# Patient Record
Sex: FEMALE | Race: WHITE | NOT HISPANIC OR LATINO | Employment: OTHER | URBAN - METROPOLITAN AREA
[De-identification: names, ages, dates, MRNs, and addresses within clinical notes are randomized per-mention and may not be internally consistent; named-entity substitution may affect disease eponyms.]

---

## 2022-04-29 ENCOUNTER — HOSPITAL ENCOUNTER (EMERGENCY)
Facility: HOSPITAL | Age: 77
Discharge: ANOTHER HOSPITAL | End: 2022-04-30
Attending: SURGERY
Payer: MEDICARE

## 2022-04-29 DIAGNOSIS — I48.91 ATRIAL FIBRILLATION WITH RVR: Primary | ICD-10-CM

## 2022-04-29 DIAGNOSIS — R42 DIZZINESS: ICD-10-CM

## 2022-04-29 LAB
ALBUMIN SERPL BCP-MCNC: 4.1 G/DL (ref 3.5–5.2)
ALP SERPL-CCNC: 57 U/L (ref 55–135)
ALT SERPL W/O P-5'-P-CCNC: 29 U/L (ref 10–44)
AMORPH CRY URNS QL MICRO: ABNORMAL
AMPHET+METHAMPHET UR QL: NEGATIVE
ANION GAP SERPL CALC-SCNC: 13 MMOL/L (ref 8–16)
AST SERPL-CCNC: 49 U/L (ref 10–40)
BACTERIA #/AREA URNS HPF: ABNORMAL /HPF
BARBITURATES UR QL SCN>200 NG/ML: NEGATIVE
BASOPHILS # BLD AUTO: 0.05 K/UL (ref 0–0.2)
BASOPHILS NFR BLD: 0.6 % (ref 0–1.9)
BENZODIAZ UR QL SCN>200 NG/ML: NEGATIVE
BILIRUB SERPL-MCNC: 0.5 MG/DL (ref 0.1–1)
BILIRUB UR QL STRIP: ABNORMAL
BNP SERPL-MCNC: 278 PG/ML (ref 0–99)
BUN SERPL-MCNC: 28 MG/DL (ref 8–23)
BZE UR QL SCN: NEGATIVE
CALCIUM SERPL-MCNC: 9.9 MG/DL (ref 8.7–10.5)
CANNABINOIDS UR QL SCN: NEGATIVE
CHLORIDE SERPL-SCNC: 92 MMOL/L (ref 95–110)
CK MB SERPL-MCNC: 11.5 NG/ML (ref 0.1–6.5)
CK MB SERPL-RTO: 2.5 % (ref 0–5)
CK SERPL-CCNC: 466 U/L (ref 20–180)
CK SERPL-CCNC: 466 U/L (ref 20–180)
CLARITY UR: CLEAR
CO2 SERPL-SCNC: 25 MMOL/L (ref 23–29)
COLOR UR: YELLOW
CREAT SERPL-MCNC: 1.5 MG/DL (ref 0.5–1.4)
CREAT UR-MCNC: 158.9 MG/DL (ref 15–325)
DIFFERENTIAL METHOD: ABNORMAL
EOSINOPHIL # BLD AUTO: 0 K/UL (ref 0–0.5)
EOSINOPHIL NFR BLD: 0.1 % (ref 0–8)
ERYTHROCYTE [DISTWIDTH] IN BLOOD BY AUTOMATED COUNT: 13.7 % (ref 11.5–14.5)
EST. GFR  (AFRICAN AMERICAN): 38 ML/MIN/1.73 M^2
EST. GFR  (NON AFRICAN AMERICAN): 33 ML/MIN/1.73 M^2
GLUCOSE SERPL-MCNC: 131 MG/DL (ref 70–110)
GLUCOSE UR QL STRIP: NEGATIVE
HCT VFR BLD AUTO: 35.7 % (ref 37–48.5)
HGB BLD-MCNC: 12 G/DL (ref 12–16)
HGB UR QL STRIP: NEGATIVE
HYALINE CASTS #/AREA URNS LPF: 30 /LPF
IMM GRANULOCYTES # BLD AUTO: 0.01 K/UL (ref 0–0.04)
IMM GRANULOCYTES NFR BLD AUTO: 0.1 % (ref 0–0.5)
KETONES UR QL STRIP: ABNORMAL
LEUKOCYTE ESTERASE UR QL STRIP: ABNORMAL
LIPASE SERPL-CCNC: 27 U/L (ref 4–60)
LYMPHOCYTES # BLD AUTO: 2.5 K/UL (ref 1–4.8)
LYMPHOCYTES NFR BLD: 30.7 % (ref 18–48)
MAGNESIUM SERPL-MCNC: 2 MG/DL (ref 1.6–2.6)
MCH RBC QN AUTO: 32.3 PG (ref 27–31)
MCHC RBC AUTO-ENTMCNC: 33.6 G/DL (ref 32–36)
MCV RBC AUTO: 96 FL (ref 82–98)
METHADONE UR QL SCN>300 NG/ML: NEGATIVE
MICROSCOPIC COMMENT: ABNORMAL
MONOCYTES # BLD AUTO: 1.2 K/UL (ref 0.3–1)
MONOCYTES NFR BLD: 14.8 % (ref 4–15)
NEUTROPHILS # BLD AUTO: 4.4 K/UL (ref 1.8–7.7)
NEUTROPHILS NFR BLD: 53.7 % (ref 38–73)
NITRITE UR QL STRIP: NEGATIVE
NRBC BLD-RTO: 0 /100 WBC
OPIATES UR QL SCN: NEGATIVE
PCP UR QL SCN>25 NG/ML: NEGATIVE
PH UR STRIP: 6 [PH] (ref 5–8)
PHOSPHATE SERPL-MCNC: 2.7 MG/DL (ref 2.7–4.5)
PLATELET # BLD AUTO: 256 K/UL (ref 150–450)
PMV BLD AUTO: 10 FL (ref 9.2–12.9)
POTASSIUM SERPL-SCNC: 3.7 MMOL/L (ref 3.5–5.1)
PROT SERPL-MCNC: 7.3 G/DL (ref 6–8.4)
PROT UR QL STRIP: NEGATIVE
RBC # BLD AUTO: 3.71 M/UL (ref 4–5.4)
RBC #/AREA URNS HPF: 1 /HPF (ref 0–4)
SODIUM SERPL-SCNC: 130 MMOL/L (ref 136–145)
SP GR UR STRIP: 1.02 (ref 1–1.03)
SQUAMOUS #/AREA URNS HPF: 1 /HPF
TOXICOLOGY INFORMATION: NORMAL
TROPONIN I SERPL DL<=0.01 NG/ML-MCNC: 0.03 NG/ML (ref 0–0.03)
TSH SERPL DL<=0.005 MIU/L-ACNC: 2.16 UIU/ML (ref 0.4–4)
URN SPEC COLLECT METH UR: ABNORMAL
UROBILINOGEN UR STRIP-ACNC: NEGATIVE EU/DL
WBC # BLD AUTO: 8.12 K/UL (ref 3.9–12.7)
WBC #/AREA URNS HPF: 7 /HPF (ref 0–5)

## 2022-04-29 PROCEDURE — 83880 ASSAY OF NATRIURETIC PEPTIDE: CPT | Performed by: SURGERY

## 2022-04-29 PROCEDURE — 80053 COMPREHEN METABOLIC PANEL: CPT | Performed by: SURGERY

## 2022-04-29 PROCEDURE — 93010 EKG 12-LEAD: ICD-10-PCS | Mod: ,,, | Performed by: INTERNAL MEDICINE

## 2022-04-29 PROCEDURE — 82553 CREATINE MB FRACTION: CPT | Performed by: SURGERY

## 2022-04-29 PROCEDURE — 25000003 PHARM REV CODE 250: Performed by: SURGERY

## 2022-04-29 PROCEDURE — 85025 COMPLETE CBC W/AUTO DIFF WBC: CPT | Performed by: SURGERY

## 2022-04-29 PROCEDURE — 80307 DRUG TEST PRSMV CHEM ANLYZR: CPT | Performed by: SURGERY

## 2022-04-29 PROCEDURE — 84484 ASSAY OF TROPONIN QUANT: CPT | Performed by: SURGERY

## 2022-04-29 PROCEDURE — 96361 HYDRATE IV INFUSION ADD-ON: CPT

## 2022-04-29 PROCEDURE — 96374 THER/PROPH/DIAG INJ IV PUSH: CPT | Mod: 59

## 2022-04-29 PROCEDURE — 83735 ASSAY OF MAGNESIUM: CPT | Performed by: SURGERY

## 2022-04-29 PROCEDURE — 93005 ELECTROCARDIOGRAM TRACING: CPT

## 2022-04-29 PROCEDURE — 63600175 PHARM REV CODE 636 W HCPCS: Performed by: SURGERY

## 2022-04-29 PROCEDURE — 99291 CRITICAL CARE FIRST HOUR: CPT | Mod: 25

## 2022-04-29 PROCEDURE — 96372 THER/PROPH/DIAG INJ SC/IM: CPT | Mod: 59 | Performed by: SURGERY

## 2022-04-29 PROCEDURE — 36415 COLL VENOUS BLD VENIPUNCTURE: CPT | Performed by: SURGERY

## 2022-04-29 PROCEDURE — 83690 ASSAY OF LIPASE: CPT | Performed by: SURGERY

## 2022-04-29 PROCEDURE — 93010 ELECTROCARDIOGRAM REPORT: CPT | Mod: ,,, | Performed by: INTERNAL MEDICINE

## 2022-04-29 PROCEDURE — 81000 URINALYSIS NONAUTO W/SCOPE: CPT | Performed by: SURGERY

## 2022-04-29 PROCEDURE — 84100 ASSAY OF PHOSPHORUS: CPT | Performed by: SURGERY

## 2022-04-29 PROCEDURE — 84443 ASSAY THYROID STIM HORMONE: CPT | Performed by: SURGERY

## 2022-04-29 RX ORDER — DILTIAZEM HYDROCHLORIDE 5 MG/ML
10 INJECTION INTRAVENOUS
Status: COMPLETED | OUTPATIENT
Start: 2022-04-29 | End: 2022-04-29

## 2022-04-29 RX ORDER — ENOXAPARIN SODIUM 100 MG/ML
1 INJECTION SUBCUTANEOUS
Status: COMPLETED | OUTPATIENT
Start: 2022-04-29 | End: 2022-04-29

## 2022-04-29 RX ORDER — SODIUM CHLORIDE 9 MG/ML
INJECTION, SOLUTION INTRAVENOUS
Status: COMPLETED | OUTPATIENT
Start: 2022-04-29 | End: 2022-04-29

## 2022-04-29 RX ORDER — DILTIAZEM HCL 1 MG/ML
5 INJECTION, SOLUTION INTRAVENOUS
Status: COMPLETED | OUTPATIENT
Start: 2022-04-29 | End: 2022-04-29

## 2022-04-29 RX ADMIN — SODIUM CHLORIDE: 0.9 INJECTION, SOLUTION INTRAVENOUS at 09:04

## 2022-04-29 RX ADMIN — DILTIAZEM HYDROCHLORIDE 10 MG: 5 INJECTION INTRAVENOUS at 10:04

## 2022-04-29 RX ADMIN — ENOXAPARIN SODIUM 50 MG: 60 INJECTION SUBCUTANEOUS at 10:04

## 2022-04-29 RX ADMIN — DILTIAZEM HYDROCHLORIDE 5 MG/HR: 5 INJECTION INTRAVENOUS at 11:04

## 2022-04-30 VITALS
DIASTOLIC BLOOD PRESSURE: 63 MMHG | SYSTOLIC BLOOD PRESSURE: 130 MMHG | WEIGHT: 114 LBS | TEMPERATURE: 99 F | BODY MASS INDEX: 21.52 KG/M2 | HEIGHT: 61 IN | RESPIRATION RATE: 23 BRPM | HEART RATE: 95 BPM | OXYGEN SATURATION: 100 %

## 2022-04-30 NOTE — ED PROVIDER NOTES
Encounter Date: 4/29/2022       History     Chief Complaint   Patient presents with    Loss of Consciousness     Patient to ED via EMS after being found outside confused, Patient is unsure how long she was outside     Teresa Leos is a 77 y.o. female presents with confusion this evening at home  Patient was found outside, not acting like herself no obvious stroke-like symptoms  Family states that the patient has a lot of medications, gets confused taking them  Patient has got confused before after mixing up her medications at home per family  Additionally, the patient started to show signs and symptoms of dementia per family  Patient has no focal deficits, no obvious weakness, not acting like herself per family        Review of patient's allergies indicates:  No Known Allergies  Past Medical History:   Diagnosis Date    Hyperlipidemia     Hypertension     Osteoporosis      Past Surgical History:   Procedure Laterality Date    HYSTERECTOMY      LEG SURGERY      SKIN CANCER EXCISION      TONSILLECTOMY, ADENOIDECTOMY       No family history on file.  Social History     Tobacco Use    Smoking status: Current Every Day Smoker     Packs/day: 0.50    Smokeless tobacco: Never Used   Substance Use Topics    Alcohol use: No    Drug use: No     Review of Systems   Constitutional: Negative.    HENT: Negative.    Eyes: Negative.    Respiratory: Negative.    Cardiovascular: Negative.    Gastrointestinal: Negative.    Genitourinary: Negative.    Musculoskeletal: Negative.    Skin: Negative.    Neurological: Positive for dizziness.   Psychiatric/Behavioral: Positive for confusion.       Physical Exam     Initial Vitals   BP Pulse Resp Temp SpO2   04/29/22 2110 04/29/22 2132 04/29/22 2207 04/29/22 2206 04/29/22 2132   135/77 (!) 165 (!) 24 98.9 °F (37.2 °C) (!) 93 %      MAP       --                Physical Exam    Nursing note and vitals reviewed.  Constitutional: She appears well-developed and well-nourished.    HENT:   Head: Normocephalic and atraumatic.   Right Ear: External ear normal.   Left Ear: External ear normal.   Nose: Nose normal.   Mouth/Throat: Oropharynx is clear and moist.   Eyes: Conjunctivae and EOM are normal. Pupils are equal, round, and reactive to light.   Neck:   Normal range of motion.  Cardiovascular:   Irregular tachycardia consistent with atrial fibrillation   Pulmonary/Chest: Breath sounds normal.   Abdominal: Abdomen is soft. Bowel sounds are normal.   Musculoskeletal:         General: Normal range of motion.      Cervical back: Normal range of motion.     Neurological: She is alert and oriented to person, place, and time.   Skin: Skin is warm. Capillary refill takes less than 2 seconds.         ED Course   Procedures  Labs Reviewed   COMPREHENSIVE METABOLIC PANEL - Abnormal; Notable for the following components:       Result Value    Sodium 130 (*)     Chloride 92 (*)     Glucose 131 (*)     BUN 28 (*)     Creatinine 1.5 (*)     AST 49 (*)     eGFR if  38 (*)     eGFR if non  33 (*)     All other components within normal limits   TROPONIN I - Abnormal; Notable for the following components:    Troponin I 0.027 (*)     All other components within normal limits   CK-MB - Abnormal; Notable for the following components:     (*)     CPK MB 11.5 (*)     All other components within normal limits   CK - Abnormal; Notable for the following components:     (*)     All other components within normal limits   CBC W/ AUTO DIFFERENTIAL - Abnormal; Notable for the following components:    RBC 3.71 (*)     Hematocrit 35.7 (*)     MCH 32.3 (*)     Mono # 1.2 (*)     All other components within normal limits   B-TYPE NATRIURETIC PEPTIDE - Abnormal; Notable for the following components:     (*)     All other components within normal limits   URINALYSIS, REFLEX TO URINE CULTURE - Abnormal; Notable for the following components:    Ketones, UA 1+ (*)     Bilirubin  (UA) 1+ (*)     Leukocytes, UA Trace (*)     All other components within normal limits    Narrative:     Specimen Source->Urine   URINALYSIS MICROSCOPIC - Abnormal; Notable for the following components:    WBC, UA 7 (*)     Bacteria Few (*)     Hyaline Casts, UA 30 (*)     All other components within normal limits    Narrative:     Specimen Source->Urine   DRUG SCREEN PANEL, URINE EMERGENCY    Narrative:     Specimen Source->Urine   PHOSPHORUS   MAGNESIUM   TSH   LIPASE          Imaging Results          CT Head Without Contrast (In process)  Result time 04/29/22 22:29:02               X-Ray Chest 1 View (Final result)  Result time 04/29/22 22:13:33    Final result by Darren Urbina MD (04/29/22 22:13:33)                 Impression:      No acute process.      Electronically signed by: Darren Urbina MD  Date:    04/29/2022  Time:    22:13             Narrative:    EXAMINATION:  XR CHEST 1 VIEW    CLINICAL HISTORY:  Chest pain.    TECHNIQUE:  Single frontal view of the chest was performed.    COMPARISON:  06/16/2016.    FINDINGS:  Monitoring EKG leads are present.  The trachea is unremarkable.  There are calcifications of the aortic knob.  The cardiomediastinal silhouette is within normal limits.  The hemidiaphragms are unremarkable.  There are no pleural effusions.  There is no evidence of a pneumothorax.  There is no evidence of pneumomediastinum.  There multiple stable calcified pulmonary nodules.  No focal consolidations identified.  There are degenerative changes in the osseous structures.                                 Medications   diltiaZEM injection 10 mg (has no administration in time range)   diltiaZEM 125 mg in D5W 125 mL infusion (has no administration in time range)   enoxaparin injection 50 mg (has no administration in time range)   0.9%  NaCl infusion ( Intravenous New Bag 4/29/22 0885)     Critical Care ED Physician Time (minutes):  -- Performed by: Boris Schwab M.D.  -- Date/Time: 10:32 PM 4/29/2022    -- Direct Patient Care (Face Time): 5  -- Additional History from Records or Taking Additional History: 5  -- Ordering, Reviewing, and Interpreting Diagnostic Studies: 5  -- Total Time in Documentation: 11  -- Consultation with Other Physicians: 5  -- Consultation with Family Related to Condition: 0  -- Total Critical Care Time: 31  -- Critical care was necessary to treat atrial fibrillation RVR  -- Critical care was time spent personally by me on the following activities:   -- blood draw for specimens discussions with consultants,   -- development of treatment plan with patient or surrogate,   -- examination of patient, ordering and performing treatments   -- review of radiographic studies, re-evaluation of pt's condition  -- review of labs and evaluation of response to treatment      Medical Decision Making:   Initial Assessment:   Confusion outside tonight, patient not acting like herself  Family is worried she is not taking her medications appropriately  Patient is showing increasing signs of dementia per her family  Patient has a heart rate in the 160s, no previous history of arrhythmia    Differential Diagnosis:   Arrhythmia, confusion, stroke, metabolic encephalopathy, UTI    Clinical Tests:   Lab Tests: Ordered and Reviewed  Radiological Study: Ordered and Reviewed  Medical Tests: Ordered and Reviewed    ED Management:  Patient with dizziness confusion and tachycardia on arrival tonight  Patient is in atrial fibrillation RVR, no previous history of atrial fibrillation  Patient has no UTI, has no other obvious metabolic issues on evaluation  Lovenox administered, IV Cardizem started, will seek transfer for evaluation  Patient needs higher level care and cardiology evaluation going forward                      Clinical Impression:   Final diagnoses:  [R42] Dizziness  [I48.91] Atrial fibrillation with RVR (Primary)          ED Disposition Condition    Transfer to Another Facility [facility]  MD Josselin  04/29/22 6253

## 2022-04-30 NOTE — ED NOTES
Patient able to state name and that she is in the hospital.  Patient unable to answer medical history, prescriber, allergy questions appropriately.  Family denies history of afib

## 2022-04-30 NOTE — ED NOTES
aasi here for transport to [facility] Emergency Room, IV Diltiazem, no acute distress noted. Breaths even and unlabored.

## 2022-04-30 NOTE — ED NOTES
Bed: ED 05  Expected date: 4/29/22  Expected time: 9:00 PM  Means of arrival: Ambulance Service  Comments:

## 2022-05-04 ENCOUNTER — HOSPITAL ENCOUNTER (EMERGENCY)
Facility: HOSPITAL | Age: 77
Discharge: ANOTHER HOSPITAL | End: 2022-05-04
Attending: SURGERY
Payer: MEDICARE

## 2022-05-04 ENCOUNTER — HOSPITAL ENCOUNTER (INPATIENT)
Facility: HOSPITAL | Age: 77
LOS: 20 days | Discharge: NURSING, REHAB OR LONG-TERM CARE | DRG: 100 | End: 2022-05-25
Attending: EMERGENCY MEDICINE | Admitting: PSYCHIATRY & NEUROLOGY
Payer: MEDICARE

## 2022-05-04 VITALS
SYSTOLIC BLOOD PRESSURE: 100 MMHG | TEMPERATURE: 96 F | HEIGHT: 61 IN | DIASTOLIC BLOOD PRESSURE: 58 MMHG | RESPIRATION RATE: 13 BRPM | HEART RATE: 119 BPM | WEIGHT: 113.56 LBS | BODY MASS INDEX: 21.44 KG/M2 | OXYGEN SATURATION: 100 %

## 2022-05-04 DIAGNOSIS — R56.9 SEIZURE: ICD-10-CM

## 2022-05-04 DIAGNOSIS — R56.9 FOCAL SEIZURES: ICD-10-CM

## 2022-05-04 DIAGNOSIS — R56.9 SEIZURE-LIKE ACTIVITY: ICD-10-CM

## 2022-05-04 DIAGNOSIS — R56.9 SEIZURE-LIKE ACTIVITY: Primary | ICD-10-CM

## 2022-05-04 DIAGNOSIS — I95.9 HYPOTENSION, UNSPECIFIED HYPOTENSION TYPE: ICD-10-CM

## 2022-05-04 DIAGNOSIS — K21.9 GASTROESOPHAGEAL REFLUX DISEASE WITHOUT ESOPHAGITIS: ICD-10-CM

## 2022-05-04 DIAGNOSIS — I48.91 A-FIB: ICD-10-CM

## 2022-05-04 DIAGNOSIS — J96.01 ACUTE HYPOXEMIC RESPIRATORY FAILURE: ICD-10-CM

## 2022-05-04 DIAGNOSIS — I48.91 ATRIAL FIBRILLATION WITH RVR: ICD-10-CM

## 2022-05-04 DIAGNOSIS — R56.9 FOCAL SEIZURE: ICD-10-CM

## 2022-05-04 DIAGNOSIS — N30.00 ACUTE CYSTITIS WITHOUT HEMATURIA: ICD-10-CM

## 2022-05-04 DIAGNOSIS — G40.901 STATUS EPILEPTICUS: ICD-10-CM

## 2022-05-04 DIAGNOSIS — R41.82 ALTERED MENTAL STATUS, UNSPECIFIED ALTERED MENTAL STATUS TYPE: Primary | ICD-10-CM

## 2022-05-04 DIAGNOSIS — R07.9 CHEST PAIN: ICD-10-CM

## 2022-05-04 DIAGNOSIS — F03.90 DEMENTIA WITHOUT BEHAVIORAL DISTURBANCE, UNSPECIFIED DEMENTIA TYPE: ICD-10-CM

## 2022-05-04 DIAGNOSIS — E87.70 VOLUME OVERLOAD: ICD-10-CM

## 2022-05-04 PROBLEM — E78.5 HYPERLIPIDEMIA: Status: ACTIVE | Noted: 2022-05-04

## 2022-05-04 PROBLEM — M25.559 GREATER TROCHANTERIC PAIN SYNDROME: Status: ACTIVE | Noted: 2022-05-04

## 2022-05-04 PROBLEM — M16.9 OSTEOARTHRITIS OF HIP: Status: ACTIVE | Noted: 2022-05-04

## 2022-05-04 PROBLEM — S82.209A CLOSED FRACTURE OF TIBIA: Status: ACTIVE | Noted: 2022-05-04

## 2022-05-04 PROBLEM — M86.9 OSTEOMYELITIS OF LOWER LEG: Status: ACTIVE | Noted: 2022-05-04

## 2022-05-04 PROBLEM — E78.49 OTHER HYPERLIPIDEMIA: Status: ACTIVE | Noted: 2022-05-04

## 2022-05-04 PROBLEM — A04.8 HELICOBACTER PYLORI GASTROINTESTINAL TRACT INFECTION: Status: ACTIVE | Noted: 2022-05-04

## 2022-05-04 PROBLEM — I83.90 VARICOSE VEINS OF LOWER EXTREMITY: Status: ACTIVE | Noted: 2022-05-04

## 2022-05-04 PROBLEM — M71.9 DISORDER OF BURSAE OF SHOULDER REGION: Status: ACTIVE | Noted: 2022-05-04

## 2022-05-04 PROBLEM — B35.3: Status: ACTIVE | Noted: 2022-05-04

## 2022-05-04 PROBLEM — M81.0 OSTEOPOROSIS: Status: ACTIVE | Noted: 2022-05-04

## 2022-05-04 LAB
ALBUMIN SERPL BCP-MCNC: 3.6 G/DL (ref 3.5–5.2)
ALLENS TEST: ABNORMAL
ALLENS TEST: ABNORMAL
ALP SERPL-CCNC: 51 U/L (ref 55–135)
ALT SERPL W/O P-5'-P-CCNC: 28 U/L (ref 10–44)
AMPHET+METHAMPHET UR QL: NEGATIVE
ANION GAP SERPL CALC-SCNC: 18 MMOL/L (ref 8–16)
APTT BLDCRRT: 25.6 SEC (ref 21–32)
AST SERPL-CCNC: 45 U/L (ref 10–40)
BARBITURATES UR QL SCN>200 NG/ML: NEGATIVE
BASOPHILS # BLD AUTO: 0.08 K/UL (ref 0–0.2)
BASOPHILS NFR BLD: 0.9 % (ref 0–1.9)
BENZODIAZ UR QL SCN>200 NG/ML: ABNORMAL
BILIRUB SERPL-MCNC: 0.5 MG/DL (ref 0.1–1)
BILIRUB UR QL STRIP: NEGATIVE
BNP SERPL-MCNC: 490 PG/ML (ref 0–99)
BUN SERPL-MCNC: 22 MG/DL (ref 8–23)
BZE UR QL SCN: NEGATIVE
CALCIUM SERPL-MCNC: 9.4 MG/DL (ref 8.7–10.5)
CANNABINOIDS UR QL SCN: NEGATIVE
CHLORIDE SERPL-SCNC: 95 MMOL/L (ref 95–110)
CK MB SERPL-MCNC: 5.5 NG/ML (ref 0.1–6.5)
CK MB SERPL-RTO: 2.8 % (ref 0–5)
CK SERPL-CCNC: 196 U/L (ref 20–180)
CK SERPL-CCNC: 196 U/L (ref 20–180)
CLARITY UR: CLEAR
CO2 SERPL-SCNC: 17 MMOL/L (ref 23–29)
COLOR UR: YELLOW
CREAT SERPL-MCNC: 1.3 MG/DL (ref 0.5–1.4)
CREAT UR-MCNC: 65.8 MG/DL (ref 15–325)
DELSYS: ABNORMAL
DELSYS: ABNORMAL
DIFFERENTIAL METHOD: ABNORMAL
EOSINOPHIL # BLD AUTO: 0.1 K/UL (ref 0–0.5)
EOSINOPHIL NFR BLD: 0.5 % (ref 0–8)
ERYTHROCYTE [DISTWIDTH] IN BLOOD BY AUTOMATED COUNT: 14 % (ref 11.5–14.5)
EST. GFR  (AFRICAN AMERICAN): 45 ML/MIN/1.73 M^2
EST. GFR  (NON AFRICAN AMERICAN): 39 ML/MIN/1.73 M^2
GLUCOSE SERPL-MCNC: 113 MG/DL (ref 70–110)
GLUCOSE UR QL STRIP: NEGATIVE
HCO3 UR-SCNC: 21.8 MMOL/L (ref 24–28)
HCT VFR BLD AUTO: 37 % (ref 37–48.5)
HGB BLD-MCNC: 12.2 G/DL (ref 12–16)
HGB UR QL STRIP: NEGATIVE
IMM GRANULOCYTES # BLD AUTO: 0.01 K/UL (ref 0–0.04)
IMM GRANULOCYTES NFR BLD AUTO: 0.1 % (ref 0–0.5)
INR PPP: 1.2 (ref 0.8–1.2)
KETONES UR QL STRIP: NEGATIVE
LACTATE SERPL-SCNC: 5.1 MMOL/L (ref 0.5–2.2)
LDH SERPL L TO P-CCNC: 2.27 MMOL/L (ref 0.5–2.2)
LEUKOCYTE ESTERASE UR QL STRIP: NEGATIVE
LYMPHOCYTES # BLD AUTO: 4.8 K/UL (ref 1–4.8)
LYMPHOCYTES NFR BLD: 50.9 % (ref 18–48)
MCH RBC QN AUTO: 32.6 PG (ref 27–31)
MCHC RBC AUTO-ENTMCNC: 33 G/DL (ref 32–36)
MCV RBC AUTO: 99 FL (ref 82–98)
METHADONE UR QL SCN>300 NG/ML: NEGATIVE
MODE: ABNORMAL
MODE: ABNORMAL
MONOCYTES # BLD AUTO: 1.2 K/UL (ref 0.3–1)
MONOCYTES NFR BLD: 12.3 % (ref 4–15)
NEUTROPHILS # BLD AUTO: 3.3 K/UL (ref 1.8–7.7)
NEUTROPHILS NFR BLD: 35.3 % (ref 38–73)
NITRITE UR QL STRIP: NEGATIVE
NRBC BLD-RTO: 0 /100 WBC
OPIATES UR QL SCN: NEGATIVE
PCO2 BLDA: 43.7 MMHG (ref 35–45)
PCP UR QL SCN>25 NG/ML: NEGATIVE
PH SMN: 7.31 [PH] (ref 7.35–7.45)
PH UR STRIP: 6 [PH] (ref 5–8)
PLATELET # BLD AUTO: 231 K/UL (ref 150–450)
PMV BLD AUTO: 10.5 FL (ref 9.2–12.9)
PO2 BLDA: 31 MMHG (ref 40–60)
POC BE: -4 MMOL/L
POC SATURATED O2: 53 % (ref 95–100)
POC TCO2: 23 MMOL/L (ref 24–29)
POTASSIUM SERPL-SCNC: 4.9 MMOL/L (ref 3.5–5.1)
PROT SERPL-MCNC: 7.1 G/DL (ref 6–8.4)
PROT UR QL STRIP: NEGATIVE
PROTHROMBIN TIME: 11.9 SEC (ref 9–12.5)
RBC # BLD AUTO: 3.74 M/UL (ref 4–5.4)
SAMPLE: ABNORMAL
SAMPLE: ABNORMAL
SARS-COV-2 RDRP RESP QL NAA+PROBE: NEGATIVE
SITE: ABNORMAL
SITE: ABNORMAL
SODIUM SERPL-SCNC: 130 MMOL/L (ref 136–145)
SP GR UR STRIP: 1.02 (ref 1–1.03)
TOXICOLOGY INFORMATION: ABNORMAL
TROPONIN I SERPL DL<=0.01 NG/ML-MCNC: <0.006 NG/ML (ref 0–0.03)
URN SPEC COLLECT METH UR: NORMAL
UROBILINOGEN UR STRIP-ACNC: NEGATIVE EU/DL
WBC # BLD AUTO: 9.4 K/UL (ref 3.9–12.7)

## 2022-05-04 PROCEDURE — 93010 ELECTROCARDIOGRAM REPORT: CPT | Mod: ,,, | Performed by: INTERNAL MEDICINE

## 2022-05-04 PROCEDURE — 85610 PROTHROMBIN TIME: CPT | Performed by: SURGERY

## 2022-05-04 PROCEDURE — G0378 HOSPITAL OBSERVATION PER HR: HCPCS

## 2022-05-04 PROCEDURE — 96365 THER/PROPH/DIAG IV INF INIT: CPT

## 2022-05-04 PROCEDURE — 99291 CRITICAL CARE FIRST HOUR: CPT | Mod: ,,, | Performed by: EMERGENCY MEDICINE

## 2022-05-04 PROCEDURE — 99291 PR CRITICAL CARE, E/M 30-74 MINUTES: ICD-10-PCS | Mod: ,,, | Performed by: EMERGENCY MEDICINE

## 2022-05-04 PROCEDURE — 25000003 PHARM REV CODE 250: Performed by: SURGERY

## 2022-05-04 PROCEDURE — 93005 ELECTROCARDIOGRAM TRACING: CPT

## 2022-05-04 PROCEDURE — 99285 EMERGENCY DEPT VISIT HI MDM: CPT | Mod: 25

## 2022-05-04 PROCEDURE — 99285 EMERGENCY DEPT VISIT HI MDM: CPT | Mod: 25,27

## 2022-05-04 PROCEDURE — 93010 EKG 12-LEAD: ICD-10-PCS | Mod: ,,, | Performed by: INTERNAL MEDICINE

## 2022-05-04 PROCEDURE — 25000003 PHARM REV CODE 250: Performed by: HOSPITALIST

## 2022-05-04 PROCEDURE — 99900035 HC TECH TIME PER 15 MIN (STAT)

## 2022-05-04 PROCEDURE — 96374 THER/PROPH/DIAG INJ IV PUSH: CPT

## 2022-05-04 PROCEDURE — 63600175 PHARM REV CODE 636 W HCPCS: Performed by: SURGERY

## 2022-05-04 PROCEDURE — 99291 CRITICAL CARE FIRST HOUR: CPT | Mod: 25

## 2022-05-04 PROCEDURE — 96360 HYDRATION IV INFUSION INIT: CPT | Mod: 59

## 2022-05-04 PROCEDURE — 85730 THROMBOPLASTIN TIME PARTIAL: CPT | Performed by: SURGERY

## 2022-05-04 PROCEDURE — 25000003 PHARM REV CODE 250: Performed by: EMERGENCY MEDICINE

## 2022-05-04 PROCEDURE — 82803 BLOOD GASES ANY COMBINATION: CPT

## 2022-05-04 PROCEDURE — 80053 COMPREHEN METABOLIC PANEL: CPT | Performed by: SURGERY

## 2022-05-04 PROCEDURE — 81003 URINALYSIS AUTO W/O SCOPE: CPT | Mod: 59 | Performed by: SURGERY

## 2022-05-04 PROCEDURE — 36415 COLL VENOUS BLD VENIPUNCTURE: CPT | Performed by: SURGERY

## 2022-05-04 PROCEDURE — 83880 ASSAY OF NATRIURETIC PEPTIDE: CPT | Performed by: SURGERY

## 2022-05-04 PROCEDURE — 84484 ASSAY OF TROPONIN QUANT: CPT | Performed by: SURGERY

## 2022-05-04 PROCEDURE — 83605 ASSAY OF LACTIC ACID: CPT

## 2022-05-04 PROCEDURE — 87040 BLOOD CULTURE FOR BACTERIA: CPT | Performed by: SURGERY

## 2022-05-04 PROCEDURE — U0002 COVID-19 LAB TEST NON-CDC: HCPCS | Performed by: SURGERY

## 2022-05-04 PROCEDURE — 63600175 PHARM REV CODE 636 W HCPCS: Performed by: HOSPITALIST

## 2022-05-04 PROCEDURE — 96361 HYDRATE IV INFUSION ADD-ON: CPT

## 2022-05-04 PROCEDURE — 63600175 PHARM REV CODE 636 W HCPCS: Performed by: EMERGENCY MEDICINE

## 2022-05-04 PROCEDURE — 82553 CREATINE MB FRACTION: CPT | Performed by: SURGERY

## 2022-05-04 PROCEDURE — 99220 PR INITIAL OBSERVATION CARE,LEVL III: CPT | Mod: ,,, | Performed by: HOSPITALIST

## 2022-05-04 PROCEDURE — 85025 COMPLETE CBC W/AUTO DIFF WBC: CPT | Performed by: SURGERY

## 2022-05-04 PROCEDURE — 80307 DRUG TEST PRSMV CHEM ANLYZR: CPT | Performed by: SURGERY

## 2022-05-04 PROCEDURE — 99220 PR INITIAL OBSERVATION CARE,LEVL III: ICD-10-PCS | Mod: ,,, | Performed by: HOSPITALIST

## 2022-05-04 PROCEDURE — 83605 ASSAY OF LACTIC ACID: CPT | Performed by: SURGERY

## 2022-05-04 RX ORDER — LEVETIRACETAM 500 MG/1
500 TABLET ORAL DAILY
Status: DISCONTINUED | OUTPATIENT
Start: 2022-05-05 | End: 2022-05-05

## 2022-05-04 RX ORDER — ASPIRIN 81 MG/1
81 TABLET ORAL DAILY
Status: DISCONTINUED | OUTPATIENT
Start: 2022-05-05 | End: 2022-05-06

## 2022-05-04 RX ORDER — DONEPEZIL HYDROCHLORIDE 5 MG/1
5 TABLET, FILM COATED ORAL NIGHTLY
Status: DISCONTINUED | OUTPATIENT
Start: 2022-05-04 | End: 2022-05-25 | Stop reason: HOSPADM

## 2022-05-04 RX ORDER — METOPROLOL SUCCINATE 25 MG/1
25 TABLET, EXTENDED RELEASE ORAL 2 TIMES DAILY
Status: ON HOLD | COMMUNITY
Start: 2022-05-02 | End: 2022-05-24 | Stop reason: HOSPADM

## 2022-05-04 RX ORDER — ATORVASTATIN CALCIUM 20 MG/1
40 TABLET, FILM COATED ORAL NIGHTLY
Status: DISCONTINUED | OUTPATIENT
Start: 2022-05-04 | End: 2022-05-06

## 2022-05-04 RX ORDER — ATORVASTATIN CALCIUM 40 MG/1
40 TABLET, FILM COATED ORAL NIGHTLY
Status: ON HOLD | COMMUNITY
End: 2022-05-08 | Stop reason: ALTCHOICE

## 2022-05-04 RX ORDER — ASPIRIN 81 MG/1
81 TABLET ORAL DAILY
Status: ON HOLD | COMMUNITY
End: 2022-10-12 | Stop reason: HOSPADM

## 2022-05-04 RX ORDER — METOPROLOL TARTRATE 25 MG/1
25 TABLET, FILM COATED ORAL EVERY 8 HOURS
Status: DISCONTINUED | OUTPATIENT
Start: 2022-05-05 | End: 2022-05-05

## 2022-05-04 RX ORDER — VITAMIN E 268 MG
400 CAPSULE ORAL DAILY
Status: ON HOLD | COMMUNITY
End: 2022-05-24 | Stop reason: HOSPADM

## 2022-05-04 RX ORDER — SUCCINYLCHOLINE CHLORIDE 20 MG/ML
INJECTION INTRAMUSCULAR; INTRAVENOUS
Status: DISCONTINUED
Start: 2022-05-04 | End: 2022-05-04 | Stop reason: WASHOUT

## 2022-05-04 RX ORDER — PANTOPRAZOLE SODIUM 40 MG/1
40 TABLET, DELAYED RELEASE ORAL DAILY
Status: DISCONTINUED | OUTPATIENT
Start: 2022-05-05 | End: 2022-05-08

## 2022-05-04 RX ORDER — APIXABAN 5 MG/1
5 TABLET, FILM COATED ORAL 2 TIMES DAILY
COMMUNITY
Start: 2022-05-02

## 2022-05-04 RX ORDER — AMLODIPINE BESYLATE 5 MG/1
2.5 TABLET ORAL DAILY
COMMUNITY
Start: 2022-04-15 | End: 2022-08-09

## 2022-05-04 RX ORDER — ACETAMINOPHEN 325 MG/1
650 TABLET ORAL
Status: COMPLETED | OUTPATIENT
Start: 2022-05-04 | End: 2022-05-04

## 2022-05-04 RX ORDER — ETOMIDATE 2 MG/ML
20 INJECTION INTRAVENOUS
Status: DISCONTINUED | OUTPATIENT
Start: 2022-05-04 | End: 2022-05-04 | Stop reason: HOSPADM

## 2022-05-04 RX ORDER — SUCCINYLCHOLINE CHLORIDE 20 MG/ML
200 INJECTION INTRAMUSCULAR; INTRAVENOUS
Status: DISCONTINUED | OUTPATIENT
Start: 2022-05-04 | End: 2022-05-04 | Stop reason: HOSPADM

## 2022-05-04 RX ORDER — OMEPRAZOLE 40 MG/1
40 CAPSULE, DELAYED RELEASE ORAL DAILY
Status: ON HOLD | COMMUNITY
Start: 2022-04-15 | End: 2022-08-09 | Stop reason: HOSPADM

## 2022-05-04 RX ORDER — DONEPEZIL HYDROCHLORIDE 5 MG/1
5 TABLET, FILM COATED ORAL NIGHTLY
Status: ON HOLD | COMMUNITY
Start: 2022-05-02 | End: 2022-10-12 | Stop reason: HOSPADM

## 2022-05-04 RX ORDER — TALC
6 POWDER (GRAM) TOPICAL NIGHTLY PRN
Status: DISCONTINUED | OUTPATIENT
Start: 2022-05-04 | End: 2022-05-25 | Stop reason: HOSPADM

## 2022-05-04 RX ORDER — VITAMIN E 268 MG
400 CAPSULE ORAL DAILY
Status: DISCONTINUED | OUTPATIENT
Start: 2022-05-05 | End: 2022-05-05

## 2022-05-04 RX ORDER — ETOMIDATE 2 MG/ML
INJECTION INTRAVENOUS
Status: DISCONTINUED
Start: 2022-05-04 | End: 2022-05-04 | Stop reason: WASHOUT

## 2022-05-04 RX ORDER — METOPROLOL TARTRATE 1 MG/ML
5 INJECTION, SOLUTION INTRAVENOUS
Status: COMPLETED | OUTPATIENT
Start: 2022-05-04 | End: 2022-05-04

## 2022-05-04 RX ORDER — LEVETIRACETAM 5 MG/ML
2000 INJECTION INTRAVASCULAR
Status: COMPLETED | OUTPATIENT
Start: 2022-05-04 | End: 2022-05-04

## 2022-05-04 RX ORDER — IRBESARTAN AND HYDROCHLOROTHIAZIDE 300; 12.5 MG/1; MG/1
1 TABLET, FILM COATED ORAL DAILY
COMMUNITY
Start: 2022-04-15 | End: 2022-05-24

## 2022-05-04 RX ORDER — SODIUM CHLORIDE 0.9 % (FLUSH) 0.9 %
10 SYRINGE (ML) INJECTION
Status: DISCONTINUED | OUTPATIENT
Start: 2022-05-04 | End: 2022-05-25 | Stop reason: HOSPADM

## 2022-05-04 RX ORDER — CIPROFLOXACIN 500 MG/1
500 TABLET ORAL 2 TIMES DAILY
Status: ON HOLD | COMMUNITY
Start: 2022-05-03 | End: 2022-05-06 | Stop reason: SINTOL

## 2022-05-04 RX ADMIN — SODIUM CHLORIDE 1000 ML: 0.9 INJECTION, SOLUTION INTRAVENOUS at 05:05

## 2022-05-04 RX ADMIN — METOROPROLOL TARTRATE 5 MG: 5 INJECTION, SOLUTION INTRAVENOUS at 07:05

## 2022-05-04 RX ADMIN — ACETAMINOPHEN 650 MG: 325 TABLET ORAL at 09:05

## 2022-05-04 RX ADMIN — SODIUM CHLORIDE, SODIUM LACTATE, POTASSIUM CHLORIDE, AND CALCIUM CHLORIDE 500 ML: .6; .31; .03; .02 INJECTION, SOLUTION INTRAVENOUS at 07:05

## 2022-05-04 RX ADMIN — SODIUM CHLORIDE 1000 ML: 0.9 SOLUTION INTRAVENOUS at 04:05

## 2022-05-04 RX ADMIN — LEVETIRACETAM 2000 MG: 5 INJECTION INTRAVENOUS at 03:05

## 2022-05-04 RX ADMIN — ATORVASTATIN CALCIUM 40 MG: 40 TABLET, FILM COATED ORAL at 11:05

## 2022-05-04 RX ADMIN — DONEPEZIL HYDROCHLORIDE 5 MG: 5 TABLET ORAL at 11:05

## 2022-05-04 RX ADMIN — CEFTRIAXONE 1 G: 1 INJECTION, SOLUTION INTRAVENOUS at 11:05

## 2022-05-04 RX ADMIN — APIXABAN 5 MG: 5 TABLET, FILM COATED ORAL at 11:05

## 2022-05-04 RX ADMIN — SODIUM CHLORIDE, SODIUM LACTATE, POTASSIUM CHLORIDE, AND CALCIUM CHLORIDE 500 ML: .6; .31; .03; .02 INJECTION, SOLUTION INTRAVENOUS at 11:05
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
## 2022-05-04 NOTE — CONSULTS
Telestroke Brief Note    Patient with several seizures en route.  Stuporous on my exam, though gaze deviation has resolved and moves L side spontaneously.  CTH reviewed - unremarkable.  Recommend treating as seizures and obtaining CTA head/neck when stable.    Dariela Mendieta MD  Vascular Neurology

## 2022-05-04 NOTE — ED NOTES
Pt awake, alert and oriented, able to give name, , oriented to place. Able to squeeze RN hand with both hands. Open mouth, say AHH, smile. Pt still a little sleepy but is aware of what is going on.

## 2022-05-04 NOTE — ED TRIAGE NOTES
77 y.o. female presents to ER ED 06/ED 06   Chief Complaint   Patient presents with    Cerebrovascular Accident   .   Here per [facility] with stroke like symptoms, LKW 1410 today, possible seizure like activity pta

## 2022-05-04 NOTE — ED PROVIDER NOTES
Encounter Date: 5/4/2022       History     Chief Complaint   Patient presents with    Cerebrovascular Accident     Teresa Leos is a 77 y.o. female presents with altered mental status today  Patient was talking on the phone with a loved one with her son at bedside today  At 2:00 p.m., the patient went nonverbal and started staring to the left immediately  911 was called in the patient was reported to have a facial droop with staring  Has had similar episodes previously which resolved quickly/happened last month  Patient arrived here from over 45 minutes away at approximately 3:00 p.m. today  Patient is staring to the left on arrival immediately brought to CT for evaluation  Telemedicine stroke consult immediately ordered for evaluation of CVA vs other          Review of patient's allergies indicates:  No Known Allergies  Past Medical History:   Diagnosis Date    Hyperlipidemia     Hypertension     Osteoporosis      Past Surgical History:   Procedure Laterality Date    HYSTERECTOMY      LEG SURGERY      SKIN CANCER EXCISION      TONSILLECTOMY, ADENOIDECTOMY       No family history on file.  Social History     Tobacco Use    Smoking status: Current Every Day Smoker     Packs/day: 0.50    Smokeless tobacco: Never Used   Substance Use Topics    Alcohol use: No    Drug use: No     Review of Systems   Unable to perform ROS: Acuity of condition   Constitutional: Negative.    HENT: Negative.    Eyes: Negative.    Respiratory: Negative.    Cardiovascular: Negative.    Gastrointestinal: Negative.    Genitourinary: Negative.    Musculoskeletal: Negative.    Skin: Negative.    Neurological: Negative.    Psychiatric/Behavioral: Negative.        Physical Exam     Initial Vitals   BP Pulse Resp Temp SpO2   05/04/22 1532 05/04/22 1516 05/04/22 1526 -- 05/04/22 1516   (!) 65/32 (!) 114 (!) 23  96 %      MAP       --                Physical Exam    Nursing note and vitals reviewed.  Constitutional: She appears  well-developed and well-nourished.   HENT:   Head: Normocephalic and atraumatic.   Right Ear: External ear normal.   Left Ear: External ear normal.   Nose: Nose normal.   Mouth/Throat: Oropharynx is clear and moist.   Eyes: Conjunctivae and EOM are normal. Pupils are equal, round, and reactive to light.   Neck: Neck supple.   Normal range of motion.  Cardiovascular: Normal rate, regular rhythm, normal heart sounds and intact distal pulses.   Pulmonary/Chest: Breath sounds normal.   Abdominal: Abdomen is soft. Bowel sounds are normal.   Musculoskeletal:      Cervical back: Normal range of motion and neck supple.     Neurological:   Patient appears to be slightly obtunded but moans in response to pain, GCS 11   Skin: Skin is warm. Capillary refill takes less than 2 seconds.         ED Course   Procedures  Labs Reviewed   CBC W/ AUTO DIFFERENTIAL - Abnormal; Notable for the following components:       Result Value    RBC 3.74 (*)     MCV 99 (*)     MCH 32.6 (*)     Mono # 1.2 (*)     Gran % 35.3 (*)     Lymph % 50.9 (*)     All other components within normal limits   COMPREHENSIVE METABOLIC PANEL - Abnormal; Notable for the following components:    Sodium 130 (*)     CO2 17 (*)     Glucose 113 (*)     Alkaline Phosphatase 51 (*)     AST 45 (*)     Anion Gap 18 (*)     eGFR if  45 (*)     eGFR if non  39 (*)     All other components within normal limits   CK-MB - Abnormal; Notable for the following components:     (*)     All other components within normal limits   CK - Abnormal; Notable for the following components:     (*)     All other components within normal limits   B-TYPE NATRIURETIC PEPTIDE - Abnormal; Notable for the following components:     (*)     All other components within normal limits   DRUG SCREEN PANEL, URINE EMERGENCY - Abnormal; Notable for the following components:    Benzodiazepines Presumptive Positive (*)     All other components within normal  limits    Narrative:     Specimen Source->Urine   LACTIC ACID, PLASMA - Abnormal; Notable for the following components:    Lactate (Lactic Acid) 5.1 (*)     All other components within normal limits    Narrative:     Lactic Acid  critical result(s) called and verbal readback obtained   from Gaby Crump RN by AKBlayne 05/04/2022 16:17   CULTURE, BLOOD   CULTURE, BLOOD   SARS-COV-2 RNA AMPLIFICATION, QUAL   TROPONIN I   PROTIME-INR   APTT   URINALYSIS, REFLEX TO URINE CULTURE    Narrative:     Specimen Source->Urine   LACTIC ACID, PLASMA     EKG Readings: (Independently Interpreted)   Initial Reading: No STEMI. Previous EKG: Compared with most recent EKG Rhythm: Atrial Fibrillation. Heart Rate: 129. Ectopy: No Ectopy Rare. ST Segments: Normal ST Segments. T Waves: Normal. Axis: Normal.       Imaging Results          X-Ray Chest 1 View (Final result)  Result time 05/04/22 16:01:36    Final result by Sandi Rojas MD (05/04/22 16:01:36)                 Impression:      No acute cardiopulmonary disease      Electronically signed by: Sandi Rojas MD  Date:    05/04/2022  Time:    16:01             Narrative:    EXAMINATION:  XR CHEST 1 VIEW    CLINICAL HISTORY:  CP;    TECHNIQUE:  Single frontal view of the chest was performed.    COMPARISON:  04/29/2022    FINDINGS:  The cardiomediastinal silhouette appears stable.  The lungs are clear of infiltrate.  There is no pleural effusion.  There are small nodular foci left midlung field and right upper lung field not appearing significantly changed compared to the prior exam or earlier study 06/06/2016 appearing chronic                               CT Head Without Contrast (Final result)  Result time 05/04/22 15:10:53    Final result by Stephon Fernando MD (05/04/22 15:10:53)                 Impression:      1. Cortical atrophy with periventricular deep white matter change consistent with chronic small vessel ischemic disease.      Electronically signed by: Stephon  Orange  Date:    05/04/2022  Time:    15:10             Narrative:    EXAMINATION:  CT HEAD WITHOUT CONTRAST    CLINICAL HISTORY:  Transient ischemic attack (TIA);Neuro deficit, acute, stroke suspected;    TECHNIQUE:  Low dose axial images were obtained through the head.  Coronal and sagittal reformations were also performed. Contrast was not administered.    COMPARISON:  CT 04/29/2022.    FINDINGS:  There is no acute hemorrhage or infarction.  There is cortical atrophy.  There are periventricular deep white matter changes consistent with chronic small vessel ischemic disease.    No extra-axial fluid collections.  Ventricles are normal in size, shape and configuration.  The basal cisterns are patent.    The imaged paranasal sinuses and ethmoid air cells are well aerated.    The mastoid air cells and middle ears are normally pneumatized.                                 Medications   etomidate injection 20 mg (0 mg Intravenous Hold 5/4/22 1515)   succinylcholine injection 200 mg (0 mg Intravenous Hold 5/4/22 1515)   sodium chloride 0.9% bolus 1,000 mL (0 mLs Intravenous Stopped 5/4/22 1730)   amiodarone in dextrose 150 mg/100 mL (1.5 mg/mL) loading dose 150 mg (has no administration in time range)   amiodarone 360 mg/200 mL (1.8 mg/mL) infusion (has no administration in time range)   levETIRAcetam in NaCl (iso-os) IVPB 2,000 mg (0 mg Intravenous Stopped 5/4/22 1626)     Critical Care ED Physician Time (minutes):  -- Performed by: oBris Schwab M.D.  -- Direct Patient Care (Face Time): 5  -- Additional History from Records or Taking Additional History: 5  -- Ordering, Reviewing, and Interpreting Diagnostic Studies: 5  -- Total Time in Documentation: 11  -- Consultation with Other Physicians: 5  -- Consultation with Family Related to Condition: 0  -- Total Critical Care Time: 31  -- Critical care was necessary to treat altered mental status/seizures  -- Critical care was time spent personally by me on the following  activities:   -- blood draw for specimens discussions with consultants,   -- development of treatment plan with patient or surrogate,   -- examination of patient, ordering and performing treatments   -- review of radiographic studies, re-evaluation of pt's condition  -- review of labs and evaluation of response to treatment       Medical Decision Making:   Initial Assessment:   Patient had episode of acute altered mental status, staring to left  This is happened before, family states that typically is resolved at home  Patient was admitted to the hospital 2 weeks ago with atrial fib RVR [place]  Patient presents the ER staring at the left, immediately went to CT with consult      Differential Diagnosis:   Stroke, seizure, metabolic encephalopathy, medication overuse, infectious process    Clinical Tests:   Lab Tests: Ordered and Reviewed  Radiological Study: Ordered and Reviewed  Medical Tests: Ordered and Reviewed    ED Management:  This patient has an acute episode of altered mental status at the home today  Patient went to CT, is no longer staring to the left on arrival back to the ER  Telemedicine stroke consult with Dr. Mendieta, consider seizure versus other  Patient given 2 grams of IV Keppra, metabolic workup largely normal today  Patient has had these episodes before, consider recurrent seizures diagnosis  Patient discussed with neuro critical care physician Dr. Rodrigues at [facility]  Will transfer for EEG and higher level care, transfer to the ER for evaluation    4:56 PM: Patient has a history of atrial fibrillation RVR, in RVR today  Patient possibly tachycardic after seizure, was mildly hypotensive on arrival  IV fluids completely dissipated all hypotension in the ER this afternoon  Patient still with atrial fib over 100, will initiate amiodarone for control    4:57 PM: Patient is now more awake with a GCS of 13 on reassessment  She does not remember this event, now with no neurologic deficits  noted  Consider seizure high in the differential diagnosis based on this development  Will transfer to [facility] for further evaluation, this is not 1st episode                      Clinical Impression:   Final diagnoses:  [R56.9] Seizure  [R41.82] Altered mental status, unspecified altered mental status type (Primary)  [R56.9] Seizure-like activity  [I48.91] Atrial fibrillation with RVR          ED Disposition Condition    Transfer to Another Facility Stable                Boris Schwab MD  05/11/22 1787

## 2022-05-05 PROBLEM — R79.89 ELEVATED LACTIC ACID LEVEL: Status: ACTIVE | Noted: 2022-05-05

## 2022-05-05 PROBLEM — N30.00 ACUTE CYSTITIS WITHOUT HEMATURIA: Status: ACTIVE | Noted: 2022-05-05

## 2022-05-05 PROBLEM — E87.1 HYPONATREMIA: Status: ACTIVE | Noted: 2022-05-05

## 2022-05-05 LAB
ALBUMIN SERPL BCP-MCNC: 3 G/DL (ref 3.5–5.2)
ALP SERPL-CCNC: 47 U/L (ref 55–135)
ALT SERPL W/O P-5'-P-CCNC: 19 U/L (ref 10–44)
ANION GAP SERPL CALC-SCNC: 5 MMOL/L (ref 8–16)
ASCENDING AORTA: 2.38 CM
AST SERPL-CCNC: 29 U/L (ref 10–40)
AV INDEX (PROSTH): 0.88
AV MEAN GRADIENT: 2 MMHG
AV PEAK GRADIENT: 5 MMHG
AV VALVE AREA: 2.69 CM2
AV VELOCITY RATIO: 0.93
BASOPHILS # BLD AUTO: 0.06 K/UL (ref 0–0.2)
BASOPHILS NFR BLD: 0.6 % (ref 0–1.9)
BILIRUB SERPL-MCNC: 0.5 MG/DL (ref 0.1–1)
BSA FOR ECHO PROCEDURE: 1.49 M2
BUN SERPL-MCNC: 14 MG/DL (ref 8–23)
CALCIUM SERPL-MCNC: 9.1 MG/DL (ref 8.7–10.5)
CHLORIDE SERPL-SCNC: 105 MMOL/L (ref 95–110)
CO2 SERPL-SCNC: 27 MMOL/L (ref 23–29)
CREAT SERPL-MCNC: 0.8 MG/DL (ref 0.5–1.4)
CV ECHO LV RWT: 0.45 CM
DIFFERENTIAL METHOD: ABNORMAL
DOP CALC AO PEAK VEL: 1.09 M/S
DOP CALC AO VTI: 17.72 CM
DOP CALC LVOT AREA: 3 CM2
DOP CALC LVOT DIAMETER: 1.97 CM
DOP CALC LVOT PEAK VEL: 1.01 M/S
DOP CALC LVOT STROKE VOLUME: 47.62 CM3
DOP CALCLVOT PEAK VEL VTI: 15.63 CM
ECHO LV POSTERIOR WALL: 0.69 CM (ref 0.6–1.1)
EJECTION FRACTION: 65 %
EOSINOPHIL # BLD AUTO: 0 K/UL (ref 0–0.5)
EOSINOPHIL NFR BLD: 0.4 % (ref 0–8)
ERYTHROCYTE [DISTWIDTH] IN BLOOD BY AUTOMATED COUNT: 14.3 % (ref 11.5–14.5)
EST. GFR  (AFRICAN AMERICAN): >60 ML/MIN/1.73 M^2
EST. GFR  (NON AFRICAN AMERICAN): >60 ML/MIN/1.73 M^2
FRACTIONAL SHORTENING: 30 % (ref 28–44)
GLUCOSE SERPL-MCNC: 92 MG/DL (ref 70–110)
HCT VFR BLD AUTO: 34.9 % (ref 37–48.5)
HGB BLD-MCNC: 11.7 G/DL (ref 12–16)
IMM GRANULOCYTES # BLD AUTO: 0.02 K/UL (ref 0–0.04)
IMM GRANULOCYTES NFR BLD AUTO: 0.2 % (ref 0–0.5)
INTERVENTRICULAR SEPTUM: 0.75 CM (ref 0.6–1.1)
LA MAJOR: 5.22 CM
LA MINOR: 5.23 CM
LA WIDTH: 4.43 CM
LACTATE SERPL-SCNC: 1.6 MMOL/L (ref 0.5–2.2)
LEFT ATRIUM SIZE: 3.17 CM
LEFT ATRIUM VOLUME INDEX MOD: 44.6 ML/M2
LEFT ATRIUM VOLUME INDEX: 42.1 ML/M2
LEFT ATRIUM VOLUME MOD: 66 CM3
LEFT ATRIUM VOLUME: 62.37 CM3
LEFT INTERNAL DIMENSION IN SYSTOLE: 2.16 CM (ref 2.1–4)
LEFT VENTRICLE DIASTOLIC VOLUME INDEX: 25.56 ML/M2
LEFT VENTRICLE DIASTOLIC VOLUME: 37.83 ML
LEFT VENTRICLE MASS INDEX: 36 G/M2
LEFT VENTRICLE SYSTOLIC VOLUME INDEX: 10.5 ML/M2
LEFT VENTRICLE SYSTOLIC VOLUME: 15.53 ML
LEFT VENTRICULAR INTERNAL DIMENSION IN DIASTOLE: 3.1 CM (ref 3.5–6)
LEFT VENTRICULAR MASS: 53.67 G
LYMPHOCYTES # BLD AUTO: 2.3 K/UL (ref 1–4.8)
LYMPHOCYTES NFR BLD: 23.1 % (ref 18–48)
MAGNESIUM SERPL-MCNC: 1.6 MG/DL (ref 1.6–2.6)
MCH RBC QN AUTO: 32.3 PG (ref 27–31)
MCHC RBC AUTO-ENTMCNC: 33.5 G/DL (ref 32–36)
MCV RBC AUTO: 96 FL (ref 82–98)
MONOCYTES # BLD AUTO: 1.2 K/UL (ref 0.3–1)
MONOCYTES NFR BLD: 12.6 % (ref 4–15)
NEUTROPHILS # BLD AUTO: 6.2 K/UL (ref 1.8–7.7)
NEUTROPHILS NFR BLD: 63.1 % (ref 38–73)
NRBC BLD-RTO: 0 /100 WBC
OSMOLALITY SERPL: 290 MOSM/KG (ref 275–295)
OSMOLALITY UR: 261 MOSM/KG (ref 50–1200)
PHOSPHATE SERPL-MCNC: 2.9 MG/DL (ref 2.7–4.5)
PISA TR MAX VEL: 2.05 M/S
PLATELET # BLD AUTO: 217 K/UL (ref 150–450)
PMV BLD AUTO: 10 FL (ref 9.2–12.9)
POCT GLUCOSE: 135 MG/DL (ref 70–110)
POTASSIUM SERPL-SCNC: 4.2 MMOL/L (ref 3.5–5.1)
PROT SERPL-MCNC: 5.8 G/DL (ref 6–8.4)
RA MAJOR: 4.46 CM
RA PRESSURE: 8 MMHG
RA WIDTH: 3.56 CM
RBC # BLD AUTO: 3.62 M/UL (ref 4–5.4)
RIGHT ATRIAL AREA: 15 CM2
RIGHT VENTRICULAR END-DIASTOLIC DIMENSION: 2.92 CM
SINUS: 2.51 CM
SODIUM SERPL-SCNC: 137 MMOL/L (ref 136–145)
STJ: 2.49 CM
TDI LATERAL: 0.1 M/S
TDI SEPTAL: 0.1 M/S
TDI: 0.1 M/S
TR MAX PG: 17 MMHG
TRICUSPID ANNULAR PLANE SYSTOLIC EXCURSION: 1.74 CM
TV REST PULMONARY ARTERY PRESSURE: 25 MMHG
WBC # BLD AUTO: 9.77 K/UL (ref 3.9–12.7)

## 2022-05-05 PROCEDURE — 83935 ASSAY OF URINE OSMOLALITY: CPT | Performed by: STUDENT IN AN ORGANIZED HEALTH CARE EDUCATION/TRAINING PROGRAM

## 2022-05-05 PROCEDURE — 95711 VEEG 2-12 HR UNMONITORED: CPT

## 2022-05-05 PROCEDURE — 83930 ASSAY OF BLOOD OSMOLALITY: CPT | Performed by: STUDENT IN AN ORGANIZED HEALTH CARE EDUCATION/TRAINING PROGRAM

## 2022-05-05 PROCEDURE — 80053 COMPREHEN METABOLIC PANEL: CPT | Performed by: HOSPITALIST

## 2022-05-05 PROCEDURE — 83735 ASSAY OF MAGNESIUM: CPT | Performed by: HOSPITALIST

## 2022-05-05 PROCEDURE — 99291 CRITICAL CARE FIRST HOUR: CPT | Mod: ,,, | Performed by: PHYSICIAN ASSISTANT

## 2022-05-05 PROCEDURE — 97116 GAIT TRAINING THERAPY: CPT

## 2022-05-05 PROCEDURE — 25000003 PHARM REV CODE 250: Performed by: HOSPITALIST

## 2022-05-05 PROCEDURE — 99233 SBSQ HOSP IP/OBS HIGH 50: CPT | Mod: ,,, | Performed by: INTERNAL MEDICINE

## 2022-05-05 PROCEDURE — 63600175 PHARM REV CODE 636 W HCPCS: Performed by: PHYSICIAN ASSISTANT

## 2022-05-05 PROCEDURE — C1751 CATH, INF, PER/CENT/MIDLINE: HCPCS

## 2022-05-05 PROCEDURE — 63600175 PHARM REV CODE 636 W HCPCS: Performed by: PSYCHIATRY & NEUROLOGY

## 2022-05-05 PROCEDURE — C9254 INJECTION, LACOSAMIDE: HCPCS | Performed by: PHYSICIAN ASSISTANT

## 2022-05-05 PROCEDURE — 36410 VNPNXR 3YR/> PHY/QHP DX/THER: CPT

## 2022-05-05 PROCEDURE — 94761 N-INVAS EAR/PLS OXIMETRY MLT: CPT

## 2022-05-05 PROCEDURE — 85025 COMPLETE CBC W/AUTO DIFF WBC: CPT | Performed by: HOSPITALIST

## 2022-05-05 PROCEDURE — 99233 PR SUBSEQUENT HOSPITAL CARE,LEVL III: ICD-10-PCS | Mod: ,,, | Performed by: INTERNAL MEDICINE

## 2022-05-05 PROCEDURE — 63600175 PHARM REV CODE 636 W HCPCS: Performed by: STUDENT IN AN ORGANIZED HEALTH CARE EDUCATION/TRAINING PROGRAM

## 2022-05-05 PROCEDURE — 63600175 PHARM REV CODE 636 W HCPCS

## 2022-05-05 PROCEDURE — 97165 OT EVAL LOW COMPLEX 30 MIN: CPT

## 2022-05-05 PROCEDURE — 95700 EEG CONT REC W/VID EEG TECH: CPT

## 2022-05-05 PROCEDURE — 97535 SELF CARE MNGMENT TRAINING: CPT

## 2022-05-05 PROCEDURE — 99291 PR CRITICAL CARE, E/M 30-74 MINUTES: ICD-10-PCS | Mod: ,,, | Performed by: PHYSICIAN ASSISTANT

## 2022-05-05 PROCEDURE — 20000000 HC ICU ROOM

## 2022-05-05 PROCEDURE — 25000003 PHARM REV CODE 250: Performed by: STUDENT IN AN ORGANIZED HEALTH CARE EDUCATION/TRAINING PROGRAM

## 2022-05-05 PROCEDURE — 97161 PT EVAL LOW COMPLEX 20 MIN: CPT

## 2022-05-05 PROCEDURE — 83605 ASSAY OF LACTIC ACID: CPT | Performed by: HOSPITALIST

## 2022-05-05 PROCEDURE — 25000003 PHARM REV CODE 250

## 2022-05-05 PROCEDURE — 25000003 PHARM REV CODE 250: Performed by: PHYSICIAN ASSISTANT

## 2022-05-05 PROCEDURE — 84100 ASSAY OF PHOSPHORUS: CPT | Performed by: HOSPITALIST

## 2022-05-05 RX ORDER — METOPROLOL TARTRATE 50 MG/1
50 TABLET ORAL EVERY 8 HOURS
Status: DISCONTINUED | OUTPATIENT
Start: 2022-05-05 | End: 2022-05-06

## 2022-05-05 RX ORDER — ENOXAPARIN SODIUM 100 MG/ML
50 INJECTION SUBCUTANEOUS EVERY 12 HOURS
Status: DISCONTINUED | OUTPATIENT
Start: 2022-05-05 | End: 2022-05-10

## 2022-05-05 RX ORDER — PROCHLORPERAZINE EDISYLATE 5 MG/ML
5 INJECTION INTRAMUSCULAR; INTRAVENOUS EVERY 6 HOURS PRN
Status: DISCONTINUED | OUTPATIENT
Start: 2022-05-05 | End: 2022-05-25 | Stop reason: HOSPADM

## 2022-05-05 RX ORDER — METOPROLOL TARTRATE 1 MG/ML
INJECTION, SOLUTION INTRAVENOUS
Status: COMPLETED
Start: 2022-05-05 | End: 2022-05-05

## 2022-05-05 RX ORDER — METOPROLOL TARTRATE 1 MG/ML
2.5 INJECTION, SOLUTION INTRAVENOUS ONCE
Status: COMPLETED | OUTPATIENT
Start: 2022-05-05 | End: 2022-05-05

## 2022-05-05 RX ORDER — AMOXICILLIN 250 MG
1 CAPSULE ORAL DAILY PRN
Status: DISCONTINUED | OUTPATIENT
Start: 2022-05-05 | End: 2022-05-09

## 2022-05-05 RX ORDER — ONDANSETRON 2 MG/ML
4 INJECTION INTRAMUSCULAR; INTRAVENOUS EVERY 8 HOURS PRN
Status: DISCONTINUED | OUTPATIENT
Start: 2022-05-05 | End: 2022-05-25 | Stop reason: HOSPADM

## 2022-05-05 RX ORDER — LORAZEPAM 2 MG/ML
INJECTION INTRAMUSCULAR
Status: COMPLETED
Start: 2022-05-05 | End: 2022-05-05

## 2022-05-05 RX ORDER — SODIUM CHLORIDE 9 MG/ML
INJECTION, SOLUTION INTRAVENOUS CONTINUOUS
Status: DISCONTINUED | OUTPATIENT
Start: 2022-05-05 | End: 2022-05-06

## 2022-05-05 RX ORDER — SODIUM CHLORIDE 0.9 % (FLUSH) 0.9 %
10 SYRINGE (ML) INJECTION EVERY 6 HOURS PRN
Status: DISCONTINUED | OUTPATIENT
Start: 2022-05-05 | End: 2022-05-12

## 2022-05-05 RX ORDER — ACETAMINOPHEN 325 MG/1
650 TABLET ORAL EVERY 4 HOURS PRN
Status: DISCONTINUED | OUTPATIENT
Start: 2022-05-05 | End: 2022-05-25 | Stop reason: HOSPADM

## 2022-05-05 RX ORDER — LEVETIRACETAM 500 MG/5ML
1000 INJECTION, SOLUTION, CONCENTRATE INTRAVENOUS EVERY 12 HOURS
Status: DISCONTINUED | OUTPATIENT
Start: 2022-05-06 | End: 2022-05-11

## 2022-05-05 RX ORDER — NALOXONE HCL 0.4 MG/ML
0.02 VIAL (ML) INJECTION
Status: DISCONTINUED | OUTPATIENT
Start: 2022-05-05 | End: 2022-05-25 | Stop reason: HOSPADM

## 2022-05-05 RX ORDER — MAG HYDROX/ALUMINUM HYD/SIMETH 200-200-20
30 SUSPENSION, ORAL (FINAL DOSE FORM) ORAL 4 TIMES DAILY PRN
Status: DISCONTINUED | OUTPATIENT
Start: 2022-05-05 | End: 2022-05-25 | Stop reason: HOSPADM

## 2022-05-05 RX ORDER — METOPROLOL TARTRATE 1 MG/ML
2.5 INJECTION, SOLUTION INTRAVENOUS EVERY 5 MIN PRN
Status: COMPLETED | OUTPATIENT
Start: 2022-05-05 | End: 2022-05-07

## 2022-05-05 RX ORDER — POLYETHYLENE GLYCOL 3350 17 G/17G
17 POWDER, FOR SOLUTION ORAL 2 TIMES DAILY PRN
Status: DISCONTINUED | OUTPATIENT
Start: 2022-05-05 | End: 2022-05-09

## 2022-05-05 RX ORDER — LEVETIRACETAM 500 MG/5ML
2000 INJECTION, SOLUTION, CONCENTRATE INTRAVENOUS ONCE
Status: COMPLETED | OUTPATIENT
Start: 2022-05-05 | End: 2022-05-05

## 2022-05-05 RX ORDER — LEVETIRACETAM 500 MG/5ML
750 INJECTION, SOLUTION, CONCENTRATE INTRAVENOUS EVERY 12 HOURS
Status: DISCONTINUED | OUTPATIENT
Start: 2022-05-06 | End: 2022-05-05

## 2022-05-05 RX ADMIN — METOROPROLOL TARTRATE 2.5 MG: 5 INJECTION, SOLUTION INTRAVENOUS at 07:05

## 2022-05-05 RX ADMIN — METOPROLOL TARTRATE 25 MG: 25 TABLET, FILM COATED ORAL at 05:05

## 2022-05-05 RX ADMIN — SODIUM CHLORIDE 250 ML: 0.9 INJECTION, SOLUTION INTRAVENOUS at 10:05

## 2022-05-05 RX ADMIN — AMIODARONE HYDROCHLORIDE 150 MG: 1.5 INJECTION, SOLUTION INTRAVENOUS at 09:05

## 2022-05-05 RX ADMIN — SODIUM CHLORIDE 300 MG: 9 INJECTION, SOLUTION INTRAVENOUS at 10:05

## 2022-05-05 RX ADMIN — PANTOPRAZOLE SODIUM 40 MG: 40 TABLET, DELAYED RELEASE ORAL at 08:05

## 2022-05-05 RX ADMIN — ASPIRIN 81 MG: 81 TABLET, COATED ORAL at 05:05

## 2022-05-05 RX ADMIN — APIXABAN 5 MG: 5 TABLET, FILM COATED ORAL at 08:05

## 2022-05-05 RX ADMIN — AMIODARONE HYDROCHLORIDE 1 MG/MIN: 1.8 INJECTION, SOLUTION INTRAVENOUS at 09:05

## 2022-05-05 RX ADMIN — LORAZEPAM 1 MG: 2 INJECTION INTRAMUSCULAR; INTRAVENOUS at 08:05

## 2022-05-05 RX ADMIN — Medication 400 UNITS: at 08:05

## 2022-05-05 RX ADMIN — LEVETIRACETAM 500 MG: 500 TABLET, FILM COATED ORAL at 08:05

## 2022-05-05 RX ADMIN — LEVETIRACETAM 2000 MG: 100 INJECTION, SOLUTION, CONCENTRATE INTRAVENOUS at 09:05

## 2022-05-05 NOTE — ASSESSMENT & PLAN NOTE
Presumed secondary to SEizure - value is downtrending at this time   -will receive total 1L of LR this evening   -Check with AM labs for trend in AM

## 2022-05-05 NOTE — PLAN OF CARE
Problem: Adult Inpatient Plan of Care  Goal: Plan of Care Review  Outcome: Ongoing, Progressing  Goal: Optimal Comfort and Wellbeing  Outcome: Ongoing, Progressing     Problem: Skin Injury Risk Increased  Goal: Skin Health and Integrity  Outcome: Ongoing, Progressing       Patient is AAO x4. POC reviewed with patient and family. Patient verbalized understanding. Patient's breathing is unlabored with equal chest expansion. Patient remained free from falls. Patient rested well through shift. Bed in lowest position,bed alarm on, side rails up x4 and padded, no complaints or signs of distress. WCTM.  See flowsheets for full assessment and VS info.

## 2022-05-05 NOTE — ASSESSMENT & PLAN NOTE
Diagnosed on 4/29 @ [facility] - discharged with 7 days of Ciprofloxacin -  Continue Ceftriaxone in place here.

## 2022-05-05 NOTE — PLAN OF CARE
Problem: Infection  Goal: Absence of Infection Signs and Symptoms  Outcome: Ongoing, Progressing     Problem: Adult Inpatient Plan of Care  Goal: Plan of Care Review  Outcome: Ongoing, Progressing  Goal: Patient-Specific Goal (Individualized)  Outcome: Ongoing, Progressing  Goal: Absence of Hospital-Acquired Illness or Injury  Outcome: Ongoing, Progressing  Goal: Optimal Comfort and Wellbeing  Outcome: Ongoing, Progressing     Problem: Skin Injury Risk Increased  Goal: Skin Health and Integrity  Outcome: Ongoing, Progressing     Problem: Impaired Wound Healing  Goal: Optimal Wound Healing  Outcome: Ongoing, Progressing    POC reviewed with the patient and they verbalized understanding. All comments and concerns addressed. Bed locked in lowest position with bed alarm set, call light within reach. Safety precautions maintained - seizure precautions. VSS, see flowsheets. No seizure/significant events this shift. Will continue to monitor for changes to POC and clinical condition.

## 2022-05-05 NOTE — HPI
77 y.o. female with HTN, HLD, Afib (Eliquis) and Alzheimer dementia presented to [facility] with new onset seizure. Per daughter-in-law, patient noted to have confusion over the past week or so then had episode last Friday concerning for seizure. Daughter-in-law describes full body convulsions and post-ictal confusion. She called EMS and patient was brought to [facility] and admitted till 5/1. OSH reportedly did not think patient truly had a seizure and was more concerned about syncope. She was diagnosed with Afib and started on toprol and eliquis. Yesterday, she was sitting at the dining room table eating then had episode of unresponsiveness, stiffening and full body convulsions. EMS was called and she was taken to [facility]. Had 2 more GTCs en route. Tele stroke consult with Dr. Mendieta 5/4 who documented stupor on exam, moving L side spontaneously. She recommended CTA head/neck when stable and seizure evaluation. She received 2 g of Keppra prior to transfer to [facility]. CTH without contrast 5/4 with cortical atrophy and chronic microvascular ischemic changes, but no acute intracranial pathology. EEG pending. Gen Neuro consulted 5/5 for new onset seizure management. DIL mentions she was reviewing patient's medications due to AMS x 1 week prior to onset of seizures and noticed patient has been taking benadryl 10 mg qd. Family concerned because they read online benadryl can lower the seizure threshold.

## 2022-05-05 NOTE — HPI
78 y/o WF hx of recently diagnosed Atrial Fibrillation, Alzheimers Dementia, Hypertension, Hyperlipidemia who is transferred for concern of seizure and acute encephalopathy.     History provided by EMR review and by pts Son-Daughter-in-Law at bedside.     For the last 2 weeks patient has  noted to have progressive confusion worse 1 week ago since Wednesday.  This past Friday - April 29th, daughter-in-law noted patient was outside and throwing rocks away in the trash can, when she went outside to see the patient patient had fallen down and was convulsing, reported seizure activity for 30-40 minutes.  Patient was confused afterward, EMS called and patient admitted to [facility] from 4/29 - Mon 5/1.  Family states @ [place], they states patient had just passed out/syncope, stroke was ruled out with CNS imaging reported CT & MRI, records not available for review.  Atrial fibrillation was noted as new diagnosis and patient was discharged on multiple new medications - including Toprol XL 25mg BID & Apixaban 5mg BID.      Today patient was at dining table eating, when she suddenly stopped responding, reported staring spells and then developed convulsions.   En Route via EMS to [facility] reported pt with additional seizures.      Tele stroke Neurology eval completed @ [facility], [place] [facility] [place] due to concern of seizures as inciting issue.     Bedside interview - patient is awake, alert, oriented to self, location, denies any acute complaints, no chest pain, dyspnea, palpitations, no LE swelling, no visual complaints, no traumatic injury     [place] [facility] [place]  Keppra 2gm iv x1,  Amiodarone - unclear amt.  Metoprolol 5mg iv x 1,  LR bolus 500cc
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
## 2022-05-05 NOTE — ASSESSMENT & PLAN NOTE
Patient with Paroxysmal (<7 days) atrial fibrillation which is uncontrolled currently with Beta Blocker. Patient is currently in atrial fibrillation.[id number] Score: 3. Anticoagulation indicated. Anticoagulation done with Apixaban.  -some mild BNP elevation but CXR clear and no overt signs of decompensated CHF.   -start IR Metoprolol 25mg every 8 hours,  At home had been issued Toprol XL 25mg BID, monitor dosage needed for rate control  -giving a 2nd 500 cc bolus x 1 now due to low-normotensive values

## 2022-05-05 NOTE — PLAN OF CARE
Eval complete, no OT needs at this time.  Safe to return home when medically clear.  Reorder OT if status deteriorates.

## 2022-05-05 NOTE — ASSESSMENT & PLAN NOTE
Hold her antihypertensives except metoprolol, given lower BP-normotensive values. For now  -Restart her ARB-HCTZ combo and amlodipine as needed

## 2022-05-05 NOTE — ASSESSMENT & PLAN NOTE
New onset, likely 2nd admission in the last week related to complications from seizures  -since receiving Keppra   -reported that patient had MRI @ [facility]- discuss obtaining records vis Case management.  Today pt s/p Tele-Stroke evaluation and CT head negative for acute finding - no focal deficit.   -Neurology Consult   -Extended EEG ordered  -Continue Keppra 500mg BID maintenance  -Neurology consultation  -Seizure precautions   -recommended to patient - discontinue Benadryl use - pt had been taking in the last 2 weeks

## 2022-05-05 NOTE — PT/OT/SLP EVAL
"Occupational Therapy   Co-Evaluation/Treatment with PT and Discharge Note  Co-treat with PT due to medical complexity of pt and need for skilled hands for safe intervention.      Name: Teresa Leos  MRN: [id number]  Admitting Diagnosis:  Seizure-like activity   Recent Surgery: * No surgery found *      Recommendations:     Discharge Recommendations: home  Discharge Equipment Recommendations:  none  Barriers to discharge:       Assessment:     Teresa Leos is a 77 y.o. female with a medical diagnosis of Seizure-like activity. At this time, patient is functioning at their prior level of function and does not require further acute OT services. Reorder OT if status deteriorates.      Plan:     During this hospitalization, patient does not require further acute OT services.  Please re-consult if situation changes.    · Plan of Care Reviewed with: patient, family    Subjective     Chief Complaint: Pt desires to do OOB activity and to decrease seizure occurrence.  Patient/Family Comments/goals: "Yeah I can do that.", when asked about brushing teeth at sink, yet pt replied no to having to use the toilet for BM at this time.    Occupational Profile:  Living Environment: Patient lives with their son and daughter in law in trailer with 5 steps to enter with L HR and walk-in shower.  Previous level of function: Independent with mobility and all ADLs.  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  Equipment Used at home:  none  Assistance upon Discharge: family 24/7 assist.    Pain/Comfort:  · Pain Rating 1: 0/10  · Pain Addressed 1: Reposition, Distraction  · Pain Rating Post-Intervention 1: 0/10    Patients cultural, spiritual, Alevism conflicts given the current situation: no    Objective:     Communicated with: RN prior to session.  Patient found HOB elevated with EEG, telemetry, paula catheter upon OT entry to room.    General Precautions: Standard, fall, seizure   Orthopedic " Precautions:N/A   Braces: N/A  Respiratory Status: Room air     Occupational Performance:    Bed Mobility:    · Patient completed Rolling/Turning to Left with  modified independence  · Patient completed Scooting/Bridging with modified independence  · Patient completed Supine to Sit with modified independence  · Patient completed Sit to Supine with modified independence    Functional Mobility/Transfers:  · Patient completed Sit <> Stand Transfer with contact guard assistance  with  no assistive device   · Functional Mobility: Pt ambulated 20' with no AD with CGA to the bathroom to engage in oral hygiene and grooming tasks while standing at sink and returned to bed upon completion of tasks.    Activities of Daily Living:  · Grooming: stand by assistance at standing level to complete oral hygiene and facial hygiene.    Cognitive/Visual Perceptual:  Cognitive/Psychosocial Skills:     -       Oriented to: Person, Place, Time and Situation   -       Follows Commands/attention:Follows multistep  commands  -       Communication: clear/fluent  -       Memory: No Deficits noted and pt does have baseline Dementia  -       Safety awareness/insight to disability: intact   -       Mood/Affect/Coping skills/emotional control: Appropriate to situation, Cooperative, Happy and Pleasant  Visual/Perceptual:      -Intact .    Physical Exam:  Balance:    -       Static sitting: Supervision  -       Dynamic Sitting: Supervision  -       Static standing: CGA  -       Dynamic Standing: CGA   Postural examination/scapula alignment:    -       Rounded shoulders  -       Forward head  Skin integrity: Visible skin intact  Edema:  None noted  Sensation:    -       Intact  Dominant hand:    -       R handed  Upper Extremity Range of Motion:     -       Right Upper Extremity: WNL  -       Left Upper Extremity: WNL  Upper Extremity Strength:    -       Right Upper Extremity: WNL  -       Left Upper Extremity: WNL   Strength:    -       Right  Upper Extremity: WNL  -       Left Upper Extremity: WNL  Fine Motor Coordination:    -       Intact  Gross motor coordination:   WFL      AMPAC 6 Click ADL:  AMPAC Total Score: 23    Treatment & Education:  -OT POC, safety during ADLs and mobility   -Education on energy conservation and task modification to maximize safety and independence  -Questions answered within OT scope of practice.    Education:    Patient left HOB elevated with all lines intact, call button in reach, bed alarm on, RN notified and family present    GOALS:   Multidisciplinary Problems     Occupational Therapy Goals     Not on file          Multidisciplinary Problems (Resolved)        Problem: Occupational Therapy    Goal Priority Disciplines Outcome Interventions   Occupational Therapy Goal   (Resolved)     OT, PT/OT Met                    History:     Past Medical History:   Diagnosis Date    Hyperlipidemia     Hypertension     Osteoporosis        Past Surgical History:   Procedure Laterality Date    HIP REPLACEMENT ARTHROPLASTY Right     HYSTERECTOMY      LEG SURGERY      SKIN CANCER EXCISION      TONSILLECTOMY, ADENOIDECTOMY         Time Tracking:     OT Date of Treatment: 05/05/22  OT Start Time: 1009  OT Stop Time: 1031  OT Total Time (min): 22 min    Billable Minutes:Evaluation 10 min  Self Care/Home Management 12 min    5/5/2022
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
## 2022-05-05 NOTE — PT/OT/SLP EVAL
Physical Therapy Co-Evaluation and Co-Treatment    Patient Name:  Teresa Leos   MRN:  [id number]    Recommendations:     Discharge Recommendations:  home   Discharge Equipment Recommendations: none   Barriers to discharge: decreased functional mobility, fall risk, decreased caregiver support and inaccessible home    Assessment:     Teresa Leos is a 77 y.o. female admitted with a medical diagnosis of Seizure.  Pt demonstrates the below listed impairments with decreased tolerance to functional mobility and gait instability being the most limiting.  Pt demonstrates fair tolerance to out of bed mobility and states she currently is mobilizing at her baseline.  Pt unable to perform stair negotiation or ambulate for prolonged distances due to being attached to EEG machine.  Pt requires skilled PT due to patient's status as: a fall risk and cognitively impaired to allow safe d/c home.     Impairments and functional limitations:  weakness, impaired endurance, impaired functional mobilty, gait instability.  These deficits affect their roles and responsibilities in which they were able to complete prior to admit.  Rehab Prognosis:   Good ; patient would benefit from acute skilled PT services 2 x/week to address these deficits, improve quality of life, focus on recovery of impairments, provide patient/caregiver education, reduce fall risk, and reach maximum level of function.  Pt is highly  motivated to participated in skilled PT.    Recent Surgery:   * No surgery found *      Plan:     During this hospitalization, patient to be seen 2 x/week to address the identified rehab impairments via therapeutic activities, gait training, therapeutic exercises, neuromuscular re-education and progress toward the following goals:    · Plan of Care Expires:  06/04/22    Subjective     Chief Complaint: decreased tolerance to functional mobility  Patient/Family Comments/Goals: Return home  Pain/Comfort:  · Pain Rating 1:  0/10    Patients cultural, spiritual, Oriental orthodox conflicts given the current situation: no    Living Environment:  Patient lives with their son and daughter in law in trailer, 5 steps with Left hand rail, walk in shower.   Pt utilizes No AD for ambulation of all distances.    Prior to admission, patient was independent with ADLs.   DME owned: none.   DME not currently used: n/a.   Upon discharge, patient will have assistance from family with 24/7 assist.     Objective:     Communicated with nursing prior to session.  Patient found HOB elevated with EEG, telemetry, paula catheter  upon PT entry to room.    General Precautions: Standard, fall, seizure   Orthopedic Precautions:N/A   Braces: N/A   Oxygen Device:      Exams:  · Cognitive Exam:  Patient is oriented to Person, Place, Time and Situation  · Command following: Patient follows 100% of commands   · RLE ROM: WFL  · RLE Strength: grossly 4/5  · LLE ROM: WFL  · LLE Strength: grossly 4/5  · Postural Exam:  Patient presented with the following abnormalities:    · -       Rounded shoulders  · -       Forward head  · Sensation:    · -       Intact   · Pt noted to have some difficulty with command following and confusion at times.  Family states this is improving but began with the episodes. She has dementia     Functional Mobility:  · Bed Mobility:  Rolling Left: modified independence  · Scooting: modified independence  · Supine to Sit: modified independence  · Sit to Supine: modified independence  · Head of bed position: HOB elevated    · Transfers:  Sit to Stand: contact guard assistance with no AD   · Pt performs x2 stands     · Gait: Patient ambulated 20' with No AD and contact guard assistance. Patient demonstrates occasional unsteady gait, decreased step length and decreased sorin. All lines remained intact throughout ambulation trial, gait belt utilized.    · Balance:   Position Score Time   Static Sitting GOOD: Takes MODERATE challenges n/a   Dynamic Sitting  GOOD-: Maintains balance through MODERATE excursions of active trunk motion, but inconstantly  n/a   Static Standing FAIR: Maintains without assist, but is unable to take any challenges n/a   Dynamic Standing FAIR-: Maintains without assist but is inconsistent n/a       Therapeutic Activities:  Patient educated on role of acute care PT and PT POC, safety while in hospital including calling nurse for mobility, call light usage, benefits of out of bed mobility, breathing technique, fall risk, bed mobility , transfers, gait technique, positioning, posture, risks of prolonged bed rest, possible discharge disposition  and benefits of continued PT by explanation and demonstration.    Patient demonstrates good understanding of education provided this day.   Whiteboard updated    Therapeutic Exercises:  n/a    AM-PAC 6 CLICK MOBILITY  Total Score:20     Patient left HOB elevated with all lines intact, call button in reach, bed alarm on, RN notified and family present.    GOALS:   Multidisciplinary Problems     Physical Therapy Goals        Problem: Physical Therapy    Goal Priority Disciplines Outcome Goal Variances Interventions   Physical Therapy Goal     PT, PT/OT Ongoing, Progressing     Description: Goals to be met by: 22    Patient will increase functional independence with mobility by performin. Supine to sit with independence  2. Sit to supine with independence  3. Sit to stand transfer with independence  4. Gait  x 300 feet with independence using LRAD as needed  5. Ascend/descend 5 stair with left handrails modified independence using LRAD as needed  6. Lower extremity exercise program x10 reps per handout, with independence                     History:     Past Medical History:   Diagnosis Date    Hyperlipidemia     Hypertension     Osteoporosis        Past Surgical History:   Procedure Laterality Date    HIP REPLACEMENT ARTHROPLASTY Right     HYSTERECTOMY      LEG SURGERY      SKIN CANCER  EXCISION      TONSILLECTOMY, ADENOIDECTOMY         Time Tracking:     PT Received On: 05/05/22  PT Start Time: 1009     PT Stop Time: 1031  PT Total Time (min): 22 min     Billable Minutes: Evaluation 10 and Gait Training 12    05/05/2022    Co-treatment performed for this visit due to patient need for two skilled therapists to ensure patient and staff safety, to accommodate for patient activity tolerance/pain management, and maximize functional potential.
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
## 2022-05-05 NOTE — PLAN OF CARE
Problem: Physical Therapy  Goal: Physical Therapy Goal  Description: Goals to be met by: 22    Patient will increase functional independence with mobility by performin. Supine to sit with independence  2. Sit to supine with independence  3. Sit to stand transfer with independence  4. Gait  x 300 feet with independence using LRAD as needed  5. Ascend/descend 5 stair with left handrails modified independence using LRAD as needed  6. Lower extremity exercise program x10 reps per handout, with independence    Outcome: Ongoing, Progressing     Pt tolerated PT session well.      All needs met, all questions answered.  Solo Gomez PT, DPT

## 2022-05-06 PROBLEM — F03.90 DEMENTIA: Status: ACTIVE | Noted: 2022-05-06

## 2022-05-06 PROBLEM — N30.00 ACUTE CYSTITIS WITHOUT HEMATURIA: Status: RESOLVED | Noted: 2022-05-05 | Resolved: 2022-05-06

## 2022-05-06 PROBLEM — E87.1 HYPONATREMIA: Status: RESOLVED | Noted: 2022-05-05 | Resolved: 2022-05-06

## 2022-05-06 PROBLEM — R79.89 ELEVATED LACTIC ACID LEVEL: Status: RESOLVED | Noted: 2022-05-05 | Resolved: 2022-05-06

## 2022-05-06 PROBLEM — R33.9 URINARY RETENTION: Status: ACTIVE | Noted: 2022-05-06

## 2022-05-06 LAB
ALBUMIN SERPL BCP-MCNC: 3.1 G/DL (ref 3.5–5.2)
ALP SERPL-CCNC: 54 U/L (ref 55–135)
ALT SERPL W/O P-5'-P-CCNC: 19 U/L (ref 10–44)
ANION GAP SERPL CALC-SCNC: 9 MMOL/L (ref 8–16)
AST SERPL-CCNC: 28 U/L (ref 10–40)
BASOPHILS # BLD AUTO: 0.1 K/UL (ref 0–0.2)
BASOPHILS NFR BLD: 0.9 % (ref 0–1.9)
BILIRUB SERPL-MCNC: 0.3 MG/DL (ref 0.1–1)
BUN SERPL-MCNC: 15 MG/DL (ref 8–23)
CALCIUM SERPL-MCNC: 8.9 MG/DL (ref 8.7–10.5)
CHLORIDE SERPL-SCNC: 107 MMOL/L (ref 95–110)
CO2 SERPL-SCNC: 21 MMOL/L (ref 23–29)
CREAT SERPL-MCNC: 0.8 MG/DL (ref 0.5–1.4)
DIFFERENTIAL METHOD: ABNORMAL
EOSINOPHIL # BLD AUTO: 0 K/UL (ref 0–0.5)
EOSINOPHIL NFR BLD: 0.1 % (ref 0–8)
ERYTHROCYTE [DISTWIDTH] IN BLOOD BY AUTOMATED COUNT: 14.4 % (ref 11.5–14.5)
EST. GFR  (AFRICAN AMERICAN): >60 ML/MIN/1.73 M^2
EST. GFR  (NON AFRICAN AMERICAN): >60 ML/MIN/1.73 M^2
GLUCOSE SERPL-MCNC: 110 MG/DL (ref 70–110)
HCT VFR BLD AUTO: 38.5 % (ref 37–48.5)
HGB BLD-MCNC: 11.7 G/DL (ref 12–16)
IMM GRANULOCYTES # BLD AUTO: 0.05 K/UL (ref 0–0.04)
IMM GRANULOCYTES NFR BLD AUTO: 0.5 % (ref 0–0.5)
LYMPHOCYTES # BLD AUTO: 3.5 K/UL (ref 1–4.8)
LYMPHOCYTES NFR BLD: 32.3 % (ref 18–48)
MAGNESIUM SERPL-MCNC: 1.8 MG/DL (ref 1.6–2.6)
MCH RBC QN AUTO: 32.5 PG (ref 27–31)
MCHC RBC AUTO-ENTMCNC: 30.4 G/DL (ref 32–36)
MCV RBC AUTO: 107 FL (ref 82–98)
MONOCYTES # BLD AUTO: 1.7 K/UL (ref 0.3–1)
MONOCYTES NFR BLD: 15.8 % (ref 4–15)
NEUTROPHILS # BLD AUTO: 5.4 K/UL (ref 1.8–7.7)
NEUTROPHILS NFR BLD: 50.4 % (ref 38–73)
NRBC BLD-RTO: 0 /100 WBC
PHOSPHATE SERPL-MCNC: 3.5 MG/DL (ref 2.7–4.5)
PLATELET # BLD AUTO: 201 K/UL (ref 150–450)
PMV BLD AUTO: 10.1 FL (ref 9.2–12.9)
POCT GLUCOSE: 132 MG/DL (ref 70–110)
POTASSIUM SERPL-SCNC: 4.8 MMOL/L (ref 3.5–5.1)
PROT SERPL-MCNC: 5.8 G/DL (ref 6–8.4)
RBC # BLD AUTO: 3.6 M/UL (ref 4–5.4)
SODIUM SERPL-SCNC: 137 MMOL/L (ref 136–145)
WBC # BLD AUTO: 10.7 K/UL (ref 3.9–12.7)

## 2022-05-06 PROCEDURE — 63600175 PHARM REV CODE 636 W HCPCS: Performed by: STUDENT IN AN ORGANIZED HEALTH CARE EDUCATION/TRAINING PROGRAM

## 2022-05-06 PROCEDURE — 95720 PR EEG, W/VIDEO, CONT RECORD, I&R, >12<26 HRS: ICD-10-PCS | Mod: ,,, | Performed by: PSYCHIATRY & NEUROLOGY

## 2022-05-06 PROCEDURE — 20000000 HC ICU ROOM

## 2022-05-06 PROCEDURE — 92610 EVALUATE SWALLOWING FUNCTION: CPT

## 2022-05-06 PROCEDURE — 99291 CRITICAL CARE FIRST HOUR: CPT | Mod: GC,,, | Performed by: PSYCHIATRY & NEUROLOGY

## 2022-05-06 PROCEDURE — 84100 ASSAY OF PHOSPHORUS: CPT | Performed by: HOSPITALIST

## 2022-05-06 PROCEDURE — 80053 COMPREHEN METABOLIC PANEL: CPT | Performed by: HOSPITALIST

## 2022-05-06 PROCEDURE — 85025 COMPLETE CBC W/AUTO DIFF WBC: CPT | Performed by: HOSPITALIST

## 2022-05-06 PROCEDURE — 25000003 PHARM REV CODE 250: Performed by: PSYCHIATRY & NEUROLOGY

## 2022-05-06 PROCEDURE — 63600175 PHARM REV CODE 636 W HCPCS: Performed by: PHYSICIAN ASSISTANT

## 2022-05-06 PROCEDURE — 25000003 PHARM REV CODE 250: Performed by: HOSPITALIST

## 2022-05-06 PROCEDURE — 95700 EEG CONT REC W/VID EEG TECH: CPT

## 2022-05-06 PROCEDURE — 25000003 PHARM REV CODE 250: Performed by: NURSE PRACTITIONER

## 2022-05-06 PROCEDURE — 25000003 PHARM REV CODE 250: Performed by: INTERNAL MEDICINE

## 2022-05-06 PROCEDURE — 95714 VEEG EA 12-26 HR UNMNTR: CPT

## 2022-05-06 PROCEDURE — 99291 PR CRITICAL CARE, E/M 30-74 MINUTES: ICD-10-PCS | Mod: GC,,, | Performed by: PSYCHIATRY & NEUROLOGY

## 2022-05-06 PROCEDURE — 25000003 PHARM REV CODE 250: Performed by: PHYSICIAN ASSISTANT

## 2022-05-06 PROCEDURE — 83735 ASSAY OF MAGNESIUM: CPT | Performed by: HOSPITALIST

## 2022-05-06 PROCEDURE — C9254 INJECTION, LACOSAMIDE: HCPCS | Performed by: PHYSICIAN ASSISTANT

## 2022-05-06 PROCEDURE — 95720 EEG PHY/QHP EA INCR W/VEEG: CPT | Mod: ,,, | Performed by: PSYCHIATRY & NEUROLOGY

## 2022-05-06 RX ORDER — ATORVASTATIN CALCIUM 20 MG/1
40 TABLET, FILM COATED ORAL NIGHTLY
Status: DISCONTINUED | OUTPATIENT
Start: 2022-05-06 | End: 2022-05-09

## 2022-05-06 RX ORDER — MUPIROCIN 20 MG/G
OINTMENT TOPICAL 2 TIMES DAILY
Status: COMPLETED | OUTPATIENT
Start: 2022-05-06 | End: 2022-05-10

## 2022-05-06 RX ORDER — LORAZEPAM 2 MG/ML
2 INJECTION INTRAMUSCULAR ONCE
Status: COMPLETED | OUTPATIENT
Start: 2022-05-06 | End: 2022-05-06

## 2022-05-06 RX ORDER — METOPROLOL TARTRATE 50 MG/1
50 TABLET ORAL EVERY 8 HOURS
Status: DISCONTINUED | OUTPATIENT
Start: 2022-05-06 | End: 2022-05-06

## 2022-05-06 RX ORDER — SILODOSIN 4 MG/1
4 CAPSULE ORAL DAILY
Status: DISCONTINUED | OUTPATIENT
Start: 2022-05-06 | End: 2022-05-07

## 2022-05-06 RX ORDER — AMOXICILLIN 250 MG
1 CAPSULE ORAL 2 TIMES DAILY
Status: DISCONTINUED | OUTPATIENT
Start: 2022-05-06 | End: 2022-05-08

## 2022-05-06 RX ADMIN — LEVETIRACETAM 1000 MG: 500 INJECTION, SOLUTION INTRAVENOUS at 09:05

## 2022-05-06 RX ADMIN — SENNOSIDES AND DOCUSATE SODIUM 1 TABLET: 50; 8.6 TABLET ORAL at 08:05

## 2022-05-06 RX ADMIN — ENOXAPARIN SODIUM 50 MG: 60 INJECTION SUBCUTANEOUS at 11:05

## 2022-05-06 RX ADMIN — DONEPEZIL HYDROCHLORIDE 5 MG: 5 TABLET ORAL at 08:05

## 2022-05-06 RX ADMIN — SODIUM CHLORIDE: 0.9 INJECTION, SOLUTION INTRAVENOUS at 07:05

## 2022-05-06 RX ADMIN — MUPIROCIN: 20 OINTMENT TOPICAL at 05:05

## 2022-05-06 RX ADMIN — SODIUM CHLORIDE 500 ML: 0.9 INJECTION, SOLUTION INTRAVENOUS at 12:05

## 2022-05-06 RX ADMIN — LORAZEPAM 2 MG: 2 INJECTION INTRAMUSCULAR; INTRAVENOUS at 02:05

## 2022-05-06 RX ADMIN — SODIUM CHLORIDE 200 MG: 9 INJECTION, SOLUTION INTRAVENOUS at 09:05

## 2022-05-06 RX ADMIN — AMIODARONE HYDROCHLORIDE 0.5 MG/MIN: 1.8 INJECTION, SOLUTION INTRAVENOUS at 02:05

## 2022-05-06 RX ADMIN — SILODOSIN 4 MG: 4 CAPSULE ORAL at 11:05

## 2022-05-06 RX ADMIN — ENOXAPARIN SODIUM 50 MG: 60 INJECTION SUBCUTANEOUS at 09:05

## 2022-05-06 RX ADMIN — SENNOSIDES AND DOCUSATE SODIUM 1 TABLET: 50; 8.6 TABLET ORAL at 11:05

## 2022-05-06 RX ADMIN — MUPIROCIN: 20 OINTMENT TOPICAL at 08:05

## 2022-05-06 RX ADMIN — LEVETIRACETAM 1000 MG: 500 INJECTION, SOLUTION INTRAVENOUS at 08:05

## 2022-05-06 RX ADMIN — AMIODARONE HYDROCHLORIDE 0.5 MG/MIN: 1.8 INJECTION, SOLUTION INTRAVENOUS at 06:05

## 2022-05-06 NOTE — PLAN OF CARE
[facility] Neuro Critical Care  Discharge Reassessment    Primary Care Provider: Joseph Mcpherson NP    Expected Discharge Date: 5/11/2022     Per MD, patient transferred to [facility] for higher level of care due to Status Epilepticus.   Not medically ready for discharge.     Reassessment (most recent)     Discharge Reassessment - 05/06/22 1306        Discharge Reassessment    Assessment Type Discharge Planning Reassessment     Did the patient's condition or plan change since previous assessment? No     Communicated MONA with patient/caregiver Date not available/Unable to determine     Discharge Plan A Home Health     Discharge Plan B Skilled Nursing Facility     DME Needed Upon Discharge  none     Discharge Barriers Identified None     Why the patient remains in the hospital Requires continued medical care               Elin Noble RN, CCRN-K, [facility]  Neuro-Critical Care   [phone]

## 2022-05-06 NOTE — ASSESSMENT & PLAN NOTE
BP soft on admission, holding home antihypertensives    - Hold home amlodipine 5 mg qday, irbesartan-HCTZ 300-12.5 mg qday  - C/w lopressor 50 mg q8hrs for rate control   - Goal normotension

## 2022-05-06 NOTE — PROGRESS NOTES
Hospital Medicine  Progress note    Team: [facility] Keiko Marlow MD  Admit Date: 5/4/2022    Principal Problem:  Seizure-like activity    Interval hx:  No complaints. Patient stated she had EEG done (not done). Nurse reported SBP in 90s with HR <100s. Held metoprolol dose. After my visit, she reports mental status change to where she is less responsive with HR >100s.      ROS   Respiratory: neg for cough neg for shortness of breath  Cardiovascular: neg for chest pain neg for palpitations  Gastrointestinal: neg for nausea neg for vomiting, neg for abdominal pain neg for diarrhea neg for constipation   Behavioral/Psych: neg for depression neg for anxiety    PEx  Temp:  [97.4 °F (36.3 °C)-98.2 °F (36.8 °C)]   Pulse:  []   Resp:  [13-20]   BP: (106-139)/()   SpO2:  [96 %-99 %]     Intake/Output Summary (Last 24 hours) at 5/5/2022 2222  Last data filed at 5/5/2022 1200  Gross per 24 hour   Intake 50 ml   Output 2850 ml   Net -2800 ml       General Appearance: no acute distress, WD, elderly  Heart: regular rate and rhythm, no heave  Respiratory: Normal respiratory effort, symmetric excursion, bilateral vesicular breath sounds   Abdomen: Soft, non-tender; bowel sounds active  Skin: intact, no rash, no ulcers  Neurologic:  No focal numbness or weakness  Mental status: Alert, oriented x 4, affect appropriate     Recent Labs   Lab 04/29/22 2111 05/04/22  1524 05/05/22  1016   WBC 8.12 9.40 9.77   HGB 12.0 12.2 11.7*   HCT 35.7* 37.0 34.9*    231 217     Recent Labs   Lab 04/29/22 2111 05/04/22  1524 05/05/22  1016   * 130* 137   K 3.7 4.9 4.2   CL 92* 95 105   CO2 25 17* 27   BUN 28* 22 14   CREATININE 1.5* 1.3 0.8   * 113* 92   CALCIUM 9.9 9.4 9.1   MG 2.0  --  1.6   PHOS 2.7  --  2.9   LIPASE 27  --   --      Recent Labs   Lab 04/29/22 2111 05/04/22  1524 05/04/22  1536 05/05/22  1016   ALKPHOS 57 51*  --  47*   ALT 29 28  --  19   AST 49* 45*  --  29   ALBUMIN 4.1 3.6  --  3.0*    PROT 7.3 7.1  --  5.8*   BILITOT 0.5 0.5  --  0.5   INR  --   --  1.2  --         Recent Labs   Lab 05/05/22 1938   POCTGLUCOSE 135*       Scheduled Meds:   aspirin  81 mg Oral Daily    atorvastatin  40 mg Oral QHS    donepeziL  5 mg Oral Nightly    enoxaparin  50 mg Subcutaneous Q12H    [START ON 5/6/2022] lacosamide (VIMPAT) IVPB  200 mg Intravenous Q12H    lacosamide (VIMPAT) IVPB  300 mg Intravenous Once    [START ON 5/6/2022] levetiracetam IV  1,000 mg Intravenous Q12H    metoprolol tartrate  50 mg Oral Q8H    pantoprazole  40 mg Oral Daily    sodium chloride 0.9% infusion  250 mL Intravenous Once     Continuous Infusions:   sodium chloride 0.9%      amiodarone in dextrose 5% 1 mg/min (05/05/22 2157)    [START ON 5/6/2022] amiodarone in dextrose 5%       As Needed:  acetaminophen, aluminum-magnesium hydroxide-simethicone, melatonin, metoprolol, naloxone, ondansetron, polyethylene glycol, prochlorperazine, senna-docusate 8.6-50 mg, sodium chloride 0.9%, sodium chloride 0.9%    Assessment and Plan  / Problems managed today    * Seizure-like activity  New onset, likely 2nd admission in the last week related to complications from seizures  -since receiving Keppra   -reported that patient had MRI @ [facility]- discuss obtaining records vis Case management.  Today pt s/p Tele-Stroke evaluation and CT head negative for acute finding - no focal deficit.   -Neurology Consult   -Extended EEG ordered and still pending  -hold Keppra 500mg BID  -Neurology consultation - requested consult after EEG placed  -Seizure precautions   -recommended to patient - discontinue Benadryl use - pt had been taking in the last 2 weeks   -sodium corrected    Elevated lactic acid level  Presumed secondary to SEizure - value is downtrending at this time   -will receive total 1L of LR this evening   -Check with AM labs for trend in AM       Hyponatremia  Check add-on serum osm, urine osm, urine sodium  -stop  thiazide  -resolved    Acute cystitis without hematuria  Diagnosed on 4/29 @ [facility] - discharged with 7 days of Ciprofloxacin -  Continue Ceftriaxone in place here.       Other hyperlipidemia  Continue atorvastatin      Gastroesophageal reflux disease  Continue protonix in place of home PPI      Atrial fibrillation with RVR  Patient with Paroxysmal (<7 days) atrial fibrillation which is uncontrolled currently with Beta Blocker. Patient is currently in atrial fibrillation.[id number] Score: 3. Anticoagulation indicated. Anticoagulation done with Apixaban.  -some mild BNP elevation but CXR clear and no overt signs of decompensated CHF.   -start IR Metoprolol 25mg every 8 hours,  At home had been issued Toprol XL 25mg BID, monitor dosage needed for rate control  -giving a 2nd 500 cc bolus x 1 now due to low-normotensive values  - NS at 100 mL/h. Nurse to give skipped dose of metoprolol    Essential hypertension  Hold her antihypertensives except metoprolol, given lower BP-normotensive values. For now  -Restart her ARB-HCTZ combo and amlodipine as needed    Discharge Planning   MONA: 5/6/2022     Code Status: Full Code   Is the patient medically ready for discharge?: No    Reason for patient still in hospital (select all that apply): Patient unstable and Patient trending condition  Discharge Plan A: Home Health        Diet:    GI PPx:   DVT PPx:    Lines:  Drains:  Airways:  Wounds:    Goals of Care:  Return to prior functional status       Time (minutes) spent in care of the patient (Greater than 1/2 spent in direct face-to-face contact and care coordination on unit)  35 min     Keiko Marlow MD

## 2022-05-06 NOTE — PROCEDURES
ICU EEG/VIDEO MONITORING REPORT    DATE OF SERVICE: 5/5/2022 to 05/06/2022   EEG NUMBER: [id number]   REQUESTED BY: Mariam   LOCATION OF SERVICE: [place] [id number]    METHODOLOGY   Electroencephalographic (EEG) recording is with electrodes placed according to the International 10-20 placement system.  Thirty two (32) channels of digital signal are simultaneously recorded from the scalp and may include EKG, EMG, and/or eye monitors.   Recording band pass was 0.1 to 512 hz.  Digital video recording of the patient is simultaneously recorded with the EEG.  The nursing staff report clinical symptoms and may press an event button when the patient has symptoms of clinical interest to the treating physicians.  EEG and video recording is stored and archived in digital format.  The entire recording is visually reviewed and the times identified by computer analysis as being spikes or seizures are reviewed again.  Activation procedures which include photic stimulation, hyperventilation and instructing patients to perform simple task are done in selected patients.   Compresses spectral analysis (CSA) is also performed on the activity recorded from each individual channel.  This is displayed as a power display of frequencies from 0 to 30 Hz over time.   The CSA analysis is done and displayed continuously.  This is reviewed for asymmetries in power between homologous areas of the scalp and for presence of changes in power which canbe seen when seizures occur.  Sections of suspected abnormalities on the CSA is then compared with the original EEG recording.     [employer] software was also utilized in the review of this study.  This software suite analyzes the EEG recording in multiple domains.  Coherence and rhythmicity is computed to identify EEG sections which may contain organized seizures.  Each channel undergoes analysis to detect presence of spike and sharp waves which have special and morphological characteristic of epileptic activity.   The routine EEG recording is converted from spacial into frequency domain.  This is then displayed comparing homologous areas to identify areas of significant asymmetry.  Algorithm to identify non-cortically generated artifact is used to separate eye movement, EMG and other artifact from the EEG.      Recording Times  Start on 5/5/2022 at 0956 running for 4 hrs and 8 min  Start on 5/5/2022 at 2051 running for 38 min  Start on 5/5/2022 at 2205 running for 2 hrs and 55 min  Start on 5/6/2022 at 0112 running for 5 hrs and 47 min  Start on 5/6/2022 at 0701 running for 1 hr and 41 min    EEG FINDINGS    Background activity:   The background rhythm was continuous and characterized by predominantly disorganized theta with alpha/delta overlying. Intermittently a nonsustained 7 Hz posterior dominant alpha rhythm was seen. Asymmetric with less fast frequencies and more prominent theta/delta over the R temporal chain, at times with wider field of slowing. Reactive and variable.      Sleep:  No normal sleep seen.     Activation procedures: not performed    Periodic/Rhythmic Activity andSeizures:  At onset of study at 0956 there is rare and very brief runs of frontally predominant generalized periodic discharges at 1 hz.     Upon start of study at 2051, continuous R temporal onset sz with quick spread into L frontal lasting 30s-60s seen (20/hr). No obvious clinical activity (R arm held in flexed position, eyes closed), noted to have HR in 170s intermittently during this time    5/6/2022 0228 abrupt improvement seen in seizure frequency (2 mg ativan given at this time). Background becomes theta/alpha with higher amplitude disorganized/sharply contoured theta/delta with continuous embedded multifocal sharp waves (R temporal and anterior temporal) over R temporal chain (ictal-interictal pattern).     EKG: when interpretable, irregular rhythm seen    IMPRESSION/CLINICAL CORRELATION    This is an abnormal vEEG due to   1) R status  epilepticus with spread to L resolved with ativan, keppra, and lacosmaide  2) R slowing with multifocal sharp waves  3) Generalized avis/delta slowing    In this 78 yo female with acute encephalopathy and paroxysmal episodes of L version indicative of  1) R temporal seizure focus with high risk for recurrent seizures  2) moderate diffuse encephalopathy    R nonconvulsive status epilepticus resolved at 0228 on 5/6/22, continues to have high risk pattern over R temporal chain with high risk for recurrent seizure.     Millie Mena MD  Neurocritical Care   Neurology-Epilepsy

## 2022-05-06 NOTE — SIGNIFICANT EVENT
Patient accepted by [facility].        Deniz Medina MD  Internal Medicine Staff

## 2022-05-06 NOTE — ASSESSMENT & PLAN NOTE
76 y/o female with new onset seizures and AF presents with recurrent focal right sided seizures. Likely 2/2 prior stroke, +/- provoked by recent course of ciprofloxacin for UTI    - cEEG improved overnight, will continue  - Epilepsy following, appreciate their recs  - C/w keppra 1 g BID, vimpat 200 mg BID   - If pt w/ recurrent seizures will plan to optimize keppra -> load depakote as 3rd line agent  - MRI brain w/ and w/o s/p achieving seizure control to further evaluate seizure etiology   - [place]ious w[place] [facility] [place], NGTD and UA noninfectious, has completed 7d treatment for previously diagnosed UTI  - Failed Monge, will place NG for meds/TFs

## 2022-05-06 NOTE — PROGRESS NOTES
[facility] - Neuro Critical Care  Neurocritical Care  Progress Note    Admit Date: 5/4/2022  Service Date: 05/06/2022  Length of Stay: 1    Subjective:     Chief Complaint: Focal seizures    History of Present Illness: Ms. Leos is a 76 y/o female with PMH significant for dementia, hypertension and recently diagnosed atrial fibrillation presents to [facility] as a transfer from Hospital Medicine team for further management of recurrent focal seizures and AF with RVR. Patient initially presented to [facility] on April 29th with confusion and witnessed convulsions. During her two day admission, stroke was reportedly ruled out and no further seizures were noted. Patient was diagnosed with new onset AF, for which she was discharged on AC and BB. She represented to [facility] on 5/4 after family reported intermittent staring spells. She had witnessed convulsions during EMS transport to [facility] ED. She had a telestroke consult and was transferred to [facility] for further evaluation. She had a total of 2g Keppra and was started on 500 mg BID for maintenance. She was given BB and amiodarone for her HR. On arrival to [facility], patient was awake and oriented to self and place. This evening, patient developed AF RVR with HR into 180s with stable BP which did not respond to IV BB. Patient was also having fluctuating mentation associated with staring, deviated gaze, and becoming nonverbal, eventually also associated with left sided weakness and facial twitching.  Patient was given amiodarone per  team and was loaded with 2g Keppra and 2 mg lorazepam. Patient did not return to her neurological baseline after medications, still requiring sternal rubbing to say her name. cEEG was placed and interpreted by Dr. Brown, noted to have recurrent focal right sided seizures. Given her AF RVR and recurrent seizures refractory to benzodiazepines and Keppra load, patient was transferred to [facility] for further management and diagnostics.    On arrival to [facility],  patient was given small fluid bolus and loaded with 300 mg lacosamide. She is intermittently following simple commands and saying her name, protecting her airway.       Hospital Course: 5/6/2022: Admitted to NSICU overnight, given keppra 2 g IV and ativan 2 mg IV prior to transfer. Given vimpat 300 mg IV x1 and additional ativan 2 mg IV x1 overnight for ongoing electrographic seizures. Last electrographic seizure at 0230. Remains on IV amiodarone loading dose, HR improved to 110s, remains in Afib      Interval History:  Please see hospital course above for details     Review of Systems   Unable to perform ROS: Mental status change     Objective:     Vitals:  Temp: 98 °F (36.7 °C)  Pulse: 102  Rhythm: atrial rhythm  BP: 118/76  MAP (mmHg): 90  Resp: 13  SpO2: 100 %  O2 Device (Oxygen Therapy): nasal cannula    Temp  Min: 97.6 °F (36.4 °C)  Max: 98.2 °F (36.8 °C)  Pulse  Min: 95  Max: 166  BP  Min: 106/57  Max: 162/92  MAP (mmHg)  Min: 75  Max: 117  Resp  Min: 13  Max: 21  SpO2  Min: 96 %  Max: 100 %    05/05 0701 - 05/06 0700  In: 652.3 [I.V.:214.5]  Out: 1100 [Urine:1100]   Unmeasured Output  Stool Occurrence: 0       Physical Exam  General Appearance: Elderly woman resting in bed, not in acute hemodynamic distress  Mental Status Exam: lethargic, requires repeated tactile stimuli to arouse, quickly falls back asleep in absence of continued stimulation. Oriented to self only, w/ significant encouragement follows simple commands  Cranial Nerves: Gaze midline, pupils 3->2 mm and reactive b/l, EOMI. No clear facial asymmetry. No clear dysarthria   Motor: Lifts b/l upper extremities AG w/ encouragement, R>L. W/d b/l lower extremities to tickle, grossly symmetric  Sensory: Grossly symmetric to noxious in all 4 limbs   Coordination: Unable to assess  Vascular: irregularly irregular rhythm, no murmurs appreciated  Lungs: CTA bilaterally without wheezing  Abdomen: Soft, non-distended, non-tender, BS  +      Medications:  Continuousamiodarone in dextrose 5%, Last Rate: 0.5 mg/min (05/06/22 1000)    Scheduledatorvastatin, 40 mg, QHS  donepeziL, 5 mg, Nightly  enoxaparin, 50 mg, Q12H  lacosamide (VIMPAT) IVPB, 200 mg, Q12H  levetiracetam IV, 1,000 mg, Q12H  metoprolol tartrate, 50 mg, Q8H  pantoprazole, 40 mg, Daily  senna-docusate 8.6-50 mg, 1 tablet, BID  silodosin, 4 mg, Daily    PRNacetaminophen, 650 mg, Q4H PRN  aluminum-magnesium hydroxide-simethicone, 30 mL, QID PRN  melatonin, 6 mg, Nightly PRN  metoprolol, 2.5 mg, Q5 Min PRN  naloxone, 0.02 mg, PRN  ondansetron, 4 mg, Q8H PRN  polyethylene glycol, 17 g, BID PRN  prochlorperazine, 5 mg, Q6H PRN  senna-docusate 8.6-50 mg, 1 tablet, Daily PRN  sodium chloride 0.9%, 10 mL, PRN  sodium chloride 0.9%, 10 mL, Q6H PRN      Today I personally reviewed pertinent imaging, laboratory results, notably: CBC w/ stable anemia, no leukocytosis. CMP wnl. UA on admission non-infectious. cEEG w/ frequent R temporal seizures w/ spread to L frontal region at beginning of recording, improved s/p keppra/vimpat/ativan. Last electrographic seizure 0230. Continues w/ R sided multifocal epileptiform discharges.     Diet  Diet NPO  Diet NPO        Assessment/Plan:     Neuro  * Focal seizures  76 y/o female with new onset seizures and AF presents with recurrent focal right sided seizures. Likely 2/2 prior stroke, +/- provoked by recent course of ciprofloxacin for UTI    - cEEG improved overnight, will continue  - Epilepsy following, appreciate their recs  - C/w keppra 1 g BID, vimpat 200 mg BID   - If pt w/ recurrent seizures will plan to optimize keppra -> load depakote as 3rd line agent  - MRI brain w/ and w/o s/p achieving seizure control to further evaluate seizure etiology   - Infectious w/u initiated on arrival to [facility] on 5[place]4, NGTD and UA noninfectious, has completed 7d treatment for previously diagnosed UTI  - Failed Monge, will place NG for meds/TFs    Dementia  Mild per family  reports, remains independent of ADLs    - C/w home donepezil 5 mg     Cardiac/Vascular  Other hyperlipidemia  - C/w atorvastatin 40 mg qhs    Atrial fibrillation with RVR  HR into 170s-180 on stepdown, given BB and loaded with amiodarone prior to NCC stepup     - Currently maintaining adequate MAPs  - Continue amiodarone infusion per protocol   - Continue anticoagulation (on lovenox 50 mg BID while in house, transition back to home apixaban s/p seizure control and MRI)   - D/c ASA, pt on AC, no hx of CAD/stents -> no indication for antiplatelet agent     Essential hypertension  BP soft on admission, holding home antihypertensives    - Hold home amlodipine 5 mg qday, irbesartan-HCTZ 300-12.5 mg qday  - C/w lopressor 50 mg q8hrs for rate control   - Goal normotension     Renal/  Urinary retention  Requiring frequent straight caths overnight    - UA on admission non-infectious   - Start silodosin 4 mg qday  - Bladder scans per protocol          The patient is being Prophylaxed for:  Venous Thromboembolism with: Mechanical or Chemical  Stress Ulcer with: PPI  Ventilator Pneumonia with: not applicable    Activity Orders          Diet NPO: NPO starting at 05/05 2046    Progressive Mobility Protocol (mobilize patient to their highest level of functioning at least twice daily) starting at 05/05 0800    Turn patient every 2 hours starting at 05/05 0400    Straight Cath starting at 05/05 0024        Full Code     Uninterrupted Critical Care/Counseling Time (not including procedures): 45 minutes  This patient is critically ill due to focal status epilepticus, acute encephalopathy, Afib w/ RVR, dysphagia, urinary retention. There is high probability for acute neurological change leading to clinical and possibly life-threatening deterioration requiring highest level of physician preparedness for urgent intervention. I spent greater than 50% of the documented critical care time assessing and making medical decisions for this  patient.       Zandra Becerra MD  Neurocritical Care  [facility] - Neuro Critical Care

## 2022-05-06 NOTE — PLAN OF CARE
Teresa Leos is a 77 y.o. female admitted after recent seizures and new onset A.fib with RVR. Called by Dr. Medina for repeated head turning to the Lt associated with staring and fluctuations iin vital signs with concern for seizures. On my arrival patient had 2 more episodes, 1mg Ativan given and 2gm of keppra which resolved clinical seizures. Patient HR still fluctuating 120's to 150's.     -Continues EEG.   -MRI brain when stable  -Continue Keppra 1gm BID (next dose 12hrs after the load)  -Based on EEG findings and clinical improvement will provide final recommendations  - Rest of care per primary team    Recommendations discussed with Dr. Carol Whiting  Neurocritical care Fellow

## 2022-05-06 NOTE — ASSESSMENT & PLAN NOTE
78 y/o female with new onset seizures and AF presents with recurrent focal right sided seizures  - Admit to NCC  - cEEG with Epilepsy consult   - Hourly neuro checks  - Has received 2 mg lorazepam, 2g Keppra, 300 mg lacosamide-- f/u read on EEG with continued R sided seizures, will repeat 2 mg lorazepam per Epilepsy recommendations  - CTH without acute intracranial process, plan to obtain MRI brain with and without contrast once neurologically and hemodynamically stable  - Increase maintenance Keppra to 1g BID, add 200 mg BID lacosamide  - [place]ious w/u [place] [facility] [place], NGTD and UA noninfectious, has completed 7d treatment for previously diagnosed UTI  - NPO until Monge passed or evaluated by SLP

## 2022-05-06 NOTE — ASSESSMENT & PLAN NOTE
Requiring frequent straight caths overnight    - UA on admission non-infectious   - Start silodosin 4 mg qday  - Bladder scans per protocol

## 2022-05-06 NOTE — ASSESSMENT & PLAN NOTE
HR into 170s-180 on stepdown, given BB and loaded with amiodarone prior to NCC stepup     - Currently maintaining adequate MAPs  - Continue amiodarone infusion per protocol   - Continue anticoagulation (on lovenox 50 mg BID while in house, transition back to home apixaban s/p seizure control and MRI)   - D/c ASA, pt on AC, no hx of CAD/stents -> no indication for antiplatelet agent

## 2022-05-06 NOTE — PLAN OF CARE
[facility] Care Plan    POC reviewed with Teresa Leos and her son at 1400. Pt's son verbalized understanding. Questions and concerns addressed. No acute events today. Pt progressing toward goals. Will continue to monitor. See below and flowsheets for full assessment and VS info.     -24 hr EEG monitoring in progress  -episode of bradycardia/hypotension, see progress notes  -amio gtt  -NG tube placed  -bladder scan q6h  -MRI after 24 hr EEG        Is this a stroke patient? no    Neuro:  Melanie Coma Scale  Best Eye Response: 2-->(E2) to pain  Best Motor Response: 6-->(M6) obeys commands  Best Verbal Response: 4-->(V4) confused  Melanie Coma Scale Score: 12  Assessment Qualifiers: patient not sedated/intubated        24 hr Temp:  [97.5 °F (36.4 °C)-98 °F (36.7 °C)]     CV:   Rhythm: atrial rhythm  BP goals:   SBP < 180  MAP > 65    Resp:   O2 Device (Oxygen Therapy): room air       Plan: N/A    GI/:     Diet/Nutrition Received: NPO  Last Bowel Movement: 05/03/22  Voiding Characteristics: other (see comments) (retention)    Intake/Output Summary (Last 24 hours) at 5/6/2022 1743  Last data filed at 5/6/2022 1400  Gross per 24 hour   Intake 1467.7 ml   Output 550 ml   Net 917.7 ml     Unmeasured Output  Stool Occurrence: 0    Labs/Accuchecks:  Recent Labs   Lab 05/06/22  0252   WBC 10.70   RBC 3.60*   HGB 11.7*   HCT 38.5         Recent Labs   Lab 05/06/22  0252      K 4.8   CO2 21*      BUN 15   CREATININE 0.8   ALKPHOS 54*   ALT 19   AST 28   BILITOT 0.3      Recent Labs   Lab 05/04/22  1536   INR 1.2   APTT 25.6      Recent Labs   Lab 05/04/22  1524 05/04/22  1536   *  196*  --    CPKMB 5.5  --    TROPONINI  --  <0.006   MB 2.8  --        Electrolytes: N/A - electrolytes WDL  Accuchecks: none    Gtts:   amiodarone in dextrose 5% Stopped (05/06/22 1211)       LDA/Wounds:  Lines/Drains/Airways       Drain  Duration                  NG/OG Tube 05/06/22 1022 Right nostril <1 day    Female  External Urinary Catheter 05/06/22 0748 <1 day              Peripheral Intravenous Line  Duration                  Midline Catheter Insertion/Assessment  - Single Lumen 05/05/22 2215 Left basilic vein (medial side of arm) other (see comments) <1 day         Peripheral IV - Single Lumen 05/05/22 1951 22 G Anterior;Distal;Left Forearm <1 day         Peripheral IV - Single Lumen 05/05/22 2222 20 G Anterior;Left Foot <1 day         Peripheral IV - Single Lumen 05/05/22 2222 Anterior;Right Foot <1 day                  Wounds: Yes  Wound care consulted: Yes

## 2022-05-06 NOTE — NURSING
Attempted to call patient's son to notify him of the patient being moved to room 9077.  There was no answer, did not leave a voicemail.

## 2022-05-06 NOTE — ASSESSMENT & PLAN NOTE
HR into 170s-180 on stepdown, given BB and loaded with amiodarone prior to NCC stepup   - Currently maintaining adequate MAPs  - Continue amiodarone infusion per protocol   - Treat seizures   - Fluids  - Continue anticoagulation

## 2022-05-06 NOTE — ASSESSMENT & PLAN NOTE
HR into 170s-180 prior to transfer to [facility] admission given BB and loaded with amiodarone     - Currently maintaining adequate MAPs  - Continue apixaban for anticoagulation  - C/w amiodarone 200 mg BID, lopressor 50 mg q6hrs, dilt 30 mg q6hrs  - C/w short acting agents today, if HR remains stable can likely transition to dilt XR and toprol XL tomorrow to facilitate home medication compliance

## 2022-05-06 NOTE — HOSPITAL COURSE
5/6/2022: Admitted to NSICU overnight, given keppra 2 g IV and ativan 2 mg IV prior to transfer. Given vimpat 300 mg IV x1 and additional ativan 2 mg IV x1 overnight for ongoing electrographic seizures. Last electrographic seizure at 0230. Remains on IV amiodarone loading dose, HR improved to 110s, remains in Afib  5/7/2022 Initiated straight cath, bladder scans, and Tubefeeds, Trending EEG, MRI pending  05/08/2022 A fib with rate in the 140s. 500cc bolus x 1. KUB with significant stool burden. Optimize emma reg and suppository. EEG with high seizure risk. Depakote load and schedule maintenance dose.   05/09/2022 cEEG w/o seizures since depakote load, level therapeutic this AM at 59. Remains in Afib w/ HR in 130-140s on amio gtt. Noted to sundown overnight, became acutely agitated, improved s/p BM. Clinically improved this AM, much more alert and interactive, passed SLP eval.   05/10/2022 NAEON, no further episodes of agitation, QTc improved to 491 this AM. CXR w/ mild pulmonary edema, on 3 L O2 via NC, given lasix. HR remains in 120-130s, BP stable. cEEG negative, d/c'd. Switched back to home apixaban   05/11/2022 NAEON, per family at bedside was slightly more confused this AM, more alert at time of exam. Started on dilt 30 mg q6hrs yesterday afternoon, HR significantly improved overnight.
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
## 2022-05-06 NOTE — PLAN OF CARE
05/06/22 0921   Post-Acute Status   Post-Acute Authorization Placement;Other   Post-Acute Placement Status Pending medical clearance/testing   Other Status No Post-Acute Service Needs     Jessica Elizondo LCSW  Neurocritical Care   [facility]  [id number]

## 2022-05-06 NOTE — SIGNIFICANT EVENT
Patient with significant AFib RVR difficult to control with recurrent apparent seizures witnessed by myself, staff and NCC.   -Reloaded with Keppra and gave low dose ativan, appears to have broken for now.   -Ordered continues EEG.   -Re-loaded with Amiodarone (received prior to admission here).           Likely keppra and amio had worn off.   -Will move to step-down unit.   -Switched Apix to Enox BID  -Made NPO and consulted SLP for am  -Consider MRI when able to sit still tomorrow         Deniz Medina MD  Internal Medicine Staff

## 2022-05-06 NOTE — PROGRESS NOTES
Order from Dr. Becerra to hold off on MRI for now until pt has extended EEG and seizures are under control.

## 2022-05-06 NOTE — PROGRESS NOTES
Pt with episode of bradycardia HR 55, BP unchanged at this time.  Pt opens eyes briefly to sternal rub, no verbalization.  Amio gtt stopped. Johan HANNAH Diaz at bedside to assess.  EEG event button pushed.      1235--Pt remains bradycardic in 50's.  Now hypotensive with MAP 63.  Orders received from HANNAH Diaz for  cc bolus now.  Will continue to monitor closely.      1315--Pt now arouses to gentle stimulation, still not verbalizing.  's, MAP 78.  Dr. Becerra at bedside.  Per MD, will restart amio gtt if HR sustains in 130's.

## 2022-05-06 NOTE — PROGRESS NOTES
Patient arrived to [facility] from  ---> 1431    Type of stroke/diagnosis:  Status Epilepticus    TPA start and end time N/A    Thrombectomy start and end time N/A    Current symptoms: follows commands, responds to voice, BUE move spontaneously, BLE withdraw, nonverbal    Skin assessment done: Yes  Wounds noted: RUE bruising    *If wounds noted, was Wound Care consulted?      Monge Completed? Yes, failed    Patient Belongings on Admit: grey sweat pants, grey sweatshirt, black t-shirt, blue underwear, grey renan, black and white cellphone , colgate tooth brush, listerine mouthwash, grey socks, grey boxers, white t shirt, grey shorts, tan shorts, blue t shirt, grey shorts, blue boxers, grey basketball shorts, black capris, purple .   Vitamin E 180mg (81caps), Simvastatin 40mg (90 tablets), Omeprazole 40mg (52caps), Ciprofloxacin 500mg (1 tab), Eloquis 5 mg (41 tablets), Donepezil 5mg (23 tablets), Simvastatin 40mg (114 tabs), metropolol 25 mg (46 tabs), Aspirin 81mg (170), Irbersartan 12.5 (98 tabs) Amlodipine 5mg (89 tablets)    NCC notified: MICA Issa

## 2022-05-06 NOTE — HPI
Ms. Leos is a 76 y/o female with PMH significant for dementia, hypertension and recently diagnosed atrial fibrillation presents to [facility] as a transfer from Hospital Medicine team for further management of recurrent focal seizures and AF with RVR. Patient initially presented to [facility] on April 29th with confusion and witnessed convulsions. During her two day admission, stroke was reportedly ruled out and no further seizures were noted. Patient was diagnosed with new onset AF, for which she was discharged on AC and BB. She represented to [facility] on 5/4 after family reported intermittent staring spells. She had witnessed convulsions during EMS transport to [facility] ED. She had a telestroke consult and was transferred to [facility] [place] [facility] [place]. She had a total of 2g Keppra and was started on 500 mg BID for maintenance. She was given BB and amiodarone for her HR. On arrival to [facility], patient was awake and oriented to self and place. This evening, patient developed AF RVR with HR into 180s with stable BP which did not respond to IV BB. Patient was also having fluctuating mentation associated with staring, deviated gaze, and becoming nonverbal, eventually also associated with left sided weakness and facial twitching.  Patient was given amiodarone per HM team and was loaded with 2g Keppra and 2 mg lorazepam. Patient did not return to her neurological baseline after medications, still requiring sternal rubbing to say her name. cEEG was placed and interpreted by Dr. Brown, noted to have recurrent focal right sided seizures. Given her AF RVR and recurrent seizures refractory to benzodiazepines and Keppra load, patient was transferred to [facility] for further management and diagnostics.    On arrival to [facility], patient was given small fluid bolus and loaded with 300 mg lacosamide. She is intermittently following simple commands and saying her name, protecting her airway.

## 2022-05-06 NOTE — NURSING
[facility] med Z, spoke with Dr. Medina about patient's HR sustaining in 170's-180's. Patient is confused and not following commands. Dr. Medina ordered a 2.5 IV push of Lopressor. Rapid and charge at bedside. No new orders at this time.

## 2022-05-06 NOTE — SIGNIFICANT EVENT
RAPID RESPONSE NURSE NOTE        Admit Date: 2022  LOS: 0  Code Status: Full Code   Date of Consult: 2022  : 1945  Age: 77 y.o.  Weight:   Wt Readings from Last 1 Encounters:   22 51.3 kg (113 lb)     Sex: female  Race: White   Bed: [address] [place] [address] A:   MRN: [id number]  Time Rapid Response Team page Received: Call at   Time Rapid Response Team at Bedside:   Time Rapid Response Team left Bedside:   Was the patient discharged from an ICU this admission? No   Was the patient discharged from a PACU within last 24 hours? No   Did the patient receive conscious sedation/general anesthesia in last 24 hours? No  Was the patient in the ED within the past 24 hours? Yes  Was the patient on NIPPV within the past 24 hours? No   Did this progress into an ARC or CPA: no  Attending Physician: Keiko Marlow MD  Primary Service: [facility] MED        SITUATION    Notified by telemetry via phone call.  Reason for alert: Tachycardia  Called to evaluate the patient for Neuro    BACKGROUND     Why is the patient in the hospital?: Seizure-like activity    Patient has a past medical history of Hyperlipidemia, Hypertension, and Osteoporosis.    Last Vitals:  Temp: 98.2 °F (36.8 °C) (1527)  Pulse: 136 (2027)  Resp: 16 (1527)  BP: 139/76 (2027)  SpO2: 97 % (2027)    24 Hours Vitals Range:  Temp:  [97.4 °F (36.3 °C)-98.2 °F (36.8 °C)]   Pulse:  []   Resp:  [13-20]   BP: (103-139)/()   SpO2:  [96 %-99 %]     Labs:  Recent Labs     22  1524 22  1016   WBC 9.40 9.77   HGB 12.2 11.7*   HCT 37.0 34.9*    217       Recent Labs     22  1524 22  1016   * 137   K 4.9 4.2   CL 95 105   CO2 17* 27   CREATININE 1.3 0.8   * 92   PHOS  --  2.9   MG  --  1.6        Recent Labs     22   PH 7.307*   PCO2 43.7   PO2 31*   HCO3 21.8*   POCSATURATED 53*   BE -4        ASSESSMENT    Physical Exam    INTERVENTIONS    The patient was seen for a  Cardiac problem. Staff concerns included tachycardia. The following interventions were performed: continuous cardiac monitoring.    Received call from telemetry and CN regarding pt HR sustaining 140-180. On arrival to bedside pt in afib RVR s/p 2.5mg IV lopressor with no improvement of HR. x2 additional doses of 2.5mg IV lopressor administered per MD order with minimal improvement of HR.  Pt then had suspected seizure like activity with head turning to RS and facial twitching. Pt became significantly hypertensive with SBP 180s. Pt protected airway and maintained stable Spo2 during seizure activity.     MD Medina to bedside, [facility] consulted and pt evaluated by MD Mcneill. 2g IV Keppra + 1mg Lorazepam ordered and administered with slow improvement of -140 and SBP to 130s. STAT EEG ordered and placed per NPU CN.     RECOMMENDATIONS    We recommend: Notify Epilepsy of EEG placment, notify RRT with concerns for airway protection or persistent seizure activity.     PROVIDER ESCALATION    Orders received and case discussed with MD Yrn Lee.    Disposition: Remain in room 922.    FOLLOW UP    charge Leanna CERRATO updated on plan of care. Instructed to call the Rapid Response Nurse, Simone Gallo RN at [phone] for additional questions or concerns.

## 2022-05-06 NOTE — PLAN OF CARE
[facility] Care Plan    POC reviewed with Teresa CHARLES NixonDeric and son Adán at 0300. Family questions and concerns addressed. EEG monitor in place. Ativan given x1 overnight. Amiodarone infusing at 0.5mg. 3L nasal cannula applied overnight. NPO due to decreased LOC, no orders for NG placement. Oral medications held overnight -- MICA Issa, aware. Bladder scan and straight cath x2 overnight. PIV placed x2, midline placed. Will continue to monitor. See below and flowsheets for full assessment and VS info.     Is this a stroke patient? no    Neuro:  Melanie Coma Scale  Best Eye Response: 3-->(E3) to speech  Best Motor Response: 5-->(M5) localizes pain  Best Verbal Response: 1-->(V1) none  [place] Coma Scale Score: 9        24hr Temp:  [97.6 °F (36.4 °C)-98.2 °F (36.8 °C)]     CV:   Rhythm: atrial rhythm  BP goals:   SBP < 180  MAP > 65    Resp:   O2 Device (Oxygen Therapy): nasal cannula       Plan: N/A    GI/:     Diet/Nutrition Received: NPO  Last Bowel Movement: 05/03/22  Voiding Characteristics: due to void    Intake/Output Summary (Last 24 hours) at 5/6/2022 0419  Last data filed at 5/6/2022 0401  Gross per 24 hour   Intake 619.05 ml   Output 1100 ml   Net -480.95 ml     Unmeasured Output  Stool Occurrence: 0    Labs/Accuchecks:  Recent Labs   Lab 05/06/22  0252   WBC 10.70   RBC 3.60*   HGB 11.7*   HCT 38.5         Recent Labs   Lab 05/06/22  0252      K 4.8   CO2 21*      BUN 15   CREATININE 0.8   ALKPHOS 54*   ALT 19   AST 28   BILITOT 0.3      Recent Labs   Lab 05/04/22  1536   INR 1.2   APTT 25.6      Recent Labs   Lab 05/04/22  1524 05/04/22  1536   *  196*  --    CPKMB 5.5  --    TROPONINI  --  <0.006   MB 2.8  --        Electrolytes: N/A - electrolytes WDL  Accuchecks: none    Gtts:   sodium chloride 0.9% Stopped (05/05/22 2000)    amiodarone in dextrose 5% 0.5 mg/min (05/06/22 6864)       LDA/Wounds:  Lines/Drains/Airways       Peripheral Intravenous Line  Duration                   Midline Catheter Insertion/Assessment  - Single Lumen 05/05/22 2215 Left basilic vein (medial side of arm) other (see comments) <1 day         Peripheral IV - Single Lumen 05/05/22 1951 22 G Anterior;Distal;Left Forearm <1 day         Peripheral IV - Single Lumen 05/05/22 2222 20 G Anterior;Left Foot <1 day         Peripheral IV - Single Lumen 05/05/22 2222 Anterior;Right Foot <1 day                  Wounds: No  Wound care consulted: No       Problem: Infection  Goal: Absence of Infection Signs and Symptoms  Outcome: Ongoing, Progressing     Problem: Adult Inpatient Plan of Care  Goal: Plan of Care Review  Outcome: Ongoing, Progressing     Problem: Skin Injury Risk Increased  Goal: Skin Health and Integrity  Outcome: Ongoing, Progressing     Problem: Impaired Wound Healing  Goal: Optimal Wound Healing  Outcome: Ongoing, Progressing

## 2022-05-06 NOTE — SUBJECTIVE & OBJECTIVE
Interval History:  Please see hospital course above for details     Review of Systems   Unable to perform ROS: Mental status change     Objective:     Vitals:  Temp: 98 °F (36.7 °C)  Pulse: 102  Rhythm: atrial rhythm  BP: 118/76  MAP (mmHg): 90  Resp: 13  SpO2: 100 %  O2 Device (Oxygen Therapy): nasal cannula    Temp  Min: 97.6 °F (36.4 °C)  Max: 98.2 °F (36.8 °C)  Pulse  Min: 95  Max: 166  BP  Min: 106/57  Max: 162/92  MAP (mmHg)  Min: 75  Max: 117  Resp  Min: 13  Max: 21  SpO2  Min: 96 %  Max: 100 %    05/05 0701 - 05/06 0700  In: 652.3 [I.V.:214.5]  Out: 1100 [Urine:1100]   Unmeasured Output  Stool Occurrence: 0       Physical Exam  General Appearance: Elderly woman resting in bed, not in acute hemodynamic distress  Mental Status Exam: lethargic, requires repeated tactile stimuli to arouse, quickly falls back asleep in absence of continued stimulation. Oriented to self only, w/ significant encouragement follows simple commands  Cranial Nerves: Gaze midline, pupils 3->2 mm and reactive b/l, EOMI. No clear facial asymmetry. No clear dysarthria   Motor: Lifts b/l upper extremities AG w/ encouragement, R>L. W/d b/l lower extremities to tickle, grossly symmetric  Sensory: Grossly symmetric to noxious in all 4 limbs   Coordination: Unable to assess  Vascular: irregularly irregular rhythm, no murmurs appreciated  Lungs: CTA bilaterally without wheezing  Abdomen: Soft, non-distended, non-tender, BS +      Medications:  Continuousamiodarone in dextrose 5%, Last Rate: 0.5 mg/min (05/06/22 1000)    Scheduledatorvastatin, 40 mg, QHS  donepeziL, 5 mg, Nightly  enoxaparin, 50 mg, Q12H  lacosamide (VIMPAT) IVPB, 200 mg, Q12H  levetiracetam IV, 1,000 mg, Q12H  metoprolol tartrate, 50 mg, Q8H  pantoprazole, 40 mg, Daily  senna-docusate 8.6-50 mg, 1 tablet, BID  silodosin, 4 mg, Daily    PRNacetaminophen, 650 mg, Q4H PRN  aluminum-magnesium hydroxide-simethicone, 30 mL, QID PRN  melatonin, 6 mg, Nightly PRN  metoprolol, 2.5 mg, Q5  Min PRN  naloxone, 0.02 mg, PRN  ondansetron, 4 mg, Q8H PRN  polyethylene glycol, 17 g, BID PRN  prochlorperazine, 5 mg, Q6H PRN  senna-docusate 8.6-50 mg, 1 tablet, Daily PRN  sodium chloride 0.9%, 10 mL, PRN  sodium chloride 0.9%, 10 mL, Q6H PRN      Today I personally reviewed pertinent imaging, laboratory results, notably: CBC w/ stable anemia, no leukocytosis. CMP wnl. UA on admission non-infectious. cEEG w/ frequent R temporal seizures w/ spread to L frontal region at beginning of recording, improved s/p keppra/vimpat/ativan. Last electrographic seizure 0230. Continues w/ R sided multifocal epileptiform discharges.     Diet  Diet NPO  Diet NPO

## 2022-05-06 NOTE — CONSULTS
NIAS consulted to obtain IV access for patient.  Patient has adequate access at this time.  Reconsult NIAS if needed.

## 2022-05-06 NOTE — ASSESSMENT & PLAN NOTE
76 y/o female with new onset seizures and AF presents with recurrent focal right sided seizures. Likely 2/2 prior stroke, +/- provoked by recent course of ciprofloxacin for UTI     Siezures  - Epilepsy following, appreciate their recs  - C/w keppra 1 g BID, vimpat 200 mg BID, depakote 260 mg IV q8hrs (15 mg/kg/day)   - Ammonia wnl, depakote level therapeutic (goal )  - Infectious w/u initiated on arrival to [facility] on 5/4, NGTD and UA noninfectious, has completed 7d treatment for previously diagnosed UTI  - MRI unremarkable  - Consider neurology consult in AM to help with transition to PO and potential oversedation
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
## 2022-05-06 NOTE — PT/OT/SLP EVAL
"Speech Language Pathology Evaluation  Bedside Swallow    Patient Name:  Teresa Leos   MRN:  [id number]  Admitting Diagnosis: Focal seizures    Recommendations:                 General Recommendations:  Dysphagia therapy  Diet recommendations:  NPO, NPO   Aspiration Precautions: Strict aspiration precautions   General Precautions: Standard, aspiration, fall      History:     Past Medical History:   Diagnosis Date    Hyperlipidemia     Hypertension     Osteoporosis        Past Surgical History:   Procedure Laterality Date    HIP REPLACEMENT ARTHROPLASTY Right     HYSTERECTOMY      LEG SURGERY      SKIN CANCER EXCISION      TONSILLECTOMY, ADENOIDECTOMY         Social History: Patient lives unable to provide hx. Family at bedside.    Prior Intubation HX:  n/a    Prior diet: n/a   Subjective     Lethargic, constant cues to rouse  "she was just up for a few minutes like 30 min ago" re family    Pain/Comfort:  · Pain Rating 1: 0/10  · Pain Rating Post-Intervention 1: 0/10    Respiratory Status: Nasal cannula    Objective:     Oral Musculature Evaluation  · Oral Musculature: unable to assess due to poor participation/comprehension    Bedside Swallow Eval:   Consistencies Assessed:  · Thin liquids ice x1, thin x1     Oral Phase:   · Thermal stimulation placed to lips with no attempt to orally accept bolus.     Pharyngeal Phase:   · no pharyngeal swallow initiated    Assessment:     Teresa Leos is a 77 y.o. female with an SLP diagnosis of Dysphagia.    Goals:   Multidisciplinary Problems     SLP Goals     Not on file                Plan:     · Patient to be seen:  4 x/week   · Plan of Care reviewed with:  patient   · SLP Follow-Up:  Yes       Discharge recommendations:    TBD    Time Tracking:     SLP Treatment Date:   05/06/22  Speech Start Time:  0831  Speech Stop Time:  0835     Speech Total Time (min):  4 min    Billable Minutes: Eval Swallow and Oral Function 4    05/06/2022           "

## 2022-05-06 NOTE — CARE UPDATE
RAPID RESPONSE NURSE FOLLOW-UP NOTE       Followed up with patient for a rapid response. EEG reviewed by epilepsy on call, [facility] accepted pt. Pt transferred to [id number] with CN and primary RN.    No acute issues at this time. Reviewed plan of care with charge RN, Leanna.   Please call Rapid Response RN, Simone Gallo RN with any questions or concerns at [phone].

## 2022-05-07 LAB
ALBUMIN SERPL BCP-MCNC: 3.1 G/DL (ref 3.5–5.2)
ALP SERPL-CCNC: 56 U/L (ref 55–135)
ALT SERPL W/O P-5'-P-CCNC: 16 U/L (ref 10–44)
ANION GAP SERPL CALC-SCNC: 10 MMOL/L (ref 8–16)
AST SERPL-CCNC: 26 U/L (ref 10–40)
BASOPHILS # BLD AUTO: 0.09 K/UL (ref 0–0.2)
BASOPHILS NFR BLD: 1 % (ref 0–1.9)
BILIRUB SERPL-MCNC: 0.4 MG/DL (ref 0.1–1)
BUN SERPL-MCNC: 12 MG/DL (ref 8–23)
CALCIUM SERPL-MCNC: 9.3 MG/DL (ref 8.7–10.5)
CHLORIDE SERPL-SCNC: 102 MMOL/L (ref 95–110)
CO2 SERPL-SCNC: 22 MMOL/L (ref 23–29)
CREAT SERPL-MCNC: 0.7 MG/DL (ref 0.5–1.4)
DIFFERENTIAL METHOD: ABNORMAL
EOSINOPHIL # BLD AUTO: 0 K/UL (ref 0–0.5)
EOSINOPHIL NFR BLD: 0.5 % (ref 0–8)
ERYTHROCYTE [DISTWIDTH] IN BLOOD BY AUTOMATED COUNT: 14.3 % (ref 11.5–14.5)
EST. GFR  (AFRICAN AMERICAN): >60 ML/MIN/1.73 M^2
EST. GFR  (NON AFRICAN AMERICAN): >60 ML/MIN/1.73 M^2
GLUCOSE SERPL-MCNC: 84 MG/DL (ref 70–110)
HCT VFR BLD AUTO: 37.7 % (ref 37–48.5)
HGB BLD-MCNC: 12.1 G/DL (ref 12–16)
IMM GRANULOCYTES # BLD AUTO: 0.02 K/UL (ref 0–0.04)
IMM GRANULOCYTES NFR BLD AUTO: 0.2 % (ref 0–0.5)
LYMPHOCYTES # BLD AUTO: 2.9 K/UL (ref 1–4.8)
LYMPHOCYTES NFR BLD: 33.3 % (ref 18–48)
MAGNESIUM SERPL-MCNC: 1.7 MG/DL (ref 1.6–2.6)
MCH RBC QN AUTO: 32.4 PG (ref 27–31)
MCHC RBC AUTO-ENTMCNC: 32.1 G/DL (ref 32–36)
MCV RBC AUTO: 101 FL (ref 82–98)
MONOCYTES # BLD AUTO: 1 K/UL (ref 0.3–1)
MONOCYTES NFR BLD: 11.8 % (ref 4–15)
NEUTROPHILS # BLD AUTO: 4.6 K/UL (ref 1.8–7.7)
NEUTROPHILS NFR BLD: 53.2 % (ref 38–73)
NRBC BLD-RTO: 0 /100 WBC
PHOSPHATE SERPL-MCNC: 3.6 MG/DL (ref 2.7–4.5)
PLATELET # BLD AUTO: 233 K/UL (ref 150–450)
PMV BLD AUTO: 11 FL (ref 9.2–12.9)
POTASSIUM SERPL-SCNC: 4.3 MMOL/L (ref 3.5–5.1)
PROT SERPL-MCNC: 6 G/DL (ref 6–8.4)
RBC # BLD AUTO: 3.73 M/UL (ref 4–5.4)
SODIUM SERPL-SCNC: 134 MMOL/L (ref 136–145)
WBC # BLD AUTO: 8.71 K/UL (ref 3.9–12.7)

## 2022-05-07 PROCEDURE — 93010 ELECTROCARDIOGRAM REPORT: CPT | Mod: ,,, | Performed by: INTERNAL MEDICINE

## 2022-05-07 PROCEDURE — 25000003 PHARM REV CODE 250: Performed by: HOSPITALIST

## 2022-05-07 PROCEDURE — A9585 GADOBUTROL INJECTION: HCPCS | Performed by: PSYCHIATRY & NEUROLOGY

## 2022-05-07 PROCEDURE — C9254 INJECTION, LACOSAMIDE: HCPCS | Performed by: PHYSICIAN ASSISTANT

## 2022-05-07 PROCEDURE — 20000000 HC ICU ROOM

## 2022-05-07 PROCEDURE — 93010 EKG 12-LEAD: ICD-10-PCS | Mod: ,,, | Performed by: INTERNAL MEDICINE

## 2022-05-07 PROCEDURE — 80053 COMPREHEN METABOLIC PANEL: CPT | Performed by: HOSPITALIST

## 2022-05-07 PROCEDURE — 25000003 PHARM REV CODE 250: Performed by: PSYCHIATRY & NEUROLOGY

## 2022-05-07 PROCEDURE — 86803 HEPATITIS C AB TEST: CPT | Performed by: EMERGENCY MEDICINE

## 2022-05-07 PROCEDURE — 95720 EEG PHY/QHP EA INCR W/VEEG: CPT | Mod: ,,, | Performed by: PSYCHIATRY & NEUROLOGY

## 2022-05-07 PROCEDURE — 83735 ASSAY OF MAGNESIUM: CPT | Performed by: HOSPITALIST

## 2022-05-07 PROCEDURE — 25000003 PHARM REV CODE 250: Performed by: STUDENT IN AN ORGANIZED HEALTH CARE EDUCATION/TRAINING PROGRAM

## 2022-05-07 PROCEDURE — 63600175 PHARM REV CODE 636 W HCPCS: Performed by: PHYSICIAN ASSISTANT

## 2022-05-07 PROCEDURE — 84100 ASSAY OF PHOSPHORUS: CPT | Performed by: HOSPITALIST

## 2022-05-07 PROCEDURE — 25000003 PHARM REV CODE 250: Performed by: NURSE PRACTITIONER

## 2022-05-07 PROCEDURE — 99233 SBSQ HOSP IP/OBS HIGH 50: CPT | Mod: 95,,, | Performed by: PSYCHIATRY & NEUROLOGY

## 2022-05-07 PROCEDURE — 51798 US URINE CAPACITY MEASURE: CPT

## 2022-05-07 PROCEDURE — 25000003 PHARM REV CODE 250: Performed by: PHYSICIAN ASSISTANT

## 2022-05-07 PROCEDURE — 51701 INSERT BLADDER CATHETER: CPT

## 2022-05-07 PROCEDURE — 95720 PR EEG, W/VIDEO, CONT RECORD, I&R, >12<26 HRS: ICD-10-PCS | Mod: ,,, | Performed by: PSYCHIATRY & NEUROLOGY

## 2022-05-07 PROCEDURE — 99233 PR SUBSEQUENT HOSPITAL CARE,LEVL III: ICD-10-PCS | Mod: 95,,, | Performed by: PSYCHIATRY & NEUROLOGY

## 2022-05-07 PROCEDURE — 63600175 PHARM REV CODE 636 W HCPCS: Performed by: NURSE PRACTITIONER

## 2022-05-07 PROCEDURE — 63600175 PHARM REV CODE 636 W HCPCS: Performed by: STUDENT IN AN ORGANIZED HEALTH CARE EDUCATION/TRAINING PROGRAM

## 2022-05-07 PROCEDURE — 25500020 PHARM REV CODE 255: Performed by: PSYCHIATRY & NEUROLOGY

## 2022-05-07 PROCEDURE — 93005 ELECTROCARDIOGRAM TRACING: CPT

## 2022-05-07 PROCEDURE — 95714 VEEG EA 12-26 HR UNMNTR: CPT

## 2022-05-07 PROCEDURE — 85025 COMPLETE CBC W/AUTO DIFF WBC: CPT | Performed by: HOSPITALIST

## 2022-05-07 PROCEDURE — 94761 N-INVAS EAR/PLS OXIMETRY MLT: CPT

## 2022-05-07 RX ORDER — GADOBUTROL 604.72 MG/ML
5 INJECTION INTRAVENOUS
Status: COMPLETED | OUTPATIENT
Start: 2022-05-07 | End: 2022-05-07

## 2022-05-07 RX ORDER — METOPROLOL TARTRATE 1 MG/ML
5 INJECTION, SOLUTION INTRAVENOUS ONCE
Status: COMPLETED | OUTPATIENT
Start: 2022-05-07 | End: 2022-05-07

## 2022-05-07 RX ORDER — AMIODARONE HYDROCHLORIDE 200 MG/1
400 TABLET ORAL 2 TIMES DAILY
Status: DISCONTINUED | OUTPATIENT
Start: 2022-05-07 | End: 2022-05-07

## 2022-05-07 RX ORDER — MAGNESIUM SULFATE 1 G/100ML
1 INJECTION INTRAVENOUS ONCE
Status: COMPLETED | OUTPATIENT
Start: 2022-05-07 | End: 2022-05-07

## 2022-05-07 RX ORDER — ACETAMINOPHEN 325 MG/1
650 TABLET ORAL ONCE
Status: DISCONTINUED | OUTPATIENT
Start: 2022-05-07 | End: 2022-05-08

## 2022-05-07 RX ORDER — SILODOSIN 8 MG/1
8 CAPSULE ORAL DAILY
Status: DISCONTINUED | OUTPATIENT
Start: 2022-05-08 | End: 2022-05-09

## 2022-05-07 RX ADMIN — AMIODARONE HYDROCHLORIDE 400 MG: 200 TABLET ORAL at 12:05

## 2022-05-07 RX ADMIN — MAGNESIUM SULFATE 1 G: 500 INJECTION, SOLUTION INTRAMUSCULAR; INTRAVENOUS at 07:05

## 2022-05-07 RX ADMIN — ATORVASTATIN CALCIUM 40 MG: 20 TABLET, FILM COATED ORAL at 08:05

## 2022-05-07 RX ADMIN — SODIUM CHLORIDE 200 MG: 9 INJECTION, SOLUTION INTRAVENOUS at 09:05

## 2022-05-07 RX ADMIN — LEVETIRACETAM 1000 MG: 500 INJECTION, SOLUTION INTRAVENOUS at 08:05

## 2022-05-07 RX ADMIN — ENOXAPARIN SODIUM 50 MG: 60 INJECTION SUBCUTANEOUS at 08:05

## 2022-05-07 RX ADMIN — PANTOPRAZOLE SODIUM 40 MG: 40 TABLET, DELAYED RELEASE ORAL at 08:05

## 2022-05-07 RX ADMIN — METOPROLOL TARTRATE 5 MG: 5 INJECTION, SOLUTION INTRAVENOUS at 03:05

## 2022-05-07 RX ADMIN — AMIODARONE HYDROCHLORIDE 0.5 MG/MIN: 1.8 INJECTION, SOLUTION INTRAVENOUS at 06:05

## 2022-05-07 RX ADMIN — METOROPROLOL TARTRATE 2.5 MG: 5 INJECTION, SOLUTION INTRAVENOUS at 04:05

## 2022-05-07 RX ADMIN — ACETAMINOPHEN 650 MG: 325 TABLET ORAL at 05:05

## 2022-05-07 RX ADMIN — SODIUM CHLORIDE 500 ML: 0.9 INJECTION, SOLUTION INTRAVENOUS at 03:05

## 2022-05-07 RX ADMIN — SODIUM CHLORIDE 200 MG: 9 INJECTION, SOLUTION INTRAVENOUS at 08:05

## 2022-05-07 RX ADMIN — DONEPEZIL HYDROCHLORIDE 5 MG: 5 TABLET ORAL at 08:05

## 2022-05-07 RX ADMIN — SILODOSIN 4 MG: 4 CAPSULE ORAL at 08:05

## 2022-05-07 RX ADMIN — MUPIROCIN: 20 OINTMENT TOPICAL at 08:05

## 2022-05-07 RX ADMIN — MUPIROCIN: 20 OINTMENT TOPICAL at 09:05

## 2022-05-07 RX ADMIN — METOROPROLOL TARTRATE 5 MG: 5 INJECTION, SOLUTION INTRAVENOUS at 03:05

## 2022-05-07 RX ADMIN — SENNOSIDES AND DOCUSATE SODIUM 1 TABLET: 50; 8.6 TABLET ORAL at 08:05

## 2022-05-07 RX ADMIN — METOROPROLOL TARTRATE 5 MG: 5 INJECTION, SOLUTION INTRAVENOUS at 09:05

## 2022-05-07 RX ADMIN — GADOBUTROL 5 ML: 604.72 INJECTION INTRAVENOUS at 11:05

## 2022-05-07 RX ADMIN — AMIODARONE HYDROCHLORIDE 0.5 MG/MIN: 1.8 INJECTION, SOLUTION INTRAVENOUS at 07:05

## 2022-05-07 NOTE — ASSESSMENT & PLAN NOTE
76 y/o female with new onset seizures and AF presents with recurrent focal right sided seizures. Likely 2/2 prior stroke, +/- provoked by recent course of ciprofloxacin for UTI  - cEEG improved overnight, will continue  - Epilepsy following, appreciate their recs  - C/w keppra 1 g BID, vimpat 200 mg BID   - If pt w/ recurrent seizures will plan to optimize keppra -> load depakote as 3rd line agent  - MRI brain w/ and w/o s/p achieving seizure control to further evaluate seizure etiology   - Infectious w/u initiated on arrival to [facility] on 5/4, NGTD and UA noninfectious, has completed 7d treatment for previously diagnosed UTI  - Failed Monge, will place NG for meds/TFs  - MRI pending and EEG back on

## 2022-05-07 NOTE — ASSESSMENT & PLAN NOTE
HR into 170s-180 on stepdown, given BB and loaded with amiodarone prior to NCC stepup   - Currently maintaining adequate MAPsl   - Continue anticoagulation (on lovenox 50 mg BID while in house, transition back to home apixaban s/p seizure control and MRI)   - Adjusted amio to po

## 2022-05-07 NOTE — ASSESSMENT & PLAN NOTE
BP soft on admission, holding home antihypertensives  - Hold home amlodipine 5 mg qday, irbesartan-HCTZ 300-12.5 mg qday  - Goal normotension

## 2022-05-07 NOTE — NURSING
Pt transported to MRI via bed. On room air, on continues/portable monitoring. Ambu bag at the bedside. VALERIA.

## 2022-05-07 NOTE — PROCEDURES
ICU EEG/VIDEO MONITORING REPORT    DATE OF SERVICE: 5/6/2022 to 05/07/2022   EEG NUMBER: [id number]  REQUESTED BY: Mariam   [place] [facility]    METHODOLOGY   Electroencephalographic (EEG) recording is with electrodes placed according to the International 10-20 placement system.  Thirty two (32) channels of digital signal are simultaneously recorded from the scalp and may include EKG, EMG, and/or eye monitors.   Recording band pass was 0.1 to 512 hz.  Digital video recording of the patient is simultaneously recorded with the EEG.  The nursing staff report clinical symptoms and may press an event button when the patient has symptoms of clinical interest to the treating physicians.  EEG and video recording is stored and archived in digital format.  The entire recording is visually reviewed and the times identified by computer analysis as being spikes or seizures are reviewed again.  Activation procedures which include photic stimulation, hyperventilation and instructing patients to perform simple task are done in selected patients.   Compresses spectral analysis (CSA) is also performed on the activity recorded from each individual channel.  This is displayed as a power display of frequencies from 0 to 30 Hz over time.   The CSA analysis is done and displayed continuously.  This is reviewed for asymmetries in power between homologous areas of the scalp and for presence of changes in power which canbe seen when seizures occur.  Sections of suspected abnormalities on the CSA is then compared with the original EEG recording.     Markkit software was also utilized in the review of this study.  This software suite analyzes the EEG recording in multiple domains.  Coherence and rhythmicity is computed to identify EEG sections which may contain organized seizures.  Each channel undergoes analysis to detect presence of spike and sharp waves which have special and morphological characteristic of epileptic  activity.  The routine EEG recording is converted from spacial into frequency domain.  This is then displayed comparing homologous areas to identify areas of significant asymmetry.  Algorithm to identify non-cortically generated artifact is used to separate eye movement, EMG and other artifact from the EEG.      Recording Times  Start on 5/6/2022 at 1124 running for 19 hrs and 34 min    EEG FINDINGS    Background activity:   The background rhythm was continuous and characterized by predominantly disorganized theta with alpha/delta overlying. Intermittently a nonsustained 7 Hz posterior dominant alpha rhythm was seen. Asymmetric with less fast frequencies and more prominent theta/delta over the R temporal chain, at times with wider field of slowing. Reactive and variable.      Sleep:  No normal sleep seen. State changes noted with increased fast frequencies with alertness.     Activation procedures: not performed    Periodic/Rhythmic Activity andSeizures:  Bilateral independent discharges seen, increases with stimulation and spontaneously   Abundant R frontal periodic discharges at 1-2hz for long periods  Frequent L frontal periodic discharges at 1-2hz for long periods    EKG: irregular rhythm seen    IMPRESSION/CLINICAL CORRELATION    This is an abnormal vEEG due to   1) Bilateral independent periodic discharges, stimulus induced and spontaneous  2) R slowing with multifocal sharp waves  3) Generalized avis/delta slowing    In this 76 yo female with acute encephalopathy and paroxysmal episodes of L version indicative of  1) R temporal seizure focus with high risk for recurrent seizures  2) pattern on ictal-interictal continuum which may or may not improve with additional antiseizure medication  2) moderate diffuse encephalopathy    Compared to yesterday's study, no further seizures seen. Pattern remains on ictal-interictal continuum    Millie Mena MD  Neurocritical Care   Neurology-Epilepsy

## 2022-05-07 NOTE — ASSESSMENT & PLAN NOTE
Requiring frequent straight caths overnight  - UA on admission non-infectious   - Increased silodosin to 8 mg qday  - Bladder scans per protocol and straight cath prn ordered

## 2022-05-07 NOTE — PROGRESS NOTES
[facility] - Neuro Critical Care  Neurocritical Care  Progress Note    Admit Date: 5/4/2022  Service Date: 05/07/2022  Length of Stay: 2    Subjective:     Chief Complaint: Focal seizures    History of Present Illness: Ms. Leos is a 76 y/o female with PMH significant for dementia, hypertension and recently diagnosed atrial fibrillation presents to [facility] as a transfer from Hospital Medicine team for further management of recurrent focal seizures and AF with RVR. Patient initially presented to [facility] on April 29th with confusion and witnessed convulsions. During her two day admission, stroke was reportedly ruled out and no further seizures were noted. Patient was diagnosed with new onset AF, for which she was discharged on AC and BB. She represented to [facility] on 5/4 after family reported intermittent staring spells. She had witnessed convulsions during EMS transport to [facility] ED. She had a telestroke consult and was transferred to [facility] for further evaluation. She had a total of 2g Keppra and was started on 500 mg BID for maintenance. She was given BB and amiodarone for her HR. On arrival to [facility], patient was awake and oriented to self and place. This evening, patient developed AF RVR with HR into 180s with stable BP which did not respond to IV BB. Patient was also having fluctuating mentation associated with staring, deviated gaze, and becoming nonverbal, eventually also associated with left sided weakness and facial twitching.  Patient was given amiodarone per  team and was loaded with 2g Keppra and 2 mg lorazepam. Patient did not return to her neurological baseline after medications, still requiring sternal rubbing to say her name. cEEG was placed and interpreted by Dr. Brown, noted to have recurrent focal right sided seizures. Given her AF RVR and recurrent seizures refractory to benzodiazepines and Keppra load, patient was transferred to [facility] for further management and diagnostics.    On arrival to [facility],  patient was given small fluid bolus and loaded with 300 mg lacosamide. She is intermittently following simple commands and saying her name, protecting her airway.       Hospital Course: 5/6/2022: Admitted to NSICU overnight, given keppra 2 g IV and ativan 2 mg IV prior to transfer. Given vimpat 300 mg IV x1 and additional ativan 2 mg IV x1 overnight for ongoing electrographic seizures. Last electrographic seizure at 0230. Remains on IV amiodarone loading dose, HR improved to 110s, remains in Afib  5/7/2022 Initiated straight cath, bladder scans, and Tubefeeds, Trending EEG, MRI pending        Review of Systems   Unable to perform ROS: Mental status change     Objective:     Vitals:  Temp: 97.9 °F (36.6 °C)  Pulse: (!) 118  Rhythm: atrial rhythm  BP: 130/80  MAP (mmHg): 99  Resp: 17  SpO2: 100 %  O2 Device (Oxygen Therapy): room air    Temp  Min: 97.6 °F (36.4 °C)  Max: 98.3 °F (36.8 °C)  Pulse  Min: 106  Max: 138  BP  Min: 103/54  Max: 152/89  MAP (mmHg)  Min: 72  Max: 115  Resp  Min: 11  Max: 22  SpO2  Min: 95 %  Max: 100 %    05/06 0701 - 05/07 0700  In: 1526.1 [I.V.:926.1]  Out: 2000 [Urine:2000]   Unmeasured Output  Stool Occurrence: 0       Physical Exam  General Appearance: Elderly woman resting in bed, not in acute hemodynamic distress  Mental Status Exam: lethargic, requires repeated tactile stimuli to arouse, quickly falls back asleep in absence of continued stimulation. Oriented to self only, w/ significant encouragement follows simple commands  Cranial Nerves: Gaze midline, pupils 3->2 mm and reactive b/l, EOMI. No clear facial asymmetry. No clear dysarthria   Motor: Lifts b/l upper extremities AG w/ encouragement, R>L. W/d b/l lower extremities to stimulation, grossly symmetric  Sensory: Grossly symmetric to noxious in all 4 limbs   Coordination: Unable to assess  Vascular: irregularly irregular rhythm, no murmurs appreciated  Lungs: CTA bilaterally without wheezing  Abdomen: Soft, non-distended,  non-tender, BS +      Medications:  Continuous   Scheduledamiodarone, 400 mg, BID  atorvastatin, 40 mg, QHS  donepeziL, 5 mg, Nightly  enoxaparin, 50 mg, Q12H  lacosamide (VIMPAT) IVPB, 200 mg, Q12H  levetiracetam IV, 1,000 mg, Q12H  mupirocin, , BID  pantoprazole, 40 mg, Daily  senna-docusate 8.6-50 mg, 1 tablet, BID  [START ON 5/8/2022] silodosin, 8 mg, Daily  PRNacetaminophen, 650 mg, Q4H PRN  aluminum-magnesium hydroxide-simethicone, 30 mL, QID PRN  melatonin, 6 mg, Nightly PRN  metoprolol, 2.5 mg, Q5 Min PRN  naloxone, 0.02 mg, PRN  ondansetron, 4 mg, Q8H PRN  polyethylene glycol, 17 g, BID PRN  prochlorperazine, 5 mg, Q6H PRN  senna-docusate 8.6-50 mg, 1 tablet, Daily PRN  sodium chloride 0.9%, 10 mL, PRN  sodium chloride 0.9%, 10 mL, Q6H PRN    Today I personally reviewed pertinent imaging, laboratory results,     Diet  Diet NPO  Diet NPO        Assessment/Plan:     Neuro  * Focal seizures  76 y/o female with new onset seizures and AF presents with recurrent focal right sided seizures. Likely 2/2 prior stroke, +/- provoked by recent course of ciprofloxacin for UTI  - cEEG improved overnight, will continue  - Epilepsy following, appreciate their recs  - C/w keppra 1 g BID, vimpat 200 mg BID   - If pt w/ recurrent seizures will plan to optimize keppra -> load depakote as 3rd line agent  - MRI brain w/ and w/o s/p achieving seizure control to further evaluate seizure etiology   - Infectious w/u initiated on arrival to [facility] on 5/4, NGTD and UA noninfectious, has completed 7d treatment for previously diagnosed UTI  - Failed Monge, will place NG for meds/TFs  - MRI pending and EEG back on    Dementia  Mild per family reports, remains independent of ADLs  - home donepezil 5 mg     Cardiac/Vascular  Other hyperlipidemia  - C/w atorvastatin 40 mg qhs    Atrial fibrillation with RVR  HR into 170s-180 on stepdown, given BB and loaded with amiodarone prior to NCC stepup   - Currently maintaining adequate MAPsl   -  Continue anticoagulation (on lovenox 50 mg BID while in house, transition back to home apixaban s/p seizure control and MRI)   - Adjusted amio to po    Essential hypertension  BP soft on admission, holding home antihypertensives  - Hold home amlodipine 5 mg qday, irbesartan-HCTZ 300-12.5 mg qday  - Goal normotension     Renal/  Urinary retention  Requiring frequent straight caths overnight  - UA on admission non-infectious   - Increased silodosin to 8 mg qday  - Bladder scans per protocol and straight cath prn ordered    GI  Gastroesophageal reflux disease  - Continue PPI             Activity Orders          Straight Cath starting at 05/07 1045    Diet NPO: NPO starting at 05/05 2046    Progressive Mobility Protocol (mobilize patient to their highest level of functioning at least twice daily) starting at 05/05 0800    Turn patient every 2 hours starting at 05/05 0400    Straight Cath starting at 05/05 0024        Full Code    Johan Diaz NP  Neurocritical Care  [facility] - Neuro Critical Care

## 2022-05-07 NOTE — SUBJECTIVE & OBJECTIVE
Review of Systems   Unable to perform ROS: Mental status change     Objective:     Vitals:  Temp: 97.9 °F (36.6 °C)  Pulse: (!) 118  Rhythm: atrial rhythm  BP: 130/80  MAP (mmHg): 99  Resp: 17  SpO2: 100 %  O2 Device (Oxygen Therapy): room air    Temp  Min: 97.6 °F (36.4 °C)  Max: 98.3 °F (36.8 °C)  Pulse  Min: 106  Max: 138  BP  Min: 103/54  Max: 152/89  MAP (mmHg)  Min: 72  Max: 115  Resp  Min: 11  Max: 22  SpO2  Min: 95 %  Max: 100 %    05/06 0701 - 05/07 0700  In: 1526.1 [I.V.:926.1]  Out: 2000 [Urine:2000]   Unmeasured Output  Stool Occurrence: 0       Physical Exam  General Appearance: Elderly woman resting in bed, not in acute hemodynamic distress  Mental Status Exam: lethargic, requires repeated tactile stimuli to arouse, quickly falls back asleep in absence of continued stimulation. Oriented to self only, w/ significant encouragement follows simple commands  Cranial Nerves: Gaze midline, pupils 3->2 mm and reactive b/l, EOMI. No clear facial asymmetry. No clear dysarthria   Motor: Lifts b/l upper extremities AG w/ encouragement, R>L. W/d b/l lower extremities to stimulation, grossly symmetric  Sensory: Grossly symmetric to noxious in all 4 limbs   Coordination: Unable to assess  Vascular: irregularly irregular rhythm, no murmurs appreciated  Lungs: CTA bilaterally without wheezing  Abdomen: Soft, non-distended, non-tender, BS +      Medications:  Continuous   Scheduledamiodarone, 400 mg, BID  atorvastatin, 40 mg, QHS  donepeziL, 5 mg, Nightly  enoxaparin, 50 mg, Q12H  lacosamide (VIMPAT) IVPB, 200 mg, Q12H  levetiracetam IV, 1,000 mg, Q12H  mupirocin, , BID  pantoprazole, 40 mg, Daily  senna-docusate 8.6-50 mg, 1 tablet, BID  [START ON 5/8/2022] silodosin, 8 mg, Daily  PRNacetaminophen, 650 mg, Q4H PRN  aluminum-magnesium hydroxide-simethicone, 30 mL, QID PRN  melatonin, 6 mg, Nightly PRN  metoprolol, 2.5 mg, Q5 Min PRN  naloxone, 0.02 mg, PRN  ondansetron, 4 mg, Q8H PRN  polyethylene glycol, 17 g, BID  PRN  prochlorperazine, 5 mg, Q6H PRN  senna-docusate 8.6-50 mg, 1 tablet, Daily PRN  sodium chloride 0.9%, 10 mL, PRN  sodium chloride 0.9%, 10 mL, Q6H PRN    Today I personally reviewed pertinent imaging, laboratory results,     Diet  Diet NPO  Diet NPO

## 2022-05-07 NOTE — PLAN OF CARE
[facility] Care Plan    POC reviewed with Teresa SOTO Deric and family at 0300. Pt's family verbalized understanding. Questions and concerns addressed. No acute events overnight. Pt progressing toward goals. Will continue to monitor. See below and flowsheets for full assessment and VS info.           Is this a stroke patient? no    Neuro:  Melanie Coma Scale  Best Eye Response: 3-->(E3) to speech  Best Motor Response: 6-->(M6) obeys commands  Best Verbal Response: 5-->(V5) oriented  Melanie Coma Scale Score: 14  Assessment Qualifiers: patient not sedated/intubated        24hr Temp:  [97.5 °F (36.4 °C)-98.3 °F (36.8 °C)]     CV:   Rhythm: atrial rhythm  BP goals:   SBP < 180  MAP > 65    Resp:   O2 Device (Oxygen Therapy): room air       Plan: N/A    GI/:     Diet/Nutrition Received: NPO  Last Bowel Movement: 05/06/22  Voiding Characteristics: other (see comments) (retention)    Intake/Output Summary (Last 24 hours) at 5/7/2022 0734  Last data filed at 5/7/2022 0601  Gross per 24 hour   Intake 1508.31 ml   Output 2000 ml   Net -491.69 ml     Unmeasured Output  Stool Occurrence: 0    Labs/Accuchecks:  Recent Labs   Lab 05/07/22  0626   WBC 8.71   RBC 3.73*   HGB 12.1   HCT 37.7         Recent Labs   Lab 05/07/22  0626   *   K 4.3   CO2 22*      BUN 12   CREATININE 0.7   ALKPHOS 56   ALT 16   AST 26   BILITOT 0.4      Recent Labs   Lab 05/04/22  1536   INR 1.2   APTT 25.6      Recent Labs   Lab 05/04/22  1524 05/04/22  1536   *  196*  --    CPKMB 5.5  --    TROPONINI  --  <0.006   MB 2.8  --        Electrolytes: N/A - electrolytes WDL  Accuchecks: none    Gtts:   amiodarone in dextrose 5% 0.5 mg/min (05/07/22 0631)       LDA/Wounds:  Lines/Drains/Airways       Drain  Duration                  NG/OG Tube 05/06/22 1022 Right nostril <1 day    Female External Urinary Catheter 05/06/22 0748 <1 day              Peripheral Intravenous Line  Duration                  Midline Catheter Insertion/Assessment   - Single Lumen 05/05/22 2215 Left basilic vein (medial side of arm) other (see comments) 1 day         Peripheral IV - Single Lumen 05/05/22 1951 22 G Anterior;Distal;Left Forearm 1 day         Peripheral IV - Single Lumen 05/05/22 2222 20 G Anterior;Left Foot 1 day         Peripheral IV - Single Lumen 05/05/22 2222 Anterior;Right Foot 1 day                  Wounds: No  Wound care consulted: No

## 2022-05-07 NOTE — PLAN OF CARE
[facility] Care Plan    POC reviewed with Teresa Leos and family at 1400. Pt verbalized understanding. Questions and concerns addressed. No acute events today. Pt progressing toward goals. Will continue to monitor. See below and flowsheets for full assessment and VS info.     -Family updated at the bedside.  -PT unable to void requiring straight cath.   -Amiodarone gtt stopped at 1001.  -A fib with -170. Amlodipine x3 admin with no improvement. Mack NP with NCC team notified. NS bolus of 700ml admin per order. PT remains with HR of 130-150. NCC team aware.          Is this a stroke patient? no    Neuro:  [place] Coma Scale  Best Eye Response: 3-->(E3) to speech  Best Motor Response: 6-->(M6) obeys commands  Best Verbal Response: 4-->(V4) confused  Melanie Coma Scale Score: 13  Assessment Qualifiers: no eye obstruction present, patient not sedated/intubated        24 hr Temp:  [97.6 °F (36.4 °C)-98.3 °F (36.8 °C)]     CV:   Rhythm: atrial rhythm  BP goals:   SBP < 160  MAP > 65    Resp:   O2 Device (Oxygen Therapy): room air       Plan: N/A    GI/:     Diet/Nutrition Received: tube feeding  Last Bowel Movement: 05/06/22  Voiding Characteristics: unable to void, due to void    Intake/Output Summary (Last 24 hours) at 5/7/2022 1836  Last data filed at 5/7/2022 1801  Gross per 24 hour   Intake 1479.35 ml   Output 1350 ml   Net 129.35 ml     Unmeasured Output  Stool Occurrence: 0    Labs/Accuchecks:  Recent Labs   Lab 05/07/22  0626   WBC 8.71   RBC 3.73*   HGB 12.1   HCT 37.7         Recent Labs   Lab 05/07/22  0626   *   K 4.3   CO2 22*      BUN 12   CREATININE 0.7   ALKPHOS 56   ALT 16   AST 26   BILITOT 0.4      Recent Labs   Lab 05/04/22  1536   INR 1.2   APTT 25.6      Recent Labs   Lab 05/04/22  1524 05/04/22  1536   *  196*  --    CPKMB 5.5  --    TROPONINI  --  <0.006   MB 2.8  --        Electrolytes: N/A - electrolytes WDL  Accuchecks:  none    Gtts:      LDA/Wounds:  Lines/Drains/Airways       Drain  Duration                  NG/OG Tube 05/06/22 1022 Right nostril 1 day    Female External Urinary Catheter 05/06/22 0748 1 day              Peripheral Intravenous Line  Duration                  Midline Catheter Insertion/Assessment  - Single Lumen 05/05/22 2215 Left basilic vein (medial side of arm) other (see comments) 1 day         Peripheral IV - Single Lumen 05/05/22 1951 22 G Anterior;Distal;Left Forearm 1 day         Peripheral IV - Single Lumen 05/05/22 2222 20 G Anterior;Left Foot 1 day         Peripheral IV - Single Lumen 05/05/22 2222 Anterior;Right Foot 1 day                  Wounds: No  Wound care consulted: No

## 2022-05-08 LAB
ALBUMIN SERPL BCP-MCNC: 3 G/DL (ref 3.5–5.2)
ALLENS TEST: ABNORMAL
ALP SERPL-CCNC: 60 U/L (ref 55–135)
ALT SERPL W/O P-5'-P-CCNC: 15 U/L (ref 10–44)
ANION GAP SERPL CALC-SCNC: 9 MMOL/L (ref 8–16)
AST SERPL-CCNC: 24 U/L (ref 10–40)
BASOPHILS # BLD AUTO: 0.06 K/UL (ref 0–0.2)
BASOPHILS NFR BLD: 0.7 % (ref 0–1.9)
BILIRUB SERPL-MCNC: 0.4 MG/DL (ref 0.1–1)
BILIRUB UR QL STRIP: NEGATIVE
BUN SERPL-MCNC: 13 MG/DL (ref 8–23)
CALCIUM SERPL-MCNC: 8.9 MG/DL (ref 8.7–10.5)
CHLORIDE SERPL-SCNC: 103 MMOL/L (ref 95–110)
CLARITY UR REFRACT.AUTO: ABNORMAL
CO2 SERPL-SCNC: 20 MMOL/L (ref 23–29)
COLOR UR AUTO: YELLOW
CREAT SERPL-MCNC: 0.8 MG/DL (ref 0.5–1.4)
DELSYS: ABNORMAL
DIFFERENTIAL METHOD: ABNORMAL
EOSINOPHIL # BLD AUTO: 0 K/UL (ref 0–0.5)
EOSINOPHIL NFR BLD: 0.2 % (ref 0–8)
ERYTHROCYTE [DISTWIDTH] IN BLOOD BY AUTOMATED COUNT: 14.2 % (ref 11.5–14.5)
EST. GFR  (AFRICAN AMERICAN): >60 ML/MIN/1.73 M^2
EST. GFR  (NON AFRICAN AMERICAN): >60 ML/MIN/1.73 M^2
GLUCOSE SERPL-MCNC: 132 MG/DL (ref 70–110)
GLUCOSE UR QL STRIP: NEGATIVE
HCO3 UR-SCNC: 23.4 MMOL/L (ref 24–28)
HCT VFR BLD AUTO: 36.4 % (ref 37–48.5)
HGB BLD-MCNC: 12 G/DL (ref 12–16)
HGB UR QL STRIP: ABNORMAL
IMM GRANULOCYTES # BLD AUTO: 0.02 K/UL (ref 0–0.04)
IMM GRANULOCYTES NFR BLD AUTO: 0.2 % (ref 0–0.5)
KETONES UR QL STRIP: ABNORMAL
LEUKOCYTE ESTERASE UR QL STRIP: NEGATIVE
LYMPHOCYTES # BLD AUTO: 2.2 K/UL (ref 1–4.8)
LYMPHOCYTES NFR BLD: 26.3 % (ref 18–48)
MAGNESIUM SERPL-MCNC: 2 MG/DL (ref 1.6–2.6)
MCH RBC QN AUTO: 32.8 PG (ref 27–31)
MCHC RBC AUTO-ENTMCNC: 33 G/DL (ref 32–36)
MCV RBC AUTO: 100 FL (ref 82–98)
MICROSCOPIC COMMENT: ABNORMAL
MODE: ABNORMAL
MONOCYTES # BLD AUTO: 1.3 K/UL (ref 0.3–1)
MONOCYTES NFR BLD: 16.1 % (ref 4–15)
NEUTROPHILS # BLD AUTO: 4.6 K/UL (ref 1.8–7.7)
NEUTROPHILS NFR BLD: 56.5 % (ref 38–73)
NITRITE UR QL STRIP: NEGATIVE
NRBC BLD-RTO: 0 /100 WBC
PCO2 BLDA: 36.7 MMHG (ref 35–45)
PH SMN: 7.41 [PH] (ref 7.35–7.45)
PH UR STRIP: 5 [PH] (ref 5–8)
PHOSPHATE SERPL-MCNC: 2.7 MG/DL (ref 2.7–4.5)
PLATELET # BLD AUTO: 252 K/UL (ref 150–450)
PMV BLD AUTO: 10.5 FL (ref 9.2–12.9)
PO2 BLDA: 74 MMHG (ref 80–100)
POC BE: -1 MMOL/L
POC SATURATED O2: 95 % (ref 95–100)
POC TCO2: 24 MMOL/L (ref 23–27)
POTASSIUM SERPL-SCNC: 4.2 MMOL/L (ref 3.5–5.1)
PROT SERPL-MCNC: 5.8 G/DL (ref 6–8.4)
PROT UR QL STRIP: NEGATIVE
RBC # BLD AUTO: 3.66 M/UL (ref 4–5.4)
RBC #/AREA URNS AUTO: 70 /HPF (ref 0–4)
SAMPLE: ABNORMAL
SITE: ABNORMAL
SODIUM SERPL-SCNC: 132 MMOL/L (ref 136–145)
SP GR UR STRIP: 1.02 (ref 1–1.03)
SP02: 100
SQUAMOUS #/AREA URNS AUTO: 1 /HPF
URN SPEC COLLECT METH UR: ABNORMAL
VALPROATE SERPL-MCNC: 48.7 UG/ML (ref 50–100)
WBC # BLD AUTO: 8.19 K/UL (ref 3.9–12.7)
WBC #/AREA URNS AUTO: 9 /HPF (ref 0–5)

## 2022-05-08 PROCEDURE — 51798 US URINE CAPACITY MEASURE: CPT

## 2022-05-08 PROCEDURE — 81001 URINALYSIS AUTO W/SCOPE: CPT

## 2022-05-08 PROCEDURE — 25000003 PHARM REV CODE 250: Performed by: EMERGENCY MEDICINE

## 2022-05-08 PROCEDURE — C9254 INJECTION, LACOSAMIDE: HCPCS | Performed by: PHYSICIAN ASSISTANT

## 2022-05-08 PROCEDURE — 80164 ASSAY DIPROPYLACETIC ACD TOT: CPT

## 2022-05-08 PROCEDURE — 63600175 PHARM REV CODE 636 W HCPCS: Performed by: STUDENT IN AN ORGANIZED HEALTH CARE EDUCATION/TRAINING PROGRAM

## 2022-05-08 PROCEDURE — 94761 N-INVAS EAR/PLS OXIMETRY MLT: CPT

## 2022-05-08 PROCEDURE — 82803 BLOOD GASES ANY COMBINATION: CPT

## 2022-05-08 PROCEDURE — 83735 ASSAY OF MAGNESIUM: CPT | Performed by: STUDENT IN AN ORGANIZED HEALTH CARE EDUCATION/TRAINING PROGRAM

## 2022-05-08 PROCEDURE — 63600175 PHARM REV CODE 636 W HCPCS: Performed by: NURSE PRACTITIONER

## 2022-05-08 PROCEDURE — 20000000 HC ICU ROOM

## 2022-05-08 PROCEDURE — 85025 COMPLETE CBC W/AUTO DIFF WBC: CPT | Performed by: STUDENT IN AN ORGANIZED HEALTH CARE EDUCATION/TRAINING PROGRAM

## 2022-05-08 PROCEDURE — 63600175 PHARM REV CODE 636 W HCPCS: Performed by: PSYCHIATRY & NEUROLOGY

## 2022-05-08 PROCEDURE — 25000003 PHARM REV CODE 250: Performed by: PSYCHIATRY & NEUROLOGY

## 2022-05-08 PROCEDURE — 25000003 PHARM REV CODE 250: Performed by: PHYSICIAN ASSISTANT

## 2022-05-08 PROCEDURE — 84100 ASSAY OF PHOSPHORUS: CPT | Performed by: STUDENT IN AN ORGANIZED HEALTH CARE EDUCATION/TRAINING PROGRAM

## 2022-05-08 PROCEDURE — 95720 PR EEG, W/VIDEO, CONT RECORD, I&R, >12<26 HRS: ICD-10-PCS | Mod: ,,, | Performed by: PSYCHIATRY & NEUROLOGY

## 2022-05-08 PROCEDURE — 25000003 PHARM REV CODE 250: Performed by: NURSE PRACTITIONER

## 2022-05-08 PROCEDURE — 25000003 PHARM REV CODE 250: Performed by: STUDENT IN AN ORGANIZED HEALTH CARE EDUCATION/TRAINING PROGRAM

## 2022-05-08 PROCEDURE — 95714 VEEG EA 12-26 HR UNMNTR: CPT

## 2022-05-08 PROCEDURE — 99233 PR SUBSEQUENT HOSPITAL CARE,LEVL III: ICD-10-PCS | Mod: ,,,

## 2022-05-08 PROCEDURE — 99233 SBSQ HOSP IP/OBS HIGH 50: CPT | Mod: ,,,

## 2022-05-08 PROCEDURE — 25000003 PHARM REV CODE 250: Performed by: HOSPITALIST

## 2022-05-08 PROCEDURE — 99900035 HC TECH TIME PER 15 MIN (STAT)

## 2022-05-08 PROCEDURE — C9113 INJ PANTOPRAZOLE SODIUM, VIA: HCPCS | Performed by: PSYCHIATRY & NEUROLOGY

## 2022-05-08 PROCEDURE — 63600175 PHARM REV CODE 636 W HCPCS: Performed by: PHYSICIAN ASSISTANT

## 2022-05-08 PROCEDURE — 36600 WITHDRAWAL OF ARTERIAL BLOOD: CPT

## 2022-05-08 PROCEDURE — 95720 EEG PHY/QHP EA INCR W/VEEG: CPT | Mod: ,,, | Performed by: PSYCHIATRY & NEUROLOGY

## 2022-05-08 PROCEDURE — 25000003 PHARM REV CODE 250

## 2022-05-08 PROCEDURE — 80053 COMPREHEN METABOLIC PANEL: CPT | Performed by: STUDENT IN AN ORGANIZED HEALTH CARE EDUCATION/TRAINING PROGRAM

## 2022-05-08 PROCEDURE — 51701 INSERT BLADDER CATHETER: CPT

## 2022-05-08 RX ORDER — BISACODYL 10 MG
10 SUPPOSITORY, RECTAL RECTAL DAILY
Status: DISCONTINUED | OUTPATIENT
Start: 2022-05-09 | End: 2022-05-09

## 2022-05-08 RX ORDER — TALC
6 POWDER (GRAM) TOPICAL NIGHTLY
Status: DISCONTINUED | OUTPATIENT
Start: 2022-05-08 | End: 2022-05-09

## 2022-05-08 RX ORDER — METOPROLOL TARTRATE 25 MG/1
25 TABLET, FILM COATED ORAL 3 TIMES DAILY
Status: DISCONTINUED | OUTPATIENT
Start: 2022-05-08 | End: 2022-05-09

## 2022-05-08 RX ORDER — BISACODYL 10 MG
10 SUPPOSITORY, RECTAL RECTAL ONCE
Status: COMPLETED | OUTPATIENT
Start: 2022-05-08 | End: 2022-05-08

## 2022-05-08 RX ORDER — AMOXICILLIN 250 MG
1 CAPSULE ORAL ONCE
Status: COMPLETED | OUTPATIENT
Start: 2022-05-08 | End: 2022-05-08

## 2022-05-08 RX ORDER — SIMVASTATIN 40 MG/1
40 TABLET, FILM COATED ORAL NIGHTLY
Status: ON HOLD | COMMUNITY
End: 2022-05-24 | Stop reason: HOSPADM

## 2022-05-08 RX ORDER — METOPROLOL TARTRATE 1 MG/ML
5 INJECTION, SOLUTION INTRAVENOUS EVERY 5 MIN PRN
Status: COMPLETED | OUTPATIENT
Start: 2022-05-08 | End: 2022-05-08

## 2022-05-08 RX ORDER — AMOXICILLIN 250 MG
2 CAPSULE ORAL 2 TIMES DAILY
Status: DISCONTINUED | OUTPATIENT
Start: 2022-05-08 | End: 2022-05-09

## 2022-05-08 RX ORDER — PANTOPRAZOLE SODIUM 40 MG/10ML
40 INJECTION, POWDER, LYOPHILIZED, FOR SOLUTION INTRAVENOUS DAILY
Status: DISCONTINUED | OUTPATIENT
Start: 2022-05-08 | End: 2022-05-12

## 2022-05-08 RX ORDER — POLYETHYLENE GLYCOL 3350 17 G/17G
17 POWDER, FOR SOLUTION ORAL 2 TIMES DAILY
Status: DISCONTINUED | OUTPATIENT
Start: 2022-05-08 | End: 2022-05-09

## 2022-05-08 RX ORDER — ACETAMINOPHEN 325 MG/1
650 TABLET ORAL ONCE
Status: COMPLETED | OUTPATIENT
Start: 2022-05-08 | End: 2022-05-08

## 2022-05-08 RX ADMIN — SENNOSIDES AND DOCUSATE SODIUM 1 TABLET: 50; 8.6 TABLET ORAL at 12:05

## 2022-05-08 RX ADMIN — VALPROATE SODIUM 1030 MG: 100 INJECTION, SOLUTION INTRAVENOUS at 07:05

## 2022-05-08 RX ADMIN — AMIODARONE HYDROCHLORIDE 0.5 MG/MIN: 1.8 INJECTION, SOLUTION INTRAVENOUS at 02:05

## 2022-05-08 RX ADMIN — DONEPEZIL HYDROCHLORIDE 5 MG: 5 TABLET ORAL at 09:05

## 2022-05-08 RX ADMIN — ENOXAPARIN SODIUM 50 MG: 60 INJECTION SUBCUTANEOUS at 09:05

## 2022-05-08 RX ADMIN — Medication 6 MG: at 09:05

## 2022-05-08 RX ADMIN — METOPROLOL TARTRATE 25 MG: 25 TABLET, FILM COATED ORAL at 03:05

## 2022-05-08 RX ADMIN — PANTOPRAZOLE SODIUM 40 MG: 40 INJECTION, POWDER, FOR SOLUTION INTRAVENOUS at 09:05

## 2022-05-08 RX ADMIN — ATORVASTATIN CALCIUM 40 MG: 20 TABLET, FILM COATED ORAL at 09:05

## 2022-05-08 RX ADMIN — VALPROATE SODIUM 1030 MG: 100 INJECTION, SOLUTION INTRAVENOUS at 03:05

## 2022-05-08 RX ADMIN — SENNOSIDES AND DOCUSATE SODIUM 1 TABLET: 50; 8.6 TABLET ORAL at 09:05

## 2022-05-08 RX ADMIN — BISACODYL 10 MG: 10 SUPPOSITORY RECTAL at 12:05

## 2022-05-08 RX ADMIN — LEVETIRACETAM 1000 MG: 500 INJECTION, SOLUTION INTRAVENOUS at 09:05

## 2022-05-08 RX ADMIN — METOROPROLOL TARTRATE 5 MG: 5 INJECTION, SOLUTION INTRAVENOUS at 01:05

## 2022-05-08 RX ADMIN — SODIUM CHLORIDE 500 ML: 0.9 INJECTION, SOLUTION INTRAVENOUS at 11:05

## 2022-05-08 RX ADMIN — MUPIROCIN: 20 OINTMENT TOPICAL at 09:05

## 2022-05-08 RX ADMIN — Medication 6 MG: at 06:05

## 2022-05-08 RX ADMIN — SODIUM CHLORIDE 200 MG: 9 INJECTION, SOLUTION INTRAVENOUS at 10:05

## 2022-05-08 RX ADMIN — SILODOSIN 8 MG: 8 CAPSULE ORAL at 09:05

## 2022-05-08 RX ADMIN — SODIUM CHLORIDE 200 MG: 9 INJECTION, SOLUTION INTRAVENOUS at 09:05

## 2022-05-08 RX ADMIN — ACETAMINOPHEN 650 MG: 325 TABLET ORAL at 03:05

## 2022-05-08 RX ADMIN — POLYETHYLENE GLYCOL 3350 17 G: 17 POWDER, FOR SOLUTION ORAL at 12:05

## 2022-05-08 RX ADMIN — METOPROLOL TARTRATE 25 MG: 25 TABLET, FILM COATED ORAL at 09:05

## 2022-05-08 RX ADMIN — VALPROATE SODIUM 260 MG: 100 INJECTION, SOLUTION INTRAVENOUS at 04:05

## 2022-05-08 RX ADMIN — METOROPROLOL TARTRATE 5 MG: 5 INJECTION, SOLUTION INTRAVENOUS at 12:05

## 2022-05-08 NOTE — PROCEDURES
ICU EEG/VIDEO MONITORING REPORT    DATE OF SERVICE: 5/7/2022 to 05/08/2022   EEG NUMBER: [id number]  REQUESTED BY: Mariam   LOCATION OF SERVICE: [facility]    METHODOLOGY   Electroencephalographic (EEG) recording is with electrodes placed according to the International 10-20 placement system.  Thirty two (32) channels of digital signal are simultaneously recorded from the scalp and may include EKG, EMG, and/or eye monitors.   Recording band pass was 0.1 to 512 hz.  Digital video recording of the patient is simultaneously recorded with the EEG.  The nursing staff report clinical symptoms and may press an event button when the patient has symptoms of clinical interest to the treating physicians.  EEG and video recording is stored and archived in digital format.  The entire recording is visually reviewed and the times identified by computer analysis as being spikes or seizures are reviewed again.  Activation procedures which include photic stimulation, hyperventilation and instructing patients to perform simple task are done in selected patients.   Compresses spectral analysis (CSA) is also performed on the activity recorded from each individual channel.  This is displayed as a power display of frequencies from 0 to 30 Hz over time.   The CSA analysis is done and displayed continuously.  This is reviewed for asymmetries in power between homologous areas of the scalp and for presence of changes in power which canbe seen when seizures occur.  Sections of suspected abnormalities on the CSA is then compared with the original EEG recording.     AppliLog software was also utilized in the review of this study.  This software suite analyzes the EEG recording in multiple domains.  Coherence and rhythmicity is computed to identify EEG sections which may contain organized seizures.  Each channel undergoes analysis to detect presence of spike and sharp waves which have special and morphological characteristic of epileptic  activity.  The routine EEG recording is converted from spacial into frequency domain.  This is then displayed comparing homologous areas to identify areas of significant asymmetry.  Algorithm to identify non-cortically generated artifact is used to separate eye movement, EMG and other artifact from the EEG.      Recording Times  Start on 5/7/2022 at 1124 running for 19 hrs and 34 min    EEG FINDINGS    Background activity:   The background rhythm was continuous and characterized by predominantly disorganized theta with alpha/delta overlying. Intermittently a nonsustained 7 Hz posterior dominant alpha rhythm was seen. Asymmetric with less fast frequencies and more prominent theta/delta over the R temporal chain, at times with wider field of slowing. Reactive and variable.      Sleep:  No normal sleep seen. State changes noted with increased amplitude and fast frequencies with alertness.     Activation procedures: not performed    Periodic/Rhythmic Activity andSeizures:  Bilateral independent discharges seen, increases in amplitude and frequency with stimulation and spontaneously   Abundant R frontal periodic discharges at 1-2hz for very long periods  Frequent L frontal periodic discharges at 1-2hz for long periods    Rare brief rhythmic potentially ictal discharge for 9s seen at 0639 (bordering definition of seizure).     EKG: irregular rhythm seen    IMPRESSION/CLINICAL CORRELATION    This is an abnormal vEEG due to   1) Brief rhythmic potentially ictal discharge  1) Bilateral independent periodic discharges, stimulus induced and spontaneous  2) R slowing with multifocal sharp waves  3) Generalized avis/delta slowing    In this 78 yo female with acute encephalopathy and paroxysmal episodes of L version indicative of  1) R temporal seizure focus with high risk for recurrent seizures  2) pattern on ictal-interictal continuum which may or may not improve with additional antiseizure medication  2) moderate diffuse  encephalopathy    Compared to yesterday's study, there some worsening in duration of stimulus induced periodic discharges and one brief rhythmic discharge (9s) indicating a very high risk for recurrent seizure.     Millie Mena MD  Neurocritical Care   Neurology-Epilepsy

## 2022-05-08 NOTE — SUBJECTIVE & OBJECTIVE
Interval History:  NAEON. See above.     Review of Systems   Constitutional: Negative.  Negative for fever.   HENT: Negative.     Eyes: Negative.    Respiratory: Negative.     Cardiovascular: Negative.    Gastrointestinal: Negative.    Genitourinary: Negative.    Musculoskeletal: Negative.    Neurological: Negative.    Hematological: Negative.      Objective:     Vitals:  Temp: 97.2 °F (36.2 °C)  Pulse: (!) 131  Rhythm: atrial rhythm  BP: 136/88  MAP (mmHg): 104  Resp: 16  SpO2: 99 %  O2 Device (Oxygen Therapy): room air    Temp  Min: 97.2 °F (36.2 °C)  Max: 98.3 °F (36.8 °C)  Pulse  Min: 113  Max: 157  BP  Min: 100/63  Max: 158/90  MAP (mmHg)  Min: 76  Max: 118  Resp  Min: 5  Max: 25  SpO2  Min: 97 %  Max: 100 %    05/07 0701 - 05/08 0700  In: 1477.2 [I.V.:357.2]  Out: 600 [Urine:600]   Unmeasured Output  Stool Occurrence: 0       Physical Exam  Constitutional: Well-nourished, well-developed. No obvious distress. EEG in place.   Eyes: Clear conjunctiva. Anicteric. No discharge. Lids without lesions.  HEENT: MMM. Nose, external ears atraumatic.  Cardio: RRR. Pulses intact. Capillary refill time < 2 seconds.  Respiratory: Clear to auscultation. Regular effort.  GI: Bowel sounds present. Soft, non-distended, non-tender.    Neurologic:  E4V4M6  AAOx self only  PERRL, EOMI  Pupils 3 mm, brisk  SILT  ALEX    Medications:  Continuousamiodarone in dextrose 5%, Last Rate: 0.5 mg/min (05/08/22 0601)    Scheduledacetaminophen, 650 mg, Once  atorvastatin, 40 mg, QHS  donepeziL, 5 mg, Nightly  enoxaparin, 50 mg, Q12H  lacosamide (VIMPAT) IVPB, 200 mg, Q12H  levetiracetam IV, 1,000 mg, Q12H  melatonin, 6 mg, Nightly  mupirocin, , BID  pantoprazole, 40 mg, Daily  polyethylene glycol, 17 g, BID  senna-docusate 8.6-50 mg, 2 tablet, BID  silodosin, 8 mg, Daily  sodium chloride 0.9%, 500 mL, Once  valproate sodium (DEPACON) IVPB, 20 mg/kg, Once  valproate sodium (DEPACON) IVPB, 15 mg/kg/day, Q8H    PRNacetaminophen, 650 mg, Q4H  PRN  aluminum-magnesium hydroxide-simethicone, 30 mL, QID PRN  melatonin, 6 mg, Nightly PRN  naloxone, 0.02 mg, PRN  ondansetron, 4 mg, Q8H PRN  polyethylene glycol, 17 g, BID PRN  prochlorperazine, 5 mg, Q6H PRN  senna-docusate 8.6-50 mg, 1 tablet, Daily PRN  sodium chloride 0.9%, 10 mL, PRN  sodium chloride 0.9%, 10 mL, Q6H PRN      Today I personally reviewed pertinent medications, lines/drains/airways, imaging, cardiology results, laboratory results, microbiology results, notably:    Diet  Diet NPO  Diet NPO

## 2022-05-08 NOTE — PROGRESS NOTES
[facility]  Neurocritical Care  Progress Note    Admit Date: 5/4/2022  Service Date: 05/08/2022  Length of Stay: 3    Subjective:     Chief Complaint: Focal seizures    History of Present Illness: Ms. Leos is a 76 y/o female with PMH significant for dementia, hypertension and recently diagnosed atrial fibrillation presents to [facility] as a transfer from Hospital Medicine team for further management of recurrent focal seizures and AF with RVR. Patient initially presented to [facility] on April 29th with confusion and witnessed convulsions. During her two day admission, stroke was reportedly ruled out and no further seizures were noted. Patient was diagnosed with new onset AF, for which she was discharged on AC and BB. She represented to [facility] on 5/4 after family reported intermittent staring spells. She had witnessed convulsions during EMS transport to [facility] ED. She had a telestroke consult and was transferred to [facility] for further evaluation. She had a total of 2g Keppra and was started on 500 mg BID for maintenance. She was given BB and amiodarone for her HR. On arrival to [facility], patient was awake and oriented to self and place. This evening, patient developed AF RVR with HR into 180s with stable BP which did not respond to IV BB. Patient was also having fluctuating mentation associated with staring, deviated gaze, and becoming nonverbal, eventually also associated with left sided weakness and facial twitching.  Patient was given amiodarone per  team and was loaded with 2g Keppra and 2 mg lorazepam. Patient did not return to her neurological baseline after medications, still requiring sternal rubbing to say her name. cEEG was placed and interpreted by Dr. Brown, noted to have recurrent focal right sided seizures. Given her AF RVR and recurrent seizures refractory to benzodiazepines and Keppra load, patient was transferred to [facility] for further management and diagnostics.    On arrival to [facility],  patient was given small fluid bolus and loaded with 300 mg lacosamide. She is intermittently following simple commands and saying her name, protecting her airway.       Hospital Course: 5/6/2022: Admitted to NSICU overnight, given keppra 2 g IV and ativan 2 mg IV prior to transfer. Given vimpat 300 mg IV x1 and additional ativan 2 mg IV x1 overnight for ongoing electrographic seizures. Last electrographic seizure at 0230. Remains on IV amiodarone loading dose, HR improved to 110s, remains in Afib  5/7/2022 Initiated straight cath, bladder scans, and Tubefeeds, Trending EEG, MRI pending  05/08/2022 A fib with rate in the 140s. 500cc bolus x 1. KUB with significant stool burden. Optimize emma reg and suppository. EEG with high seizure risk. Depakote load and schedule maintenance dose.       Interval History:  NAEON. See above.     Review of Systems   Constitutional: Negative.  Negative for fever.   HENT: Negative.     Eyes: Negative.    Respiratory: Negative.     Cardiovascular: Negative.    Gastrointestinal: Negative.    Genitourinary: Negative.    Musculoskeletal: Negative.    Neurological: Negative.    Hematological: Negative.      Objective:     Vitals:  Temp: 97.2 °F (36.2 °C)  Pulse: (!) 131  Rhythm: atrial rhythm  BP: 136/88  MAP (mmHg): 104  Resp: 16  SpO2: 99 %  O2 Device (Oxygen Therapy): room air    Temp  Min: 97.2 °F (36.2 °C)  Max: 98.3 °F (36.8 °C)  Pulse  Min: 113  Max: 157  BP  Min: 100/63  Max: 158/90  MAP (mmHg)  Min: 76  Max: 118  Resp  Min: 5  Max: 25  SpO2  Min: 97 %  Max: 100 %    05/07 0701 - 05/08 0700  In: 1477.2 [I.V.:357.2]  Out: 600 [Urine:600]   Unmeasured Output  Stool Occurrence: 0       Physical Exam  Constitutional: Well-nourished, well-developed. No obvious distress. EEG in place.   Eyes: Clear conjunctiva. Anicteric. No discharge. Lids without lesions.  HEENT: MMM. Nose, external ears atraumatic.  Cardio: RRR. Pulses intact. Capillary refill time < 2 seconds.  Respiratory: Clear to  auscultation. Regular effort.  GI: Bowel sounds present. Soft, non-distended, non-tender.    Neurologic:  E4V4M6  AAOx self only  PERRL, EOMI  Pupils 3 mm, brisk  SILT  ALEX    Medications:  Continuousamiodarone in dextrose 5%, Last Rate: 0.5 mg/min (05/08/22 0601)    Scheduledacetaminophen, 650 mg, Once  atorvastatin, 40 mg, QHS  donepeziL, 5 mg, Nightly  enoxaparin, 50 mg, Q12H  lacosamide (VIMPAT) IVPB, 200 mg, Q12H  levetiracetam IV, 1,000 mg, Q12H  melatonin, 6 mg, Nightly  mupirocin, , BID  pantoprazole, 40 mg, Daily  polyethylene glycol, 17 g, BID  senna-docusate 8.6-50 mg, 2 tablet, BID  silodosin, 8 mg, Daily  sodium chloride 0.9%, 500 mL, Once  valproate sodium (DEPACON) IVPB, 20 mg/kg, Once  valproate sodium (DEPACON) IVPB, 15 mg/kg/day, Q8H    PRNacetaminophen, 650 mg, Q4H PRN  aluminum-magnesium hydroxide-simethicone, 30 mL, QID PRN  melatonin, 6 mg, Nightly PRN  naloxone, 0.02 mg, PRN  ondansetron, 4 mg, Q8H PRN  polyethylene glycol, 17 g, BID PRN  prochlorperazine, 5 mg, Q6H PRN  senna-docusate 8.6-50 mg, 1 tablet, Daily PRN  sodium chloride 0.9%, 10 mL, PRN  sodium chloride 0.9%, 10 mL, Q6H PRN      Today I personally reviewed pertinent medications, lines/drains/airways, imaging, cardiology results, laboratory results, microbiology results, notably:    Diet  Diet NPO  Diet NPO          Assessment/Plan:     Neuro  * Focal seizures  76 y/o female with new onset seizures and AF presents with recurrent focal right sided seizures. Likely 2/2 prior stroke, +/- provoked by recent course of ciprofloxacin for UTI  - Epilepsy following, appreciate their recs  - C/w keppra 1 g BID, vimpat 200 mg BID   - Infectious w/u initiated on arrival to [facility] on 5/4, NGTD and UA noninfectious, has completed 7d treatment for previously diagnosed UTI  - Failed Monge, will place NG for meds/TFs  - MRI unremarkable    5/8  - IIC on EEG, depakote load and scheduled maintenance dose     Dementia  Mild per family reports, remains  independent of ADLs  - home donepezil 5 mg     Cardiac/Vascular  Other hyperlipidemia  - C/w atorvastatin 40 mg qhs    Atrial fibrillation with RVR  HR into 170s-180 on stepdown, given BB and loaded with amiodarone prior to NCC stepup   - Currently maintaining adequate MAPsl   - Continue anticoagulation (on lovenox 50 mg BID while in house, transition back to home apixaban s/p seizure control and MRI)   - Adjusted amio to po which did not maintain HR, resume gtt    5/8 500 cc bolus, suppository for significant stool burden on KUB, UA ensure no return of UTI    Essential hypertension  BP soft on admission, holding home antihypertensives  - Hold home amlodipine 5 mg qday, irbesartan-HCTZ 300-12.5 mg qday  - Goal normotension     Renal/  Urinary retention  Requiring frequent straight caths overnight  - UA on admission non-infectious   - Increased silodosin to 8 mg qday  - Bladder scans per protocol and straight cath prn ordered    GI  Gastroesophageal reflux disease  - Continue PPI           The patient is being Prophylaxed for:  Venous Thromboembolism with: Mechanical or Chemical  Stress Ulcer with: PPI  Ventilator Pneumonia with: none    Activity Orders          Straight Cath starting at 05/07 1045    Diet NPO: NPO starting at 05/05 2046    Progressive Mobility Protocol (mobilize patient to their highest level of functioning at least twice daily) starting at 05/05 0800    Turn patient every 2 hours starting at 05/05 0400    Straight Cath starting at 05/05 0024        Full Code     Level III    MICA López-C  Neurocritical Care  [facility] - Neuro Critical Care

## 2022-05-08 NOTE — ASSESSMENT & PLAN NOTE
HR into 170s-180 on stepdown, given BB and loaded with amiodarone prior to NCC stepup   - Currently maintaining adequate MAPsl   - Continue anticoagulation (on lovenox 50 mg BID while in house, transition back to home apixaban s/p seizure control and MRI)   - Adjusted amio to po which did not maintain HR, resume gtt    5/8 500 cc bolus, suppository for significant stool burden on KUB, UA ensure no return of UTI

## 2022-05-08 NOTE — SIGNIFICANT EVENT
PRELIM EEG READ  Read from 0701 to 1814    Near continuous 1-2hz GPDs (R predominant) with occasional increase in frequency >2.5hz (meets definition for electrographic seizure)        Loaded w 20 mg/kg vpa at 1502, continued to have IIC pattern with occasional very brief resolution, continued to intermittently meeting criteria for seizure albeit less frequently         Recommend additional load of 20mg/kg vpa, may need 60mg/kg total, consider addition of clobazam if no improvement by tonight, will follow EEG    Discussed with primary team.       Millie Mena MD  Neurocritical Care   Neurology-Epilepsy

## 2022-05-08 NOTE — PLAN OF CARE
[facility] Care Plan    POC reviewed with Teresa Leos and daughter at 0300. Pt's daughter verbalized understanding. Questions and concerns addressed. Pt progressing toward goals. Will continue to monitor. See below and flowsheets for full assessment and VS info.     -Pt started having delirium episodes around midnight.   -Unsuccessful attempts at keeping HR <130. Metoprolol 5 mg given 2x.  -0422 Dr. Jorge Jara MD ordered no further intervention to decrease HR<130.  -Pt continues to retain urine. Straight cath x1.      Is this a stroke patient? no    Neuro:  Bryan Coma Scale  Best Eye Response: 3-->(E3) to speech  Best Motor Response: 6-->(M6) obeys commands  Best Verbal Response: 4-->(V4) confused  Melanie Coma Scale Score: 13  Assessment Qualifiers: patient not sedated/intubated        24hr Temp:  [97.6 °F (36.4 °C)-98.3 °F (36.8 °C)]     CV:   Rhythm: atrial rhythm  BP goals:   SBP < 180  MAP > 65    Resp:   O2 Device (Oxygen Therapy): room air       Plan: N/A    GI/:     Diet/Nutrition Received: tube feeding  Last Bowel Movement: 05/06/22  Voiding Characteristics: other (see comments) (retention)    Intake/Output Summary (Last 24 hours) at 5/8/2022 0601  Last data filed at 5/8/2022 0501  Gross per 24 hour   Intake 1420.55 ml   Output 600 ml   Net 820.55 ml     Unmeasured Output  Stool Occurrence: 0    Labs/Accuchecks:  Recent Labs   Lab 05/08/22  0116   WBC 8.19   RBC 3.66*   HGB 12.0   HCT 36.4*         Recent Labs   Lab 05/08/22  0116   *   K 4.2   CO2 20*      BUN 13   CREATININE 0.8   ALKPHOS 60   ALT 15   AST 24   BILITOT 0.4      Recent Labs   Lab 05/04/22  1536   INR 1.2   APTT 25.6      Recent Labs   Lab 05/04/22  1524 05/04/22  1536   *  196*  --    CPKMB 5.5  --    TROPONINI  --  <0.006   MB 2.8  --        Electrolytes: N/A - electrolytes WDL  Accuchecks: none    Gtts:   amiodarone in dextrose 5% 0.5 mg/min (05/08/22 3669)       LDA/Wounds:  Lines/Drains/Airways        Drain  Duration                  NG/OG Tube 05/06/22 1022 Right nostril 1 day    Female External Urinary Catheter 05/06/22 0748 1 day              Peripheral Intravenous Line  Duration                  Midline Catheter Insertion/Assessment  - Single Lumen 05/05/22 2215 Left basilic vein (medial side of arm) other (see comments) 2 days         Peripheral IV - Single Lumen 05/05/22 1951 22 G Anterior;Distal;Left Forearm 2 days         Peripheral IV - Single Lumen 05/05/22 2222 20 G Anterior;Left Foot 2 days         Peripheral IV - Single Lumen 05/05/22 2222 Anterior;Right Foot 2 days                  Wounds: No  Wound care consulted: No

## 2022-05-08 NOTE — ASSESSMENT & PLAN NOTE
78 y/o female with new onset seizures and AF presents with recurrent focal right sided seizures. Likely 2/2 prior stroke, +/- provoked by recent course of ciprofloxacin for UTI  - Epilepsy following, appreciate their recs  - C/w keppra 1 g BID, vimpat 200 mg BID   - Infectious w/u initiated [place] [facility] [place], NGTD and UA noninfectious, has completed 7d treatment for previously diagnosed UTI  - Failed Monge, will place NG for meds/TFs  - MRI unremarkable    5/8  - IIC on EEG, depakote load and scheduled maintenance dose

## 2022-05-09 PROBLEM — R94.31 PROLONGED Q-T INTERVAL ON ECG: Status: ACTIVE | Noted: 2022-05-09

## 2022-05-09 LAB
ALBUMIN SERPL BCP-MCNC: 2.5 G/DL (ref 3.5–5.2)
ALP SERPL-CCNC: 52 U/L (ref 55–135)
ALT SERPL W/O P-5'-P-CCNC: 18 U/L (ref 10–44)
ANION GAP SERPL CALC-SCNC: 8 MMOL/L (ref 8–16)
ANISOCYTOSIS BLD QL SMEAR: SLIGHT
AST SERPL-CCNC: 35 U/L (ref 10–40)
BACTERIA BLD CULT: NORMAL
BACTERIA BLD CULT: NORMAL
BASOPHILS # BLD AUTO: 0.05 K/UL (ref 0–0.2)
BASOPHILS NFR BLD: 0.7 % (ref 0–1.9)
BILIRUB SERPL-MCNC: 0.3 MG/DL (ref 0.1–1)
BUN SERPL-MCNC: 14 MG/DL (ref 8–23)
CALCIUM SERPL-MCNC: 7.9 MG/DL (ref 8.7–10.5)
CHLORIDE SERPL-SCNC: 107 MMOL/L (ref 95–110)
CO2 SERPL-SCNC: 19 MMOL/L (ref 23–29)
CREAT SERPL-MCNC: 0.7 MG/DL (ref 0.5–1.4)
DIFFERENTIAL METHOD: ABNORMAL
EOSINOPHIL # BLD AUTO: 0 K/UL (ref 0–0.5)
EOSINOPHIL NFR BLD: 0.4 % (ref 0–8)
ERYTHROCYTE [DISTWIDTH] IN BLOOD BY AUTOMATED COUNT: 14.2 % (ref 11.5–14.5)
EST. GFR  (AFRICAN AMERICAN): >60 ML/MIN/1.73 M^2
EST. GFR  (NON AFRICAN AMERICAN): >60 ML/MIN/1.73 M^2
GLUCOSE SERPL-MCNC: 119 MG/DL (ref 70–110)
HCT VFR BLD AUTO: 31.1 % (ref 37–48.5)
HGB BLD-MCNC: 10 G/DL (ref 12–16)
IMM GRANULOCYTES # BLD AUTO: 0.04 K/UL (ref 0–0.04)
IMM GRANULOCYTES NFR BLD AUTO: 0.5 % (ref 0–0.5)
LYMPHOCYTES # BLD AUTO: 2.1 K/UL (ref 1–4.8)
LYMPHOCYTES NFR BLD: 28.2 % (ref 18–48)
MAGNESIUM SERPL-MCNC: 1.7 MG/DL (ref 1.6–2.6)
MCH RBC QN AUTO: 31.9 PG (ref 27–31)
MCHC RBC AUTO-ENTMCNC: 32.2 G/DL (ref 32–36)
MCV RBC AUTO: 99 FL (ref 82–98)
MONOCYTES # BLD AUTO: 1.2 K/UL (ref 0.3–1)
MONOCYTES NFR BLD: 15.7 % (ref 4–15)
NEUTROPHILS # BLD AUTO: 4.1 K/UL (ref 1.8–7.7)
NEUTROPHILS NFR BLD: 54.5 % (ref 38–73)
NRBC BLD-RTO: 0 /100 WBC
PHOSPHATE SERPL-MCNC: 2.5 MG/DL (ref 2.7–4.5)
PLATELET # BLD AUTO: 181 K/UL (ref 150–450)
PLATELET BLD QL SMEAR: ABNORMAL
PMV BLD AUTO: 11 FL (ref 9.2–12.9)
POIKILOCYTOSIS BLD QL SMEAR: SLIGHT
POTASSIUM SERPL-SCNC: 4.4 MMOL/L (ref 3.5–5.1)
PROT SERPL-MCNC: 5 G/DL (ref 6–8.4)
RBC # BLD AUTO: 3.13 M/UL (ref 4–5.4)
SODIUM SERPL-SCNC: 134 MMOL/L (ref 136–145)
VALPROATE SERPL-MCNC: 59.9 UG/ML (ref 50–100)
WBC # BLD AUTO: 7.56 K/UL (ref 3.9–12.7)

## 2022-05-09 PROCEDURE — 25000003 PHARM REV CODE 250: Performed by: PHYSICIAN ASSISTANT

## 2022-05-09 PROCEDURE — 99291 PR CRITICAL CARE, E/M 30-74 MINUTES: ICD-10-PCS | Mod: GC,,, | Performed by: PSYCHIATRY & NEUROLOGY

## 2022-05-09 PROCEDURE — 94761 N-INVAS EAR/PLS OXIMETRY MLT: CPT

## 2022-05-09 PROCEDURE — 99223 PR INITIAL HOSPITAL CARE,LEVL III: ICD-10-PCS | Mod: ,,, | Performed by: PSYCHIATRY & NEUROLOGY

## 2022-05-09 PROCEDURE — 84100 ASSAY OF PHOSPHORUS: CPT | Performed by: STUDENT IN AN ORGANIZED HEALTH CARE EDUCATION/TRAINING PROGRAM

## 2022-05-09 PROCEDURE — 63600175 PHARM REV CODE 636 W HCPCS: Performed by: PSYCHIATRY & NEUROLOGY

## 2022-05-09 PROCEDURE — 63600175 PHARM REV CODE 636 W HCPCS: Performed by: PHYSICIAN ASSISTANT

## 2022-05-09 PROCEDURE — C9113 INJ PANTOPRAZOLE SODIUM, VIA: HCPCS | Performed by: PSYCHIATRY & NEUROLOGY

## 2022-05-09 PROCEDURE — 93010 ELECTROCARDIOGRAM REPORT: CPT | Mod: ,,, | Performed by: INTERNAL MEDICINE

## 2022-05-09 PROCEDURE — 83735 ASSAY OF MAGNESIUM: CPT | Performed by: STUDENT IN AN ORGANIZED HEALTH CARE EDUCATION/TRAINING PROGRAM

## 2022-05-09 PROCEDURE — C9254 INJECTION, LACOSAMIDE: HCPCS | Performed by: PHYSICIAN ASSISTANT

## 2022-05-09 PROCEDURE — 20000000 HC ICU ROOM

## 2022-05-09 PROCEDURE — 51701 INSERT BLADDER CATHETER: CPT

## 2022-05-09 PROCEDURE — 99223 1ST HOSP IP/OBS HIGH 75: CPT | Mod: ,,, | Performed by: PSYCHIATRY & NEUROLOGY

## 2022-05-09 PROCEDURE — 92526 ORAL FUNCTION THERAPY: CPT

## 2022-05-09 PROCEDURE — 63600175 PHARM REV CODE 636 W HCPCS: Performed by: STUDENT IN AN ORGANIZED HEALTH CARE EDUCATION/TRAINING PROGRAM

## 2022-05-09 PROCEDURE — 80053 COMPREHEN METABOLIC PANEL: CPT | Performed by: STUDENT IN AN ORGANIZED HEALTH CARE EDUCATION/TRAINING PROGRAM

## 2022-05-09 PROCEDURE — 51798 US URINE CAPACITY MEASURE: CPT

## 2022-05-09 PROCEDURE — 25000003 PHARM REV CODE 250

## 2022-05-09 PROCEDURE — 25000003 PHARM REV CODE 250: Performed by: HOSPITALIST

## 2022-05-09 PROCEDURE — 95720 EEG PHY/QHP EA INCR W/VEEG: CPT | Mod: ,,, | Performed by: PSYCHIATRY & NEUROLOGY

## 2022-05-09 PROCEDURE — 85025 COMPLETE CBC W/AUTO DIFF WBC: CPT | Performed by: STUDENT IN AN ORGANIZED HEALTH CARE EDUCATION/TRAINING PROGRAM

## 2022-05-09 PROCEDURE — 93010 EKG 12-LEAD: ICD-10-PCS | Mod: ,,, | Performed by: INTERNAL MEDICINE

## 2022-05-09 PROCEDURE — 25000003 PHARM REV CODE 250: Performed by: NURSE PRACTITIONER

## 2022-05-09 PROCEDURE — 25000003 PHARM REV CODE 250: Performed by: PSYCHIATRY & NEUROLOGY

## 2022-05-09 PROCEDURE — 93005 ELECTROCARDIOGRAM TRACING: CPT

## 2022-05-09 PROCEDURE — 80164 ASSAY DIPROPYLACETIC ACD TOT: CPT

## 2022-05-09 PROCEDURE — 99291 CRITICAL CARE FIRST HOUR: CPT | Mod: GC,,, | Performed by: PSYCHIATRY & NEUROLOGY

## 2022-05-09 PROCEDURE — 63600175 PHARM REV CODE 636 W HCPCS: Performed by: NURSE PRACTITIONER

## 2022-05-09 PROCEDURE — 95714 VEEG EA 12-26 HR UNMNTR: CPT

## 2022-05-09 PROCEDURE — 63600175 PHARM REV CODE 636 W HCPCS

## 2022-05-09 PROCEDURE — 95720 PR EEG, W/VIDEO, CONT RECORD, I&R, >12<26 HRS: ICD-10-PCS | Mod: ,,, | Performed by: PSYCHIATRY & NEUROLOGY

## 2022-05-09 RX ORDER — SILODOSIN 8 MG/1
8 CAPSULE ORAL DAILY
Status: DISCONTINUED | OUTPATIENT
Start: 2022-05-10 | End: 2022-05-25 | Stop reason: HOSPADM

## 2022-05-09 RX ORDER — POLYETHYLENE GLYCOL 3350 17 G/17G
17 POWDER, FOR SOLUTION ORAL 2 TIMES DAILY
Status: DISCONTINUED | OUTPATIENT
Start: 2022-05-09 | End: 2022-05-15

## 2022-05-09 RX ORDER — AMOXICILLIN 250 MG
2 CAPSULE ORAL 2 TIMES DAILY
Status: DISCONTINUED | OUTPATIENT
Start: 2022-05-09 | End: 2022-05-15

## 2022-05-09 RX ORDER — AMIODARONE HYDROCHLORIDE 200 MG/1
200 TABLET ORAL 2 TIMES DAILY
Status: DISCONTINUED | OUTPATIENT
Start: 2022-05-09 | End: 2022-05-15

## 2022-05-09 RX ORDER — SODIUM,POTASSIUM PHOSPHATES 280-250MG
2 POWDER IN PACKET (EA) ORAL
Status: DISCONTINUED | OUTPATIENT
Start: 2022-05-09 | End: 2022-05-09

## 2022-05-09 RX ORDER — SODIUM CHLORIDE, SODIUM LACTATE, POTASSIUM CHLORIDE, CALCIUM CHLORIDE 600; 310; 30; 20 MG/100ML; MG/100ML; MG/100ML; MG/100ML
INJECTION, SOLUTION INTRAVENOUS CONTINUOUS
Status: DISCONTINUED | OUTPATIENT
Start: 2022-05-09 | End: 2022-05-10

## 2022-05-09 RX ORDER — LANOLIN ALCOHOL/MO/W.PET/CERES
800 CREAM (GRAM) TOPICAL
Status: DISCONTINUED | OUTPATIENT
Start: 2022-05-09 | End: 2022-05-12

## 2022-05-09 RX ORDER — TALC
6 POWDER (GRAM) TOPICAL NIGHTLY
Status: DISCONTINUED | OUTPATIENT
Start: 2022-05-09 | End: 2022-05-25 | Stop reason: HOSPADM

## 2022-05-09 RX ORDER — AMIODARONE HYDROCHLORIDE 200 MG/1
200 TABLET ORAL 2 TIMES DAILY
Status: DISCONTINUED | OUTPATIENT
Start: 2022-05-09 | End: 2022-05-09

## 2022-05-09 RX ORDER — METOPROLOL TARTRATE 50 MG/1
50 TABLET ORAL EVERY 8 HOURS
Status: DISCONTINUED | OUTPATIENT
Start: 2022-05-09 | End: 2022-05-10

## 2022-05-09 RX ORDER — SODIUM,POTASSIUM PHOSPHATES 280-250MG
2 POWDER IN PACKET (EA) ORAL
Status: DISCONTINUED | OUTPATIENT
Start: 2022-05-09 | End: 2022-05-12

## 2022-05-09 RX ORDER — LANOLIN ALCOHOL/MO/W.PET/CERES
800 CREAM (GRAM) TOPICAL
Status: DISCONTINUED | OUTPATIENT
Start: 2022-05-09 | End: 2022-05-09

## 2022-05-09 RX ORDER — ATORVASTATIN CALCIUM 20 MG/1
40 TABLET, FILM COATED ORAL NIGHTLY
Status: DISCONTINUED | OUTPATIENT
Start: 2022-05-09 | End: 2022-05-25 | Stop reason: HOSPADM

## 2022-05-09 RX ORDER — METOPROLOL TARTRATE 50 MG/1
50 TABLET ORAL EVERY 8 HOURS
Status: DISCONTINUED | OUTPATIENT
Start: 2022-05-09 | End: 2022-05-09

## 2022-05-09 RX ORDER — BISACODYL 10 MG
10 SUPPOSITORY, RECTAL RECTAL DAILY PRN
Status: DISCONTINUED | OUTPATIENT
Start: 2022-05-09 | End: 2022-05-25 | Stop reason: HOSPADM

## 2022-05-09 RX ORDER — MAGNESIUM SULFATE HEPTAHYDRATE 40 MG/ML
2 INJECTION, SOLUTION INTRAVENOUS ONCE
Status: COMPLETED | OUTPATIENT
Start: 2022-05-09 | End: 2022-05-09

## 2022-05-09 RX ADMIN — SODIUM CHLORIDE 200 MG: 9 INJECTION, SOLUTION INTRAVENOUS at 08:05

## 2022-05-09 RX ADMIN — LEVETIRACETAM 1000 MG: 500 INJECTION, SOLUTION INTRAVENOUS at 08:05

## 2022-05-09 RX ADMIN — METOPROLOL TARTRATE 50 MG: 50 TABLET, FILM COATED ORAL at 03:05

## 2022-05-09 RX ADMIN — SILODOSIN 8 MG: 8 CAPSULE ORAL at 08:05

## 2022-05-09 RX ADMIN — ATORVASTATIN CALCIUM 40 MG: 20 TABLET, FILM COATED ORAL at 09:05

## 2022-05-09 RX ADMIN — VALPROATE SODIUM 260 MG: 100 INJECTION, SOLUTION INTRAVENOUS at 03:05

## 2022-05-09 RX ADMIN — PANTOPRAZOLE SODIUM 40 MG: 40 INJECTION, POWDER, FOR SOLUTION INTRAVENOUS at 08:05

## 2022-05-09 RX ADMIN — AMIODARONE HYDROCHLORIDE 0.5 MG/MIN: 1.8 INJECTION, SOLUTION INTRAVENOUS at 02:05

## 2022-05-09 RX ADMIN — AMIODARONE HYDROCHLORIDE 200 MG: 200 TABLET ORAL at 09:05

## 2022-05-09 RX ADMIN — SENNOSIDES AND DOCUSATE SODIUM 2 TABLET: 50; 8.6 TABLET ORAL at 08:05

## 2022-05-09 RX ADMIN — SENNOSIDES AND DOCUSATE SODIUM 2 TABLET: 50; 8.6 TABLET ORAL at 09:05

## 2022-05-09 RX ADMIN — ENOXAPARIN SODIUM 50 MG: 60 INJECTION SUBCUTANEOUS at 08:05

## 2022-05-09 RX ADMIN — SODIUM CHLORIDE, SODIUM LACTATE, POTASSIUM CHLORIDE, AND CALCIUM CHLORIDE: .6; .31; .03; .02 INJECTION, SOLUTION INTRAVENOUS at 05:05

## 2022-05-09 RX ADMIN — POLYETHYLENE GLYCOL 3350 17 G: 17 POWDER, FOR SOLUTION ORAL at 09:05

## 2022-05-09 RX ADMIN — VALPROATE SODIUM 260 MG: 100 INJECTION, SOLUTION INTRAVENOUS at 11:05

## 2022-05-09 RX ADMIN — SODIUM CHLORIDE 500 ML: 0.9 INJECTION, SOLUTION INTRAVENOUS at 05:05

## 2022-05-09 RX ADMIN — SODIUM CHLORIDE 200 MG: 9 INJECTION, SOLUTION INTRAVENOUS at 10:05

## 2022-05-09 RX ADMIN — VALPROATE SODIUM 260 MG: 100 INJECTION, SOLUTION INTRAVENOUS at 07:05

## 2022-05-09 RX ADMIN — MAGNESIUM SULFATE 2 G: 2 INJECTION INTRAVENOUS at 09:05

## 2022-05-09 RX ADMIN — DONEPEZIL HYDROCHLORIDE 5 MG: 5 TABLET ORAL at 09:05

## 2022-05-09 RX ADMIN — MELATONIN TAB 3 MG 6 MG: 3 TAB at 09:05

## 2022-05-09 RX ADMIN — MUPIROCIN: 20 OINTMENT TOPICAL at 08:05

## 2022-05-09 RX ADMIN — MUPIROCIN: 20 OINTMENT TOPICAL at 09:05

## 2022-05-09 RX ADMIN — LEVETIRACETAM 1000 MG: 500 INJECTION, SOLUTION INTRAVENOUS at 09:05

## 2022-05-09 RX ADMIN — Medication 800 MG: at 11:05

## 2022-05-09 RX ADMIN — METOPROLOL TARTRATE 25 MG: 25 TABLET, FILM COATED ORAL at 08:05

## 2022-05-09 RX ADMIN — POLYETHYLENE GLYCOL 3350 17 G: 17 POWDER, FOR SOLUTION ORAL at 10:05

## 2022-05-09 RX ADMIN — SODIUM CHLORIDE 500 ML: 0.9 INJECTION, SOLUTION INTRAVENOUS at 11:05

## 2022-05-09 RX ADMIN — AMIODARONE HYDROCHLORIDE 200 MG: 200 TABLET ORAL at 11:05

## 2022-05-09 RX ADMIN — ENOXAPARIN SODIUM 50 MG: 60 INJECTION SUBCUTANEOUS at 09:05

## 2022-05-09 RX ADMIN — METOPROLOL TARTRATE 50 MG: 50 TABLET, FILM COATED ORAL at 09:05

## 2022-05-09 NOTE — PLAN OF CARE
[facility] - Neuro Critical Care  Discharge Reassessment    Primary Care Provider: Joseph Mcpherson NP    Expected Discharge Date: 5/18/2022     Per MD: Delirium precautions, q4hr neuro checks o/n.  Passed swallow per SLP.    Not medically ready for discharge.          Reassessment (most recent)     Discharge Reassessment - 05/09/22 1654        Discharge Reassessment    Assessment Type Discharge Planning Reassessment     Did the patient's condition or plan change since previous assessment? No     Communicated MONA with patient/caregiver Date not available/Unable to determine     Discharge Plan A Home with family     Discharge Plan B Home with family     DME Needed Upon Discharge  none     Discharge Barriers Identified None     Why the patient remains in the hospital Requires continued medical care               Elin Noble RN, CCRN-K, [facility]  Neuro-Critical Care   [phone]

## 2022-05-09 NOTE — ASSESSMENT & PLAN NOTE
Requiring frequent straight caths overnight    - UA on admission non-infectious   - C/w silodosin 8 mg qday  - Bladder scans per protocol and straight cath prn ordered

## 2022-05-09 NOTE — ASSESSMENT & PLAN NOTE
77F PMHx of HTN, dementia, recently diagnosed afib who initially presented to OSH due to AMS and convulsions. Per chart review, patient had fall on 4/29 and witnessed convulsions; subsequently admitted to [facility] with hospital course c/b afib RVR. After discharge, patient had episode of unresponsiveness and convulsions at home. Patient brought to [facility] via EMS, subsequently transferred to [facility] and admitted to  5/5/2022. Patient developed afib RVR which did not respond to IV beta blocker, worsening mentation, and staring episodes associated with deviated gaze. Patient admitted to [facility] for higher level of care and further management. EEG placed 5/5 and revealed right NCSE which resolved after AED escalation. Epilepsy following for assistance in management.    Recommendations:  - Continuous vEEG monitoring: no further seizures after VPA  - Recommend to continue current AED regimen: Lacosamide 200 mg BID, Levetiracetam 1g BID, and  mg q8  - Avoid agents that lower seizure threshold  - Management of infectious/metabolic abnormalities per NCC  - Seizure precautions      Discussed plan of care with NCC team. No family at bedside. Will follow, please call with questions.

## 2022-05-09 NOTE — SUBJECTIVE & OBJECTIVE
Interval History: NAEON. This AM, patient awake, alert, oriented to self. Clinically improved today. No family at bedside.    Current Facility-Administered Medications   Medication Dose Route Frequency Provider Last Rate Last Admin    acetaminophen tablet 650 mg  650 mg Oral Q4H PRN Rudi Cronin MD   650 mg at 05/07/22 1732    aluminum-magnesium hydroxide-simethicone 200-200-20 mg/5 mL suspension 30 mL  30 mL Oral QID PRN Rudi Cronin MD        amiodarone tablet 200 mg  200 mg Oral BID Yohana Joy PA-C        atorvastatin tablet 40 mg  40 mg Oral QHS Yohana Joy PA-C        bisacodyL suppository 10 mg  10 mg Rectal Daily PRN Yohana Joy PA-C        donepeziL tablet 5 mg  5 mg Oral Nightly Rdui Cronin MD   5 mg at 05/08/22 2157    enoxaparin injection 50 mg  50 mg Subcutaneous Q12H Deniz Medina MD   50 mg at 05/09/22 0841    lacosamide (VIMPAT) 200 mg in sodium chloride 0.9% 100 mL IVPB  200 mg Intravenous Q12H Mckayla Issa PA-C   Stopped at 05/09/22 0913    levETIRAcetam injection 1,000 mg  1,000 mg Intravenous Q12H Mckayla Issa PA-C   1,000 mg at 05/09/22 0840    magnesium oxide tablet 800 mg  800 mg Oral PRN Yohana Joy PA-C        magnesium oxide tablet 800 mg  800 mg Oral PRN Yohana Joy PA-C        melatonin tablet 6 mg  6 mg Oral Nightly PRN Doron Haynes MD   6 mg at 05/08/22 1848    melatonin tablet 6 mg  6 mg Oral Nightly Yohana Joy PA-C        metoprolol tartrate (LOPRESSOR) tablet 50 mg  50 mg Oral Q8H Yohana Joy PA-C        mupirocin 2 % ointment   Nasal BID Zandra Becerra MD   Given at 05/09/22 0841    naloxone 0.4 mg/mL injection 0.02 mg  0.02 mg Intravenous PRN Rudi Cronin MD        ondansetron injection 4 mg  4 mg Intravenous Q8H PRN Rudi Cronin MD        pantoprazole injection 40 mg  40 mg Intravenous Daily Chirag Gonzalez MD   40 mg at 05/09/22 0841    polyethylene glycol packet 17 g  17 g Oral BID  Yohana Joy, PA-C        potassium bicarbonate disintegrating tablet 35 mEq  35 mEq Oral PRN Yohana Joy, PA-C        potassium bicarbonate disintegrating tablet 50 mEq  50 mEq Oral PRN Yohanaaggie Joy, PA-C        potassium bicarbonate disintegrating tablet 60 mEq  60 mEq Oral PRN Yohana Joy, PA-C        potassium, sodium phosphates 280-160-250 mg packet 2 packet  2 packet Oral PRN Yohana Joy, PA-C        potassium, sodium phosphates 280-160-250 mg packet 2 packet  2 packet Oral PRN Yhoana Joy, PA-C        potassium, sodium phosphates 280-160-250 mg packet 2 packet  2 packet Oral PRN Yohana Joy, PA-C        prochlorperazine injection Soln 5 mg  5 mg Intravenous Q6H PRN Rudi Cronin MD        senna-docusate 8.6-50 mg per tablet 2 tablet  2 tablet Oral BID Yohana Joy PA-C        [START ON 5/10/2022] silodosin capsule 8 mg  8 mg Oral Daily MICA López-C        sodium chloride 0.9% bolus 500 mL  500 mL Intravenous Once Yohana Joy PA-C 500 mL/hr at 05/09/22 1106 500 mL at 05/09/22 1106    sodium chloride 0.9% flush 10 mL  10 mL Intravenous PRN Doron Haynes MD        sodium chloride 0.9% flush 10 mL  10 mL Intravenous Q6H PRN Rudi Cronin MD        valproate (DEPACON) 260 mg in dextrose 5 % 50 mL IVPB  15 mg/kg/day Intravenous Q8H Yohana Joy PA-C 50 mL/hr at 05/09/22 1108 260 mg at 05/09/22 1108     Continuous Infusions:    Review of Systems   Unable to perform ROS: Dementia   Objective:     Vital Signs (Most Recent):  Temp: 98 °F (36.7 °C) (05/09/22 0701)  Pulse: (!) 124 (05/09/22 0901)  Resp: 17 (05/09/22 0901)  BP: 137/83 (05/09/22 0901)  SpO2: 95 % (05/09/22 0901)   Vital Signs (24h Range):  Temp:  [97.2 °F (36.2 °C)-99.3 °F (37.4 °C)] 98 °F (36.7 °C)  Pulse:  [] 124  Resp:  [9-28] 17  SpO2:  [92 %-99 %] 95 %  BP: ()/(51-92) 137/83     Weight: 51.3 kg (113 lb)  Body mass index is 21.35 kg/m².    Physical  Exam  Constitutional:       General: She is not in acute distress.     Appearance: She is not diaphoretic.   HENT:      Head: Normocephalic and atraumatic.      Comments: EEG in place  Eyes:      General: No scleral icterus.        Right eye: No discharge.         Left eye: No discharge.      Conjunctiva/sclera: Conjunctivae normal.      Pupils: Pupils are equal, round, and reactive to light.   Cardiovascular:      Rate and Rhythm: Tachycardia present.   Pulmonary:      Effort: Pulmonary effort is normal. No respiratory distress.   Abdominal:      General: There is no distension.      Palpations: Abdomen is soft.      Tenderness: There is no abdominal tenderness.   Musculoskeletal:         General: No deformity or signs of injury.   Skin:     General: Skin is warm and dry.   Psychiatric:      Comments: Unable to assess       NEUROLOGICAL EXAMINATION:     CRANIAL NERVES     CN III, IV, VI   Pupils are equal, round, and reactive to light.       Awake, alert, oriented to self  BINH OU   Corneal intact OU   + spontaneous eye opening   Face symmetric   Tongue midline   Follows commands    Exam findings to suggest seizures:  Myoclonus - no   eye twitching - no   Nystagmus - no   gaze deviation - no   waxy rigidity - no      Significant Labs: All pertinent lab results from the past 24 hours have been reviewed.    Significant Studies: I have reviewed all pertinent imaging results/findings within the past 24 hours.

## 2022-05-09 NOTE — PROGRESS NOTES
[facility] - Neuro Critical Care  Neurocritical Care  Progress Note    Admit Date: 5/4/2022  Service Date: 05/09/2022  Length of Stay: 4    Subjective:     Chief Complaint: Focal seizures    History of Present Illness: Ms. Leos is a 78 y/o female with PMH significant for dementia, hypertension and recently diagnosed atrial fibrillation presents to [facility] as a transfer from Hospital Medicine team for further management of recurrent focal seizures and AF with RVR. Patient initially presented to [facility] on April 29th with confusion and witnessed convulsions. During her two day admission, stroke was reportedly ruled out and no further seizures were noted. Patient was diagnosed with new onset AF, for which she was discharged on AC and BB. She represented to [facility] on 5/4 after family reported intermittent staring spells. She had witnessed convulsions during EMS transport to [facility] ED. She had a telestroke consult and was transferred to [facility] for further evaluation. She had a total of 2g Keppra and was started on 500 mg BID for maintenance. She was given BB and amiodarone for her HR. On arrival to [facility], patient was awake and oriented to self and place. This evening, patient developed AF RVR with HR into 180s with stable BP which did not respond to IV BB. Patient was also having fluctuating mentation associated with staring, deviated gaze, and becoming nonverbal, eventually also associated with left sided weakness and facial twitching.  Patient was given amiodarone per  team and was loaded with 2g Keppra and 2 mg lorazepam. Patient did not return to her neurological baseline after medications, still requiring sternal rubbing to say her name. cEEG was placed and interpreted by Dr. Brown, noted to have recurrent focal right sided seizures. Given her AF RVR and recurrent seizures refractory to benzodiazepines and Keppra load, patient was transferred to [facility] for further management and diagnostics.    On arrival to [facility],  patient was given small fluid bolus and loaded with 300 mg lacosamide. She is intermittently following simple commands and saying her name, protecting her airway.       Hospital Course: 5/6/2022: Admitted to NSICU overnight, given keppra 2 g IV and ativan 2 mg IV prior to transfer. Given vimpat 300 mg IV x1 and additional ativan 2 mg IV x1 overnight for ongoing electrographic seizures. Last electrographic seizure at 0230. Remains on IV amiodarone loading dose, HR improved to 110s, remains in Afib  5/7/2022 Initiated straight cath, bladder scans, and Tubefeeds, Trending EEG, MRI pending  05/08/2022 A fib with rate in the 140s. 500cc bolus x 1. KUB with significant stool burden. Optimize emma reg and suppository. EEG with high seizure risk. Depakote load and schedule maintenance dose.   05/09/2022 cEEG w/o seizures since depakote load, level therapeutic this AM at 59. Remains in Afib w/ HR in 130-140s on amio gtt. Noted to sundown overnight, became acutely agitated, improved s/p BM. Clinically improved this AM, much more alert and interactive, passed SLP eval.       Interval History:  Please see hospital course above for details    Review of Systems   Unable to perform ROS: Dementia     Objective:     Vitals:  Temp: 98 °F (36.7 °C)  Pulse: (!) 124  Rhythm: atrial rhythm  BP: 137/83  MAP (mmHg): 104  Resp: 17  SpO2: 95 %  O2 Device (Oxygen Therapy): room air    Temp  Min: 97.2 °F (36.2 °C)  Max: 99.3 °F (37.4 °C)  Pulse  Min: 98  Max: 150  BP  Min: 80/51  Max: 145/81  MAP (mmHg)  Min: 60  Max: 112  Resp  Min: 9  Max: 28  SpO2  Min: 92 %  Max: 99 %    05/08 0701 - 05/09 0700  In: 2179 [I.V.:466.1]  Out: 550 [Urine:550]   Unmeasured Output  Stool Occurrence: 1  Pad Count: 2       Physical Exam  General Appearance: Not in acute hemodynamic distress  Mental Status Exam: awake, alert, aware, oriented to name and place, not oriented to time or situation. Follows simple commands b/l   Cranial Nerves: VFF, EOM full, pupils  are equal and reactive to light bilaterally, symmetric facial sensory, symmetric facial motor, hearing grossly normal, midline tongue  Motor: no drift in the UE/LE. Power is 5/5 in both UE/LE. Normal tone.  Sensory: Symmetric to LT in all 4 limbs   Vascular: Irregularly irregular rhythm, no m/r/g  Lungs: CTA bilaterally without wheezing  Abdomen: Soft, non-distended, non-tender, BS +      Medications:  Continuous Scheduledamiodarone, 200 mg, BID  atorvastatin, 40 mg, QHS  donepeziL, 5 mg, Nightly  enoxaparin, 50 mg, Q12H  lacosamide (VIMPAT) IVPB, 200 mg, Q12H  levetiracetam IV, 1,000 mg, Q12H  melatonin, 6 mg, Nightly  metoprolol tartrate, 50 mg, Q8H  mupirocin, , BID  pantoprazole, 40 mg, Daily  polyethylene glycol, 17 g, BID  senna-docusate 8.6-50 mg, 2 tablet, BID  silodosin, 8 mg, Daily  sodium chloride 0.9%, 500 mL, Once  valproate sodium (DEPACON) IVPB, 15 mg/kg/day, Q8H    PRNacetaminophen, 650 mg, Q4H PRN  aluminum-magnesium hydroxide-simethicone, 30 mL, QID PRN  bisacodyL, 10 mg, Daily PRN  magnesium oxide, 800 mg, PRN  magnesium oxide, 800 mg, PRN  melatonin, 6 mg, Nightly PRN  naloxone, 0.02 mg, PRN  ondansetron, 4 mg, Q8H PRN  potassium bicarbonate, 35 mEq, PRN  potassium bicarbonate, 50 mEq, PRN  potassium bicarbonate, 60 mEq, PRN  potassium, sodium phosphates, 2 packet, PRN  potassium, sodium phosphates, 2 packet, PRN  potassium, sodium phosphates, 2 packet, PRN  prochlorperazine, 5 mg, Q6H PRN  sodium chloride 0.9%, 10 mL, PRN  sodium chloride 0.9%, 10 mL, Q6H PRN      Today I personally reviewed pertinent imaging, laboratory results, notably: cEEG w/ improvement in R sided discharges, no further electrographic seizures. Depakote level 59.9 this AM. CBC stable. CMP notable for mild hyponatremia to 134, mag and phos low -> repleted.    Diet  Diet Dysphagia Mechanical Soft (IDDSI Level 5)  Diet Dysphagia Mechanical Soft (IDDSI Level 5)      Assessment/Plan:     Neuro  * Focal seizures  78 y/o female  with new onset seizures and AF presents with recurrent focal right sided seizures. Likely 2/2 prior stroke, +/- provoked by recent course of ciprofloxacin for UTI    - Epilepsy following, appreciate their recs  - C/w keppra 1 g BID, vimpat 200 mg BID, depakote 260 mg IV q8hrs (15 mg/kg/day)   - C/w cEEG -> if stable over next 24 hrs, likely d/c tomorrow  - Infectious w/u initiated on arrival to [facility] on 5/4, NGTD and UA noninfectious, has completed 7d treatment for previously diagnosed UTI  - MRI unremarkable      Dementia  Mild per family reports, remains independent of ADLs    - c/w home donepezil 5 mg  - Delirium precautions, q4hr neuro checks o/n  - Melatonin 6 mg qhs  - Avoid seroquel/antipsychotics given prolonged QTc     Cardiac/Vascular  Prolonged Q-T interval on ECG  May be secondary to amiodarone gtt    - QTc 570 on AM ECG   - Trend daily  - Aggressively replete K/Mag  - Avoid additional QTc prolonging medications    Other hyperlipidemia  - C/w atorvastatin 40 mg qhs    Atrial fibrillation with RVR  HR into 170s-180 on stepdown, given BB and loaded with amiodarone prior to NCC stepup     - Currently maintaining adequate MAPs   - Continue anticoagulation (on lovenox 50 mg BID while in house, transition back to home apixaban s/p seizure control and MRI)   - Transition from amio gtt -> amio PO to complete load  - Increase lopressor 25 mg q8hrs -> 50 mg q8hrs  - May need to consider starting dilt in place of amiodarone if QTc continues to prolong (570 on AM ECG)    Essential hypertension  BP soft on admission, holding home antihypertensives    - Hold home amlodipine 5 mg qday, irbesartan-HCTZ 300-12.5 mg qday  - Goal normotension     Renal/  Urinary retention  Requiring frequent straight caths overnight    - UA on admission non-infectious   - C/w silodosin 8 mg qday  - Bladder scans per protocol and straight cath prn ordered    GI  Gastroesophageal reflux disease  - Continue PPI           The patient is being  Prophylaxed for:  Venous Thromboembolism with: Mechanical or Chemical  Stress Ulcer with: PPI  Ventilator Pneumonia with: not applicable    Activity Orders          Diet Dysphagia Mechanical Soft (IDDSI Level 5): Dysphagia 2 (Mechanical Soft Ground) starting at 05/09 0903    Straight Cath starting at 05/07 1045    Progressive Mobility Protocol (mobilize patient to their highest level of functioning at least twice daily) starting at 05/05 0800    Turn patient every 2 hours starting at 05/05 0400    Straight Cath starting at 05/05 0024        Full Code    Uninterrupted Critical Care/Counseling Time (not including procedures): 50 minutes  This patient is critically ill due to focal status epilepticus, acute encephalopathy, dementia w/ behavioral disturbance, Afib w/ RVR, prolonged QTc, urinary retention. There is high probability for acute neurological change leading to clinical and possibly life-threatening deterioration requiring highest level of physician preparedness for urgent intervention. I spent greater than 50% of the documented critical care time assessing and making medical decisions for this patient.        Zandra Becerra MD  Neurocritical Care  [facility] - Neuro Critical Care

## 2022-05-09 NOTE — PT/OT/SLP DISCHARGE
Physical Therapy Discharge Summary    Name: Teresa Leos  MRN: [id number]   Principal Problem: Focal seizures     Patient Discharged from acute Physical Therapy on . Pt was transferred to ICU  due to status epilepticus. .  Please refer to prior PT noted date on  for functional status.     Assessment:     Patient has not met goals.    Objective:     GOALS:   Multidisciplinary Problems     Physical Therapy Goals        Problem: Physical Therapy    Goal Priority Disciplines Outcome Goal Variances Interventions   Physical Therapy Goal     PT, PT/OT Ongoing, Progressing     Description: Goals to be met by: 22    Patient will increase functional independence with mobility by performin. Supine to sit with independence  2. Sit to supine with independence  3. Sit to stand transfer with independence  4. Gait  x 300 feet with independence using LRAD as needed  5. Ascend/descend 5 stair with left handrails modified independence using LRAD as needed  6. Lower extremity exercise program x10 reps per handout, with independence                     Reasons for Discontinuation of Therapy Services  Transfer to alternate level of care.      Plan:     Patient Discharged to: to ICU, new orders needed for therapy. Therapist of record not available to write discharge summary. .      2022

## 2022-05-09 NOTE — HPI
77 year old female with a PMHx of HTN, dementia, recently diagnosed atrial fibrillation who initially presented to OSH due to altered mental status and convulsions. HPI per chart review. Per chart, patient had a fall on 4/29 and witnessed convulsions by family. Patient was subsequently admitted to [facility]; hospital course complicated by atrial fibrillation and discharged on Toprol XL and Eliquis. After discharge, patient had episode of unresponsiveness and convulsions at home. Patient brought to [facility] via EMS, subsequently transferred to [facility] and admitted to Hospital Medicine 5/5/2022. Patient developed afib RVR which did not respond to IV beta blocker, worsening mentation, and staring episodes associated with deviated gaze. Patient admitted to [facility] for higher level of care and further management. EEG placed 5/5 and revealed right NCSE which resolved after AED escalation. Epilepsy following for assistance in management.

## 2022-05-09 NOTE — PT/OT/SLP PROGRESS
"Speech Language Pathology Treatment    Patient Name:  Teresa Leos   MRN:  [id number]  Admitting Diagnosis: Seizure-like activity    Recommendations:                 General Recommendations:  Dysphagia therapy  Diet recommendations:  Mechanical soft, Liquid Diet Level: Thin   Aspiration Precautions: 1 bite/sip at a time, Feed only when awake/alert, HOB to 90 degrees and Small bites/sips   General Precautions: Standard, aspiration, fall  Communication strategies:  provide increased time to answer    Subjective     Awake/alert  "She really wants a hamburger." Re son  Pain/Comfort:  · Pain Rating 1: 0/10  · Pain Rating Post-Intervention 1: 0/10    Respiratory Status: Room air    Objective:     Has the patient been evaluated by SLP for swallowing?   Yes  Keep patient NPO? No     Pt repositioned upright in bed for PO trials. Increased time to answer provided throughout session. She was oriented x4 ind'ly. She tolerated thin liquids x5 oz, puree x3 and cracker x1 with no overt clinical s/s of airway compromise. Per son, pt lost bottom dentures in transit when coming to this hospital. She tolerated cracker x1 with prolonged mastication 2/2 poor dentition. Recommend mechanical soft diet/thin liquids at this time. SLP reviewed recs with pt, family, MD, and CATHERINE. Whiteboard updated with diet recs. SLP will follow up for ongoing assessment and SLC eval.     Assessment:     Teresa Leos is a 77 y.o. female with an SLP diagnosis of Dysphagia.      Goals:   Multidisciplinary Problems     SLP Goals        Problem: SLP    Goal Priority Disciplines Outcome   SLP Goal     SLP    Description: Speech Language Pathology Goals  Goals expected to be met by 5/13    1. Pt will participate in ongoing swallow assessment  2. PT will participate in speech language eval                              Plan:     · Patient to be seen:  4 x/week   · Plan of Care reviewed with:  patient, family   · SLP Follow-Up:  Yes       Discharge " recommendations:    TBD      Time Tracking:     SLP Treatment Date:   05/09/22  Speech Start Time:  0851  Speech Stop Time:  0900     Speech Total Time (min):  9 min    Billable Minutes: Treatment Swallowing Dysfunction 9    05/09/2022

## 2022-05-09 NOTE — ASSESSMENT & PLAN NOTE
May be secondary to amiodarone gtt    - QTc 570 on AM ECG   - Trend daily  - Aggressively replete K/Mag  - Avoid additional QTc prolonging medications

## 2022-05-09 NOTE — PLAN OF CARE
[facility] Care Plan    POC reviewed with Teresa Leos and family at 1400. Pt confused unable to verbalize understanding. Questions and concerns addressed. No acute events today.  Will continue to monitor. See below and flowsheets for full assessment and VS info.     -Family updated at the bedside.   -PT on EEG monitoring.   -Bladder Scan Q6. PT unable to void requiring straight cath.   -Pt on delirium precaution. Melatonin 6mg given per MD order.           Is this a stroke patient? no    Neuro:  Melanie Coma Scale  Best Eye Response: 4-->(E4) spontaneous  Best Motor Response: 6-->(M6) obeys commands  Best Verbal Response: 4-->(V4) confused  Warren Coma Scale Score: 14  Assessment Qualifiers: no eye obstruction present, patient not sedated/intubated        24 hr Temp:  [97.2 °F (36.2 °C)-98.1 °F (36.7 °C)]     CV:   Rhythm: atrial rhythm  BP goals:   SBP < 160  MAP > 65    Resp:   O2 Device (Oxygen Therapy): room air       Plan: N/A    GI/:     Diet/Nutrition Received: tube feeding  Last Bowel Movement: 05/06/22  Voiding Characteristics: unable to void    Intake/Output Summary (Last 24 hours) at 5/8/2022 1942  Last data filed at 5/8/2022 1701  Gross per 24 hour   Intake 2073.41 ml   Output 450 ml   Net 1623.41 ml     Unmeasured Output  Stool Occurrence: 0    Labs/Accuchecks:  Recent Labs   Lab 05/08/22  0116   WBC 8.19   RBC 3.66*   HGB 12.0   HCT 36.4*         Recent Labs   Lab 05/08/22  0116   *   K 4.2   CO2 20*      BUN 13   CREATININE 0.8   ALKPHOS 60   ALT 15   AST 24   BILITOT 0.4      Recent Labs   Lab 05/04/22  1536   INR 1.2   APTT 25.6      Recent Labs   Lab 05/04/22  1524 05/04/22  1536   *  196*  --    CPKMB 5.5  --    TROPONINI  --  <0.006   MB 2.8  --        Electrolytes: N/A - electrolytes WDL  Accuchecks: none    Gtts:   amiodarone in dextrose 5% 0.5 mg/min (05/08/22 1701)       LDA/Wounds:  Lines/Drains/Airways       Drain  Duration                  NG/OG Tube 05/06/22  1022 Right nostril 2 days    Female External Urinary Catheter 05/06/22 0748 2 days              Peripheral Intravenous Line  Duration                  Midline Catheter Insertion/Assessment  - Single Lumen 05/05/22 2215 Left basilic vein (medial side of arm) other (see comments) 2 days         Peripheral IV - Single Lumen 05/05/22 1951 22 G Anterior;Distal;Left Forearm 2 days         Peripheral IV - Single Lumen 05/05/22 2222 20 G Anterior;Left Foot 2 days         Peripheral IV - Single Lumen 05/05/22 2222 Anterior;Right Foot 2 days                  Wounds: No  Wound care consulted: No

## 2022-05-09 NOTE — ASSESSMENT & PLAN NOTE
HR into 170s-180 on stepdown, given BB and loaded with amiodarone prior to NCC stepup     - Currently maintaining adequate MAPs   - Continue anticoagulation (on lovenox 50 mg BID while in house, transition back to home apixaban s/p seizure control and MRI)   - Transition from amio gtt -> amio PO to complete load  - Increase lopressor 25 mg q8hrs -> 50 mg q8hrs  - May need to consider starting dilt in place of amiodarone if QTc continues to prolong (570 on AM ECG)

## 2022-05-09 NOTE — PLAN OF CARE
[facility] Care Plan    POC reviewed with Teresa Leos and family at 1400. Pt verbalized understanding. Questions and concerns addressed. No acute events today. Pt progressing toward goals. Will continue to monitor. See below and flowsheets for full assessment and VS info.     -Family updated at the bedside.   -NGT DC.   -Pt tolerating diet well.   -CHG bath completed.   -Amio Gtt DC this am.   -Pt on delirium precautions           Is this a stroke patient? no    Neuro:  [place] Coma Scale  Best Eye Response: 4-->(E4) spontaneous  Best Motor Response: 6-->(M6) obeys commands  Best Verbal Response: 4-->(V4) confused  [place] Coma Scale Score: 14  Assessment Qualifiers: no eye obstruction present, patient not sedated/intubated        24 hr Temp:  [97.2 °F (36.2 °C)-99.3 °F (37.4 °C)]     CV:   Rhythm: atrial rhythm  BP goals:   SBP < 160  MAP > 65    Resp:   O2 Device (Oxygen Therapy): room air       Plan: N/A    GI/:     Diet/Nutrition Received: mechanical/dental soft  Last Bowel Movement: 05/09/22  Voiding Characteristics: unable to void    Intake/Output Summary (Last 24 hours) at 5/9/2022 1628  Last data filed at 5/9/2022 1501  Gross per 24 hour   Intake 1624.93 ml   Output 350 ml   Net 1274.93 ml     Unmeasured Output  Urine Occurrence: 0  Stool Occurrence: 1  Emesis Occurrence: 0  Pad Count: 2    Labs/Accuchecks:  Recent Labs   Lab 05/09/22  0328   WBC 7.56   RBC 3.13*   HGB 10.0*   HCT 31.1*         Recent Labs   Lab 05/09/22  0328   *   K 4.4   CO2 19*      BUN 14   CREATININE 0.7   ALKPHOS 52*   ALT 18   AST 35   BILITOT 0.3      Recent Labs   Lab 05/04/22  1536   INR 1.2   APTT 25.6      Recent Labs   Lab 05/04/22  1524 05/04/22  1536   *  196*  --    CPKMB 5.5  --    TROPONINI  --  <0.006   MB 2.8  --        Electrolytes: Electrolytes replaced  Accuchecks: NA    Gtts:   lactated ringers         LDA/Wounds:  Lines/Drains/Airways       Drain  Duration                  NG/OG Tube  05/06/22 1022 Right nostril 3 days    Female External Urinary Catheter 05/06/22 0748 3 days              Peripheral Intravenous Line  Duration                  Midline Catheter Insertion/Assessment  - Single Lumen 05/05/22 2215 Left basilic vein (medial side of arm) other (see comments) 3 days         Peripheral IV - Single Lumen 05/05/22 2222 20 G Anterior;Left Foot 3 days                  Wounds: No  Wound care consulted: No

## 2022-05-09 NOTE — PLAN OF CARE
[facility] Care Plan    POC reviewed with Teresa Leos at 0300. Pt has developed delirium and unable to verbalize understanding. No acute events overnight. Pt progressing toward goals. Will continue to monitor. See below and flowsheets for full assessment and VS info.         Is this a stroke patient? no    Neuro:  [place] Coma Scale  Best Eye Response: 4-->(E4) spontaneous  Best Motor Response: 6-->(M6) obeys commands  Best Verbal Response: 4-->(V4) confused  [place] Coma Scale Score: 14  Assessment Qualifiers: patient not sedated/intubated        24hr Temp:  [97.2 °F (36.2 °C)-99.3 °F (37.4 °C)]     CV:   Rhythm: atrial rhythm  BP goals:   SBP < 180  MAP > 65    Resp:   O2 Device (Oxygen Therapy): room air       Plan: N/A    GI/:     Diet/Nutrition Received: tube feeding  Last Bowel Movement: 05/08/22  Voiding Characteristics: other (see comments) (retention)    Intake/Output Summary (Last 24 hours) at 5/9/2022 0717  Last data filed at 5/9/2022 0601  Gross per 24 hour   Intake 2139.04 ml   Output 550 ml   Net 1589.04 ml     Unmeasured Output  Stool Occurrence: 1  Pad Count: 2    Labs/Accuchecks:  Recent Labs   Lab 05/09/22  0328   WBC 7.56   RBC 3.13*   HGB 10.0*   HCT 31.1*         Recent Labs   Lab 05/09/22  0328   *   K 4.4   CO2 19*      BUN 14   CREATININE 0.7   ALKPHOS 52*   ALT 18   AST 35   BILITOT 0.3      Recent Labs   Lab 05/04/22  1536   INR 1.2   APTT 25.6      Recent Labs   Lab 05/04/22  1524 05/04/22  1536   *  196*  --    CPKMB 5.5  --    TROPONINI  --  <0.006   MB 2.8  --        Electrolytes: N/A - electrolytes WDL  Accuchecks: none    Gtts:   amiodarone in dextrose 5% 0.5 mg/min (05/09/22 0601)       LDA/Wounds:  Lines/Drains/Airways       Drain  Duration                  NG/OG Tube 05/06/22 1022 Right nostril 2 days    Female External Urinary Catheter 05/06/22 0748 2 days              Peripheral Intravenous Line  Duration                  Midline Catheter  Insertion/Assessment  - Single Lumen 05/05/22 2215 Left basilic vein (medial side of arm) other (see comments) 3 days         Peripheral IV - Single Lumen 05/05/22 1951 22 G Anterior;Distal;Left Forearm 3 days         Peripheral IV - Single Lumen 05/05/22 2222 20 G Anterior;Left Foot 3 days         Peripheral IV - Single Lumen 05/05/22 2222 Anterior;Right Foot 3 days                  Wounds: No  Wound care consulted: No

## 2022-05-09 NOTE — ASSESSMENT & PLAN NOTE
Mild per family reports, remains independent of ADLs    - c/w home donepezil 5 mg  - Delirium precautions, q4hr neuro checks o/n  - Melatonin 6 mg qhs  - Avoid seroquel/antipsychotics given prolonged QTc

## 2022-05-09 NOTE — ASSESSMENT & PLAN NOTE
78 y/o female with new onset seizures and AF presents with recurrent focal right sided seizures. Likely 2/2 prior stroke, +/- provoked by recent course of ciprofloxacin for UTI    - Epilepsy following, appreciate their recs  - C/w keppra 1 g BID, vimpat 200 mg BID, depakote 260 mg IV q8hrs (15 mg/kg/day)   - C/w cEEG -> if stable over next 24 hrs, likely d/c tomorrow  - [place]ious w/u [place] [facility] [place], NGTD and UA noninfectious, has completed 7d treatment for previously diagnosed UTI  - MRI unremarkable

## 2022-05-09 NOTE — CONSULTS
[facility]  Neurology-Epilepsy  Consult Note    Patient Name: Teresa Leos  MRN: [id number]  Admission Date: 5/4/2022  Hospital Length of Stay: 4 days  Code Status: Full Code   Attending Provider: Zandra Becerra MD   Consulting Provider: Mee Cohen PA-C  Primary Care Physician: Joseph Mcpherson NP  Principal Problem:Focal seizures    Consults   Subjective:     HPI:   77 year old female with a PMHx of HTN, dementia, recently diagnosed atrial fibrillation who initially presented to OSH due to altered mental status and convulsions. HPI per chart review. Per chart, patient had a fall on 4/29 and witnessed convulsions by family. Patient was subsequently admitted to [facility]; hospital course complicated by atrial fibrillation and discharged on Toprol XL and Eliquis. After discharge, patient had episode of unresponsiveness and convulsions at home. Patient brought to [facility] via EMS, [place] [facility] [place] to Hospital Medicine 5/5/2022. Patient developed afib RVR which did not respond to IV beta blocker, worsening mentation, and staring episodes associated with deviated gaze. Patient admitted to [facility] for higher level of care and further management. EEG placed 5/5 and revealed right NCSE which resolved after AED escalation. Epilepsy following for assistance in management.      Hospital Course:   5/5>5/6: R NCSE which resolved at 0228 on 5/6, continues to have high risk pattern over R temporal chain with high risk for recurrent seizure  5/6>5/7: R temporal seizure focus, pattern on IIC, no further seizures  5/7>5/8: R temporal seizures focus, pattern on IIC; compared to yesterday's study, there is some worsening in duration of stimulus induced periodic discharges and one brief run of rhythmic discharges indicating a very high risk of recurrent seizure  5/8<5/9: mild encephalopathy, structural abnormality in right hemisphere, right frontal polar maximum  epileptiform discharges, numerous subclinical electrographic seizures from right hemisphere which resolve after VPA load; last seizure at 2101 5/8       Interval History: NAEON. This AM, patient awake, alert, oriented to self. Clinically improved today. No family at bedside.    Current Facility-Administered Medications   Medication Dose Route Frequency Provider Last Rate Last Admin    acetaminophen tablet 650 mg  650 mg Oral Q4H PRN Rudi Cronin MD   650 mg at 05/07/22 1732    aluminum-magnesium hydroxide-simethicone 200-200-20 mg/5 mL suspension 30 mL  30 mL Oral QID PRN Rudi Cronin MD        amiodarone tablet 200 mg  200 mg Oral BID Yohana Joy PA-C        atorvastatin tablet 40 mg  40 mg Oral QHS Yohana Joy PA-C        bisacodyL suppository 10 mg  10 mg Rectal Daily PRN Yohana Joy PA-C        donepeziL tablet 5 mg  5 mg Oral Nightly Rudi Cronin MD   5 mg at 05/08/22 2157    enoxaparin injection 50 mg  50 mg Subcutaneous Q12H Deniz Medina MD   50 mg at 05/09/22 0841    lacosamide (VIMPAT) 200 mg in sodium chloride 0.9% 100 mL IVPB  200 mg Intravenous Q12H Mckayla Issa PA-C   Stopped at 05/09/22 0913    levETIRAcetam injection 1,000 mg  1,000 mg Intravenous Q12H Mckayla Issa PA-C   1,000 mg at 05/09/22 0840    magnesium oxide tablet 800 mg  800 mg Oral PRN Yohana Joy PA-C        magnesium oxide tablet 800 mg  800 mg Oral PRN Yohana Joy PA-C        melatonin tablet 6 mg  6 mg Oral Nightly PRN Doron Haynes MD   6 mg at 05/08/22 1848    melatonin tablet 6 mg  6 mg Oral Nightly Yohana Joy PA-C        metoprolol tartrate (LOPRESSOR) tablet 50 mg  50 mg Oral Q8H Yohana Joy PA-C        mupirocin 2 % ointment   Nasal BID Zandra Becerra MD   Given at 05/09/22 0841    naloxone 0.4 mg/mL injection 0.02 mg  0.02 mg Intravenous PRN Rudi Cronin MD        ondansetron injection 4 mg  4 mg Intravenous Q8H PRN Rudi  MD Roseanne        pantoprazole injection 40 mg  40 mg Intravenous Daily Chirag Gonzalez MD   40 mg at 05/09/22 0841    polyethylene glycol packet 17 g  17 g Oral BID Yohana Joy PA-C        potassium bicarbonate disintegrating tablet 35 mEq  35 mEq Oral PRN Yohana Joy PA-C        potassium bicarbonate disintegrating tablet 50 mEq  50 mEq Oral PRN Yohana Joy PA-C        potassium bicarbonate disintegrating tablet 60 mEq  60 mEq Oral PRN Yohana Joy PA-C        potassium, sodium phosphates 280-160-250 mg packet 2 packet  2 packet Oral PRN MICA López-MATTHEW        potassium, sodium phosphates 280-160-250 mg packet 2 packet  2 packet Oral PRN MICA López-MATTHEW        potassium, sodium phosphates 280-160-250 mg packet 2 packet  2 packet Oral PRN Yohana Joy PA-C        prochlorperazine injection Soln 5 mg  5 mg Intravenous Q6H PRN Rudi Cronin MD        senna-docusate 8.6-50 mg per tablet 2 tablet  2 tablet Oral BID Yohana Joy PA-C        [START ON 5/10/2022] silodosin capsule 8 mg  8 mg Oral Daily Yohana Joy PA-C        sodium chloride 0.9% bolus 500 mL  500 mL Intravenous Once Yohana Joy PA-C 500 mL/hr at 05/09/22 1106 500 mL at 05/09/22 1106    sodium chloride 0.9% flush 10 mL  10 mL Intravenous PRN Doron Haynes MD        sodium chloride 0.9% flush 10 mL  10 mL Intravenous Q6H PRN Rudi Cronin MD        valproate (DEPACON) 260 mg in dextrose 5 % 50 mL IVPB  15 mg/kg/day Intravenous Q8H Yohana Joy PA-C 50 mL/hr at 05/09/22 1108 260 mg at 05/09/22 1108     Continuous Infusions:    Review of Systems   Unable to perform ROS: Dementia   Objective:     Vital Signs (Most Recent):  Temp: 98 °F (36.7 °C) (05/09/22 0701)  Pulse: (!) 124 (05/09/22 0901)  Resp: 17 (05/09/22 0901)  BP: 137/83 (05/09/22 0901)  SpO2: 95 % (05/09/22 0901)   Vital Signs (24h Range):  Temp:  [97.2 °F (36.2 °C)-99.3 °F (37.4 °C)] 98 °F  (36.7 °C)  Pulse:  [] 124  Resp:  [9-28] 17  SpO2:  [92 %-99 %] 95 %  BP: ()/(51-92) 137/83     Weight: 51.3 kg (113 lb)  Body mass index is 21.35 kg/m².    Physical Exam  Constitutional:       General: She is not in acute distress.     Appearance: She is not diaphoretic.   HENT:      Head: Normocephalic and atraumatic.      Comments: EEG in place  Eyes:      General: No scleral icterus.        Right eye: No discharge.         Left eye: No discharge.      Conjunctiva/sclera: Conjunctivae normal.      Pupils: Pupils are equal, round, and reactive to light.   Cardiovascular:      Rate and Rhythm: Tachycardia present.   Pulmonary:      Effort: Pulmonary effort is normal. No respiratory distress.   Abdominal:      General: There is no distension.      Palpations: Abdomen is soft.      Tenderness: There is no abdominal tenderness.   Musculoskeletal:         General: No deformity or signs of injury.   Skin:     General: Skin is warm and dry.   Psychiatric:      Comments: Unable to assess       NEUROLOGICAL EXAMINATION:     CRANIAL NERVES     CN III, IV, VI   Pupils are equal, round, and reactive to light.       Awake, alert, oriented to self  BINH OU   Corneal intact OU   + spontaneous eye opening   Face symmetric   Tongue midline   Follows commands    Exam findings to suggest seizures:  Myoclonus - no   eye twitching - no   Nystagmus - no   gaze deviation - no   waxy rigidity - no      Significant Labs: All pertinent lab results from the past 24 hours have been reviewed.    Significant Studies: I have reviewed all pertinent imaging results/findings within the past 24 hours.    Assessment and Plan:     * Focal seizures  77F PMHx of HTN, dementia, recently diagnosed afib who initially presented to OSH due to AMS and convulsions. Per chart review, patient had fall on 4/29 and witnessed convulsions; subsequently [place] with hospital course c/b afib RVR. After discharge, patient had episode  of unresponsiveness and convulsions at home. Patient brought to [facility] via EMS, subsequently transferred to [facility] and admitted to  5/5/2022. Patient developed afib RVR which did not respond to IV beta blocker, worsening mentation, and staring episodes associated with deviated gaze. Patient admitted to [facility] for higher level of care and further management. EEG placed 5/5 and revealed right NCSE which resolved after AED escalation. Epilepsy following for assistance in management.    Recommendations:  - Continuous vEEG monitoring: no further seizures after VPA  - Recommend to continue current AED regimen: Lacosamide 200 mg BID, Levetiracetam 1g BID, and  mg q8  - Avoid agents that lower seizure threshold  - Management of infectious/metabolic abnormalities per [facility]  - Seizure precautions      Discussed plan of care with [facility] team. No family at bedside. Will follow, please call with questions.    Dementia  Hx of  - Unclear baseline, no family at bedside  - Patient oriented to self  - On Donepezil     Atrial fibrillation with RVR  - On Metoprolol, Amiodarone, Lovenox  - Management per [facility]     Essential hypertension  Hx of  - On Metoprolol   - Management per [facility]      VTE Risk Mitigation (From admission, onward)         Ordered     enoxaparin injection 50 mg  Every 12 hours         05/05/22 2046     IP VTE HIGH RISK PATIENT  Once         05/04/22 2039     Place sequential compression device  Until discontinued         05/04/22 2039                Thank you for your consult. I will follow-up with patient. Please contact us if you have any additional questions.    Mee Cohen PA-C  Neurology-Epilepsy  [facility]  Staff: Dr. Kline

## 2022-05-09 NOTE — SUBJECTIVE & OBJECTIVE
Interval History:  Please see hospital course above for details    Review of Systems   Unable to perform ROS: Dementia     Objective:     Vitals:  Temp: 98 °F (36.7 °C)  Pulse: (!) 124  Rhythm: atrial rhythm  BP: 137/83  MAP (mmHg): 104  Resp: 17  SpO2: 95 %  O2 Device (Oxygen Therapy): room air    Temp  Min: 97.2 °F (36.2 °C)  Max: 99.3 °F (37.4 °C)  Pulse  Min: 98  Max: 150  BP  Min: 80/51  Max: 145/81  MAP (mmHg)  Min: 60  Max: 112  Resp  Min: 9  Max: 28  SpO2  Min: 92 %  Max: 99 %    05/08 0701 - 05/09 0700  In: 2179 [I.V.:466.1]  Out: 550 [Urine:550]   Unmeasured Output  Stool Occurrence: 1  Pad Count: 2       Physical Exam  General Appearance: Not in acute hemodynamic distress  Mental Status Exam: awake, alert, aware, oriented to name and place, not oriented to time or situation. Follows simple commands b/l   Cranial Nerves: VFF, EOM full, pupils are equal and reactive to light bilaterally, symmetric facial sensory, symmetric facial motor, hearing grossly normal, midline tongue  Motor: no drift in the UE/LE. Power is 5/5 in both UE/LE. Normal tone.  Sensory: Symmetric to LT in all 4 limbs   Vascular: Irregularly irregular rhythm, no m/r/g  Lungs: CTA bilaterally without wheezing  Abdomen: Soft, non-distended, non-tender, BS +      Medications:  Continuous Scheduledamiodarone, 200 mg, BID  atorvastatin, 40 mg, QHS  donepeziL, 5 mg, Nightly  enoxaparin, 50 mg, Q12H  lacosamide (VIMPAT) IVPB, 200 mg, Q12H  levetiracetam IV, 1,000 mg, Q12H  melatonin, 6 mg, Nightly  metoprolol tartrate, 50 mg, Q8H  mupirocin, , BID  pantoprazole, 40 mg, Daily  polyethylene glycol, 17 g, BID  senna-docusate 8.6-50 mg, 2 tablet, BID  silodosin, 8 mg, Daily  sodium chloride 0.9%, 500 mL, Once  valproate sodium (DEPACON) IVPB, 15 mg/kg/day, Q8H    PRNacetaminophen, 650 mg, Q4H PRN  aluminum-magnesium hydroxide-simethicone, 30 mL, QID PRN  bisacodyL, 10 mg, Daily PRN  magnesium oxide, 800 mg, PRN  magnesium oxide, 800 mg,  PRN  melatonin, 6 mg, Nightly PRN  naloxone, 0.02 mg, PRN  ondansetron, 4 mg, Q8H PRN  potassium bicarbonate, 35 mEq, PRN  potassium bicarbonate, 50 mEq, PRN  potassium bicarbonate, 60 mEq, PRN  potassium, sodium phosphates, 2 packet, PRN  potassium, sodium phosphates, 2 packet, PRN  potassium, sodium phosphates, 2 packet, PRN  prochlorperazine, 5 mg, Q6H PRN  sodium chloride 0.9%, 10 mL, PRN  sodium chloride 0.9%, 10 mL, Q6H PRN      Today I personally reviewed pertinent imaging, laboratory results, notably: cEEG w/ improvement in R sided discharges, no further electrographic seizures. Depakote level 59.9 this AM. CBC stable. CMP notable for mild hyponatremia to 134, mag and phos low -> repleted.    Diet  Diet Dysphagia Mechanical Soft (IDDSI Level 5)  Diet Dysphagia Mechanical Soft (IDDSI Level 5)

## 2022-05-09 NOTE — PROCEDURES
DATE: 5/8/22    EEG NUMBER: [id number]    REFERRING PHYSICIAN:  Dr. Gonzalez      This EEG was performed to assess for subclinical seizures      ELECTROENCEPHALOGRAM REPORT     METHODOLOGY:  Electroencephalographic (EEG) recording is with electrodes placed according to the International 10-20 placement system.  Thirty two (32) channels of digital signal are simultaneously recorded from the scalp and may include EKG, EMG, and/or eye monitors.   Recording band pass was 0.1 to 512 hz.  Digital video recording of the patient is simultaneously recorded with the EEG.  The nursing staff report clinical symptoms and may press an event button when the patient has symptoms of clinical interest to the treating physicians.  EEG and video recording is stored and archived in digital format.  The entire recording is visually reviewed, and the times identified by computer analysis as being spikes or seizures are reviewed again.  Activation procedures which include photic stimulation, hyperventilation and instructing patients to perform simple task are done in selected patients.   Compresses spectral analysis (CSA) is also performed on the activity recorded from each individual channel.  This is displayed as a power display of frequencies from 0 to 30 Hz over time.   The CSA analysis is done and displayed continuously.  This is reviewed for asymmetries in power between homologous areas of the scalp and for presence of changes in power which can be seen when seizures occur.  Sections of suspected abnormalities on the CSA is then compared with the original EEG recording.                [employer] software was also utilized in the review of this study.  This software suite analyzes the EEG recording in multiple domains.  Coherence and rhythmicity is computed to identify EEG sections which may contain organized seizures.  Each channel undergoes analysis to detect presence of spike and sharp waves which have special and morphological  characteristic of epileptic activity.  The routine EEG recording is converted from spacial into frequency domain.  This is then displayed comparing homologous areas to identify areas of significant asymmetry.  Algorithm to identify non-cortically generated artifact is used to separate eye movement, EMG and other artifact from the EEG.     Recording times  Start on May 8, 2022 at hours 7 minute 1 seconds 32  End on May 9, 2022 at hours 7 minute 0 seconds 1  The total time of EEG recording for the study was 23 hours and 58 minutes    EEG FINDINGS:  The recording was obtained with a number of standard bipolar and referential montages during wakefulness, drowsiness and sleep.  In the alert state, the posterior background rhythm was a symmetric, well-modulated 7 Hz activity, which reacted symmetrically to eye opening.  Activation procedures were not performed.  The background is punctuated by right frontal polar maximum as well as right lateralized epileptiform discharges which were pseudo periodic at a frequency of 1-3 hertz.  In addition prolonged runs of sharply contoured rhythmical 4-5 hertz activity was noted in the right hemisphere with evolution in morphology and rhythmicity suggestive of an electrographic seizure.  Example of this was at hour 8 minute 47. The last seizure was recorded at hour 21 minute 1. Subsequently there was a decrease in the burden of the epileptiform discharges in the right hemisphere with prolonged periods of generalized bihemispheric mixed theta range slowing the absence of epileptiform discharges.    The EKG channel revealed a sinus rhythm.     IMPRESSION:  This is an abnormal EEG during wakefulness, drowsiness and sleep. Diffuse slowing was noted.  Asymmetric slowing of the right hemisphere was noted.  Right frontal polar maximum epileptiform discharges were noted which were pseudo periodic.  Several subclinical electrographic seizures were recorded emanating from this region.      CLINICAL CORRELATION:  The patient is a 77 year female who presented with witnessed seizures and is currently maintained on Keppra Vimpat and Depakote.  This is an abnormal EEG during wakefulness, drowsiness and sleep.  The overall degree of slowing disorganization suggestive mild encephalopathy nonspecific to the cause.  The background asymmetry suggestive of structural abnormality in the right hemisphere.  The presence of right frontal polar maximum epileptiform discharges confers an increased risk for focal seizures from a deeper epileptogenic focus within the right hemisphere.  During this study numerous subclinical electrographic seizures were recorded which emanated from this region.  The last seizure was recorded hour 21 minute 1 on May 8, 2022.

## 2022-05-09 NOTE — HOSPITAL COURSE
5/5>5/6: R NCSE which resolved at 0228 on 5/6, continues to have high risk pattern over R temporal chain with high risk for recurrent seizure  5/6>5/7: R temporal seizure focus, pattern on IIC, no further seizures  5/7>5/8: R temporal seizures focus, pattern on IIC; compared to yesterday's study, there is some worsening in duration of stimulus induced periodic discharges and one brief run of rhythmic discharges indicating a very high risk of recurrent seizure  5/8<5/9: mild encephalopathy, structural abnormality in right hemisphere, right frontal polar maximum epileptiform discharges, numerous subclinical electrographic seizures from right hemisphere which resolve after VPA load; last seizure at 2101 5/8 5/9>5/10: right frontal polar epileptiform discharges, no electrographic seizures

## 2022-05-10 LAB
ALBUMIN SERPL BCP-MCNC: 2.6 G/DL (ref 3.5–5.2)
ALBUMIN SERPL BCP-MCNC: 2.6 G/DL (ref 3.5–5.2)
ALP SERPL-CCNC: 56 U/L (ref 55–135)
ALP SERPL-CCNC: 58 U/L (ref 55–135)
ALT SERPL W/O P-5'-P-CCNC: 18 U/L (ref 10–44)
ALT SERPL W/O P-5'-P-CCNC: 20 U/L (ref 10–44)
ANION GAP SERPL CALC-SCNC: 11 MMOL/L (ref 8–16)
ANION GAP SERPL CALC-SCNC: 7 MMOL/L (ref 8–16)
AST SERPL-CCNC: 27 U/L (ref 10–40)
AST SERPL-CCNC: 29 U/L (ref 10–40)
BASOPHILS # BLD AUTO: 0.05 K/UL (ref 0–0.2)
BASOPHILS NFR BLD: 0.5 % (ref 0–1.9)
BILIRUB SERPL-MCNC: 0.5 MG/DL (ref 0.1–1)
BILIRUB SERPL-MCNC: 0.5 MG/DL (ref 0.1–1)
BUN SERPL-MCNC: 13 MG/DL (ref 8–23)
BUN SERPL-MCNC: 16 MG/DL (ref 8–23)
CALCIUM SERPL-MCNC: 8.3 MG/DL (ref 8.7–10.5)
CALCIUM SERPL-MCNC: 8.5 MG/DL (ref 8.7–10.5)
CHLORIDE SERPL-SCNC: 101 MMOL/L (ref 95–110)
CHLORIDE SERPL-SCNC: 105 MMOL/L (ref 95–110)
CO2 SERPL-SCNC: 23 MMOL/L (ref 23–29)
CO2 SERPL-SCNC: 23 MMOL/L (ref 23–29)
CREAT SERPL-MCNC: 0.9 MG/DL (ref 0.5–1.4)
CREAT SERPL-MCNC: 1 MG/DL (ref 0.5–1.4)
DIFFERENTIAL METHOD: ABNORMAL
EOSINOPHIL # BLD AUTO: 0.1 K/UL (ref 0–0.5)
EOSINOPHIL NFR BLD: 0.9 % (ref 0–8)
ERYTHROCYTE [DISTWIDTH] IN BLOOD BY AUTOMATED COUNT: 14.5 % (ref 11.5–14.5)
EST. GFR  (AFRICAN AMERICAN): >60 ML/MIN/1.73 M^2
EST. GFR  (AFRICAN AMERICAN): >60 ML/MIN/1.73 M^2
EST. GFR  (NON AFRICAN AMERICAN): 54.1 ML/MIN/1.73 M^2
EST. GFR  (NON AFRICAN AMERICAN): >60 ML/MIN/1.73 M^2
FACT X PPP CHRO-ACNC: 1.13 IU/ML (ref 0.3–0.7)
GLUCOSE SERPL-MCNC: 120 MG/DL (ref 70–110)
GLUCOSE SERPL-MCNC: 98 MG/DL (ref 70–110)
HCT VFR BLD AUTO: 32.6 % (ref 37–48.5)
HCV AB SERPL QL IA: NEGATIVE
HGB BLD-MCNC: 10.8 G/DL (ref 12–16)
IMM GRANULOCYTES # BLD AUTO: 0.02 K/UL (ref 0–0.04)
IMM GRANULOCYTES NFR BLD AUTO: 0.2 % (ref 0–0.5)
LYMPHOCYTES # BLD AUTO: 3.6 K/UL (ref 1–4.8)
LYMPHOCYTES NFR BLD: 38.6 % (ref 18–48)
MAGNESIUM SERPL-MCNC: 2.1 MG/DL (ref 1.6–2.6)
MAGNESIUM SERPL-MCNC: 2.1 MG/DL (ref 1.6–2.6)
MCH RBC QN AUTO: 32 PG (ref 27–31)
MCHC RBC AUTO-ENTMCNC: 33.1 G/DL (ref 32–36)
MCV RBC AUTO: 97 FL (ref 82–98)
MONOCYTES # BLD AUTO: 1.1 K/UL (ref 0.3–1)
MONOCYTES NFR BLD: 12 % (ref 4–15)
NEUTROPHILS # BLD AUTO: 4.5 K/UL (ref 1.8–7.7)
NEUTROPHILS NFR BLD: 47.8 % (ref 38–73)
NRBC BLD-RTO: 0 /100 WBC
PHOSPHATE SERPL-MCNC: 2.9 MG/DL (ref 2.7–4.5)
PHOSPHATE SERPL-MCNC: 3.2 MG/DL (ref 2.7–4.5)
PLATELET # BLD AUTO: 240 K/UL (ref 150–450)
PMV BLD AUTO: 10.3 FL (ref 9.2–12.9)
POTASSIUM SERPL-SCNC: 4.1 MMOL/L (ref 3.5–5.1)
POTASSIUM SERPL-SCNC: 4.2 MMOL/L (ref 3.5–5.1)
PROT SERPL-MCNC: 5.5 G/DL (ref 6–8.4)
PROT SERPL-MCNC: 5.6 G/DL (ref 6–8.4)
RBC # BLD AUTO: 3.37 M/UL (ref 4–5.4)
SODIUM SERPL-SCNC: 135 MMOL/L (ref 136–145)
SODIUM SERPL-SCNC: 135 MMOL/L (ref 136–145)
VALPROATE SERPL-MCNC: 51.6 UG/ML (ref 50–100)
WBC # BLD AUTO: 9.32 K/UL (ref 3.9–12.7)

## 2022-05-10 PROCEDURE — 83735 ASSAY OF MAGNESIUM: CPT | Performed by: STUDENT IN AN ORGANIZED HEALTH CARE EDUCATION/TRAINING PROGRAM

## 2022-05-10 PROCEDURE — 94761 N-INVAS EAR/PLS OXIMETRY MLT: CPT

## 2022-05-10 PROCEDURE — 99233 SBSQ HOSP IP/OBS HIGH 50: CPT | Mod: ,,, | Performed by: PSYCHIATRY & NEUROLOGY

## 2022-05-10 PROCEDURE — C9254 INJECTION, LACOSAMIDE: HCPCS | Performed by: PHYSICIAN ASSISTANT

## 2022-05-10 PROCEDURE — 97116 GAIT TRAINING THERAPY: CPT

## 2022-05-10 PROCEDURE — 99233 PR SUBSEQUENT HOSPITAL CARE,LEVL III: ICD-10-PCS | Mod: ,,, | Performed by: PSYCHIATRY & NEUROLOGY

## 2022-05-10 PROCEDURE — 63600175 PHARM REV CODE 636 W HCPCS: Performed by: PSYCHIATRY & NEUROLOGY

## 2022-05-10 PROCEDURE — 99233 PR SUBSEQUENT HOSPITAL CARE,LEVL III: ICD-10-PCS | Mod: GC,,, | Performed by: PSYCHIATRY & NEUROLOGY

## 2022-05-10 PROCEDURE — 97164 PT RE-EVAL EST PLAN CARE: CPT

## 2022-05-10 PROCEDURE — 51798 US URINE CAPACITY MEASURE: CPT

## 2022-05-10 PROCEDURE — 27000221 HC OXYGEN, UP TO 24 HOURS

## 2022-05-10 PROCEDURE — 85520 HEPARIN ASSAY: CPT | Performed by: PSYCHIATRY & NEUROLOGY

## 2022-05-10 PROCEDURE — 80164 ASSAY DIPROPYLACETIC ACD TOT: CPT

## 2022-05-10 PROCEDURE — 84100 ASSAY OF PHOSPHORUS: CPT | Mod: 91 | Performed by: PSYCHIATRY & NEUROLOGY

## 2022-05-10 PROCEDURE — 63600175 PHARM REV CODE 636 W HCPCS

## 2022-05-10 PROCEDURE — 85025 COMPLETE CBC W/AUTO DIFF WBC: CPT | Performed by: STUDENT IN AN ORGANIZED HEALTH CARE EDUCATION/TRAINING PROGRAM

## 2022-05-10 PROCEDURE — 97530 THERAPEUTIC ACTIVITIES: CPT

## 2022-05-10 PROCEDURE — C9113 INJ PANTOPRAZOLE SODIUM, VIA: HCPCS | Performed by: PSYCHIATRY & NEUROLOGY

## 2022-05-10 PROCEDURE — 94799 UNLISTED PULMONARY SVC/PX: CPT

## 2022-05-10 PROCEDURE — 99900035 HC TECH TIME PER 15 MIN (STAT)

## 2022-05-10 PROCEDURE — 51701 INSERT BLADDER CATHETER: CPT

## 2022-05-10 PROCEDURE — 92523 SPEECH SOUND LANG COMPREHEN: CPT

## 2022-05-10 PROCEDURE — 83735 ASSAY OF MAGNESIUM: CPT | Mod: 91 | Performed by: PSYCHIATRY & NEUROLOGY

## 2022-05-10 PROCEDURE — 25000003 PHARM REV CODE 250

## 2022-05-10 PROCEDURE — 63600175 PHARM REV CODE 636 W HCPCS: Performed by: STUDENT IN AN ORGANIZED HEALTH CARE EDUCATION/TRAINING PROGRAM

## 2022-05-10 PROCEDURE — 25000003 PHARM REV CODE 250: Performed by: PHYSICIAN ASSISTANT

## 2022-05-10 PROCEDURE — 99233 SBSQ HOSP IP/OBS HIGH 50: CPT | Mod: GC,,, | Performed by: PSYCHIATRY & NEUROLOGY

## 2022-05-10 PROCEDURE — 80053 COMPREHEN METABOLIC PANEL: CPT | Mod: 91 | Performed by: PSYCHIATRY & NEUROLOGY

## 2022-05-10 PROCEDURE — 20000000 HC ICU ROOM

## 2022-05-10 PROCEDURE — 80053 COMPREHEN METABOLIC PANEL: CPT | Performed by: STUDENT IN AN ORGANIZED HEALTH CARE EDUCATION/TRAINING PROGRAM

## 2022-05-10 PROCEDURE — 84100 ASSAY OF PHOSPHORUS: CPT | Performed by: STUDENT IN AN ORGANIZED HEALTH CARE EDUCATION/TRAINING PROGRAM

## 2022-05-10 PROCEDURE — 63600175 PHARM REV CODE 636 W HCPCS: Performed by: PHYSICIAN ASSISTANT

## 2022-05-10 PROCEDURE — 95718 PR EEG, W/VIDEO, CONT RECORD, I&R, 2-12 HRS: ICD-10-PCS | Mod: ,,, | Performed by: PSYCHIATRY & NEUROLOGY

## 2022-05-10 PROCEDURE — 95718 EEG PHYS/QHP 2-12 HR W/VEEG: CPT | Mod: ,,, | Performed by: PSYCHIATRY & NEUROLOGY

## 2022-05-10 PROCEDURE — 25000003 PHARM REV CODE 250: Performed by: HOSPITALIST

## 2022-05-10 PROCEDURE — 92526 ORAL FUNCTION THERAPY: CPT

## 2022-05-10 RX ORDER — METOPROLOL TARTRATE 50 MG/1
50 TABLET ORAL EVERY 6 HOURS
Status: DISCONTINUED | OUTPATIENT
Start: 2022-05-10 | End: 2022-05-15

## 2022-05-10 RX ORDER — DILTIAZEM HYDROCHLORIDE 30 MG/1
30 TABLET, FILM COATED ORAL EVERY 6 HOURS
Status: DISCONTINUED | OUTPATIENT
Start: 2022-05-10 | End: 2022-05-15

## 2022-05-10 RX ORDER — FUROSEMIDE 10 MG/ML
20 INJECTION INTRAMUSCULAR; INTRAVENOUS ONCE
Status: COMPLETED | OUTPATIENT
Start: 2022-05-10 | End: 2022-05-10

## 2022-05-10 RX ADMIN — VALPROATE SODIUM 260 MG: 100 INJECTION, SOLUTION INTRAVENOUS at 10:05

## 2022-05-10 RX ADMIN — MUPIROCIN: 20 OINTMENT TOPICAL at 08:05

## 2022-05-10 RX ADMIN — AMIODARONE HYDROCHLORIDE 200 MG: 200 TABLET ORAL at 08:05

## 2022-05-10 RX ADMIN — VALPROATE SODIUM 260 MG: 100 INJECTION, SOLUTION INTRAVENOUS at 08:05

## 2022-05-10 RX ADMIN — ATORVASTATIN CALCIUM 40 MG: 20 TABLET, FILM COATED ORAL at 09:05

## 2022-05-10 RX ADMIN — SENNOSIDES AND DOCUSATE SODIUM 2 TABLET: 50; 8.6 TABLET ORAL at 09:05

## 2022-05-10 RX ADMIN — AMIODARONE HYDROCHLORIDE 200 MG: 200 TABLET ORAL at 09:05

## 2022-05-10 RX ADMIN — METOPROLOL TARTRATE 50 MG: 50 TABLET, FILM COATED ORAL at 12:05

## 2022-05-10 RX ADMIN — DILTIAZEM HYDROCHLORIDE 30 MG: 30 TABLET, FILM COATED ORAL at 03:05

## 2022-05-10 RX ADMIN — FUROSEMIDE 20 MG: 40 INJECTION, SOLUTION INTRAMUSCULAR; INTRAVENOUS at 10:05

## 2022-05-10 RX ADMIN — POLYETHYLENE GLYCOL 3350 17 G: 17 POWDER, FOR SOLUTION ORAL at 10:05

## 2022-05-10 RX ADMIN — POLYETHYLENE GLYCOL 3350 17 G: 17 POWDER, FOR SOLUTION ORAL at 09:05

## 2022-05-10 RX ADMIN — MUPIROCIN: 20 OINTMENT TOPICAL at 09:05

## 2022-05-10 RX ADMIN — DONEPEZIL HYDROCHLORIDE 5 MG: 5 TABLET ORAL at 09:05

## 2022-05-10 RX ADMIN — LEVETIRACETAM 1000 MG: 500 INJECTION, SOLUTION INTRAVENOUS at 09:05

## 2022-05-10 RX ADMIN — SENNOSIDES AND DOCUSATE SODIUM 2 TABLET: 50; 8.6 TABLET ORAL at 08:05

## 2022-05-10 RX ADMIN — MELATONIN TAB 3 MG 6 MG: 3 TAB at 09:05

## 2022-05-10 RX ADMIN — LEVETIRACETAM 1000 MG: 500 INJECTION, SOLUTION INTRAVENOUS at 08:05

## 2022-05-10 RX ADMIN — PANTOPRAZOLE SODIUM 40 MG: 40 INJECTION, POWDER, FOR SOLUTION INTRAVENOUS at 08:05

## 2022-05-10 RX ADMIN — SODIUM CHLORIDE 200 MG: 9 INJECTION, SOLUTION INTRAVENOUS at 08:05

## 2022-05-10 RX ADMIN — METOPROLOL TARTRATE 50 MG: 50 TABLET, FILM COATED ORAL at 07:05

## 2022-05-10 RX ADMIN — ENOXAPARIN SODIUM 50 MG: 60 INJECTION SUBCUTANEOUS at 08:05

## 2022-05-10 RX ADMIN — APIXABAN 5 MG: 5 TABLET, FILM COATED ORAL at 10:05

## 2022-05-10 RX ADMIN — SODIUM CHLORIDE 200 MG: 9 INJECTION, SOLUTION INTRAVENOUS at 09:05

## 2022-05-10 RX ADMIN — SILODOSIN 8 MG: 8 CAPSULE ORAL at 08:05

## 2022-05-10 RX ADMIN — METOPROLOL TARTRATE 50 MG: 50 TABLET, FILM COATED ORAL at 05:05

## 2022-05-10 RX ADMIN — VALPROATE SODIUM 260 MG: 100 INJECTION, SOLUTION INTRAVENOUS at 03:05

## 2022-05-10 NOTE — PLAN OF CARE
PT re-evaluation completed, goals reviewed and revised as appropriate    Problem: Physical Therapy  Goal: Physical Therapy Goal  Description: Goals to be met by: 22    Patient will increase functional independence with mobility by performin. Supine to sit with supervision  2. Sit to supine with supervision  3. Sit to stand transfer with independence  4. Gait  x 80 feet with supervision using LRAD as needed  5. Ascend/descend 5 stair with left handrails supervision using LRAD as needed  6. Lower extremity exercise program x10 reps per handout, with independence    Outcome: Ongoing, Progressing

## 2022-05-10 NOTE — PLAN OF CARE
[facility] Care Plan    POC reviewed with Teresa Leos and family at 1400. Pt verbalized understanding. Questions and concerns addressed. No acute events today. Pt progressing toward goals. Will continue to monitor. See below and flowsheets for full assessment and VS info.     - bladder scan (845mL) with straight cath with 1000mL output following 20mg Lasix IV  - LR at 50cc/hr discontinued per MAR  - Ms. Leos sat at edge of bed and stood up (was not able to ambulate - significantly weak) with PT. Not cleared by PT yet to get up.  - PO diltalizem added per MAR  - Frequent PVCs/couplets to brief runs of v-tach noted. Team notified of runs  - Incentive spirometer added. Pt demonstrated proper use.  - cEEG discontinued        Is this a stroke patient? no    Neuro:  [place] Coma Scale  Best Eye Response: 4-->(E4) spontaneous  Best Motor Response: 6-->(M6) obeys commands  Best Verbal Response: 5-->(V5) oriented  [place] Coma Scale Score: 15  Assessment Qualifiers: patient not sedated/intubated, no eye obstruction present  Pupil PERRLA: yes     24 hr Temp:  [97.6 °F (36.4 °C)-98.6 °F (37 °C)]     CV:   Rhythm: atrial rhythm  BP goals:   SBP < 180  MAP > 65    Resp:   O2 Device (Oxygen Therapy): nasal cannula       Plan:  2L NC    GI/:     Diet/Nutrition Received: mechanical/dental soft  Last Bowel Movement: 05/09/22  Voiding Characteristics: unable to void (intermittent straight cath needed)    Intake/Output Summary (Last 24 hours) at 5/10/2022 1655  Last data filed at 5/10/2022 1205  Gross per 24 hour   Intake 1257.01 ml   Output 2100 ml   Net -842.99 ml     Unmeasured Output  Urine Occurrence: 0  Stool Occurrence: 0  Emesis Occurrence: 0  Pad Count: 2    Labs/Accuchecks:  Recent Labs   Lab 05/10/22  0055   WBC 9.32   RBC 3.37*   HGB 10.8*   HCT 32.6*         Recent Labs   Lab 05/10/22  1454   *   K 4.1   CO2 23      BUN 16   CREATININE 1.0   ALKPHOS 58   ALT 20   AST 27   BILITOT 0.5      Recent  Labs   Lab 05/04/22  1536   INR 1.2   APTT 25.6      Recent Labs   Lab 05/04/22  1524 05/04/22  1536   *  196*  --    CPKMB 5.5  --    TROPONINI  --  <0.006   MB 2.8  --        Electrolytes: N/A - electrolytes WDL  Accuchecks: none    Gtts:      LDA/Wounds:  Lines/Drains/Airways       Peripheral Intravenous Line  Duration                  Midline Catheter Insertion/Assessment  - Single Lumen 05/05/22 2215 Left basilic vein (medial side of arm) other (see comments) 4 days         Peripheral IV - Single Lumen 05/05/22 2222 20 G Anterior;Left Foot 4 days                  Wounds: n/a  Wound care consulted: n/a

## 2022-05-10 NOTE — PROGRESS NOTES
[facility]  Neurology-Epilepsy  Progress Note    Patient Name: Teresa Leos  MRN: [id number]  Admission Date: 5/4/2022  Hospital Length of Stay: 5 days  Code Status: Full Code   Attending Provider: Zandra Becerra MD  Primary Care Physician: Joseph Mcpherson NP   Principal Problem:Focal seizures    Subjective:     Hospital Course:   5/5>5/6: R NCSE which resolved at 0228 on 5/6, continues to have high risk pattern over R temporal chain with high risk for recurrent seizure  5/6>5/7: R temporal seizure focus, pattern on IIC, no further seizures  5/7>5/8: R temporal seizures focus, pattern on IIC; compared to yesterday's study, there is some worsening in duration of stimulus induced periodic discharges and one brief run of rhythmic discharges indicating a very high risk of recurrent seizure  5/8<5/9: mild encephalopathy, structural abnormality in right hemisphere, right frontal polar maximum epileptiform discharges, numerous subclinical electrographic seizures from right hemisphere which resolve after VPA load; last seizure at 2101 5/8 5/9>5/10: right frontal polar epileptiform discharges, no electrographic seizures      Interval History: NAEON. This AM, patient resting comfortably in bed watching TV. No family at bedside. Patient continues to improve clinically. Patient alert and oriented to self, location, and year. Follows commands appropriately.     Current Facility-Administered Medications   Medication Dose Route Frequency Provider Last Rate Last Admin    acetaminophen tablet 650 mg  650 mg Oral Q4H PRN Rudi Cronin MD   650 mg at 05/07/22 1732    aluminum-magnesium hydroxide-simethicone 200-200-20 mg/5 mL suspension 30 mL  30 mL Oral QID PRN Rudi Cronin MD        amiodarone tablet 200 mg  200 mg Oral BID Yohana Joy PA-C   200 mg at 05/10/22 0807    apixaban tablet 5 mg  5 mg Oral BID Yohana Joy PA-C        atorvastatin tablet 40 mg  40 mg Oral QHS Yohana CHU  AUDI Joy   40 mg at 05/09/22 2155    bisacodyL suppository 10 mg  10 mg Rectal Daily PRN Yohana Joy PA-C        donepeziL tablet 5 mg  5 mg Oral Nightly Rudi Cronin MD   5 mg at 05/09/22 2155    lacosamide (VIMPAT) 200 mg in sodium chloride 0.9% 100 mL IVPB  200 mg Intravenous Q12H Mckayla Issa PA-C   Stopped at 05/10/22 0856    levETIRAcetam injection 1,000 mg  1,000 mg Intravenous Q12H Mckayla Issa PA-C   1,000 mg at 05/10/22 0808    magnesium oxide tablet 800 mg  800 mg Oral PRN Yohana Joy PA-C        magnesium oxide tablet 800 mg  800 mg Oral PRN Yohana Joy PA-C   800 mg at 05/09/22 1137    melatonin tablet 6 mg  6 mg Oral Nightly PRN Doron Haynes MD   6 mg at 05/08/22 1848    melatonin tablet 6 mg  6 mg Oral Nightly Yohana Joy PA-C   6 mg at 05/09/22 2155    metoprolol tartrate (LOPRESSOR) tablet 50 mg  50 mg Oral Q6H Yohana Joy PA-C        mupirocin 2 % ointment   Nasal BID Zandra Becerra MD   Given at 05/10/22 0808    naloxone 0.4 mg/mL injection 0.02 mg  0.02 mg Intravenous PRN Rudi Cronin MD        ondansetron injection 4 mg  4 mg Intravenous Q8H PRN Rudi Cronin MD        pantoprazole injection 40 mg  40 mg Intravenous Daily Chirag Gonzalez MD   40 mg at 05/10/22 0808    polyethylene glycol packet 17 g  17 g Oral BID Yohana Joy PA-C   17 g at 05/10/22 1000    potassium bicarbonate disintegrating tablet 35 mEq  35 mEq Oral PRN Yohana Joy PA-C        potassium bicarbonate disintegrating tablet 50 mEq  50 mEq Oral PRN Yohana Joy PA-C        potassium bicarbonate disintegrating tablet 60 mEq  60 mEq Oral PRN Yohana Joy PA-C        potassium, sodium phosphates 280-160-250 mg packet 2 packet  2 packet Oral PRN Yohana Joy PA-C        potassium, sodium phosphates 280-160-250 mg packet 2 packet  2 packet Oral PRN Yohana Joy PA-C        potassium, sodium phosphates  280-160-250 mg packet 2 packet  2 packet Oral PRN Yohana Joy PA-C        prochlorperazine injection Soln 5 mg  5 mg Intravenous Q6H PRN Rudi Cronin MD        senna-docusate 8.6-50 mg per tablet 2 tablet  2 tablet Oral BID Yohana Joy PA-C   2 tablet at 05/10/22 0808    silodosin capsule 8 mg  8 mg Oral Daily Yohana Joy PA-C   8 mg at 05/10/22 0808    sodium chloride 0.9% flush 10 mL  10 mL Intravenous PRN Doron Haynes MD        sodium chloride 0.9% flush 10 mL  10 mL Intravenous Q6H PRN Rudi Cronin MD        valproate (DEPACON) 260 mg in dextrose 5 % 50 mL IVPB  15 mg/kg/day Intravenous Q8H Yohana Joy PA-C 50 mL/hr at 05/10/22 1026 260 mg at 05/10/22 1026     Continuous Infusions:    Review of Systems   Unable to perform ROS: Dementia   Objective:     Vital Signs (Most Recent):  Temp: 98.6 °F (37 °C) (05/10/22 0705)  Pulse: (!) 130 (team notified of afib rvr) (05/10/22 0905)  Resp: (!) 24 (05/10/22 0905)  BP: (!) 149/86 (05/10/22 0905)  SpO2: 95 % (05/10/22 0905)   Vital Signs (24h Range):  Temp:  [97.6 °F (36.4 °C)-98.6 °F (37 °C)] 98.6 °F (37 °C)  Pulse:  [102-139] 130  Resp:  [15-24] 24  SpO2:  [91 %-98 %] 95 %  BP: (110-195)/() 149/86     Weight: 51.3 kg (113 lb)  Body mass index is 21.35 kg/m².    Physical Exam  Constitutional:       General: She is not in acute distress.     Appearance: She is not diaphoretic.   HENT:      Head: Normocephalic and atraumatic.      Comments: EEG in place  Eyes:      General: No scleral icterus.        Right eye: No discharge.         Left eye: No discharge.      Conjunctiva/sclera: Conjunctivae normal.      Pupils: Pupils are equal, round, and reactive to light.   Cardiovascular:      Rate and Rhythm: Tachycardia present.   Pulmonary:      Effort: Pulmonary effort is normal. No respiratory distress.   Abdominal:      General: There is no distension.      Palpations: Abdomen is soft.      Tenderness: There is no  abdominal tenderness.   Musculoskeletal:         General: No deformity or signs of injury.   Skin:     General: Skin is warm and dry.   Psychiatric:      Comments: Unable to assess       NEUROLOGICAL EXAMINATION:     CRANIAL NERVES     CN III, IV, VI   Pupils are equal, round, and reactive to light.       AAxOx3  BINH OU   Corneal intact OU   + spontaneous eye opening   Face symmetric   Tongue midline   Follows commands    Exam findings to suggest seizures:  Myoclonus - no   eye twitching - no   Nystagmus - no   gaze deviation - no   waxy rigidity - no      Significant Labs: All pertinent lab results from the past 24 hours have been reviewed.    Significant Studies: I have reviewed all pertinent imaging results/findings within the past 24 hours.    Assessment and Plan:     * Focal seizures  77F PMHx of HTN, dementia, recently diagnosed afib who initially presented to OSH due to AMS and convulsions. Per chart review, patient had fall on 4/29 and witnessed convulsions; subsequently admitted to [facility] with hospital course c/b afib RVR. After discharge, patient had episode of unresponsiveness and convulsions at home. Patient brought to [facility] via EMS, subsequently transferred to [facility] and admitted to  5/5/2022. Patient developed afib RVR which did not respond to IV beta blocker, worsening mentation, and staring episodes associated with deviated gaze. Patient admitted to [facility] for higher level of care and further management. EEG placed 5/5 and revealed right NCSE which resolved after AED escalation. Epilepsy following for assistance in management.    Recommendations:  - Discontinue vEEG monitoring  - Recommend to continue current AED regimen: Lacosamide 200 mg BID, Levetiracetam 1g BID, and  mg q8  - Avoid agents that lower seizure threshold  - Management of infectious/metabolic abnormalities per NCC  - Seizure precautions      Discussed plan of care with NCC team. No family at bedside. Will  sign off, please call with questions.    Dementia  Hx of  - Unclear baseline, no family at bedside  - Patient oriented to self, place, and year today  - On Donepezil     Atrial fibrillation with RVR  - On Metoprolol, Amiodarone, Lovenox  - Management per [facility]     Essential hypertension  Hx of  - On Metoprolol   - Management per [facility]        VTE Risk Mitigation (From admission, onward)         Ordered     apixaban tablet 5 mg  2 times daily         05/10/22 0923     IP VTE HIGH RISK PATIENT  Once         05/04/22 2039     Place sequential compression device  Until discontinued         05/04/22 2039                Mee Cohen PA-C  Neurology-Epilepsy  [facility]  Staff: Dr. Kline

## 2022-05-10 NOTE — SUBJECTIVE & OBJECTIVE
Interval History: NAEON. This AM, patient resting comfortably in bed watching TV. No family at bedside. Patient continues to improve clinically. Patient alert and oriented to self, location, and year. Follows commands appropriately.     Current Facility-Administered Medications   Medication Dose Route Frequency Provider Last Rate Last Admin    acetaminophen tablet 650 mg  650 mg Oral Q4H PRN Rudi Cronin MD   650 mg at 05/07/22 1732    aluminum-magnesium hydroxide-simethicone 200-200-20 mg/5 mL suspension 30 mL  30 mL Oral QID PRN Rudi Cronin MD        amiodarone tablet 200 mg  200 mg Oral BID Yohana Joy PA-C   200 mg at 05/10/22 0807    apixaban tablet 5 mg  5 mg Oral BID Yohana Joy PA-C        atorvastatin tablet 40 mg  40 mg Oral QHS Yohana Joy PA-C   40 mg at 05/09/22 2155    bisacodyL suppository 10 mg  10 mg Rectal Daily PRN Yohana Joy PA-C        donepeziL tablet 5 mg  5 mg Oral Nightly Rudi Cronin MD   5 mg at 05/09/22 2155    lacosamide (VIMPAT) 200 mg in sodium chloride 0.9% 100 mL IVPB  200 mg Intravenous Q12H Mckayla Issa PA-C   Stopped at 05/10/22 0856    levETIRAcetam injection 1,000 mg  1,000 mg Intravenous Q12H Mckayla Issa PA-C   1,000 mg at 05/10/22 0808    magnesium oxide tablet 800 mg  800 mg Oral PRN Yohana Joy PA-C        magnesium oxide tablet 800 mg  800 mg Oral PRN Yohana Joy PA-C   800 mg at 05/09/22 1137    melatonin tablet 6 mg  6 mg Oral Nightly PRN Doron Haynes MD   6 mg at 05/08/22 1848    melatonin tablet 6 mg  6 mg Oral Nightly Yohana Joy PA-C   6 mg at 05/09/22 2155    metoprolol tartrate (LOPRESSOR) tablet 50 mg  50 mg Oral Q6H Yohana Joy PA-C        mupirocin 2 % ointment   Nasal BID Zandra Becerra MD   Given at 05/10/22 0808    naloxone 0.4 mg/mL injection 0.02 mg  0.02 mg Intravenous PRN Rudi Cronin MD        ondansetron injection 4 mg  4 mg Intravenous Q8H PRN Rudi  MD Roseanne        pantoprazole injection 40 mg  40 mg Intravenous Daily Chirag Gonzalez MD   40 mg at 05/10/22 0808    polyethylene glycol packet 17 g  17 g Oral BID Yohanaaggie Joy, PA-C   17 g at 05/10/22 1000    potassium bicarbonate disintegrating tablet 35 mEq  35 mEq Oral PRN Yohana SApollo Joy, PA-C        potassium bicarbonate disintegrating tablet 50 mEq  50 mEq Oral PRN Yohana S. Benita, PA-C        potassium bicarbonate disintegrating tablet 60 mEq  60 mEq Oral PRN Yohana S. Nicaud, PA-C        potassium, sodium phosphates 280-160-250 mg packet 2 packet  2 packet Oral PRN Yohana S. Nicaud, PA-C        potassium, sodium phosphates 280-160-250 mg packet 2 packet  2 packet Oral PRN Yohana S. Nicaud, PA-C        potassium, sodium phosphates 280-160-250 mg packet 2 packet  2 packet Oral PRN Yohana SApollo Joy, PA-C        prochlorperazine injection Soln 5 mg  5 mg Intravenous Q6H PRN Rudi Cronin MD        senna-docusate 8.6-50 mg per tablet 2 tablet  2 tablet Oral BID Yohana VLAD Joy, PA-C   2 tablet at 05/10/22 0808    silodosin capsule 8 mg  8 mg Oral Daily Yohana VLAD Joy, PA-C   8 mg at 05/10/22 0808    sodium chloride 0.9% flush 10 mL  10 mL Intravenous PRN Doron Haynes MD        sodium chloride 0.9% flush 10 mL  10 mL Intravenous Q6H PRN Rudi Cronin MD        valproate (DEPACON) 260 mg in dextrose 5 % 50 mL IVPB  15 mg/kg/day Intravenous Q8H Yohana VLAD Joy, PA-C 50 mL/hr at 05/10/22 1026 260 mg at 05/10/22 1026     Continuous Infusions:    Review of Systems   Unable to perform ROS: Dementia   Objective:     Vital Signs (Most Recent):  Temp: 98.6 °F (37 °C) (05/10/22 0705)  Pulse: (!) 130 (team notified of afib rvr) (05/10/22 0905)  Resp: (!) 24 (05/10/22 0905)  BP: (!) 149/86 (05/10/22 0905)  SpO2: 95 % (05/10/22 0905)   Vital Signs (24h Range):  Temp:  [97.6 °F (36.4 °C)-98.6 °F (37 °C)] 98.6 °F (37 °C)  Pulse:  [102-139] 130  Resp:  [15-24] 24  SpO2:  [91 %-98 %] 95  %  BP: (110-195)/() 149/86     Weight: 51.3 kg (113 lb)  Body mass index is 21.35 kg/m².    Physical Exam  Constitutional:       General: She is not in acute distress.     Appearance: She is not diaphoretic.   HENT:      Head: Normocephalic and atraumatic.      Comments: EEG in place  Eyes:      General: No scleral icterus.        Right eye: No discharge.         Left eye: No discharge.      Conjunctiva/sclera: Conjunctivae normal.      Pupils: Pupils are equal, round, and reactive to light.   Cardiovascular:      Rate and Rhythm: Tachycardia present.   Pulmonary:      Effort: Pulmonary effort is normal. No respiratory distress.   Abdominal:      General: There is no distension.      Palpations: Abdomen is soft.      Tenderness: There is no abdominal tenderness.   Musculoskeletal:         General: No deformity or signs of injury.   Skin:     General: Skin is warm and dry.   Psychiatric:      Comments: Unable to assess       NEUROLOGICAL EXAMINATION:     CRANIAL NERVES     CN III, IV, VI   Pupils are equal, round, and reactive to light.       AAxOx3  BINH OU   Corneal intact OU   + spontaneous eye opening   Face symmetric   Tongue midline   Follows commands    Exam findings to suggest seizures:  Myoclonus - no   eye twitching - no   Nystagmus - no   gaze deviation - no   waxy rigidity - no      Significant Labs: All pertinent lab results from the past 24 hours have been reviewed.    Significant Studies: I have reviewed all pertinent imaging results/findings within the past 24 hours.

## 2022-05-10 NOTE — ASSESSMENT & PLAN NOTE
Hx of  - Unclear baseline, no family at bedside  - Patient oriented to self, place, and year today  - On Donepezil

## 2022-05-10 NOTE — PLAN OF CARE
[facility] Care Plan    POC reviewed with Teresa Leos and family at 0300. Pt and family verbalized understanding. Questions and concerns addressed. No acute events overnight. Pt progressing toward goals. Will continue to monitor. See below and flowsheets for full assessment and VS info.   -Mg sulfate 2g IV given with metoprolol to control Afib.  -Placed on 2L NC. Increased to 3L NC.   -CXR completed.   -Straight Cath output 484cc.       Is this a stroke patient? no    Neuro:  [place] Coma Scale  Best Eye Response: 4-->(E4) spontaneous  Best Motor Response: 6-->(M6) obeys commands  Best Verbal Response: 5-->(V5) oriented  [place] Coma Scale Score: 15  Assessment Qualifiers: patient not sedated/intubated, no eye obstruction present  Pupil PERRLA: yes     24hr Temp:  [97.6 °F (36.4 °C)-98.4 °F (36.9 °C)]     CV:   Rhythm: atrial rhythm  BP goals:   SBP < 180  MAP > 65    Resp:   O2 Device (Oxygen Therapy): nasal cannula       Plan:  Continue to montior    GI/:     Diet/Nutrition Received: mechanical/dental soft  Last Bowel Movement: 05/09/22  Voiding Characteristics: unable to void    Intake/Output Summary (Last 24 hours) at 5/10/2022 0714  Last data filed at 5/10/2022 0605  Gross per 24 hour   Intake 1574.47 ml   Output 600 ml   Net 974.47 ml     Unmeasured Output  Urine Occurrence: 0  Stool Occurrence: 0  Emesis Occurrence: 0  Pad Count: 2    Labs/Accuchecks:  Recent Labs   Lab 05/10/22  0055   WBC 9.32   RBC 3.37*   HGB 10.8*   HCT 32.6*         Recent Labs   Lab 05/10/22  0055   *   K 4.2   CO2 23      BUN 13   CREATININE 0.9   ALKPHOS 56   ALT 18   AST 29   BILITOT 0.5      Recent Labs   Lab 05/04/22  1536   INR 1.2   APTT 25.6      Recent Labs   Lab 05/04/22  1524 05/04/22  1536   *  196*  --    CPKMB 5.5  --    TROPONINI  --  <0.006   MB 2.8  --        Electrolytes: Electrolytes replaced  Accuchecks: none    Gtts:   lactated ringers 50 mL/hr at 05/10/22 0605        LDA/Wounds:  Lines/Drains/Airways       Peripheral Intravenous Line  Duration                  Midline Catheter Insertion/Assessment  - Single Lumen 05/05/22 2215 Left basilic vein (medial side of arm) other (see comments) 4 days         Peripheral IV - Single Lumen 05/05/22 2222 20 G Anterior;Left Foot 4 days                  Wounds: No  Wound care consulted: No

## 2022-05-10 NOTE — ASSESSMENT & PLAN NOTE
77F PMHx of HTN, dementia, recently diagnosed afib who initially presented to OSH due to AMS and convulsions. Per chart review, patient had fall on 4/29 and witnessed convulsions; subsequently admitted to [facility] with hospital course c/b afib RVR. After discharge, patient had episode of unresponsiveness and convulsions at home. Patient brought to [facility] via EMS, subsequently transferred to [facility] and admitted to  5/5/2022. Patient developed afib RVR which did not respond to IV beta blocker, worsening mentation, and staring episodes associated with deviated gaze. Patient admitted to [facility] for higher level of care and further management. EEG placed 5/5 and revealed right NCSE which resolved after AED escalation. Epilepsy following for assistance in management.    Recommendations:  - Discontinue vEEG monitoring  - Recommend to continue current AED regimen: Lacosamide 200 mg BID, Levetiracetam 1g BID, and  mg q8  - Avoid agents that lower seizure threshold  - Management of infectious/metabolic abnormalities per NCC  - Seizure precautions      Discussed plan of care with NCC team. No family at bedside. Will sign off, please call with questions.

## 2022-05-10 NOTE — PT/OT/SLP EVAL
Speech Language Pathology Evaluation  Cognitive Communication    Patient Name:  Teresa Leos   MRN:  [id number]  Admitting Diagnosis: Seizure-like activity    Recommendations:     Recommendations:                General Recommendations:  Dysphagia therapy  Diet recommendations:  Mechanical soft, Thin   Aspiration Precautions: Assistance with meals, Remain upright 30 minutes post meal and Standard aspiration precautions   General Precautions: Standard, aspiration, fall  Communication strategies: pt [place]    History:     Past Medical History:   Diagnosis Date    Hyperlipidemia     Hypertension     Osteoporosis        Past Surgical History:   Procedure Laterality Date    HIP REPLACEMENT ARTHROPLASTY Right     HYSTERECTOMY      LEG SURGERY      SKIN CANCER EXCISION      TONSILLECTOMY, ADENOIDECTOMY         Prior Intubation HX:  n/a    Prior diet: regular/thin.      Subjective     Awake/alert  Son at bedside    Pain/Comfort:  · Pain Rating 1: 0/10  · Pain Rating Post-Intervention 1: 0/10    Respiratory Status: Room air    Objective:   Cognitive Status:    Orientation Oriented x4  Problem Solving reasoning 100%, Categories 100% and Compare/contrast 100%      Receptive Language:   Comprehension:   Questions Complex yes/no 100%  Commands  complex/abstract commands 100%    Pragmatics:    WFL    Expressive Language:  Verbal:    Verbal language skills were wfl with no evidence of aphasia.  Pt. Expressed their thoughts coherently in conversation with no evidence of confusion or word finding deficits  Nonverbal:   WFL      Motor Speech:  WFL    Voice:   WFL    Visual-Spatial:  TBA    Reading:   TBA     Written Expression:   TBA    Treatment: Pt upright in bed with breakfast tray upon entry. Pt tolerated self fed bites of grits x4 and thin liquids via cup x5 with timely swallow initiation and no overt clinical s/s of airway compromise. Recommend continue mechanical soft diet/thin liquids at this time, pt needs assistance  with meals. Per son, pt at cognitive linguistic baseline at this time.     Assessment:   Teresa Leos is a 77 y.o. female with an SLP diagnosis of Dysphagia.    Goals:   Multidisciplinary Problems     SLP Goals        Problem: SLP    Goal Priority Disciplines Outcome   SLP Goal     SLP Ongoing, Progressing   Description: Speech Language Pathology Goals  Goals expected to be met by 5/13    1. Pt will participate in ongoing swallow assessment  2. PT will participate in speech language eval                              Plan:   · Patient to be seen:  4 x/week   · Plan of Care reviewed with:  patient, family   · SLP Follow-Up:  Yes         Time Tracking:   SLP Treatment Date:   05/10/22  Speech Start Time:  0816  Speech Stop Time:  0831     Speech Total Time (min):  15 min    Billable Minutes: Eval 7  and Treatment Swallowing Dysfunction 8    05/10/2022

## 2022-05-10 NOTE — PLAN OF CARE
Problem: SLP  Goal: SLP Goal  Description: Speech Language Pathology Goals  Goals expected to be met by 5/13    1. Pt will participate in ongoing swallow assessment  2. PT will participate in speech language eval             Outcome: Ongoing, Progressing   Continue mechanical soft diet/thin liquids at this time.   5/10/2022

## 2022-05-10 NOTE — PT/OT/SLP RE-EVAL
Physical Therapy Re-Evaluation and Treatment    Patient Name:  Teresa Leos   MRN:  [id number]    Recommendations:     Discharge Recommendations:  nursing facility, skilled   Discharge Equipment Recommendations: wheelchair, shower chair   Barriers to discharge: Increased level of assist and Inaccessible home    Assessment:     Teresa Leos is a 77 y.o. female admitted with a medical diagnosis of Seizure-like activity. She presents with the following impairments/functional limitations:  weakness, impaired endurance, impaired self care skills, impaired functional mobilty, gait instability, impaired balance, impaired cognition, decreased upper extremity function, decreased lower extremity function, decreased safety awareness, impaired coordination, impaired cardiopulmonary response to activity. These deficits affect their roles and responsibilities in which they were able to complete prior to admit. Teresa Leos would benefit from acute PT intervention to improve quality of life and focus on recovery of impairments. Once medically stable, recommending pt discharge to Skilled Nursing Facility. Patient presents for re-evaluation s/p transfer to ICU. Patient tolerated session well however limited by impaired command following and impaired gait stability. Follows 50% of simple commands. Demonstrates LOB in standing requiring assistance from PT to regain balance.     Rehab Prognosis: Good; patient would benefit from acute skilled PT services 4 x/week to address these deficits and reach maximum level of function. Patient is most appropriate to go to nursing facility, skilled.  Recent Surgery: * No surgery found *      Plan:     During this hospitalization, patient to be seen 4 x/week to address the identified rehab impairments via gait training, therapeutic activities, therapeutic exercises, neuromuscular re-education and progress toward the following goals:    · Plan of Care Expires:  06/10/22    Subjective  "    Chief Complaint: None verbalized   Patient/Family Comments/Goals: "I think more therapy would be good"  Pain/Comfort:  · Pain Rating 1: 0/10    Patients cultural, spiritual, Zoroastrian conflicts given the current situation: no    Objective:     Communicated with nursing prior to session.  Patient found HOB elevated with telemetry, pulse ox (continuous), blood pressure cuff, peripheral IV upon PT entry to room.    General Precautions: Standard, aspiration, fall   Orthopedic Precautions:N/A   Braces: N/A     Exams:  · Cognitive Exam:  Patient is oriented to Person, Not oriented to place, Not oriented to time, Not oriented to situation  · RLE ROM: WFL  · RLE Strength: hip flexion 3/5, knee extension 4/5, knee flexion 3/5 and ankle dorsiflexion 3/5  · LLE ROM: WFL  · LLE Strength: hip flexion 3/5, knee extension 3/5, knee flexion 3/5 and ankle dorsiflexion 3/5  · Sensation: Intact light touch to BLEs, however yes/no may not be accurate    Functional Mobility:  · Bed Mobility:     · Rolling Left:  minimum assistance  · Scooting: moderate assistance  · Supine to Sit: moderate assistance  · Sit to Supine: moderate assistance  · Transfers:     · Sit to Stand: moderate assistance with hand-held assist, 2 trials  · LOB after 1st trial requiring moderate assistance from PT to regain balance  · Gait: Patient ambulated 4 steps to the left with hand-held assist and maximal assistance. Patient demonstrates unsteady gait, decreased step length, decreased weight shift, decreased foot clearance, decreased sorin and left knee hyperextension. Verbal and tactile cues required for weightshifting, sequencing, and to step with LLE. All lines remained intact throughout ambulation trial, gait belt utilized.  · Balance:   · Static Sitting: Fair, able to maintain for 5 minute(s) with moderate assistance progressing to contact guard assistance, demonstrates posterior and right lean  · Dynamic Sitting: Poor: Patient unable to accept " challenge or move without loss of balance, moderate assistance  · Static Standing: Fair, able to maintain for 45 seconds with minimum assistance  · Dynamic Standing: Poor: Patient unable to accept challenge or move without loss of balance, maximal assistance    Therapeutic Activities and Exercises:  Patient educated on role of acute care PT and PT POC and call light usage  Educated patient's son about discharge recommendation, he was in agreement with placement  Whiteboard updated, Patient is not clear to transfer with RN/PCT   Provided daily orientation to patient    AM-PAC 6 CLICK MOBILITY  Total Score:14     Patient left HOB elevated with all lines intact, call button in reach and RN and family present.    GOALS:   Multidisciplinary Problems     Physical Therapy Goals        Problem: Physical Therapy    Goal Priority Disciplines Outcome Goal Variances Interventions   Physical Therapy Goal     PT, PT/OT Ongoing, Progressing     Description: Goals to be met by: 22    Patient will increase functional independence with mobility by performin. Supine to sit with supervision  2. Sit to supine with supervision  3. Sit to stand transfer with independence  4. Gait  x 80 feet with supervision using LRAD as needed  5. Ascend/descend 5 stair with left handrails supervision using LRAD as needed  6. Lower extremity exercise program x10 reps per handout, with independence                     History:     Past Medical History:   Diagnosis Date    Hyperlipidemia     Hypertension     Osteoporosis        Past Surgical History:   Procedure Laterality Date    HIP REPLACEMENT ARTHROPLASTY Right     HYSTERECTOMY      LEG SURGERY      SKIN CANCER EXCISION      TONSILLECTOMY, ADENOIDECTOMY         Time Tracking:     PT Received On: 05/10/22  PT Start Time: 1532     PT Stop Time: 1603  PT Total Time (min): 31 min     Billable Minutes: Re-Evaluation 7 min Gait Training 15 min and Therapeutic Activity 8  min    05/10/2022

## 2022-05-11 LAB
ALBUMIN SERPL BCP-MCNC: 2.4 G/DL (ref 3.5–5.2)
ALP SERPL-CCNC: 46 U/L (ref 55–135)
ALT SERPL W/O P-5'-P-CCNC: 15 U/L (ref 10–44)
AMMONIA PLAS-SCNC: 22 UMOL/L (ref 10–50)
ANION GAP SERPL CALC-SCNC: 8 MMOL/L (ref 8–16)
AST SERPL-CCNC: 34 U/L (ref 10–40)
BASOPHILS # BLD AUTO: 0.05 K/UL (ref 0–0.2)
BASOPHILS NFR BLD: 0.4 % (ref 0–1.9)
BILIRUB SERPL-MCNC: 0.6 MG/DL (ref 0.1–1)
BUN SERPL-MCNC: 17 MG/DL (ref 8–23)
CALCIUM SERPL-MCNC: 8.1 MG/DL (ref 8.7–10.5)
CHLORIDE SERPL-SCNC: 100 MMOL/L (ref 95–110)
CO2 SERPL-SCNC: 24 MMOL/L (ref 23–29)
CREAT SERPL-MCNC: 0.8 MG/DL (ref 0.5–1.4)
DIFFERENTIAL METHOD: ABNORMAL
EOSINOPHIL # BLD AUTO: 0 K/UL (ref 0–0.5)
EOSINOPHIL NFR BLD: 0.3 % (ref 0–8)
ERYTHROCYTE [DISTWIDTH] IN BLOOD BY AUTOMATED COUNT: 14 % (ref 11.5–14.5)
EST. GFR  (AFRICAN AMERICAN): >60 ML/MIN/1.73 M^2
EST. GFR  (NON AFRICAN AMERICAN): >60 ML/MIN/1.73 M^2
GLUCOSE SERPL-MCNC: 82 MG/DL (ref 70–110)
HCT VFR BLD AUTO: 31.3 % (ref 37–48.5)
HGB BLD-MCNC: 10.3 G/DL (ref 12–16)
IMM GRANULOCYTES # BLD AUTO: 0.04 K/UL (ref 0–0.04)
IMM GRANULOCYTES NFR BLD AUTO: 0.3 % (ref 0–0.5)
LYMPHOCYTES # BLD AUTO: 2.6 K/UL (ref 1–4.8)
LYMPHOCYTES NFR BLD: 22.2 % (ref 18–48)
MAGNESIUM SERPL-MCNC: 2 MG/DL (ref 1.6–2.6)
MCH RBC QN AUTO: 33.1 PG (ref 27–31)
MCHC RBC AUTO-ENTMCNC: 32.9 G/DL (ref 32–36)
MCV RBC AUTO: 101 FL (ref 82–98)
MONOCYTES # BLD AUTO: 1.4 K/UL (ref 0.3–1)
MONOCYTES NFR BLD: 11.7 % (ref 4–15)
NEUTROPHILS # BLD AUTO: 7.6 K/UL (ref 1.8–7.7)
NEUTROPHILS NFR BLD: 65.1 % (ref 38–73)
NRBC BLD-RTO: 0 /100 WBC
PHOSPHATE SERPL-MCNC: 3.2 MG/DL (ref 2.7–4.5)
PLATELET # BLD AUTO: 201 K/UL (ref 150–450)
PMV BLD AUTO: 10.8 FL (ref 9.2–12.9)
POTASSIUM SERPL-SCNC: 4.6 MMOL/L (ref 3.5–5.1)
PROT SERPL-MCNC: 5.4 G/DL (ref 6–8.4)
RBC # BLD AUTO: 3.11 M/UL (ref 4–5.4)
SODIUM SERPL-SCNC: 132 MMOL/L (ref 136–145)
VALPROATE SERPL-MCNC: 53.7 UG/ML (ref 50–100)
WBC # BLD AUTO: 11.75 K/UL (ref 3.9–12.7)

## 2022-05-11 PROCEDURE — 80164 ASSAY DIPROPYLACETIC ACD TOT: CPT

## 2022-05-11 PROCEDURE — 27000221 HC OXYGEN, UP TO 24 HOURS

## 2022-05-11 PROCEDURE — 97530 THERAPEUTIC ACTIVITIES: CPT

## 2022-05-11 PROCEDURE — 84100 ASSAY OF PHOSPHORUS: CPT | Performed by: STUDENT IN AN ORGANIZED HEALTH CARE EDUCATION/TRAINING PROGRAM

## 2022-05-11 PROCEDURE — 99233 SBSQ HOSP IP/OBS HIGH 50: CPT | Mod: GC,,, | Performed by: PSYCHIATRY & NEUROLOGY

## 2022-05-11 PROCEDURE — 82140 ASSAY OF AMMONIA: CPT | Performed by: PSYCHIATRY & NEUROLOGY

## 2022-05-11 PROCEDURE — 51701 INSERT BLADDER CATHETER: CPT

## 2022-05-11 PROCEDURE — 25000003 PHARM REV CODE 250

## 2022-05-11 PROCEDURE — 83735 ASSAY OF MAGNESIUM: CPT | Performed by: STUDENT IN AN ORGANIZED HEALTH CARE EDUCATION/TRAINING PROGRAM

## 2022-05-11 PROCEDURE — 63600175 PHARM REV CODE 636 W HCPCS: Performed by: PHYSICIAN ASSISTANT

## 2022-05-11 PROCEDURE — 97112 NEUROMUSCULAR REEDUCATION: CPT

## 2022-05-11 PROCEDURE — C9254 INJECTION, LACOSAMIDE: HCPCS | Performed by: PHYSICIAN ASSISTANT

## 2022-05-11 PROCEDURE — 25000003 PHARM REV CODE 250: Performed by: PHYSICIAN ASSISTANT

## 2022-05-11 PROCEDURE — 94761 N-INVAS EAR/PLS OXIMETRY MLT: CPT

## 2022-05-11 PROCEDURE — C9113 INJ PANTOPRAZOLE SODIUM, VIA: HCPCS | Performed by: PSYCHIATRY & NEUROLOGY

## 2022-05-11 PROCEDURE — 99233 PR SUBSEQUENT HOSPITAL CARE,LEVL III: ICD-10-PCS | Mod: GC,,, | Performed by: PSYCHIATRY & NEUROLOGY

## 2022-05-11 PROCEDURE — 25000003 PHARM REV CODE 250: Performed by: HOSPITALIST

## 2022-05-11 PROCEDURE — 97535 SELF CARE MNGMENT TRAINING: CPT

## 2022-05-11 PROCEDURE — 63600175 PHARM REV CODE 636 W HCPCS: Performed by: PSYCHIATRY & NEUROLOGY

## 2022-05-11 PROCEDURE — 51798 US URINE CAPACITY MEASURE: CPT

## 2022-05-11 PROCEDURE — 92526 ORAL FUNCTION THERAPY: CPT

## 2022-05-11 PROCEDURE — 20600001 HC STEP DOWN PRIVATE ROOM

## 2022-05-11 PROCEDURE — 85025 COMPLETE CBC W/AUTO DIFF WBC: CPT | Performed by: STUDENT IN AN ORGANIZED HEALTH CARE EDUCATION/TRAINING PROGRAM

## 2022-05-11 PROCEDURE — 97168 OT RE-EVAL EST PLAN CARE: CPT

## 2022-05-11 PROCEDURE — 97116 GAIT TRAINING THERAPY: CPT

## 2022-05-11 PROCEDURE — 80053 COMPREHEN METABOLIC PANEL: CPT | Performed by: STUDENT IN AN ORGANIZED HEALTH CARE EDUCATION/TRAINING PROGRAM

## 2022-05-11 RX ORDER — LEVETIRACETAM 500 MG/1
1000 TABLET ORAL 2 TIMES DAILY
Status: DISCONTINUED | OUTPATIENT
Start: 2022-05-11 | End: 2022-05-25 | Stop reason: HOSPADM

## 2022-05-11 RX ORDER — LACOSAMIDE 50 MG/1
200 TABLET ORAL EVERY 12 HOURS
Status: DISCONTINUED | OUTPATIENT
Start: 2022-05-11 | End: 2022-05-13

## 2022-05-11 RX ORDER — FUROSEMIDE 10 MG/ML
40 INJECTION INTRAMUSCULAR; INTRAVENOUS DAILY
Status: DISCONTINUED | OUTPATIENT
Start: 2022-05-11 | End: 2022-05-13

## 2022-05-11 RX ORDER — VALPROIC ACID 250 MG/5ML
260 SOLUTION ORAL EVERY 8 HOURS
Status: DISCONTINUED | OUTPATIENT
Start: 2022-05-11 | End: 2022-05-11

## 2022-05-11 RX ORDER — VALPROIC ACID 250 MG/5ML
260 SOLUTION ORAL EVERY 8 HOURS
Status: DISCONTINUED | OUTPATIENT
Start: 2022-05-11 | End: 2022-05-13

## 2022-05-11 RX ADMIN — DONEPEZIL HYDROCHLORIDE 5 MG: 5 TABLET ORAL at 09:05

## 2022-05-11 RX ADMIN — POLYETHYLENE GLYCOL 3350 17 G: 17 POWDER, FOR SOLUTION ORAL at 09:05

## 2022-05-11 RX ADMIN — SENNOSIDES AND DOCUSATE SODIUM 2 TABLET: 50; 8.6 TABLET ORAL at 09:05

## 2022-05-11 RX ADMIN — VALPROIC ACID 260 MG: 250 SOLUTION ORAL at 03:05

## 2022-05-11 RX ADMIN — MELATONIN TAB 3 MG 6 MG: 3 TAB at 09:05

## 2022-05-11 RX ADMIN — METOPROLOL TARTRATE 50 MG: 50 TABLET, FILM COATED ORAL at 07:05

## 2022-05-11 RX ADMIN — DILTIAZEM HYDROCHLORIDE 30 MG: 30 TABLET, FILM COATED ORAL at 06:05

## 2022-05-11 RX ADMIN — AMIODARONE HYDROCHLORIDE 200 MG: 200 TABLET ORAL at 09:05

## 2022-05-11 RX ADMIN — METOPROLOL TARTRATE 50 MG: 50 TABLET, FILM COATED ORAL at 06:05

## 2022-05-11 RX ADMIN — APIXABAN 5 MG: 5 TABLET, FILM COATED ORAL at 09:05

## 2022-05-11 RX ADMIN — METOPROLOL TARTRATE 50 MG: 50 TABLET, FILM COATED ORAL at 12:05

## 2022-05-11 RX ADMIN — SODIUM CHLORIDE 200 MG: 9 INJECTION, SOLUTION INTRAVENOUS at 09:05

## 2022-05-11 RX ADMIN — DILTIAZEM HYDROCHLORIDE 30 MG: 30 TABLET, FILM COATED ORAL at 12:05

## 2022-05-11 RX ADMIN — ATORVASTATIN CALCIUM 40 MG: 20 TABLET, FILM COATED ORAL at 09:05

## 2022-05-11 RX ADMIN — LACOSAMIDE 200 MG: 50 TABLET, FILM COATED ORAL at 09:05

## 2022-05-11 RX ADMIN — LEVETIRACETAM 1000 MG: 500 TABLET, FILM COATED ORAL at 09:05

## 2022-05-11 RX ADMIN — SODIUM CHLORIDE 1 G: 1 TABLET ORAL at 09:05

## 2022-05-11 RX ADMIN — VALPROIC ACID 260 MG: 250 SOLUTION ORAL at 09:05

## 2022-05-11 RX ADMIN — SODIUM CHLORIDE 1 G: 1 TABLET ORAL at 07:05

## 2022-05-11 RX ADMIN — PANTOPRAZOLE SODIUM 40 MG: 40 INJECTION, POWDER, FOR SOLUTION INTRAVENOUS at 09:05

## 2022-05-11 RX ADMIN — VALPROATE SODIUM 260 MG: 100 INJECTION, SOLUTION INTRAVENOUS at 02:05

## 2022-05-11 RX ADMIN — LEVETIRACETAM 1000 MG: 500 INJECTION, SOLUTION INTRAVENOUS at 09:05

## 2022-05-11 RX ADMIN — DILTIAZEM HYDROCHLORIDE 30 MG: 30 TABLET, FILM COATED ORAL at 07:05

## 2022-05-11 RX ADMIN — SILODOSIN 8 MG: 8 CAPSULE ORAL at 09:05

## 2022-05-11 NOTE — PLAN OF CARE
Problem: SLP  Goal: SLP Goal  Description: Speech Language Pathology Goals  Goals expected to be met by 5/13    1. Pt will participate in ongoing swallow assessment  2. PT will participate in speech language eval     Outcome: Met   Patient tolerating mechanical soft diet and thin liquids with aspiration precautions in place. Per chart review/family report, patient is at baseline for speech, language, and cognition. No further skilled acute Speech Therapy services warranted at this time. Please re-consult as needed.

## 2022-05-11 NOTE — ASSESSMENT & PLAN NOTE
HR into 170s-180 on stepdown, given BB and loaded with amiodarone prior to NCC stepup     - Currently maintaining adequate MAPs   - Continue anticoagulation -> will transition back to home apixaban   - C/w amiodarone 200 mg BID  - Increase lopressor 50 mg q8hrs -> 50 mg q6hrs  - May need to consider starting dilt if HR remains poorly controlled s/p increasing lopressor dose

## 2022-05-11 NOTE — ASSESSMENT & PLAN NOTE
HR into 170s-180 on stepdown, given BB and loaded with amiodarone prior to NCC stepup     - Currently maintaining adequate MAPs, HR control improved overnight  - Continue apixaban for anticoagulation  - C/w amiodarone 200 mg BID, lopressor 50 mg q6hrs, dilt 30 mg q6hrs  - C/w short acting agents today, if HR remains stable can likely transition to dilt XR and toprol XL tomorrow to facilitate home medication compliance

## 2022-05-11 NOTE — SUBJECTIVE & OBJECTIVE
Interval History:  Please see hospital course for details    Review of Systems   Unable to perform ROS: Dementia     Objective:     Vitals:  Temp: 97.8 °F (36.6 °C)  Pulse: 90  Rhythm: atrial rhythm  BP: 122/60  MAP (mmHg): 83  Resp: 20  SpO2: 95 %  O2 Device (Oxygen Therapy): nasal cannula    Temp  Min: 97.6 °F (36.4 °C)  Max: 98.4 °F (36.9 °C)  Pulse  Min: 72  Max: 123  BP  Min: 98/55  Max: 171/92  MAP (mmHg)  Min: 71  Max: 122  Resp  Min: 18  Max: 27  SpO2  Min: 89 %  Max: 99 %    05/10 0701 - 05/11 0700  In: 451.4 [P.O.:150; I.V.:151.2]  Out: 1500 [Urine:1500]   Unmeasured Output  Urine Occurrence: 0  Stool Occurrence: 0  Emesis Occurrence: 0  Pad Count: 2       Physical Exam  General Appearance: Not in acute hemodynamic distress  Mental Status Exam: awake, alert, aware, oriented to name, place and situation, not oriented to month or year. Follows simple commands b/l. Pleasantly confused   Cranial Nerves: VFF, EOM full, pupils are equal and reactive to light bilaterally, symmetric facial sensory, symmetric facial motor, hearing grossly normal, midline tongue  Motor: no drift in the UE/LE. Power is 5/5 in both UE/LE. Normal tone.  Sensory: Symmetric to LT in all 4 limbs   Vascular: Irregularly irregular rhythm, no m/r/g  Lungs: CTA bilaterally without wheezing; decreased breath sounds in b/l bases  Abdomen: Soft, non-distended, non-tender, BS +    Medications:  Continuous Scheduledamiodarone, 200 mg, BID  apixaban, 5 mg, BID  atorvastatin, 40 mg, QHS  diltiaZEM, 30 mg, Q6H  donepeziL, 5 mg, Nightly  lacosamide, 200 mg, Q12H  levETIRAcetam, 1,000 mg, BID  melatonin, 6 mg, Nightly  metoprolol tartrate, 50 mg, Q6H  pantoprazole, 40 mg, Daily  polyethylene glycol, 17 g, BID  senna-docusate 8.6-50 mg, 2 tablet, BID  silodosin, 8 mg, Daily  sodium chloride, 1 g, BID  valproic acid (as sodium salt), 260 mg, Q8H    PRNacetaminophen, 650 mg, Q4H PRN  aluminum-magnesium hydroxide-simethicone, 30 mL, QID PRN  bisacodyL, 10  mg, Daily PRN  magnesium oxide, 800 mg, PRN  magnesium oxide, 800 mg, PRN  melatonin, 6 mg, Nightly PRN  naloxone, 0.02 mg, PRN  ondansetron, 4 mg, Q8H PRN  potassium bicarbonate, 35 mEq, PRN  potassium bicarbonate, 50 mEq, PRN  potassium bicarbonate, 60 mEq, PRN  potassium, sodium phosphates, 2 packet, PRN  potassium, sodium phosphates, 2 packet, PRN  potassium, sodium phosphates, 2 packet, PRN  prochlorperazine, 5 mg, Q6H PRN  sodium chloride 0.9%, 10 mL, PRN  sodium chloride 0.9%, 10 mL, Q6H PRN      Today I personally reviewed pertinent cardiology results, laboratory results, notably: QTc 490, stable from prior. HR improved, 90-110s overnight. CBC stable. CMP notable for Na 132, ammonia wnl at 22. Depakote level 53.7, therapeutic.     Diet  Diet Dysphagia Mechanical Soft (IDDSI Level 5)  Diet Dysphagia Mechanical Soft (IDDSI Level 5)

## 2022-05-11 NOTE — PROVIDER TRANSFER
Handoff     Primary Team: [facility] Room Number: [id number] [id number]     Patient Name: Teresa Leos MRN: [id number]     Date of Birth: [id number] Allergies: Ciprofloxacin     Age: 77 y.o. Admit Date: 5/4/2022     Sex: female  BMI: Body mass index is 21.35 kg/m².     Code Status: Full Code          Reason for Admission: Seizure-like activity    Brief HPI (pertinent PMH and diagnosis or differential diagnosis):   Ms. Leos is a 76 y/o female with PMH significant for dementia, hypertension and recently diagnosed atrial fibrillation presents to [facility] as a transfer from Hospital Medicine team for further management of recurrent focal seizures and AF with RVR. Patient initially presented to OS on April 29th with confusion and witnessed convulsions. During her two day admission, stroke was reportedly ruled out and no further seizures were noted. Patient was diagnosed with new onset AF, for which she was discharged on AC and BB. She represented to [facility] on 5/4 after family reported intermittent staring spells. She had witnessed convulsions during EMS transport to [facility] ED. She had a telestroke consult and was transferred to [facility] [place] evaluation. She had a total of 2g Keppra and was started on 500 mg BID for maintenance. She was given BB and amiodarone for her HR. On arrival to [facility], patient was awake and oriented to self and place. This evening, patient developed AF RVR with HR into 180s with stable BP which did not respond to IV BB. Patient was also having fluctuating mentation associated with staring, deviated gaze, and becoming nonverbal, eventually also associated with left sided weakness and facial twitching.  Patient was given amiodarone per  team and was loaded with 2g Keppra and 2 mg lorazepam. Patient did not return to her neurological baseline after medications, still requiring sternal rubbing to say her name. cEEG was placed and interpreted by Dr. Brown, noted to have recurrent focal right  sided seizures. Given her AF RVR and recurrent seizures refractory to benzodiazepines and Keppra load, patient was transferred to [facility] for further management and diagnostics.     On arrival to [facility], patient was given small fluid bolus and loaded with 300 mg lacosamide. She is intermittently following simple commands and saying her name, protecting her airway.     5/6/2022: Admitted to NSICU overnight, given keppra 2 g IV and ativan 2 mg IV prior to transfer. Given vimpat 300 mg IV x1 and additional ativan 2 mg IV x1 overnight for ongoing electrographic seizures. Last electrographic seizure at 0230. Remains on IV amiodarone loading dose, HR improved to 110s, remains in Afib  5/7/2022 Initiated straight cath, bladder scans, and Tubefeeds, Trending EEG, MRI pending  05/08/2022 A fib with rate in the 140s. 500cc bolus x 1. KUB with significant stool burden. Optimize emma reg and suppository. EEG with high seizure risk. Depakote load and schedule maintenance dose.   05/09/2022 cEEG w/o seizures since depakote load, level therapeutic this AM at 59. Remains in Afib w/ HR in 130-140s on amio gtt. Noted to sundown overnight, became acutely agitated, improved s/p BM. Clinically improved this AM, much more alert and interactive, passed SLP eval.   05/10/2022 NAEON, no further episodes of agitation, QTc improved to 491 this AM. CXR w/ mild pulmonary edema, on 3 L O2 via NC, given lasix. HR remains in 120-130s, BP stable. cEEG negative, d/c'd    The patient was kept in ICU one more day for A fib RVR which remained stable overnight on PO amio, PO dilt, PO metoprolol. We plan to switch her to long acting meds once she's stable another day. We will keep her on apixaban for A fib. Seizures are stable on keppra 1g BID and vimpat 200mg bid, and depacon 260 q8h.    Procedure Date: EEG 5/6-5/8    Contingency Plan (special circumstances anticipated and plan): switch HR meds to long acting, depacon IV to PO    Discharge Disposition: per  PT/OT reccomendations    Mame Renteria PA-C

## 2022-05-11 NOTE — ASSESSMENT & PLAN NOTE
76 y/o female with new onset seizures and AF presents with recurrent focal right sided seizures. Likely 2/2 prior stroke, +/- provoked by recent course of ciprofloxacin for UTI    - Epilepsy following, appreciate their recs  - C/w keppra 1 g BID, vimpat 200 mg BID, depakote 260 mg IV q8hrs (15 mg/kg/day)   - Check ammonia w/ AM labs  - D/c cEEG  - Infectious w/u initiated on arrival to [facility] on 5/4, NGTD and UA noninfectious, has completed 7d treatment for previously diagnosed UTI  - MRI unremarkable

## 2022-05-11 NOTE — PLAN OF CARE
[facility] Care Plan    POC reviewed with Teresa Leos and family at 0300. Pt verbalized understanding. Questions and concerns addressed. No acute events overnight. Pt progressing toward goals. Will continue to monitor. See below and flowsheets for full assessment and VS info.   -Delirium precautions followed.  -At 0605, pt disoriented to place and situation, agitated, only followed command to squeeze hands. Na 132. Dariela Strong NP notified and reassessed pt at bedside. Ordered CXR, 1g Na tab, and 24hr EEG monitoring.       Is this a stroke patient? no    Neuro:  Merom Coma Scale  Best Eye Response: 4-->(E4) spontaneous  Best Motor Response: 6-->(M6) obeys commands  Best Verbal Response: 5-->(V5) oriented  Melanie Coma Scale Score: 15  Assessment Qualifiers: patient not sedated/intubated, no eye obstruction present  Pupil PERRLA: yes     24hr Temp:  [97.6 °F (36.4 °C)-98.4 °F (36.9 °C)]     CV:   Rhythm: atrial rhythm  BP goals:   SBP < 180  MAP > 65    Resp:   O2 Device (Oxygen Therapy): nasal cannula       Plan:  Continue to monitor    GI/:     Diet/Nutrition Received: mechanical/dental soft  Last Bowel Movement: 05/09/22  Voiding Characteristics: unable to void    Intake/Output Summary (Last 24 hours) at 5/11/2022 0711  Last data filed at 5/11/2022 0705  Gross per 24 hour   Intake 411.03 ml   Output 1000 ml   Net -588.97 ml     Unmeasured Output  Urine Occurrence: 0  Stool Occurrence: 0  Emesis Occurrence: 0  Pad Count: 2    Labs/Accuchecks:  Recent Labs   Lab 05/11/22  0237   WBC 11.75   RBC 3.11*   HGB 10.3*   HCT 31.3*         Recent Labs   Lab 05/11/22  0237   *   K 4.6   CO2 24      BUN 17   CREATININE 0.8   ALKPHOS 46*   ALT 15   AST 34   BILITOT 0.6      Recent Labs   Lab 05/04/22  1536   INR 1.2   APTT 25.6      Recent Labs   Lab 05/04/22  1524 05/04/22  1536   *  196*  --    CPKMB 5.5  --    TROPONINI  --  <0.006   MB 2.8  --        Electrolytes: Electrolytes  replaced  Accuchecks: none    Gtts:      LDA/Wounds:  Lines/Drains/Airways       Peripheral Intravenous Line  Duration                  Midline Catheter Insertion/Assessment  - Single Lumen 05/05/22 2215 Left basilic vein (medial side of arm) other (see comments) 5 days         Peripheral IV - Single Lumen 05/05/22 2222 20 G Anterior;Left Foot 5 days                  Wounds: No  Wound care consulted: No

## 2022-05-11 NOTE — PROGRESS NOTES
[facility] - Telemetry Stepdown  Neurocritical Care  Progress Note    Admit Date: 5/4/2022  Service Date: 05/11/2022  Length of Stay: 6    Subjective:     Chief Complaint: Seizure-like activity    History of Present Illness: Ms. Leos is a 76 y/o female with PMH significant for dementia, hypertension and recently diagnosed atrial fibrillation presents to [facility] as a transfer from Hospital Medicine team for further management of recurrent focal seizures and AF with RVR. Patient initially presented to OS on April 29th with confusion and witnessed convulsions. During her two day admission, stroke was reportedly ruled out and no further seizures were noted. Patient was diagnosed with new onset AF, for which she was discharged on AC and BB. She represented to [facility] on 5/4 after family reported intermittent staring spells. She had witnessed convulsions during EMS transport to [facility] ED. She had a telestroke consult and was transferred to [facility] for further evaluation. She had a total of 2g Keppra and was started on 500 mg BID for maintenance. She was given BB and amiodarone for her HR. On arrival to [facility], patient was awake and oriented to self and place. This evening, patient developed AF RVR with HR into 180s with stable BP which did not respond to IV BB. Patient was also having fluctuating mentation associated with staring, deviated gaze, and becoming nonverbal, eventually also associated with left sided weakness and facial twitching.  Patient was given amiodarone per HM team and was loaded with 2g Keppra and 2 mg lorazepam. Patient did not return to her neurological baseline after medications, still requiring sternal rubbing to say her name. cEEG was placed and interpreted by Dr. Brown, noted to have recurrent focal right sided seizures. Given her AF RVR and recurrent seizures refractory to benzodiazepines and Keppra load, patient was transferred to [facility] for further management and diagnostics.    On arrival to [facility],  patient was given small fluid bolus and loaded with 300 mg lacosamide. She is intermittently following simple commands and saying her name, protecting her airway.       Hospital Course: 5/6/2022: Admitted to NSICU overnight, given keppra 2 g IV and ativan 2 mg IV prior to transfer. Given vimpat 300 mg IV x1 and additional ativan 2 mg IV x1 overnight for ongoing electrographic seizures. Last electrographic seizure at 0230. Remains on IV amiodarone loading dose, HR improved to 110s, remains in Afib  5/7/2022 Initiated straight cath, bladder scans, and Tubefeeds, Trending EEG, MRI pending  05/08/2022 A fib with rate in the 140s. 500cc bolus x 1. KUB with significant stool burden. Optimize emma reg and suppository. EEG with high seizure risk. Depakote load and schedule maintenance dose.   05/09/2022 cEEG w/o seizures since depakote load, level therapeutic this AM at 59. Remains in Afib w/ HR in 130-140s on amio gtt. Noted to sundown overnight, became acutely agitated, improved s/p BM. Clinically improved this AM, much more alert and interactive, passed SLP eval.   05/10/2022 NAEON, no further episodes of agitation, QTc improved to 491 this AM. CXR w/ mild pulmonary edema, on 3 L O2 via NC, given lasix. HR remains in 120-130s, BP stable. cEEG negative, d/c'd. Switched back to home apixaban   05/11/2022 NAEON, per family at bedside was slightly more confused this AM, more alert at time of exam. Started on dilt 30 mg q6hrs yesterday afternoon, HR significantly improved overnight.       Interval History:  Please see hospital course for details    Review of Systems   Unable to perform ROS: Dementia     Objective:     Vitals:  Temp: 97.8 °F (36.6 °C)  Pulse: 90  Rhythm: atrial rhythm  BP: 122/60  MAP (mmHg): 83  Resp: 20  SpO2: 95 %  O2 Device (Oxygen Therapy): nasal cannula    Temp  Min: 97.6 °F (36.4 °C)  Max: 98.4 °F (36.9 °C)  Pulse  Min: 72  Max: 123  BP  Min: 98/55  Max: 171/92  MAP (mmHg)  Min: 71  Max: 122  Resp   Min: 18  Max: 27  SpO2  Min: 89 %  Max: 99 %    05/10 0701 - 05/11 0700  In: 451.4 [P.O.:150; I.V.:151.2]  Out: 1500 [Urine:1500]   Unmeasured Output  Urine Occurrence: 0  Stool Occurrence: 0  Emesis Occurrence: 0  Pad Count: 2       Physical Exam  General Appearance: Not in acute hemodynamic distress  Mental Status Exam: awake, alert, aware, oriented to name, place and situation, not oriented to month or year. Follows simple commands b/l. Pleasantly confused   Cranial Nerves: VFF, EOM full, pupils are equal and reactive to light bilaterally, symmetric facial sensory, symmetric facial motor, hearing grossly normal, midline tongue  Motor: no drift in the UE/LE. Power is 5/5 in both UE/LE. Normal tone.  Sensory: Symmetric to LT in all 4 limbs   Vascular: Irregularly irregular rhythm, no m/r/g  Lungs: CTA bilaterally without wheezing; decreased breath sounds in b/l bases  Abdomen: Soft, non-distended, non-tender, BS +    Medications:  Continuous Scheduledamiodarone, 200 mg, BID  apixaban, 5 mg, BID  atorvastatin, 40 mg, QHS  diltiaZEM, 30 mg, Q6H  donepeziL, 5 mg, Nightly  lacosamide, 200 mg, Q12H  levETIRAcetam, 1,000 mg, BID  melatonin, 6 mg, Nightly  metoprolol tartrate, 50 mg, Q6H  pantoprazole, 40 mg, Daily  polyethylene glycol, 17 g, BID  senna-docusate 8.6-50 mg, 2 tablet, BID  silodosin, 8 mg, Daily  sodium chloride, 1 g, BID  valproic acid (as sodium salt), 260 mg, Q8H    PRNacetaminophen, 650 mg, Q4H PRN  aluminum-magnesium hydroxide-simethicone, 30 mL, QID PRN  bisacodyL, 10 mg, Daily PRN  magnesium oxide, 800 mg, PRN  magnesium oxide, 800 mg, PRN  melatonin, 6 mg, Nightly PRN  naloxone, 0.02 mg, PRN  ondansetron, 4 mg, Q8H PRN  potassium bicarbonate, 35 mEq, PRN  potassium bicarbonate, 50 mEq, PRN  potassium bicarbonate, 60 mEq, PRN  potassium, sodium phosphates, 2 packet, PRN  potassium, sodium phosphates, 2 packet, PRN  potassium, sodium phosphates, 2 packet, PRN  prochlorperazine, 5 mg, Q6H PRN  sodium  chloride 0.9%, 10 mL, PRN  sodium chloride 0.9%, 10 mL, Q6H PRN      Today I personally reviewed pertinent cardiology results, laboratory results, notably: QTc 490, stable from prior. HR improved, 90-110s overnight. CBC stable. CMP notable for Na 132, ammonia wnl at 22. Depakote level 53.7, therapeutic.     Diet  Diet Dysphagia Mechanical Soft (IDDSI Level 5)  Diet Dysphagia Mechanical Soft (IDDSI Level 5)        Assessment/Plan:     Neuro  * Seizure-like activity  76 y/o female with new onset seizures and AF presents with recurrent focal right sided seizures. Likely 2/2 prior stroke, +/- provoked by recent course of ciprofloxacin for UTI    - Epilepsy following, appreciate their recs  - C/w keppra 1 g BID, vimpat 200 mg BID, depakote 260 mg IV q8hrs (15 mg/kg/day)   - Ammonia wnl, depakote level therapeutic (goal )  - Infectious w/u initiated on arrival to [facility] on 5/4, NGTD and UA noninfectious, has completed 7d treatment for previously diagnosed UTI  - MRI unremarkable      Dementia  Mild per family reports, remains independent of ADLs    - c/w home donepezil 5 mg  - Delirium precautions, q4hr neuro checks o/n  - Melatonin 6 mg qhs  - Avoid seroquel/antipsychotics given prolonged QTc     Cardiac/Vascular  Prolonged Q-T interval on ECG  May be secondary to amiodarone gtt -> improved    - QTc 490 on AM ECG   - Trend daily  - Aggressively replete K/Mag  - Avoid additional QTc prolonging medications    Other hyperlipidemia  - C/w atorvastatin 40 mg qhs    Atrial fibrillation with RVR  HR into 170s-180 on stepdown, given BB and loaded with amiodarone prior to NCC stepup     - Currently maintaining adequate MAPs, HR control improved overnight  - Continue apixaban for anticoagulation  - C/w amiodarone 200 mg BID, lopressor 50 mg q6hrs, dilt 30 mg q6hrs  - C/w short acting agents today, if HR remains stable can likely transition to dilt XR and toprol XL tomorrow to facilitate home medication compliance     Essential  hypertension  BP soft on admission, holding home antihypertensives.     - Hold home amlodipine 5 mg qday, irbesartan-HCTZ 300-12.5 mg qday  - Goal normotension     Renal/  Urinary retention  Requiring frequent straight caths     - UA on admission non-infectious   - C/w silodosin 8 mg qday  - Bladder scans per protocol and straight cath prn ordered    GI  Gastroesophageal reflux disease  - Continue PPI           The patient is being Prophylaxed for:  Venous Thromboembolism with: Mechanical or Chemical  Stress Ulcer with: PPI  Ventilator Pneumonia with: not applicable    Activity Orders          Diet Dysphagia Mechanical Soft (IDDSI Level 5): Dysphagia 2 (Mechanical Soft Ground) starting at 05/09 0903    Straight Cath starting at 05/07 1045    Progressive Mobility Protocol (mobilize patient to their highest level of functioning at least twice daily) starting at 05/05 0800    Turn patient every 2 hours starting at 05/05 0400    Straight Cath starting at 05/05 0024        Full Code     Level III visit    Zandra Becerra MD  Neurocritical Care

## 2022-05-11 NOTE — PLAN OF CARE
OT re-eval complete and OT POC established with appropriate goals.  Problem: Occupational Therapy  Goal: Occupational Therapy Goal  Description: Goals set on 5/11/2022 with expiration date 5/25/2022:  Patient will increase functional independence with ADLs by performing:    Supine <> Sit with Stand-by Assistance.  Grooming while standing at sink with Stand-by Assistance.  UB Dressing with Stand-by Assistance.  LB Dressing with Stand-by Assistance.  Step transfer with Stand-by Assistance with DME as needed.  Pt will demonstrate understanding of education provided regarding energy conservation and task modification through teach-back method.         Outcome: Ongoing, Progressing

## 2022-05-11 NOTE — ASSESSMENT & PLAN NOTE
May be secondary to amiodarone gtt -> improved    - QTc 491 on AM ECG   - Trend daily  - Aggressively replete K/Mag  - Avoid additional QTc prolonging medications

## 2022-05-11 NOTE — PT/OT/SLP PROGRESS
"Physical Therapy Co-Treatment    Patient Name:  Teresa Leos   MRN:  [id number]    Recommendations:     Discharge Recommendations:  nursing facility, skilled   Discharge Equipment Recommendations: wheelchair, shower chair   Barriers to discharge: Increased level of assist and Inaccessible home    Assessment:     Teresa Leos is a 77 y.o. female admitted with a medical diagnosis of Seizure-like activity. Patient tolerated session well however further gait limited by bowel incontinence in standing, necessitating return to bed. Demonstrates improved orientation compared to last visit, AxOx4.     She presents with the following impairments/functional limitations: weakness, impaired endurance, impaired self care skills, impaired functional mobilty, gait instability, impaired balance, impaired cognition, decreased upper extremity function, decreased lower extremity function, decreased safety awareness, impaired coordination, impaired cardiopulmonary response to activity. Teresa Leos would continue to benefit from acute PT intervention to improve quality of life and focus on recovery of impairments. Once medically stable, recommending pt discharge to nursing facility, skilled.    Rehab Prognosis: Good; patient continues to benefit from acute skilled PT services to address these deficits and reach maximum level of function.  Recent Surgery: * No surgery found *      Plan:     During this hospitalization, patient to be seen 4 x/week to address the identified rehab impairments via gait training, therapeutic activities, therapeutic exercises, neuromuscular re-education and progress toward the following goals:    · Plan of Care Expires:  06/10/22    Subjective     Chief Complaint: None verbalized   Patient/Family Comments/Goals: "I'm going" referring to bowel movement in standing  Pain/Comfort:  · Pain Rating 1: 0/10    Objective:     Communicated with RN prior to session. Patient found HOB elevated with telemetry, " pulse ox (continuous), blood pressure cuff, peripheral IV, oxygen upon PT entry to room.     General Precautions: Standard, aspiration, fall   Orthopedic Precautions:N/A   Braces: N/A    Functional Mobility:  · Bed Mobility:     · Rolling Left:  moderate assistance  · Scooting: moderate assistance to scoot anteriorly to EOB, maximal assistance of 2 persons to scoot to HOB via drawsheet transfer   · Supine to Sit: moderate assistance  · Sit to Supine: moderate assistance of 2 persons  · Transfers:     · Sit to Stand: minimum assistance of 2 persons with hand-held assist  · Gait: Patient ambulated 8 steps to the left with hand-held assist and maximal assistance of 2 persons. Patient demonstrates unsteady gait, decreased step length, decreased weight shift and decreased foot clearance. Maximal assistance to step with LLE, cuing for weight shifting and sequencing. All lines remained intact throughout ambulation trial.  · Balance:   · Static Sitting: Poor, able to maintain for 8 minute(s) with minimum - moderate assistance, demonstrates posterior and left lean, verbal and tactile cues for upright posture  · Dynamic Sitting: Poor: Patient unable to accept challenge or move without loss of balance, moderate assistance  · Static Standing: Poor, able to maintain for 1 minute(s) with moderate assistance, requires verbal cues for upright posture  · Dynamic Standing: Poor: Patient unable to accept challenge or move without loss of balance, maximal assistance of 2 persons    AM-PAC 6 CLICK MOBILITY  Turning over in bed (including adjusting bedclothes, sheets and blankets)?: 2  Sitting down on and standing up from a chair with arms (e.g., wheelchair, bedside commode, etc.): 3  Moving from lying on back to sitting on the side of the bed?: 2  Moving to and from a bed to a chair (including a wheelchair)?: 2  Need to walk in hospital room?: 2  Climbing 3-5 steps with a railing?: 1  Basic Mobility Total Score: 12     Therapeutic  Activities and Exercises:  Patient educated on role of acute care PT and PT POC  Patient encountered with soiled bedding and then became further soiled during treatment. Extra time spent changing linens and performing pericare with total assistance  Whiteboard updated, Patient is not clear to transfer with RN/PCT    Patient left HOB elevated with all lines intact, call button in reach and RN present.    GOALS:   Multidisciplinary Problems     Physical Therapy Goals        Problem: Physical Therapy    Goal Priority Disciplines Outcome Goal Variances Interventions   Physical Therapy Goal     PT, PT/OT Ongoing, Progressing     Description: Goals to be met by: 22    Patient will increase functional independence with mobility by performin. Supine to sit with supervision  2. Sit to supine with supervision  3. Sit to stand transfer with independence  4. Gait  x 80 feet with supervision using LRAD as needed  5. Ascend/descend 5 stair with left handrails supervision using LRAD as needed  6. Lower extremity exercise program x10 reps per handout, with independence                     Time Tracking:     PT Received On: 22  PT Start Time: 1003     PT Stop Time: 1041  PT Total Time (min): 38 min     Billable Minutes: Gait Training 10 min, Therapeutic Activity 20 min and Neuromuscular Re-education 8 min     Treatment Type: Re-evaluation  PT/PTA: PT     PTA Visit Number: 0     2022    Co-treatment performed for this visit due to patient need for two skilled therapists to ensure patient and staff safety and to accommodate for patient activity tolerance/pain management

## 2022-05-11 NOTE — PT/OT/SLP RE-EVAL
Occupational Therapy   Re-Assessment / Treatment    Patient Name:  Teresa Leos   MRN:  [id number]  Admit Date: 5/4/2022  Admitting Diagnosis:  Seizure-like activity   Length of Stay: 6 days  Recent Surgery: * No surgery found *      Hospital Course:  5/4/20222: Admit date  5/5/2022: OT initial evaluation      OT recommendation:  SNF  Please refer to OT initial evaluation for PLOF, OP and social history.  Recommendations:     Discharge Recommendations: nursing facility, skilled  Discharge Equipment Recommendations:  wheelchair, shower chair  Barriers to discharge:  Inaccessible home environment (Increased skilled A required)    Assessment:     Teresa Leos is a 77 y.o. female with a medical diagnosis of Seizure-like activity.  She presents with performance deficits affecting function are weakness, impaired endurance, impaired self care skills, impaired functional mobilty, gait instability, impaired balance, impaired cognition, decreased upper extremity function, decreased lower extremity function, decreased safety awareness, impaired coordination, impaired cardiopulmonary response to activity.      Pt recommended to d/c to SNF for continued improvement in deficit areas and ADLs/functional mobility for increased functional independence and OQL prior to retrun to home environment. Pt had incontinent BM during session and required increased time for toilet hygiene while in standing and once returned to bed.      Performance deficits affecting function: weakness, impaired endurance, impaired self care skills, impaired functional mobilty, gait instability, impaired balance, impaired cognition, decreased upper extremity function, decreased lower extremity function, decreased safety awareness, impaired coordination, impaired cardiopulmonary response to activity    Rehab Prognosis: Good; patient would benefit from acute skilled OT services to address these deficits and reach maximum level of function.       Plan:  "    Patient to be seen   to address the above listed problems via self-care/home management, therapeutic activities, neuromuscular re-education, therapeutic exercises  · Plan of Care Expires: 06/09/22  · Plan of Care Reviewed with: patient, family    Subjective     Communicated with RN prior to session.  Patient found HOB elevated upon OT entry to room, agreeable to evaluation.     Chief Complaint: Transfer (Arrives from [place] for seizure activity, and arrives for further evaluation. Pt hypotensive and slightly confused. Pt poor historian of medical hx. )    Patient comments/goals: "I'm still going" referring to BM in standing.    Pain/Comfort:  Pain Rating 1: 0/10  Pain Addressed 1: Reposition, Distraction  Pain Rating Post-Intervention 1: 0/10    Objective:   Patient found with: telemetry, pulse ox (continuous), blood pressure cuff, peripheral IV, oxygen     General Precautions: Standard, Cardiac aspiration, fall   Orthopedic Precautions:N/A   Braces: N/A   Respiratory Status:    Nasal cannula, flow 2 L/min  Vitals: /87 (BP Location: Right arm, Patient Position: Lying)   Pulse 101   Temp 98 °F (36.7 °C) (Oral)   Resp 20   Ht 5' 1" (1.549 m)   Wt 51.3 kg (113 lb)   SpO2 95%   BMI 21.35 kg/m²     Cognitive and Psychosocial Function:   · AxOx4 -- Person, Place, Time and Situation   · Follows Commands/attention: follows one-step commands  · Communication: clear/fluent  · Memory: Impaired STM, Impaired LTM and Poor immediate recall  · Safety awareness/insight to disability: impaired   · Mood/Affect/Coping skills/emotional control: Appropriate to situation, Cooperative, Happy, Lethargic and Pleasant    Hearing: Intact    Vision:  Intact visual fields    Physical Exam:  Postural examination/scapula alignment:    -       Rounded shoulders  -       Forward head  Skin integrity: Visible skin intact     Left UE Right UE   UE Edema absent absent   UE ROM AROM WFL AROM WFL   UE Strength 3/5, adequate ROM, " adequate strength    MMT skewed due to pt limited command following during assessment 3/5, adequate ROM, adequate strength    MMT skewed due to pt limited command following during assessment    Strength moderate composite grasp moderate composite grasp   Sensation LUE INTACT:light/touch RUE INTACT: light/touch   Fine Motor Coordination:  LUE INTACT: hand, finger to nose and hand thumb/finger opposition skills  RUE INTACT: hand, finger to nose and hand thumb/finger opposition skills    Gross Motor Coordination: LUE INTACT: WFL, limited by fatigue and impaired functional endurance RUE INTACT:WFL, limited by fatigue and impaired functional endurance       Occupational Performance:  Bed Mobility:    · Patient completed Rolling/Turning to Left and Right with  moderate assistance  · Scooting to HOB in supine: maximal assistance and 2 persons  · Patient completed Supine to Sit with moderate assistance on L side of bed  · Scooting anteriorly to EOB to have both feet planted on floor: moderate assistance  · Patient completed Sit to Supine with moderate assistance and 2 persons on L side of bed    Functional Mobility/Transfers:   Static Sitting EOB: Min-Mod A with posterior and L lean throughout session   Dynamic Sitting EOB: See above.   Patient completed Sit <> Stand Transfer with minimum assistance and of 2 persons  with  hand-held assist    Static Standing Balance: Mod A   Dynamic Standing Balance: Max A x 2 for 8 steps to the left along side bed with HHA.    Activities of Daily Living:  · Toileting: total assistance due to BM incontinence during standing trial and pt required total A to engage post bimal hygiene while standing at EOB. Pt reported that she was still going and was returned to supine in bed and placed on bed pan with total A x 2 from PT and RN while OT continued to clean BM which was all over side of bed, bed rail, and floor. Pt completed rolling to finish post bimal hygiene.      [facility] 6 Click  ADL:  AMPAC Total Score: 9    Treatment & Education:  -OT POC, safety during ADLs and mobility   -Education on energy conservation and task modification to maximize safety and independence  -Questions answered within OT scope of practice.      Patient left HOB elevated with all lines intact, call button in reach, bed alarm on, RN notified and RN present    GOALS:   Multidisciplinary Problems     Occupational Therapy Goals        Problem: Occupational Therapy    Goal Priority Disciplines Outcome Interventions   Occupational Therapy Goal     OT, PT/OT Ongoing, Progressing    Description: Goals set on 5/11/2022 with expiration date 5/25/2022:  Patient will increase functional independence with ADLs by performing:    Supine <> Sit with Stand-by Assistance.  Grooming while standing at sink with Stand-by Assistance.  UB Dressing with Stand-by Assistance.  LB Dressing with Stand-by Assistance.  Step transfer with Stand-by Assistance with DME as needed.  Pt will demonstrate understanding of education provided regarding energy conservation and task modification through teach-back method.                    Multidisciplinary Problems (Resolved)        Problem: Occupational Therapy    Goal Priority Disciplines Outcome Interventions   Occupational Therapy Goal   (Resolved)     OT, PT/OT Met                    History:     Past Medical History:   Diagnosis Date    Hyperlipidemia     Hypertension     Osteoporosis          Past Surgical History:   Procedure Laterality Date    HIP REPLACEMENT ARTHROPLASTY Right     HYSTERECTOMY      LEG SURGERY      SKIN CANCER EXCISION      TONSILLECTOMY, ADENOIDECTOMY         Time Tracking:     OT Date of Treatment: 05/11/22  OT Start Time: 1003  OT Stop Time: 1041  OT Total Time (min): 38 min  Additional staff present: RN and PT      Billable Minutes:Re-eval 10 min  Self Care/Home Management 28 min      5/11/2022

## 2022-05-11 NOTE — PLAN OF CARE
Problem: Adult Inpatient Plan of Care  Goal: Plan of Care Review  Outcome: Ongoing, Progressing  Goal: Patient-Specific Goal (Individualized)  Outcome: Ongoing, Progressing  Goal: Optimal Comfort and Wellbeing  Outcome: Ongoing, Progressing     Problem: Adjustment to Illness (Delirium)  Goal: Optimal Coping  Outcome: Ongoing, Progressing  Intervention: Optimize Psychosocial Adjustment to Delirium  Flowsheets (Taken 5/11/2022 7134)  Supportive Measures:   active listening utilized   verbalization of feelings encouraged     Problem: Skin Injury Risk Increased  Goal: Skin Health and Integrity  Outcome: Ongoing, Progressing     Problem: Seizure, Active Management  Goal: Absence of Seizure/Seizure-Related Injury  Outcome: Ongoing, Progressing     Problem: Fall Injury Risk  Goal: Absence of Fall and Fall-Related Injury  Outcome: Ongoing, Progressing     POC reviewed. AAOX3. VVS. Council. Address questions and concerns with son. Pt verbalized understanding. No seizure activity. Bladder scan q6 <300.  Encourage reposition. Frequent safety checks. Bed alarm. Call light in reach.

## 2022-05-11 NOTE — NURSING TRANSFER
Nursing Transfer Note      5/11/2022     Reason patient is being transferred: medically stable to stepdown per NCC    Transfer from [facility] 9077 to [facility] 8085    Transfer via bed    Transfer with RN and PCT - patient belongings at the bedside     Transported by Deborah Lujan RN    Medicines sent: n/a    Any special needs or follow-up needed: travel with personal belongings and telemetry monitoring    Chart send with patient: Yes    Notified: SAQIB King    Patient reassessed at: 1130    Upon arrival to floor: telemetry box on

## 2022-05-11 NOTE — ASSESSMENT & PLAN NOTE
BP soft on admission, holding home antihypertensives.     - Hold home amlodipine 5 mg qday, irbesartan-HCTZ 300-12.5 mg qday  - Goal normotension

## 2022-05-11 NOTE — PROGRESS NOTES
[facility] - Neuro Critical Care  Neurocritical Care  Progress Note    Admit Date: 5/4/2022  Service Date: 05/10/2022  Length of Stay: 5    Subjective:     Chief Complaint: Seizure-like activity    History of Present Illness: Ms. Leos is a 76 y/o female with PMH significant for dementia, hypertension and recently diagnosed atrial fibrillation presents to [facility] as a transfer from Hospital Medicine team for further management of recurrent focal seizures and AF with RVR. Patient initially presented to OS on April 29th with confusion and witnessed convulsions. During her two day admission, stroke was reportedly ruled out and no further seizures were noted. Patient was diagnosed with new onset AF, for which she was discharged on AC and BB. She represented to [facility] on 5/4 after family reported intermittent staring spells. She had witnessed convulsions during EMS transport to [facility] ED. She had a telestroke consult and was transferred to [facility] for further evaluation. She had a total of 2g Keppra and was started on 500 mg BID for maintenance. She was given BB and amiodarone for her HR. On arrival to [facility], patient was awake and oriented to self and place. This evening, patient developed AF RVR with HR into 180s with stable BP which did not respond to IV BB. Patient was also having fluctuating mentation associated with staring, deviated gaze, and becoming nonverbal, eventually also associated with left sided weakness and facial twitching.  Patient was given amiodarone per HM team and was loaded with 2g Keppra and 2 mg lorazepam. Patient did not return to her neurological baseline after medications, still requiring sternal rubbing to say her name. cEEG was placed and interpreted by Dr. Brown, noted to have recurrent focal right sided seizures. Given her AF RVR and recurrent seizures refractory to benzodiazepines and Keppra load, patient was transferred to [facility] for further management and diagnostics.    On arrival to  NCC, patient was given small fluid bolus and loaded with 300 mg lacosamide. She is intermittently following simple commands and saying her name, protecting her airway.       Hospital Course: 5/6/2022: Admitted to NSICU overnight, given keppra 2 g IV and ativan 2 mg IV prior to transfer. Given vimpat 300 mg IV x1 and additional ativan 2 mg IV x1 overnight for ongoing electrographic seizures. Last electrographic seizure at 0230. Remains on IV amiodarone loading dose, HR improved to 110s, remains in Afib  5/7/2022 Initiated straight cath, bladder scans, and Tubefeeds, Trending EEG, MRI pending  05/08/2022 A fib with rate in the 140s. 500cc bolus x 1. KUB with significant stool burden. Optimize emma reg and suppository. EEG with high seizure risk. Depakote load and schedule maintenance dose.   05/09/2022 cEEG w/o seizures since depakote load, level therapeutic this AM at 59. Remains in Afib w/ HR in 130-140s on amio gtt. Noted to sundown overnight, became acutely agitated, improved s/p BM. Clinically improved this AM, much more alert and interactive, passed SLP eval.   05/10/2022 NAEON, no further episodes of agitation, QTc improved to 491 this AM. CXR w/ mild pulmonary edema, on 3 L O2 via NC, given lasix. HR remains in 120-130s, BP stable. cEEG negative, d/c'd      Interval History:  Please see hospital course above for details    Review of Systems   Unable to perform ROS: Dementia     Objective:     Vitals:  Temp: 98.4 °F (36.9 °C)  Pulse: (!) 114  Rhythm: atrial rhythm  BP: 137/83  MAP (mmHg): 105  Resp: (!) 27  SpO2: (!) 93 %  O2 Device (Oxygen Therapy): nasal cannula    Temp  Min: 97.6 °F (36.4 °C)  Max: 98.6 °F (37 °C)  Pulse  Min: 100  Max: 130  BP  Min: 114/72  Max: 172/89  MAP (mmHg)  Min: 87  Max: 130  Resp  Min: 15  Max: 27  SpO2  Min: 91 %  Max: 98 %    05/09 0701 - 05/10 0700  In: 1614.5 [I.V.:729.3]  Out: 600 [Urine:600]   Unmeasured Output  Urine Occurrence: 0  Stool Occurrence: 0  Emesis Occurrence:  0  Pad Count: 2       Physical Exam  General Appearance: Not in acute hemodynamic distress  Mental Status Exam: awake, alert, aware, oriented to name, place, situation and year, not oriented to month. Follows simple commands b/l   Cranial Nerves: VFF, EOM full, pupils are equal and reactive to light bilaterally, symmetric facial sensory, symmetric facial motor, hearing grossly normal, midline tongue  Motor: no drift in the UE/LE. Power is 5/5 in both UE/LE. Normal tone.  Sensory: Symmetric to LT in all 4 limbs   Vascular: Irregularly irregular rhythm, no m/r/g  Lungs: CTA bilaterally without wheezing  Abdomen: Soft, non-distended, non-tender, BS +    Medications:  Continuous Scheduledamiodarone, 200 mg, BID  apixaban, 5 mg, BID  atorvastatin, 40 mg, QHS  diltiaZEM, 30 mg, Q6H  donepeziL, 5 mg, Nightly  lacosamide (VIMPAT) IVPB, 200 mg, Q12H  levetiracetam IV, 1,000 mg, Q12H  melatonin, 6 mg, Nightly  metoprolol tartrate, 50 mg, Q6H  pantoprazole, 40 mg, Daily  polyethylene glycol, 17 g, BID  senna-docusate 8.6-50 mg, 2 tablet, BID  silodosin, 8 mg, Daily  valproate sodium (DEPACON) IVPB, 15 mg/kg/day, Q8H    PRNacetaminophen, 650 mg, Q4H PRN  aluminum-magnesium hydroxide-simethicone, 30 mL, QID PRN  bisacodyL, 10 mg, Daily PRN  magnesium oxide, 800 mg, PRN  magnesium oxide, 800 mg, PRN  melatonin, 6 mg, Nightly PRN  naloxone, 0.02 mg, PRN  ondansetron, 4 mg, Q8H PRN  potassium bicarbonate, 35 mEq, PRN  potassium bicarbonate, 50 mEq, PRN  potassium bicarbonate, 60 mEq, PRN  potassium, sodium phosphates, 2 packet, PRN  potassium, sodium phosphates, 2 packet, PRN  potassium, sodium phosphates, 2 packet, PRN  prochlorperazine, 5 mg, Q6H PRN  sodium chloride 0.9%, 10 mL, PRN  sodium chloride 0.9%, 10 mL, Q6H PRN      Today I personally reviewed pertinent imaging, cardiology results, laboratory results, notably: QTc improved to 491. cEEG w/o seizures. CBC stable, CMP notable for improving Na to 135, Mag/K wnl. Depakote  level 51.6. Anti-Xa level mildly elevated to 1.13    Diet  Diet Dysphagia Mechanical Soft (IDDSI Level 5)  Diet Dysphagia Mechanical Soft (IDDSI Level 5)        Assessment/Plan:     Neuro  * Seizure-like activity  76 y/o female with new onset seizures and AF presents with recurrent focal right sided seizures. Likely 2/2 prior stroke, +/- provoked by recent course of ciprofloxacin for UTI    - Epilepsy following, appreciate their recs  - C/w keppra 1 g BID, vimpat 200 mg BID, depakote 260 mg IV q8hrs (15 mg/kg/day)   - Check ammonia w/ AM labs  - D/c cEEG  - Infectious w/u initiated on arrival to [facility] on 5/4, NGTD and UA noninfectious, has completed 7d treatment for previously diagnosed UTI  - MRI unremarkable      Dementia  Mild per family reports, remains independent of ADLs    - c/w home donepezil 5 mg  - Delirium precautions, q4hr neuro checks o/n  - Melatonin 6 mg qhs  - Avoid seroquel/antipsychotics given prolonged QTc     Cardiac/Vascular  Prolonged Q-T interval on ECG  May be secondary to amiodarone gtt -> improved    - QTc 491 on AM ECG   - Trend daily  - Aggressively replete K/Mag  - Avoid additional QTc prolonging medications    Other hyperlipidemia  - C/w atorvastatin 40 mg qhs    Atrial fibrillation with RVR  HR into 170s-180 on stepdown, given BB and loaded with amiodarone prior to NCC stepup     - Currently maintaining adequate MAPs   - Continue anticoagulation -> will transition back to home apixaban   - C/w amiodarone 200 mg BID  - Increase lopressor 50 mg q8hrs -> 50 mg q6hrs  - May need to consider starting dilt if HR remains poorly controlled s/p increasing lopressor dose    Essential hypertension  BP soft on admission, holding home antihypertensives.     - Hold home amlodipine 5 mg qday, irbesartan-HCTZ 300-12.5 mg qday  - Goal normotension     Renal/  Urinary retention  Requiring frequent straight caths overnight    - UA on admission non-infectious   - C/w silodosin 8 mg qday  - Bladder scans  per protocol and straight cath prn ordered    GI  Gastroesophageal reflux disease  - Continue PPI           The patient is being Prophylaxed for:  Venous Thromboembolism with: Mechanical or Chemical  Stress Ulcer with: PPI  Ventilator Pneumonia with: not applicable    Activity Orders          Diet Dysphagia Mechanical Soft (IDDSI Level 5): Dysphagia 2 (Mechanical Soft Ground) starting at 05/09 0903    Straight Cath starting at 05/07 1045    Progressive Mobility Protocol (mobilize patient to their highest level of functioning at least twice daily) starting at 05/05 0800    Turn patient every 2 hours starting at 05/05 0400    Straight Cath starting at 05/05 0024        Full Code     Level III visit    Zandra Becerra MD  Neurocritical Care  [facility] - Neuro Critical Care

## 2022-05-11 NOTE — SUBJECTIVE & OBJECTIVE
Interval History:  Please see hospital course above for details    Review of Systems   Unable to perform ROS: Dementia     Objective:     Vitals:  Temp: 98.4 °F (36.9 °C)  Pulse: (!) 114  Rhythm: atrial rhythm  BP: 137/83  MAP (mmHg): 105  Resp: (!) 27  SpO2: (!) 93 %  O2 Device (Oxygen Therapy): nasal cannula    Temp  Min: 97.6 °F (36.4 °C)  Max: 98.6 °F (37 °C)  Pulse  Min: 100  Max: 130  BP  Min: 114/72  Max: 172/89  MAP (mmHg)  Min: 87  Max: 130  Resp  Min: 15  Max: 27  SpO2  Min: 91 %  Max: 98 %    05/09 0701 - 05/10 0700  In: 1614.5 [I.V.:729.3]  Out: 600 [Urine:600]   Unmeasured Output  Urine Occurrence: 0  Stool Occurrence: 0  Emesis Occurrence: 0  Pad Count: 2       Physical Exam  General Appearance: Not in acute hemodynamic distress  Mental Status Exam: awake, alert, aware, oriented to name, place, situation and year, not oriented to month. Follows simple commands b/l   Cranial Nerves: VFF, EOM full, pupils are equal and reactive to light bilaterally, symmetric facial sensory, symmetric facial motor, hearing grossly normal, midline tongue  Motor: no drift in the UE/LE. Power is 5/5 in both UE/LE. Normal tone.  Sensory: Symmetric to LT in all 4 limbs   Vascular: Irregularly irregular rhythm, no m/r/g  Lungs: CTA bilaterally without wheezing  Abdomen: Soft, non-distended, non-tender, BS +    Medications:  Continuous Scheduledamiodarone, 200 mg, BID  apixaban, 5 mg, BID  atorvastatin, 40 mg, QHS  diltiaZEM, 30 mg, Q6H  donepeziL, 5 mg, Nightly  lacosamide (VIMPAT) IVPB, 200 mg, Q12H  levetiracetam IV, 1,000 mg, Q12H  melatonin, 6 mg, Nightly  metoprolol tartrate, 50 mg, Q6H  pantoprazole, 40 mg, Daily  polyethylene glycol, 17 g, BID  senna-docusate 8.6-50 mg, 2 tablet, BID  silodosin, 8 mg, Daily  valproate sodium (DEPACON) IVPB, 15 mg/kg/day, Q8H    PRNacetaminophen, 650 mg, Q4H PRN  aluminum-magnesium hydroxide-simethicone, 30 mL, QID PRN  bisacodyL, 10 mg, Daily PRN  magnesium oxide, 800 mg, PRN  magnesium  oxide, 800 mg, PRN  melatonin, 6 mg, Nightly PRN  naloxone, 0.02 mg, PRN  ondansetron, 4 mg, Q8H PRN  potassium bicarbonate, 35 mEq, PRN  potassium bicarbonate, 50 mEq, PRN  potassium bicarbonate, 60 mEq, PRN  potassium, sodium phosphates, 2 packet, PRN  potassium, sodium phosphates, 2 packet, PRN  potassium, sodium phosphates, 2 packet, PRN  prochlorperazine, 5 mg, Q6H PRN  sodium chloride 0.9%, 10 mL, PRN  sodium chloride 0.9%, 10 mL, Q6H PRN      Today I personally reviewed pertinent imaging, cardiology results, laboratory results, notably: QTc improved to 491. cEEG w/o seizures. CBC stable, CMP notable for improving Na to 135, Mag/K wnl. Depakote level 51.6. Anti-Xa level mildly elevated to 1.13    Diet  Diet Dysphagia Mechanical Soft (IDDSI Level 5)  Diet Dysphagia Mechanical Soft (IDDSI Level 5)

## 2022-05-11 NOTE — ASSESSMENT & PLAN NOTE
May be secondary to amiodarone gtt -> improved    - QTc 490 on AM ECG   - Trend daily  - Aggressively replete K/Mag  - Avoid additional QTc prolonging medications

## 2022-05-11 NOTE — PT/OT/SLP PROGRESS
Speech Language Pathology Treatment  Discharge    Patient Name:  Teresa Leos   MRN:  [id number]   [id number] A    Admitting Diagnosis: Seizure-like activity    Recommendations:                 General Recommendations:  Follow-up not indicated  Diet recommendations:  Mechanical soft, Liquid Diet Level: Thin   Aspiration Precautions: Assistance with meals, Remain upright 30 minutes post meal and Standard aspiration precautions   General Precautions: Standard, aspiration, fall  Communication strategies: pt [place]       Subjective     Patient awake and eating breakfast tray.   Family member entered room at end of session.  Patient goals: none stated     Pain/Comfort:  ·  no pain reported    Respiratory Status: nasal cannula    Objective:     Has the patient been evaluated by SLP for swallowing?   Yes  Keep patient NPO? No     Patient seen to assess tolerance of diet. Patient assessed with bites of grits, eggs, chopped sausage, and apple sauce and sips of Sprite and juice via straw. She presents with adequate oral clearance and no overt s/s of aspiration. Patient would benefit from assistance with meals. SLP provided education on SLP recommendations, SLP role, s/s and risks of aspiration, safe swallow precautions, and d/c from ST services. Patient's family member verbalized understanding and is in agreement with POC. SLP communicated with RN.     Assessment:     Teresa Leos is a 77 y.o. female with adequate tolerance of mechanical soft diet and thin liquids. No further skilled acute Speech Therapy services warranted at this time. Please re-consult as needed.     Goals:   Multidisciplinary Problems     SLP Goals     Not on file          Multidisciplinary Problems (Resolved)        Problem: SLP    Goal Priority Disciplines Outcome   SLP Goal   (Resolved)     SLP Met   Description: Speech Language Pathology Goals  Goals expected to be met by 5/13    1. Pt will participate in ongoing swallow assessment  2. PT will  participate in speech language eval                              Plan:     · Plan of Care reviewed with:  patient, family   · SLP Follow-Up:  Yes         Barriers to Discharge:  None per ST discipline    Time Tracking:     SLP Treatment Date:   05/11/22  Speech Start Time:  0824  Speech Stop Time:  0840     Speech Total Time (min):  16 min    Billable Minutes: Treatment Swallowing Dysfunction 8 and Self Care/Home Management Training 8    05/11/2022

## 2022-05-11 NOTE — ASSESSMENT & PLAN NOTE
78 y/o female with new onset seizures and AF presents with recurrent focal right sided seizures. Likely 2/2 prior stroke, +/- provoked by recent course of ciprofloxacin for UTI    - Epilepsy following, appreciate their recs  - C/w keppra 1 g BID, vimpat 200 mg BID, depakote 260 mg IV q8hrs (15 mg/kg/day)   - Ammonia wnl, depakote level therapeutic (goal )  - Infectious w/u initiated on arrival to [facility] on 5/4, NGTD and UA noninfectious, has completed 7d treatment for previously diagnosed UTI  - MRI unremarkable

## 2022-05-11 NOTE — ASSESSMENT & PLAN NOTE
Requiring frequent straight caths     - UA on admission non-infectious   - C/w silodosin 8 mg qday  - Bladder scans per protocol and straight cath prn ordered

## 2022-05-12 PROBLEM — J96.01 ACUTE HYPOXEMIC RESPIRATORY FAILURE: Status: ACTIVE | Noted: 2022-05-12

## 2022-05-12 LAB
ALBUMIN SERPL BCP-MCNC: 2.2 G/DL (ref 3.5–5.2)
ALP SERPL-CCNC: 50 U/L (ref 55–135)
ALT SERPL W/O P-5'-P-CCNC: 12 U/L (ref 10–44)
ANION GAP SERPL CALC-SCNC: 8 MMOL/L (ref 8–16)
AST SERPL-CCNC: 20 U/L (ref 10–40)
BILIRUB SERPL-MCNC: 0.6 MG/DL (ref 0.1–1)
BUN SERPL-MCNC: 18 MG/DL (ref 8–23)
CALCIUM SERPL-MCNC: 8.4 MG/DL (ref 8.7–10.5)
CHLORIDE SERPL-SCNC: 98 MMOL/L (ref 95–110)
CO2 SERPL-SCNC: 27 MMOL/L (ref 23–29)
CREAT SERPL-MCNC: 0.8 MG/DL (ref 0.5–1.4)
EST. GFR  (AFRICAN AMERICAN): >60 ML/MIN/1.73 M^2
EST. GFR  (NON AFRICAN AMERICAN): >60 ML/MIN/1.73 M^2
GLUCOSE SERPL-MCNC: 89 MG/DL (ref 70–110)
POTASSIUM SERPL-SCNC: 3.6 MMOL/L (ref 3.5–5.1)
PROT SERPL-MCNC: 5.4 G/DL (ref 6–8.4)
SODIUM SERPL-SCNC: 133 MMOL/L (ref 136–145)
VALPROATE SERPL-MCNC: 56.8 UG/ML (ref 50–100)

## 2022-05-12 PROCEDURE — 99900035 HC TECH TIME PER 15 MIN (STAT)

## 2022-05-12 PROCEDURE — 80164 ASSAY DIPROPYLACETIC ACD TOT: CPT

## 2022-05-12 PROCEDURE — 25000003 PHARM REV CODE 250: Performed by: HOSPITALIST

## 2022-05-12 PROCEDURE — 99232 PR SUBSEQUENT HOSPITAL CARE,LEVL II: ICD-10-PCS | Mod: ,,, | Performed by: STUDENT IN AN ORGANIZED HEALTH CARE EDUCATION/TRAINING PROGRAM

## 2022-05-12 PROCEDURE — 51798 US URINE CAPACITY MEASURE: CPT

## 2022-05-12 PROCEDURE — 51701 INSERT BLADDER CATHETER: CPT

## 2022-05-12 PROCEDURE — 20600001 HC STEP DOWN PRIVATE ROOM

## 2022-05-12 PROCEDURE — 27000221 HC OXYGEN, UP TO 24 HOURS

## 2022-05-12 PROCEDURE — 25000003 PHARM REV CODE 250: Performed by: PHYSICIAN ASSISTANT

## 2022-05-12 PROCEDURE — 25000003 PHARM REV CODE 250

## 2022-05-12 PROCEDURE — 63600175 PHARM REV CODE 636 W HCPCS: Performed by: INTERNAL MEDICINE

## 2022-05-12 PROCEDURE — 80053 COMPREHEN METABOLIC PANEL: CPT | Performed by: INTERNAL MEDICINE

## 2022-05-12 PROCEDURE — 25000003 PHARM REV CODE 250: Performed by: STUDENT IN AN ORGANIZED HEALTH CARE EDUCATION/TRAINING PROGRAM

## 2022-05-12 PROCEDURE — 63600175 PHARM REV CODE 636 W HCPCS: Performed by: HOSPITALIST

## 2022-05-12 PROCEDURE — 99232 SBSQ HOSP IP/OBS MODERATE 35: CPT | Mod: ,,, | Performed by: STUDENT IN AN ORGANIZED HEALTH CARE EDUCATION/TRAINING PROGRAM

## 2022-05-12 PROCEDURE — 94761 N-INVAS EAR/PLS OXIMETRY MLT: CPT

## 2022-05-12 PROCEDURE — 25000003 PHARM REV CODE 250: Performed by: INTERNAL MEDICINE

## 2022-05-12 PROCEDURE — 63600175 PHARM REV CODE 636 W HCPCS: Performed by: STUDENT IN AN ORGANIZED HEALTH CARE EDUCATION/TRAINING PROGRAM

## 2022-05-12 PROCEDURE — 36415 COLL VENOUS BLD VENIPUNCTURE: CPT

## 2022-05-12 RX ORDER — PANTOPRAZOLE SODIUM 40 MG/1
40 TABLET, DELAYED RELEASE ORAL DAILY
Status: DISCONTINUED | OUTPATIENT
Start: 2022-05-12 | End: 2022-05-25 | Stop reason: HOSPADM

## 2022-05-12 RX ORDER — POTASSIUM CHLORIDE 20 MEQ/1
40 TABLET, EXTENDED RELEASE ORAL ONCE
Status: COMPLETED | OUTPATIENT
Start: 2022-05-12 | End: 2022-05-12

## 2022-05-12 RX ADMIN — DILTIAZEM HYDROCHLORIDE 30 MG: 30 TABLET, FILM COATED ORAL at 01:05

## 2022-05-12 RX ADMIN — METOPROLOL TARTRATE 50 MG: 50 TABLET, FILM COATED ORAL at 02:05

## 2022-05-12 RX ADMIN — VALPROIC ACID 260 MG: 250 SOLUTION ORAL at 02:05

## 2022-05-12 RX ADMIN — FUROSEMIDE 40 MG: 10 INJECTION, SOLUTION INTRAMUSCULAR; INTRAVENOUS at 01:05

## 2022-05-12 RX ADMIN — SODIUM CHLORIDE 1 G: 1 TABLET ORAL at 08:05

## 2022-05-12 RX ADMIN — ATORVASTATIN CALCIUM 40 MG: 20 TABLET, FILM COATED ORAL at 09:05

## 2022-05-12 RX ADMIN — POTASSIUM CHLORIDE 40 MEQ: 1500 TABLET, EXTENDED RELEASE ORAL at 08:05

## 2022-05-12 RX ADMIN — METOPROLOL TARTRATE 50 MG: 50 TABLET, FILM COATED ORAL at 01:05

## 2022-05-12 RX ADMIN — LACOSAMIDE 200 MG: 50 TABLET, FILM COATED ORAL at 08:05

## 2022-05-12 RX ADMIN — DILTIAZEM HYDROCHLORIDE 30 MG: 30 TABLET, FILM COATED ORAL at 06:05

## 2022-05-12 RX ADMIN — VALPROIC ACID 260 MG: 250 SOLUTION ORAL at 09:05

## 2022-05-12 RX ADMIN — PANTOPRAZOLE SODIUM 40 MG: 40 TABLET, DELAYED RELEASE ORAL at 11:05

## 2022-05-12 RX ADMIN — SENNOSIDES AND DOCUSATE SODIUM 2 TABLET: 50; 8.6 TABLET ORAL at 08:05

## 2022-05-12 RX ADMIN — DONEPEZIL HYDROCHLORIDE 5 MG: 5 TABLET ORAL at 09:05

## 2022-05-12 RX ADMIN — APIXABAN 5 MG: 5 TABLET, FILM COATED ORAL at 08:05

## 2022-05-12 RX ADMIN — DILTIAZEM HYDROCHLORIDE 30 MG: 30 TABLET, FILM COATED ORAL at 11:05

## 2022-05-12 RX ADMIN — SODIUM CHLORIDE 1 G: 1 TABLET ORAL at 09:05

## 2022-05-12 RX ADMIN — FUROSEMIDE 40 MG: 10 INJECTION, SOLUTION INTRAMUSCULAR; INTRAVENOUS at 08:05

## 2022-05-12 RX ADMIN — VALPROIC ACID 260 MG: 250 SOLUTION ORAL at 06:05

## 2022-05-12 RX ADMIN — SODIUM CHLORIDE, SODIUM LACTATE, POTASSIUM CHLORIDE, AND CALCIUM CHLORIDE 250 ML: .6; .31; .03; .02 INJECTION, SOLUTION INTRAVENOUS at 06:05

## 2022-05-12 RX ADMIN — LEVETIRACETAM 1000 MG: 500 TABLET, FILM COATED ORAL at 08:05

## 2022-05-12 RX ADMIN — APIXABAN 5 MG: 5 TABLET, FILM COATED ORAL at 09:05

## 2022-05-12 RX ADMIN — LACOSAMIDE 200 MG: 50 TABLET, FILM COATED ORAL at 09:05

## 2022-05-12 RX ADMIN — LEVETIRACETAM 1000 MG: 500 TABLET, FILM COATED ORAL at 09:05

## 2022-05-12 RX ADMIN — METOPROLOL TARTRATE 50 MG: 50 TABLET, FILM COATED ORAL at 06:05

## 2022-05-12 RX ADMIN — AMIODARONE HYDROCHLORIDE 200 MG: 200 TABLET ORAL at 09:05

## 2022-05-12 RX ADMIN — AMIODARONE HYDROCHLORIDE 200 MG: 200 TABLET ORAL at 08:05

## 2022-05-12 RX ADMIN — SILODOSIN 8 MG: 8 CAPSULE ORAL at 08:05

## 2022-05-12 RX ADMIN — ONDANSETRON 4 MG: 2 INJECTION INTRAMUSCULAR; INTRAVENOUS at 08:05

## 2022-05-12 NOTE — SUBJECTIVE & OBJECTIVE
Interval History: NAEON    Review of Systems   Constitutional:  Negative for fatigue and fever.   Respiratory:  Negative for cough and shortness of breath.    Cardiovascular:  Negative for chest pain and leg swelling.   Gastrointestinal:  Negative for abdominal distention and abdominal pain.   Objective:     Vital Signs (Most Recent):  Temp: 97.7 °F (36.5 °C) (05/12/22 1133)  Pulse: 89 (05/12/22 1133)  Resp: 20 (05/12/22 1133)  BP: 123/79 (05/12/22 1133)  SpO2: 96 % (05/12/22 1133) Vital Signs (24h Range):  Temp:  [97.7 °F (36.5 °C)-99.3 °F (37.4 °C)] 97.7 °F (36.5 °C)  Pulse:  [] 89  Resp:  [16-20] 20  SpO2:  [92 %-96 %] 96 %  BP: ()/(56-87) 123/79     Weight: 51.3 kg (113 lb)  Body mass index is 21.35 kg/m².    Intake/Output Summary (Last 24 hours) at 5/12/2022 1319  Last data filed at 5/12/2022 0815  Gross per 24 hour   Intake 400 ml   Output 1600 ml   Net -1200 ml      Physical Exam  Constitutional:       General: She is not in acute distress.     Appearance: Normal appearance. She is not ill-appearing.   Cardiovascular:      Rate and Rhythm: Normal rate and regular rhythm.      Pulses: Normal pulses.      Heart sounds: Normal heart sounds.   Pulmonary:      Effort: Pulmonary effort is normal.      Comments: Crackles at the bases  Abdominal:      General: Abdomen is flat. Bowel sounds are normal. There is no distension.      Palpations: Abdomen is soft.   Musculoskeletal:      Right lower leg: No edema.      Left lower leg: No edema.   Skin:     General: Skin is warm.      Findings: No lesion or rash.   Neurological:      General: No focal deficit present.      Mental Status: She is alert.   Psychiatric:         Mood and Affect: Mood normal.         Behavior: Behavior normal.       Significant Labs: All pertinent labs within the past 24 hours have been reviewed.  CBC:   Recent Labs   Lab 05/11/22  0237   WBC 11.75   HGB 10.3*   HCT 31.3*        CMP:   Recent Labs   Lab 05/10/22  9634  05/11/22  0237 05/12/22  0535   * 132* 133*   K 4.1 4.6 3.6    100 98   CO2 23 24 27   * 82 89   BUN 16 17 18   CREATININE 1.0 0.8 0.8   CALCIUM 8.5* 8.1* 8.4*   PROT 5.6* 5.4* 5.4*   ALBUMIN 2.6* 2.4* 2.2*   BILITOT 0.5 0.6 0.6   ALKPHOS 58 46* 50*   AST 27 34 20   ALT 20 15 12   ANIONGAP 11 8 8   EGFRNONAA 54.1* >60.0 >60.0       Significant Imaging: I have reviewed all pertinent imaging results/findings within the past 24 hours.

## 2022-05-12 NOTE — ASSESSMENT & PLAN NOTE
May have been secondary to amiodarone gtt -> improved     - QTc 490 on AM ECG   - Trend daily  - Aggressively replete K/Mag  - Avoid additional QTc prolonging medications

## 2022-05-12 NOTE — ASSESSMENT & PLAN NOTE
Volume overload  Likely due to volume overload, not on O2 at home, will continue diuresis. Potentially due to afib w/ RVR leading to volume overload. EF shows normal systolic function on 5/5  - Lasix 40mg IV daily

## 2022-05-12 NOTE — ASSESSMENT & PLAN NOTE
Requiring frequent straight caths      - UA on admission non-infectious   - C/w silodosin 8 mg qday  - Bladder scans per protocol and straight cath prn ordered  - If requiring straight cath often will consider paula and urology follow up as frequent retention can lead to UTI

## 2022-05-12 NOTE — PROGRESS NOTES
Bladder scan performed shows > 933. Straight cath performed and returned 1150ml urine. Notified Dr. Vogel.

## 2022-05-12 NOTE — PLAN OF CARE
Problem: Adult Inpatient Plan of Care  Goal: Plan of Care Review  Outcome: Ongoing, Progressing  Goal: Patient-Specific Goal (Individualized)  Description: Admit Date: 5/4/2022  Outcome: Ongoing, Progressing     Problem: Seizure, Active Management  Goal: Absence of Seizure/Seizure-Related Injury  Outcome: Ongoing, Progressing     Problem: Skin Injury Risk Increased  Goal: Skin Health and Integrity  Outcome: Ongoing, Progressing     Problem: Fall Injury Risk  Goal: Absence of Fall and Fall-Related Injury  Outcome: Ongoing, Progressing     POC reviewed. AAOX4. B/P <100. LR infusing @83cc/hr. Neuro checks Wnl's. Up to BSC w/asst. Voiding without problems. Remain with poor appetite. Encourage reposition. Son@bedside. Call light in reach. Frequent safety checks.

## 2022-05-12 NOTE — PT/OT/SLP PROGRESS
Occupational Therapy      Patient Name:  Teresa Leos   MRN:  [id number]    Patient not seen today secondary to patient politely deferred treatment due to late arrival of lunch.  Therapist unable to return. Will follow-up 5/13/22.    5/12/2022

## 2022-05-12 NOTE — PLAN OF CARE
"  Problem: Infection  Goal: Absence of Infection Signs and Symptoms  Outcome: Ongoing, Progressing     Problem: Adult Inpatient Plan of Care  Goal: Plan of Care Review  Outcome: Ongoing, Progressing  Goal: Patient-Specific Goal (Individualized)  Description: Admit Date: 5/4/2022    Focal seizures    Past Medical History:  No date: Hyperlipidemia  No date: Hypertension  No date: Osteoporosis    Past Surgical History:  No date: HIP REPLACEMENT ARTHROPLASTY; Right  No date: HYSTERECTOMY  No date: LEG SURGERY  No date: SKIN CANCER EXCISION  No date: TONSILLECTOMY, ADENOIDECTOMY    Individualization:   1. Pt likes to be called "Tuyet"     Restraints: none        Outcome: Ongoing, Progressing  Goal: Absence of Hospital-Acquired Illness or Injury  Outcome: Ongoing, Progressing  Goal: Optimal Comfort and Wellbeing  Outcome: Ongoing, Progressing  Goal: Readiness for Transition of Care  Outcome: Ongoing, Progressing     Problem: Skin Injury Risk Increased  Goal: Skin Health and Integrity  Outcome: Ongoing, Progressing     Problem: Impaired Wound Healing  Goal: Optimal Wound Healing  Outcome: Ongoing, Progressing     Problem: Adjustment to Illness (Delirium)  Goal: Optimal Coping  Outcome: Ongoing, Progressing     Problem: Altered Behavior (Delirium)  Goal: Improved Behavioral Control  Outcome: Ongoing, Progressing     Problem: Attention and Thought Clarity Impairment (Delirium)  Goal: Improved Attention and Thought Clarity  Outcome: Ongoing, Progressing     Problem: Sleep Disturbance (Delirium)  Goal: Improved Sleep  Outcome: Ongoing, Progressing     Problem: Fall Injury Risk  Goal: Absence of Fall and Fall-Related Injury  Outcome: Ongoing, Progressing     Problem: Restraint, Nonbehavioral (Nonviolent)  Goal: Absence of Harm or Injury  Outcome: Ongoing, Progressing     Problem: Seizure, Active Management  Goal: Absence of Seizure/Seizure-Related Injury  Outcome: Ongoing, Progressing     "

## 2022-05-12 NOTE — HOSPITAL COURSE
Patient was admitted for new onset seizures and uncontrolled afib in RVR. 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 She was placed on valproic acid, lacosamide, and leveitracetam.   During her [facility] stay she was also in atrial fibrillation RVR which was difficult to control. She is on metoprolol, diltiazem, and amiodarone. She developed hypoxia during her admission. CXR showed moderate bilateral pleural effusions that occurred over two days. Started on furosemide IV, and underwent diuresis, was hypotensive on 5/12. She was given 250cc LR with improvement in BP. Then changed to oral diuretics. Neurology was consulted 5/13 due to ongoing oversedation with AEDs for help with AED adjustment. Recommended increasing vimpat back to 200 mg BID. Hypotensive again. Stopped diltiazem and increased metoprolol. Gave bolus fluids. However still with hypoxia due to pulmonary edema. Increased furosemide to 80 mg daily. Also with hyponatremia likely from hypervolemia and SIADH.    On 5/17 she became significantly more hypoxic, requiring as much as 10-16 L HFNC to maintain her oxygen saturation. Was HTN to 180s/90s, was given 20mg IV hydralazine with improvement and 80mg IV lasix, she was weaned back to 7L HFNC. Continued on IV lasix 80mg IV TID, CT head done due to intermittent somnolence and confusion, no acute abnormality shown. ABG showed no hypercarbia. Started on vanc/zosyn for possible PNA.    PT/OT recommended SNF

## 2022-05-12 NOTE — PROGRESS NOTES
[facility] - Telemetry [facility] Medicine  Progress Note    Patient Name: Teresa Leos  MRN: [id number]  Patient Class: IP- Inpatient   Admission Date: 5/4/2022  Length of Stay: 7 days  Attending Physician: Joaquín Aldana MD  Primary Care Provider: Joseph Mcpherson NP        Subjective:     Principal Problem:Seizure-like activity        HPI:  78 y/o WF hx of recently diagnosed Atrial Fibrillation, Alzheimers Dementia, Hypertension, Hyperlipidemia who is transferred for concern of seizure and acute encephalopathy.     History provided by EMR review and by pts Son-Daughter-in-Law at bedside.     For the last 2 weeks patient has  noted to have progressive confusion worse 1 week ago since Wednesday.  This past Friday - April 29th, daughter-in-law noted patient was outside and throwing rocks away in the trash can, when she went outside to see the patient patient had fallen down and was convulsing, reported seizure activity for 30-40 minutes.  Patient was confused afterward, EMS called and patient admitted to [facility] from 4/29 - Mon 5/1.  Family states @ [place], [facility] [place] states patient had just passed out/syncope, stroke was ruled out with CNS imaging reported CT & MRI, records not available for review.  Atrial fibrillation was noted as new diagnosis and patient was discharged on multiple new medications - including Toprol XL 25mg BID & Apixaban 5mg BID.      Today patient was at dining table eating, when she suddenly stopped responding, reported staring spells and then developed convulsions.   En Route via EMS to [facility] reported pt with additional seizures.      Tele stroke Neurology eval completed @ [facility], pt transferred to [facility] for further w/u due to concern of seizures as inciting issue.     Bedside interview - patient is awake, alert, oriented to self, location, denies any acute complaints, no chest pain, dyspnea, palpitations, no LE swelling, no visual complaints, no traumatic  injury     ED [facility] -  Keppra 2gm iv x1,  Amiodarone - unclear amt.  Metoprolol 5mg iv x 1,  LR bolus 500cc      Overview/Hospital Course:  Patient was admitted for new onset seizures and uncontrolled afib in RVR. 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 She was placed on valproic acid, lacosamide, and leveitracetam.   During her [facility] stay she was also in atrial fibrillation RVR which was difficult to control. She is on metoprolol, diltiazem, and amiodarone. She developed hypoxia during her admission. CXR showed moderate bilateral pleural effusions that occurred over two days. Started on furosemide IV, and underwent diuresis. PT/OT recommended SNF      Interval History: NAEON    Review of Systems   Constitutional:  Negative for fatigue and fever.   Respiratory:  Negative for cough and shortness of breath.    Cardiovascular:  Negative for chest pain and leg swelling.   Gastrointestinal:  Negative for abdominal distention and abdominal pain.   Objective:     Vital Signs (Most Recent):  Temp: 97.7 °F (36.5 °C) (05/12/22 1133)  Pulse: 89 (05/12/22 1133)  Resp: 20 (05/12/22 1133)  BP: 123/79 (05/12/22 1133)  SpO2: 96 % (05/12/22 1133) Vital Signs (24h Range):  Temp:  [97.7 °F (36.5 °C)-99.3 °F (37.4 °C)] 97.7 °F (36.5 °C)  Pulse:  [] 89  Resp:  [16-20] 20  SpO2:  [92 %-96 %] 96 %  BP: ()/(56-87) 123/79     Weight: 51.3 kg (113 lb)  Body mass index is 21.35 kg/m².    Intake/Output Summary (Last 24 hours) at 5/12/2022 1319  Last data filed at 5/12/2022 0815  Gross per 24 hour   Intake 400 ml   Output 1600 ml   Net -1200 ml      Physical Exam  Constitutional:       General: She is not in acute distress.     Appearance: Normal appearance. She is not ill-appearing.   Cardiovascular:      Rate and Rhythm: Normal rate and regular rhythm.      Pulses: Normal pulses.      Heart sounds: Normal heart sounds.   Pulmonary:       Effort: Pulmonary effort is normal.      Comments: Crackles at the bases  Abdominal:      General: Abdomen is flat. Bowel sounds are normal. There is no distension.      Palpations: Abdomen is soft.   Musculoskeletal:      Right lower leg: No edema.      Left lower leg: No edema.   Skin:     General: Skin is warm.      Findings: No lesion or rash.   Neurological:      General: No focal deficit present.      Mental Status: She is alert.   Psychiatric:         Mood and Affect: Mood normal.         Behavior: Behavior normal.       Significant Labs: All pertinent labs within the past 24 hours have been reviewed.  CBC:   Recent Labs   Lab 05/11/22  0237   WBC 11.75   HGB 10.3*   HCT 31.3*        CMP:   Recent Labs   Lab 05/10/22  1454 05/11/22  0237 05/12/22  0535   * 132* 133*   K 4.1 4.6 3.6    100 98   CO2 23 24 27   * 82 89   BUN 16 17 18   CREATININE 1.0 0.8 0.8   CALCIUM 8.5* 8.1* 8.4*   PROT 5.6* 5.4* 5.4*   ALBUMIN 2.6* 2.4* 2.2*   BILITOT 0.5 0.6 0.6   ALKPHOS 58 46* 50*   AST 27 34 20   ALT 20 15 12   ANIONGAP 11 8 8   EGFRNONAA 54.1* >60.0 >60.0       Significant Imaging: I have reviewed all pertinent imaging results/findings within the past 24 hours.      Assessment/Plan:      * Seizure-like activity  76 y/o female with new onset seizures and AF presents with recurrent focal right sided seizures. Likely 2/2 prior stroke, +/- provoked by recent course of ciprofloxacin for UTI     Siezures  - Epilepsy following, appreciate their recs  - C/w keppra 1 g BID, vimpat 200 mg BID, depakote 260 mg IV q8hrs (15 mg/kg/day)   - Ammonia wnl, depakote level therapeutic (goal )  - Infectious w/u initiated on arrival to [facility] on 5/4, NGTD and UA noninfectious, has completed 7d treatment for previously diagnosed UTI  - MRI unremarkable  - Consider neurology consult in AM to help with transition to PO and potential oversedation    Acute hypoxemic respiratory failure  Volume overload  Likely due to  volume overload, not on O2 at home, will continue diuresis. Potentially due to afib w/ RVR leading to volume overload. EF shows normal systolic function on 5/5  - Lasix 40mg IV daily    Prolonged Q-T interval on ECG  May have been secondary to amiodarone gtt -> improved     - QTc 490 on AM ECG   - Trend daily  - Aggressively replete K/Mag  - Avoid additional QTc prolonging medications      Urinary retention  Requiring frequent straight caths      - UA on admission non-infectious   - C/w silodosin 8 mg qday  - Bladder scans per protocol and straight cath prn ordered  - If requiring straight cath often will consider paula and urology follow up as frequent retention can lead to UTI      Dementia  Mild per family reports, remains independent of ADLs     - c/w home donepezil 5 mg  - Delirium precautions, q4hr neuro checks o/n  - Melatonin 6 mg qhs  - Avoid seroquel/antipsychotics given prolonged QTc       Other hyperlipidemia  Continue atorvastatin      Gastroesophageal reflux disease  Continue protonix in place of home PPI      Atrial fibrillation with RVR  HR into 170s-180 prior to transfer to [facility] admission given BB and loaded with amiodarone     - Currently maintaining adequate MAPs  - Continue apixaban for anticoagulation  - C/w amiodarone 200 mg BID, lopressor 50 mg q6hrs, dilt 30 mg q6hrs  - C/w short acting agents today, if HR remains stable can likely transition to dilt XR and toprol XL tomorrow to facilitate home medication compliance     Essential hypertension  BP soft on admission, holding home antihypertensives.      - Hold home amlodipine 5 mg qday, irbesartan-HCTZ 300-12.5 mg qday  - Goal normotension         VTE Risk Mitigation (From admission, onward)         Ordered     apixaban tablet 5 mg  2 times daily         05/10/22 0923     IP VTE HIGH RISK PATIENT  Once         05/04/22 2039     Place sequential compression device  Until discontinued         05/04/22 2039                Discharge Planning   MONA:  5/16/2022     Code Status: Full Code   Is the patient medically ready for discharge?: No    Reason for patient still in hospital (select all that apply): Patient trending condition, Treatment and Pending disposition  Discharge Plan A: Home with family                  Joaquín Aldana MD  Department of Hospital Medicine   [facility] - Telemetry Stepdown

## 2022-05-12 NOTE — SIGNIFICANT EVENT
Received notice from RN that patient's BP is softer - 89/52, giving 250cc LR, potentially patient was overdiuresed. Would monitor BP and HR closely as patient has recent Hx of significant Afib with RVR in the NCC unit    Plan  - 250cc bolus over 2 hours    Joaquín Aldana M.D.  Department of Hospital Medicine  [facility]  Pager: [phone]

## 2022-05-13 LAB
ANION GAP SERPL CALC-SCNC: 9 MMOL/L (ref 8–16)
BASOPHILS # BLD AUTO: 0.04 K/UL (ref 0–0.2)
BASOPHILS NFR BLD: 0.3 % (ref 0–1.9)
BUN SERPL-MCNC: 20 MG/DL (ref 8–23)
CALCIUM SERPL-MCNC: 8.5 MG/DL (ref 8.7–10.5)
CHLORIDE SERPL-SCNC: 97 MMOL/L (ref 95–110)
CO2 SERPL-SCNC: 24 MMOL/L (ref 23–29)
CREAT SERPL-MCNC: 0.8 MG/DL (ref 0.5–1.4)
DIFFERENTIAL METHOD: ABNORMAL
EOSINOPHIL # BLD AUTO: 0 K/UL (ref 0–0.5)
EOSINOPHIL NFR BLD: 0.1 % (ref 0–8)
ERYTHROCYTE [DISTWIDTH] IN BLOOD BY AUTOMATED COUNT: 14.2 % (ref 11.5–14.5)
EST. GFR  (AFRICAN AMERICAN): >60 ML/MIN/1.73 M^2
EST. GFR  (NON AFRICAN AMERICAN): >60 ML/MIN/1.73 M^2
GLUCOSE SERPL-MCNC: 98 MG/DL (ref 70–110)
HCT VFR BLD AUTO: 29 % (ref 37–48.5)
HGB BLD-MCNC: 9.7 G/DL (ref 12–16)
IMM GRANULOCYTES # BLD AUTO: 0.05 K/UL (ref 0–0.04)
IMM GRANULOCYTES NFR BLD AUTO: 0.4 % (ref 0–0.5)
LYMPHOCYTES # BLD AUTO: 2.1 K/UL (ref 1–4.8)
LYMPHOCYTES NFR BLD: 17.3 % (ref 18–48)
MAGNESIUM SERPL-MCNC: 1.8 MG/DL (ref 1.6–2.6)
MCH RBC QN AUTO: 32.2 PG (ref 27–31)
MCHC RBC AUTO-ENTMCNC: 33.4 G/DL (ref 32–36)
MCV RBC AUTO: 96 FL (ref 82–98)
MONOCYTES # BLD AUTO: 1.4 K/UL (ref 0.3–1)
MONOCYTES NFR BLD: 11.8 % (ref 4–15)
NEUTROPHILS # BLD AUTO: 8.5 K/UL (ref 1.8–7.7)
NEUTROPHILS NFR BLD: 70.1 % (ref 38–73)
NRBC BLD-RTO: 0 /100 WBC
PLATELET # BLD AUTO: 281 K/UL (ref 150–450)
PMV BLD AUTO: 11.8 FL (ref 9.2–12.9)
POTASSIUM SERPL-SCNC: 4.2 MMOL/L (ref 3.5–5.1)
RBC # BLD AUTO: 3.01 M/UL (ref 4–5.4)
SODIUM SERPL-SCNC: 130 MMOL/L (ref 136–145)
VALPROATE SERPL-MCNC: 59.2 UG/ML (ref 50–100)
WBC # BLD AUTO: 12.08 K/UL (ref 3.9–12.7)

## 2022-05-13 PROCEDURE — 25000003 PHARM REV CODE 250

## 2022-05-13 PROCEDURE — 25000003 PHARM REV CODE 250: Performed by: STUDENT IN AN ORGANIZED HEALTH CARE EDUCATION/TRAINING PROGRAM

## 2022-05-13 PROCEDURE — 99900035 HC TECH TIME PER 15 MIN (STAT)

## 2022-05-13 PROCEDURE — 25000003 PHARM REV CODE 250: Performed by: PHYSICIAN ASSISTANT

## 2022-05-13 PROCEDURE — 80164 ASSAY DIPROPYLACETIC ACD TOT: CPT

## 2022-05-13 PROCEDURE — 25000003 PHARM REV CODE 250: Performed by: HOSPITALIST

## 2022-05-13 PROCEDURE — 63600175 PHARM REV CODE 636 W HCPCS: Performed by: HOSPITALIST

## 2022-05-13 PROCEDURE — 85025 COMPLETE CBC W/AUTO DIFF WBC: CPT | Performed by: STUDENT IN AN ORGANIZED HEALTH CARE EDUCATION/TRAINING PROGRAM

## 2022-05-13 PROCEDURE — 80048 BASIC METABOLIC PNL TOTAL CA: CPT | Performed by: STUDENT IN AN ORGANIZED HEALTH CARE EDUCATION/TRAINING PROGRAM

## 2022-05-13 PROCEDURE — 99233 PR SUBSEQUENT HOSPITAL CARE,LEVL III: ICD-10-PCS | Mod: ,,, | Performed by: STUDENT IN AN ORGANIZED HEALTH CARE EDUCATION/TRAINING PROGRAM

## 2022-05-13 PROCEDURE — 25000003 PHARM REV CODE 250: Performed by: INTERNAL MEDICINE

## 2022-05-13 PROCEDURE — 94761 N-INVAS EAR/PLS OXIMETRY MLT: CPT

## 2022-05-13 PROCEDURE — 20600001 HC STEP DOWN PRIVATE ROOM

## 2022-05-13 PROCEDURE — 97530 THERAPEUTIC ACTIVITIES: CPT

## 2022-05-13 PROCEDURE — 36415 COLL VENOUS BLD VENIPUNCTURE: CPT

## 2022-05-13 PROCEDURE — 27000221 HC OXYGEN, UP TO 24 HOURS

## 2022-05-13 PROCEDURE — 99233 SBSQ HOSP IP/OBS HIGH 50: CPT | Mod: ,,, | Performed by: STUDENT IN AN ORGANIZED HEALTH CARE EDUCATION/TRAINING PROGRAM

## 2022-05-13 PROCEDURE — 83735 ASSAY OF MAGNESIUM: CPT | Performed by: STUDENT IN AN ORGANIZED HEALTH CARE EDUCATION/TRAINING PROGRAM

## 2022-05-13 RX ORDER — VALPROIC ACID 250 MG/1
250 CAPSULE, LIQUID FILLED ORAL 3 TIMES DAILY
Status: DISCONTINUED | OUTPATIENT
Start: 2022-05-13 | End: 2022-05-25 | Stop reason: HOSPADM

## 2022-05-13 RX ORDER — LACOSAMIDE 50 MG/1
100 TABLET ORAL EVERY 12 HOURS
Status: DISCONTINUED | OUTPATIENT
Start: 2022-05-13 | End: 2022-05-15

## 2022-05-13 RX ORDER — FUROSEMIDE 40 MG/1
40 TABLET ORAL DAILY
Status: DISCONTINUED | OUTPATIENT
Start: 2022-05-14 | End: 2022-05-15

## 2022-05-13 RX ADMIN — METOPROLOL TARTRATE 50 MG: 50 TABLET, FILM COATED ORAL at 05:05

## 2022-05-13 RX ADMIN — LEVETIRACETAM 1000 MG: 500 TABLET, FILM COATED ORAL at 09:05

## 2022-05-13 RX ADMIN — METOPROLOL TARTRATE 50 MG: 50 TABLET, FILM COATED ORAL at 12:05

## 2022-05-13 RX ADMIN — SODIUM CHLORIDE 1 G: 1 TABLET ORAL at 09:05

## 2022-05-13 RX ADMIN — DILTIAZEM HYDROCHLORIDE 30 MG: 30 TABLET, FILM COATED ORAL at 01:05

## 2022-05-13 RX ADMIN — POLYETHYLENE GLYCOL 3350 17 G: 17 POWDER, FOR SOLUTION ORAL at 09:05

## 2022-05-13 RX ADMIN — LACOSAMIDE 200 MG: 50 TABLET, FILM COATED ORAL at 09:05

## 2022-05-13 RX ADMIN — DILTIAZEM HYDROCHLORIDE 30 MG: 30 TABLET, FILM COATED ORAL at 05:05

## 2022-05-13 RX ADMIN — AMIODARONE HYDROCHLORIDE 200 MG: 200 TABLET ORAL at 09:05

## 2022-05-13 RX ADMIN — VALPROIC ACID 250 MG: 250 CAPSULE, LIQUID FILLED ORAL at 09:05

## 2022-05-13 RX ADMIN — APIXABAN 5 MG: 5 TABLET, FILM COATED ORAL at 09:05

## 2022-05-13 RX ADMIN — PANTOPRAZOLE SODIUM 40 MG: 40 TABLET, DELAYED RELEASE ORAL at 09:05

## 2022-05-13 RX ADMIN — DONEPEZIL HYDROCHLORIDE 5 MG: 5 TABLET ORAL at 09:05

## 2022-05-13 RX ADMIN — MELATONIN TAB 3 MG 6 MG: 3 TAB at 09:05

## 2022-05-13 RX ADMIN — SILODOSIN 8 MG: 8 CAPSULE ORAL at 09:05

## 2022-05-13 RX ADMIN — ONDANSETRON 4 MG: 2 INJECTION INTRAMUSCULAR; INTRAVENOUS at 12:05

## 2022-05-13 RX ADMIN — SENNOSIDES AND DOCUSATE SODIUM 2 TABLET: 50; 8.6 TABLET ORAL at 09:05

## 2022-05-13 RX ADMIN — DILTIAZEM HYDROCHLORIDE 30 MG: 30 TABLET, FILM COATED ORAL at 12:05

## 2022-05-13 RX ADMIN — METOPROLOL TARTRATE 50 MG: 50 TABLET, FILM COATED ORAL at 01:05

## 2022-05-13 RX ADMIN — VALPROIC ACID 250 MG: 250 CAPSULE, LIQUID FILLED ORAL at 04:05

## 2022-05-13 RX ADMIN — ATORVASTATIN CALCIUM 40 MG: 20 TABLET, FILM COATED ORAL at 09:05

## 2022-05-13 RX ADMIN — VALPROIC ACID 260 MG: 250 SOLUTION ORAL at 05:05

## 2022-05-13 NOTE — SUBJECTIVE & OBJECTIVE
Interval History: NAEON    Review of Systems   Constitutional:  Negative for fatigue and fever.   Respiratory:  Negative for cough and shortness of breath.    Cardiovascular:  Negative for chest pain and leg swelling.   Gastrointestinal:  Negative for abdominal distention and abdominal pain.   Objective:     Vital Signs (Most Recent):  Temp: 97.9 °F (36.6 °C) (05/13/22 1142)  Pulse: (!) 122 (05/13/22 1142)  Resp: 20 (05/13/22 1142)  BP: (!) 140/98 (05/13/22 1142)  SpO2: 99 % (05/13/22 1142) Vital Signs (24h Range):  Temp:  [97.5 °F (36.4 °C)-98.1 °F (36.7 °C)] 97.9 °F (36.6 °C)  Pulse:  [] 122  Resp:  [16-20] 20  SpO2:  [86 %-99 %] 99 %  BP: ()/(52-98) 140/98     Weight: 51.3 kg (113 lb)  Body mass index is 21.35 kg/m².    Intake/Output Summary (Last 24 hours) at 5/13/2022 1352  Last data filed at 5/13/2022 0950  Gross per 24 hour   Intake 360 ml   Output 1450 ml   Net -1090 ml        Physical Exam  Constitutional:       General: She is not in acute distress.     Appearance: Normal appearance. She is not ill-appearing.   Cardiovascular:      Rate and Rhythm: Normal rate and regular rhythm.      Pulses: Normal pulses.      Heart sounds: Normal heart sounds.   Pulmonary:      Effort: Pulmonary effort is normal.      Comments: Crackles at the bases  Abdominal:      General: Abdomen is flat. Bowel sounds are normal. There is no distension.      Palpations: Abdomen is soft.   Musculoskeletal:      Right lower leg: No edema.      Left lower leg: No edema.   Skin:     General: Skin is warm.      Findings: No lesion or rash.   Neurological:      General: No focal deficit present.      Mental Status: She is alert.   Psychiatric:         Mood and Affect: Mood normal.         Behavior: Behavior normal.       Significant Labs: All pertinent labs within the past 24 hours have been reviewed.  CBC:   Recent Labs   Lab 05/13/22  0541   WBC 12.08   HGB 9.7*   HCT 29.0*          CMP:   Recent Labs   Lab  05/12/22  0535 05/13/22  0541   * 130*   K 3.6 4.2   CL 98 97   CO2 27 24   GLU 89 98   BUN 18 20   CREATININE 0.8 0.8   CALCIUM 8.4* 8.5*   PROT 5.4*  --    ALBUMIN 2.2*  --    BILITOT 0.6  --    ALKPHOS 50*  --    AST 20  --    ALT 12  --    ANIONGAP 8 9   EGFRNONAA >60.0 >60.0         Significant Imaging: I have reviewed all pertinent imaging results/findings within the past 24 hours.

## 2022-05-13 NOTE — PROGRESS NOTES
[facility] - Telemetry [facility] Medicine  Progress Note    Patient Name: Teresa Leos  MRN: [id number]  Patient Class: IP- Inpatient   Admission Date: 5/4/2022  Length of Stay: 8 days  Attending Physician: Joaquín Aldana MD  Primary Care Provider: Joseph Mcpherson NP        Subjective:     Principal Problem:Seizure-like activity        HPI:  76 y/o WF hx of recently diagnosed Atrial Fibrillation, Alzheimers Dementia, Hypertension, Hyperlipidemia who is transferred for concern of seizure and acute encephalopathy.     History provided by EMR review and by pts Son-Daughter-in-Law at bedside.     For the last 2 weeks patient has  noted to have progressive confusion worse 1 week ago since Wednesday.  This past Friday - April 29th, daughter-in-law noted patient was outside and throwing rocks away in the trash can, when she went outside to see the patient patient had fallen down and was convulsing, reported seizure activity for 30-40 minutes.  Patient was confused afterward, EMS called and patient admitted to [facility] from 4/29 - Mon 5/1.  Family states @ [place], [facility] states patient had just passed out/syncope, stroke was ruled out with CNS imaging reported CT & MRI, records not available for review.  Atrial fibrillation was noted as new diagnosis and patient was discharged on multiple new medications - including Toprol XL 25mg BID & Apixaban 5mg BID.      Today patient was at dining table eating, when she suddenly stopped responding, reported staring spells and then developed convulsions.   En Route via EMS to [facility] reported pt with additional seizures.      Tele stroke Neurology eval completed @ [facility], pt transferred to [facility] for further w/u due to concern of seizures as inciting issue.     Bedside interview - patient is awake, alert, oriented to self, location, denies any acute complaints, no chest pain, dyspnea, palpitations, no LE swelling, no visual complaints, no traumatic  injury     ED [facility] -  Keppra 2gm iv x1,  Amiodarone - unclear amt.  Metoprolol 5mg iv x 1,  LR bolus 500cc      Overview/Hospital Course:  Patient was admitted for new onset seizures and uncontrolled afib in RVR. She was transferred to 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 She was placed on valproic acid, lacosamide, and leveitracetam.   During her [facility] stay she was also in atrial fibrillation RVR which was difficult to control. She is on metoprolol, diltiazem, and amiodarone. She developed hypoxia during her admission. CXR showed moderate bilateral pleural effusions that occurred over two days. Started on furosemide IV, and underwent diuresis, was hypotensive on 5/12. She was given 250cc LR with improvement in BP. Then changed to oral diuretics. Neurology was consulted 5/13 due to ongoing oversedation with AEDs for help with AED adjustment    PT/OT recommended SNF      Interval History: NAEON    Review of Systems   Constitutional:  Negative for fatigue and fever.   Respiratory:  Negative for cough and shortness of breath.    Cardiovascular:  Negative for chest pain and leg swelling.   Gastrointestinal:  Negative for abdominal distention and abdominal pain.   Objective:     Vital Signs (Most Recent):  Temp: 97.9 °F (36.6 °C) (05/13/22 1142)  Pulse: (!) 122 (05/13/22 1142)  Resp: 20 (05/13/22 1142)  BP: (!) 140/98 (05/13/22 1142)  SpO2: 99 % (05/13/22 1142) Vital Signs (24h Range):  Temp:  [97.5 °F (36.4 °C)-98.1 °F (36.7 °C)] 97.9 °F (36.6 °C)  Pulse:  [] 122  Resp:  [16-20] 20  SpO2:  [86 %-99 %] 99 %  BP: ()/(52-98) 140/98     Weight: 51.3 kg (113 lb)  Body mass index is 21.35 kg/m².    Intake/Output Summary (Last 24 hours) at 5/13/2022 1352  Last data filed at 5/13/2022 0950  Gross per 24 hour   Intake 360 ml   Output 1450 ml   Net -1090 ml        Physical Exam  Constitutional:       General: She is not in acute distress.      Appearance: Normal appearance. She is not ill-appearing.   Cardiovascular:      Rate and Rhythm: Normal rate and regular rhythm.      Pulses: Normal pulses.      Heart sounds: Normal heart sounds.   Pulmonary:      Effort: Pulmonary effort is normal.      Comments: Crackles at the bases  Abdominal:      General: Abdomen is flat. Bowel sounds are normal. There is no distension.      Palpations: Abdomen is soft.   Musculoskeletal:      Right lower leg: No edema.      Left lower leg: No edema.   Skin:     General: Skin is warm.      Findings: No lesion or rash.   Neurological:      General: No focal deficit present.      Mental Status: She is alert.   Psychiatric:         Mood and Affect: Mood normal.         Behavior: Behavior normal.       Significant Labs: All pertinent labs within the past 24 hours have been reviewed.  CBC:   Recent Labs   Lab 05/13/22  0541   WBC 12.08   HGB 9.7*   HCT 29.0*          CMP:   Recent Labs   Lab 05/12/22  0535 05/13/22  0541   * 130*   K 3.6 4.2   CL 98 97   CO2 27 24   GLU 89 98   BUN 18 20   CREATININE 0.8 0.8   CALCIUM 8.4* 8.5*   PROT 5.4*  --    ALBUMIN 2.2*  --    BILITOT 0.6  --    ALKPHOS 50*  --    AST 20  --    ALT 12  --    ANIONGAP 8 9   EGFRNONAA >60.0 >60.0         Significant Imaging: I have reviewed all pertinent imaging results/findings within the past 24 hours.      Assessment/Plan:      * Seizure-like activity  76 y/o female with new onset seizures and AF presents with recurrent focal right sided seizures. Likely 2/2 prior stroke, +/- provoked by recent course of ciprofloxacin for UTI, then developed volume overload, and was diuresed.      Siezures  - Epilepsy following, appreciate their recs  - keppra 1 g BID  - Reduce vimpat to 100mg BID  - depakote 250mg Q8H  - Ammonia wnl, depakote level therapeutic (goal )  - Infectious w/u initiated on arrival to [facility] on 5/4, NGTD and UA noninfectious, has completed 7d treatment for previously diagnosed  UTI  - MRI unremarkable  - neurology consulted on 5/13 as patient was overly sedated, likely due to AEDs    Acute hypoxemic respiratory failure  Volume overload  Likely due to volume overload, not on O2 at home,. Potentially due to afib w/ RVR leading to volume overload. EF shows normal systolic function on 5/5  - Lasix 40mg PO daily    Prolonged Q-T interval on ECG  May have been secondary to amiodarone gtt -> improved     - QTc 490 on AM ECG   - Trend daily  - Aggressively replete K/Mag  - Avoid additional QTc prolonging medications      Urinary retention  Requiring frequent straight caths      - UA on admission non-infectious   - C/w silodosin 8 mg qday  - Bladder scans per protocol and straight cath prn ordered  - If requiring straight cath often will consider paula and urology follow up as frequent retention can lead to UTI      Dementia  Mild per family reports, remains independent of ADLs     - c/w home donepezil 5 mg  - Delirium precautions, q4hr neuro checks o/n  - Melatonin 6 mg qhs  - Avoid seroquel/antipsychotics given prolonged QTc       Other hyperlipidemia  Continue atorvastatin      Gastroesophageal reflux disease  Continue protonix in place of home PPI      Atrial fibrillation with RVR  HR into 170s-180 prior to transfer to [facility] admission given BB and loaded with amiodarone     - Currently maintaining adequate MAPs  - Continue apixaban for anticoagulation  - C/w amiodarone 200 mg BID, lopressor 50 mg q6hrs, dilt 30 mg q6hrs  - C/w short acting agents today, if HR remains stable can likely transition to dilt XR and toprol XL tomorrow to facilitate home medication compliance     Essential hypertension  BP soft on admission, holding home antihypertensives.      - Hold home amlodipine 5 mg qday, irbesartan-HCTZ 300-12.5 mg qday  - Goal normotension         VTE Risk Mitigation (From admission, onward)         Ordered     apixaban tablet 5 mg  2 times daily         05/10/22 0923     IP VTE HIGH RISK  PATIENT  Once         05/04/22 2039     Place sequential compression device  Until discontinued         05/04/22 2039                Discharge Planning   MONA: 5/16/2022     Code Status: Full Code   Is the patient medically ready for discharge?: No    Reason for patient still in hospital (select all that apply): Patient trending condition, Treatment and Consult recommendations  Discharge Plan A: Home with family                  Joaquín Aldana MD  Department of Hospital Medicine   [facility] - Telemetry Stepdown

## 2022-05-13 NOTE — RESPIRATORY THERAPY
RAPID RESPONSE RESPIRATORY CHART CHECK       Chart check completed, instructed to call [phone] for further concerns or assistance.

## 2022-05-13 NOTE — ASSESSMENT & PLAN NOTE
HR into 170s-180 prior to transfer to [facility] admission given BB and loaded with amiodarone     - Currently maintaining adequate MAPs  - Continue apixaban for anticoagulation  - C/w amiodarone 200 mg BID, lopressor 50 mg q6hrs, dilt 30 mg q6hrs  - C/w short acting agents today, if HR remains stable can likely transition to dilt XR and toprol XL tomorrow to facilitate home medication compliance

## 2022-05-13 NOTE — PLAN OF CARE
Problem: Adult Inpatient Plan of Care  Goal: Plan of Care Review  Outcome: Ongoing, Progressing     Problem: Skin Injury Risk Increased  Goal: Skin Health and Integrity  Outcome: Ongoing, Progressing     Problem: Seizure, Active Management  Goal: Absence of Seizure/Seizure-Related Injury  Outcome: Ongoing, Progressing     Problem: Fall Injury Risk  Goal: Absence of Fall and Fall-Related Injury  Outcome: Ongoing, Progressing     POC reviewed with son.  Patient had been drowsy but following commands but seen by neurology for oversedation decrease in medications, AAOX4. VVS. Up to BSC w/asst.  Episode of vomit x1, prn. Remain on O2 @2L. No s/s of distress. Frequent safety checks.

## 2022-05-13 NOTE — ASSESSMENT & PLAN NOTE
Volume overload  Likely due to volume overload, not on O2 at home,. Potentially due to afib w/ RVR leading to volume overload. EF shows normal systolic function on 5/5  - Lasix 40mg PO daily

## 2022-05-13 NOTE — PT/OT/SLP PROGRESS
Occupational Therapy   Treatment    Name: Teresa Leos  MRN: [id number]  Admitting Diagnosis:  Seizure-like activity       Recommendations:     Discharge Recommendations: nursing facility, skilled  Discharge Equipment Recommendations:  wheelchair, shower chair  Barriers to discharge:       Assessment:     Teresa Leos is a 77 y.o. female with a medical diagnosis of Seizure-like activity.  She presents with some AMS which son states may be from medications. Requires frequent cueing and direction for tasks. Performance deficits affecting function are weakness, impaired endurance, impaired self care skills, impaired functional mobilty, gait instability, impaired balance, impaired cognition, decreased coordination, decreased safety awareness.     Rehab Prognosis:  Good; patient would benefit from acute skilled OT services to address these deficits and reach maximum level of function.       Plan:     Patient to be seen 3 x/week to address the above listed problems via self-care/home management, therapeutic activities, therapeutic exercises  · Plan of Care Expires: 06/09/22  · Plan of Care Reviewed with: patient, son, caregiver    Subjective     Pain/Comfort:  · Pain Rating 1: 0/10  · Pain Rating Post-Intervention 1: 0/10    Objective:     Communicated with: rn prior to session.  Patient found supine with telemetry, pulse ox (continuous) upon OT entry to room.    General Precautions: Standard, fall   Orthopedic Precautions:N/A   Braces:    Respiratory Status: Room air     Occupational Performance:     Bed Mobility:    · Patient completed Rolling/Turning to Left with  moderate assistance  · Patient completed Scooting/Bridging with maximal assistance  · Patient completed Supine to Sit with maximal assistance     Functional Mobility/Transfers:  · Patient completed Sit <> Stand Transfer with minimum assistance  with  no assistive device   · Patient completed Bed <> Chair Transfer using Stand Pivot technique with moderate  assistance with no assistive device  · Patient completed Toilet Transfer Stand Pivot technique with moderate assistance with  bedside commode    AMPA 6 Click ADL: 18    Treatment & Education:  Encouraged activity / OOB with staff over the weekend.  Discussed OT POC and progress.    Patient left up in chair with all lines intact, call button in reach and family presentEducation:      GOALS:   Multidisciplinary Problems     Occupational Therapy Goals        Problem: Occupational Therapy    Goal Priority Disciplines Outcome Interventions   Occupational Therapy Goal     OT, PT/OT Ongoing, Progressing    Description: Goals set on 5/11/2022 with expiration date 5/25/2022:  Patient will increase functional independence with ADLs by performing:    Supine <> Sit with Stand-by Assistance.  Grooming while standing at sink with Stand-by Assistance.  UB Dressing with Stand-by Assistance.  LB Dressing with Stand-by Assistance.  Step transfer with Stand-by Assistance with DME as needed.  Pt will demonstrate understanding of education provided regarding energy conservation and task modification through teach-back method.                    Multidisciplinary Problems (Resolved)        Problem: Occupational Therapy    Goal Priority Disciplines Outcome Interventions   Occupational Therapy Goal   (Resolved)     OT, PT/OT Met                    Time Tracking:     OT Date of Treatment: 05/13/22  OT Start Time: 1054  OT Stop Time: 1121  OT Total Time (min): 27 min    Billable Minutes:Therapeutic Activity 27    OT/HAYDEE: OT          5/13/2022

## 2022-05-13 NOTE — ASSESSMENT & PLAN NOTE
78 y/o female with new onset seizures and AF presents with recurrent focal right sided seizures. Likely 2/2 prior stroke, +/- provoked by recent course of ciprofloxacin for UTI, then developed volume overload, and was diuresed.      Siezures  - Epilepsy following, appreciate their recs  - keppra 1 g BID  - Reduce vimpat to 100mg BID  - depakote 250mg Q8H  - Ammonia wnl, depakote level therapeutic (goal )  - Infectious w/u initiated on arrival to [facility] on 5/4, NGTD and UA noninfectious, has completed 7d treatment for previously diagnosed UTI  - MRI unremarkable  - neurology consulted on 5/13 as patient was overly sedated, likely due to AEDs

## 2022-05-13 NOTE — CONSULTS
Consult received and patient seen by neurology service. Plan discussed with primary team.   Please see POC done by Mars Gonzalez MD for full plan.     Melany Cochran MD   Neurology Resident, PGY2   [facility]

## 2022-05-13 NOTE — PLAN OF CARE
[facility]  Neurology  Plan of Care    Patient's chart was reviewed by a Neurology team provider.    Ms. Leos is a 78 y/o female with PMH significant for dementia, hypertension and recently diagnosed atrial fibrillation who presented to [facility] as a transfer from Hospital Medicine team for further management of recurrent focal seizures and AF with RVR. Patient initially presented to [facility] on April 29th with confusion and witnessed convulsions. During her two day admission, stroke was reportedly ruled out and no further seizures were noted. Patient was diagnosed with new onset AF, for which she was discharged on AC and BB. She represented to [facility] on 5/4 after family reported intermittent staring spells. She had witnessed convulsions during EMS transport to [facility] ED. She had a telestroke consult and was transferred to [facility] for further evaluation. She had a total of 2g Keppra and was started on 500 mg BID for maintenance. She was given BB and amiodarone for her HR. On arrival to [facility], patient was awake and oriented to self and place. While followed by [facility], patient developed AF RVR with HR into 180s with stable BP which did not respond to IV BB. Patient was also having fluctuating mentation associated with staring, deviated gaze, and becoming nonverbal, eventually also associated with left sided weakness and facial twitching.  Patient was given amiodarone per  team and was loaded with 2g Keppra and 2 mg lorazepam. Patient did not return to her neurological baseline after medications, still requiring sternal rubbing to say her name. cEEG was placed and interpreted by Dr. Brown, noted to have recurrent focal right sided seizures. Given her AF RVR and recurrent seizures refractory to benzodiazepines and Keppra load, patient was transferred to [facility] for further management and diagnostics.     On arrival to [facility], patient was given small fluid bolus and loaded with 300 mg lacosamide. She is  intermittently following simple commands and saying her name, protecting her airway.      Per NCC, hospital course has been as follows;  5/6/2022: Admitted to NSICU overnight, given keppra 2 g IV and ativan 2 mg IV prior to transfer. Given vimpat 300 mg IV x1 and additional ativan 2 mg IV x1 overnight for ongoing electrographic seizures. Last electrographic seizure at 0230. Remains on IV amiodarone loading dose, HR improved to 110s, remains in Afib  5/7/2022 Initiated straight cath, bladder scans, and Tubefeeds, Trending EEG, MRI pending  05/08/2022 A fib with rate in the 140s. 500cc bolus x 1. KUB with significant stool burden. Optimize emma reg and suppository. EEG with high seizure risk. Depakote load and schedule maintenance dose.   05/09/2022 cEEG w/o seizures since depakote load, level therapeutic this AM at 59. Remains in Afib w/ HR in 130-140s on amio gtt. Noted to sundown overnight, became acutely agitated, improved s/p BM. Clinically improved this AM, much more alert and interactive, passed SLP eval.   05/10/2022 NAEON, no further episodes of agitation, QTc improved to 491 this AM. CXR w/ mild pulmonary edema, on 3 L O2 via NC, given lasix. HR remains in 120-130s, BP stable. cEEG negative, d/c'd. Switched back to home apixaban   05/11/2022 NAEON, per family at bedside was slightly more confused this AM, more alert at time of exam. Started on dilt 30 mg q6hrs yesterday afternoon, H  HR significantly improved overnight.     Most recent interval history:  Neurology was consulted today for AED optimization in the context of oversedation. Today the patient had an episode of low BP to 89/52 requiring hospital medicine intervention with recs to give 240cc LR over 2 hours, with concern for overdiuresis. When seen the patient is resting comfortably in bed with family at bedside. She arouses to voice and smiles. Per nursing just had an episode of vomiting. Per family she has been talkative and her usual self, but  when she receives AED medications she becomes drowsy. On exam, she opens her eyes to voice, is oriented to person, place and care plan, and obeys commands albeit slowly. She demonstrates insight into her condition, and nods understanding when care plan is explained, but continues to fall asleep at multiple points during the evaluation.     Her AED regimen has been the following: Keppra 1000mg IV q12h started on 5/6 (given 2000mg IV and 500mg PO on 5/5), Lacosamide 200mg PO q12h started on 5/11, and Depakote 15mg/kg IV q8h starting on 5/9. Pt started on Depakote 250mg PO capsule TID on 5/13.     NEUROLOGICAL EXAMINATION:     MENTAL STATUS   Oriented to person.   Oriented to place.   Attention: decreased. Concentration: decreased.   Speech: speech is normal   Level of consciousness: arousable by verbal stimuli ,  drowsy  Knowledge: good.   Normal comprehension. Praxis: normal     CRANIAL NERVES     CN II   Visual fields full to confrontation.     CN III, IV, VI   Pupils are equal, round, and reactive to light.  Extraocular motions are normal.   Right pupil: Size: 3 mm.   Left pupil: Size: 3 mm.   CN III: no CN III palsy  CN VI: no CN VI palsy  Nystagmus: none     CN VIII   Hearing: impaired    MOTOR EXAM   Muscle bulk: normal       UE strength diminished but responds to commands     SENSORY EXAM   Light touch normal.     GAIT AND COORDINATION      Coordination   Finger to nose coordination: abnormal       Slowed finger to nose, no pass pointing     There are no new labs/micro/imaging to review.    Pending diagnostics to follow up on include: None    Recommendations:  - Decrease Vimpat to 100mg PO BID, monitor for improvement in mentation  - Hold EEG at this time  - Monitor over the next 48 hours for seizure activity, and decide on re-starting EEG after    Discussed patient with staff.   Will follow patient peripherally. Please contact Neurology for any questions or concerns.     James Gonzalez MD  S, [facility] Psychiatry,  PGY1  [facility]

## 2022-05-14 LAB
ANION GAP SERPL CALC-SCNC: 10 MMOL/L (ref 8–16)
BASOPHILS # BLD AUTO: 0.03 K/UL (ref 0–0.2)
BASOPHILS NFR BLD: 0.3 % (ref 0–1.9)
BUN SERPL-MCNC: 14 MG/DL (ref 8–23)
CALCIUM SERPL-MCNC: 8.8 MG/DL (ref 8.7–10.5)
CHLORIDE SERPL-SCNC: 98 MMOL/L (ref 95–110)
CO2 SERPL-SCNC: 25 MMOL/L (ref 23–29)
CREAT SERPL-MCNC: 0.7 MG/DL (ref 0.5–1.4)
DIFFERENTIAL METHOD: ABNORMAL
EOSINOPHIL # BLD AUTO: 0 K/UL (ref 0–0.5)
EOSINOPHIL NFR BLD: 0.2 % (ref 0–8)
ERYTHROCYTE [DISTWIDTH] IN BLOOD BY AUTOMATED COUNT: 13.8 % (ref 11.5–14.5)
EST. GFR  (AFRICAN AMERICAN): >60 ML/MIN/1.73 M^2
EST. GFR  (NON AFRICAN AMERICAN): >60 ML/MIN/1.73 M^2
GLUCOSE SERPL-MCNC: 80 MG/DL (ref 70–110)
HCT VFR BLD AUTO: 34.2 % (ref 37–48.5)
HGB BLD-MCNC: 11.5 G/DL (ref 12–16)
IMM GRANULOCYTES # BLD AUTO: 0.04 K/UL (ref 0–0.04)
IMM GRANULOCYTES NFR BLD AUTO: 0.4 % (ref 0–0.5)
LYMPHOCYTES # BLD AUTO: 2 K/UL (ref 1–4.8)
LYMPHOCYTES NFR BLD: 19.3 % (ref 18–48)
MAGNESIUM SERPL-MCNC: 2 MG/DL (ref 1.6–2.6)
MCH RBC QN AUTO: 33.3 PG (ref 27–31)
MCHC RBC AUTO-ENTMCNC: 33.6 G/DL (ref 32–36)
MCV RBC AUTO: 99 FL (ref 82–98)
MONOCYTES # BLD AUTO: 1.4 K/UL (ref 0.3–1)
MONOCYTES NFR BLD: 13.1 % (ref 4–15)
NEUTROPHILS # BLD AUTO: 7.1 K/UL (ref 1.8–7.7)
NEUTROPHILS NFR BLD: 66.7 % (ref 38–73)
NRBC BLD-RTO: 0 /100 WBC
PLATELET # BLD AUTO: 293 K/UL (ref 150–450)
PMV BLD AUTO: 11.9 FL (ref 9.2–12.9)
POTASSIUM SERPL-SCNC: 4 MMOL/L (ref 3.5–5.1)
RBC # BLD AUTO: 3.45 M/UL (ref 4–5.4)
SODIUM SERPL-SCNC: 133 MMOL/L (ref 136–145)
VALPROATE SERPL-MCNC: 62.1 UG/ML (ref 50–100)
WBC # BLD AUTO: 10.59 K/UL (ref 3.9–12.7)

## 2022-05-14 PROCEDURE — 25000003 PHARM REV CODE 250: Performed by: HOSPITALIST

## 2022-05-14 PROCEDURE — 25000003 PHARM REV CODE 250

## 2022-05-14 PROCEDURE — 25000003 PHARM REV CODE 250: Performed by: EMERGENCY MEDICINE

## 2022-05-14 PROCEDURE — 80048 BASIC METABOLIC PNL TOTAL CA: CPT | Performed by: STUDENT IN AN ORGANIZED HEALTH CARE EDUCATION/TRAINING PROGRAM

## 2022-05-14 PROCEDURE — 85025 COMPLETE CBC W/AUTO DIFF WBC: CPT | Performed by: STUDENT IN AN ORGANIZED HEALTH CARE EDUCATION/TRAINING PROGRAM

## 2022-05-14 PROCEDURE — 25000003 PHARM REV CODE 250: Performed by: INTERNAL MEDICINE

## 2022-05-14 PROCEDURE — 36415 COLL VENOUS BLD VENIPUNCTURE: CPT

## 2022-05-14 PROCEDURE — 99233 SBSQ HOSP IP/OBS HIGH 50: CPT | Mod: ,,, | Performed by: INTERNAL MEDICINE

## 2022-05-14 PROCEDURE — 83735 ASSAY OF MAGNESIUM: CPT | Performed by: STUDENT IN AN ORGANIZED HEALTH CARE EDUCATION/TRAINING PROGRAM

## 2022-05-14 PROCEDURE — 99233 PR SUBSEQUENT HOSPITAL CARE,LEVL III: ICD-10-PCS | Mod: ,,, | Performed by: INTERNAL MEDICINE

## 2022-05-14 PROCEDURE — 20600001 HC STEP DOWN PRIVATE ROOM

## 2022-05-14 PROCEDURE — 25000003 PHARM REV CODE 250: Performed by: PHYSICIAN ASSISTANT

## 2022-05-14 PROCEDURE — 25000003 PHARM REV CODE 250: Performed by: STUDENT IN AN ORGANIZED HEALTH CARE EDUCATION/TRAINING PROGRAM

## 2022-05-14 PROCEDURE — 80164 ASSAY DIPROPYLACETIC ACD TOT: CPT

## 2022-05-14 RX ADMIN — SENNOSIDES AND DOCUSATE SODIUM 2 TABLET: 50; 8.6 TABLET ORAL at 09:05

## 2022-05-14 RX ADMIN — VALPROIC ACID 250 MG: 250 CAPSULE, LIQUID FILLED ORAL at 09:05

## 2022-05-14 RX ADMIN — DILTIAZEM HYDROCHLORIDE 30 MG: 30 TABLET, FILM COATED ORAL at 09:05

## 2022-05-14 RX ADMIN — MELATONIN TAB 3 MG 6 MG: 3 TAB at 09:05

## 2022-05-14 RX ADMIN — METOPROLOL TARTRATE 50 MG: 50 TABLET, FILM COATED ORAL at 09:05

## 2022-05-14 RX ADMIN — LEVETIRACETAM 1000 MG: 500 TABLET, FILM COATED ORAL at 09:05

## 2022-05-14 RX ADMIN — ATORVASTATIN CALCIUM 40 MG: 20 TABLET, FILM COATED ORAL at 09:05

## 2022-05-14 RX ADMIN — POLYETHYLENE GLYCOL 3350 17 G: 17 POWDER, FOR SOLUTION ORAL at 09:05

## 2022-05-14 RX ADMIN — METOPROLOL TARTRATE 50 MG: 50 TABLET, FILM COATED ORAL at 03:05

## 2022-05-14 RX ADMIN — AMIODARONE HYDROCHLORIDE 200 MG: 200 TABLET ORAL at 09:05

## 2022-05-14 RX ADMIN — Medication 6 MG: at 09:05

## 2022-05-14 RX ADMIN — APIXABAN 5 MG: 5 TABLET, FILM COATED ORAL at 09:05

## 2022-05-14 RX ADMIN — PANTOPRAZOLE SODIUM 40 MG: 40 TABLET, DELAYED RELEASE ORAL at 09:05

## 2022-05-14 RX ADMIN — SODIUM CHLORIDE 1 G: 1 TABLET ORAL at 09:05

## 2022-05-14 RX ADMIN — DILTIAZEM HYDROCHLORIDE 30 MG: 30 TABLET, FILM COATED ORAL at 12:05

## 2022-05-14 RX ADMIN — LACOSAMIDE 100 MG: 50 TABLET, FILM COATED ORAL at 09:05

## 2022-05-14 RX ADMIN — DILTIAZEM HYDROCHLORIDE 30 MG: 30 TABLET, FILM COATED ORAL at 06:05

## 2022-05-14 RX ADMIN — METOPROLOL TARTRATE 50 MG: 50 TABLET, FILM COATED ORAL at 01:05

## 2022-05-14 RX ADMIN — VALPROIC ACID 250 MG: 250 CAPSULE, LIQUID FILLED ORAL at 03:05

## 2022-05-14 RX ADMIN — DILTIAZEM HYDROCHLORIDE 30 MG: 30 TABLET, FILM COATED ORAL at 03:05

## 2022-05-14 RX ADMIN — FUROSEMIDE 40 MG: 40 TABLET ORAL at 09:05

## 2022-05-14 RX ADMIN — DONEPEZIL HYDROCHLORIDE 5 MG: 5 TABLET ORAL at 09:05

## 2022-05-14 NOTE — PLAN OF CARE
Problem: Infection  Goal: Absence of Infection Signs and Symptoms  Outcome: Ongoing, Progressing     Problem: Adult Inpatient Plan of Care  Goal: Plan of Care Review  Outcome: Ongoing, Progressing     Problem: Adult Inpatient Plan of Care  Goal: Patient-Specific Goal (Individualized)      POC reviewed with pt and son. Answered all questions. A&Ox3. Denies pain. Turn q2. 2LNC. Bed in lowest position, call light within reach, bed alarm set, non skid socks on, heel and sacral protection in place.

## 2022-05-14 NOTE — NURSING
"Pt's son to nursing station to request that nurse clean up pt and the BSC after pt stated that he had to " put my own mother on the potty". Son stated that when tech came around for VS he asked tech to help pt to the BSC but that the tech stated that there was only two people on the floor and he would come back. Pt stated that he waited 30 minutes for tech to return but that he never did. Pt stated that he was "angry and irate". Nurse asked Son if he pressed call bell to ask for help. Pt stated that he did not press call bell for help. He stated that if he was not at the bedside, his mother would not be able to press call bell because " she is too doped up". Nurse informed Son that rounding on pt's is done q2 hours. Son stated that that was not true that it had been four hours. Nurse informed pt that nurse had rounded on pt 11/2 hours ago and son was asleep at bedside. Nurse informed Son that she did not want to argue and that pt would be looked for and cared for appropriately. Nurse cleaned pt and emptied BSC BM. Pt turned again. Pt verbalized understanding that he would press call bell for help. Nurse continued to educate that rounding happens every two hours.   "

## 2022-05-14 NOTE — PLAN OF CARE
[facility]  Neurology    Interval history: patient evaluated at bedside along with son and it seems that her mental status gradually improved yesterday afternoon. Son concerned about possibly starting a new medication, but explained that yesterday patient was transitioned from IV to oral depakote which might have been the medication change. She did not receive her night vimpat but no seizure activity overnight.   Explained in detail current AED regimen: keppra 1g BID, vimpat 100mg BID and depakoe 250mg TID all in oral forms and also went over yesterday's recommendations to decrease vimpat to 100mg BID from 200mg BID.     MRI personally reviewed and there is extensive white matter change with encephalomalacia including R temporal area where seizure focus was seen on EEG. Patient overall has poor cerebral reserve now coming out of NCSE, along with  BP/HR instability plus new AED that can initially affect mentation until body gets use to it.   Per son patient is about 70% her mental baseline today.     Recommendations:   -- Continue current AED regimen: keppra 1g BID, vimpat 100mg BID, depakote 250mg TID all in oral form   -- Start delirium precations including:    - Minimize use of restraints; if physical restraints  necessary, please utilize medical/chemical prns  for agitation.   - Keep shades open and room lit during day and  room dim at night in order to promote healthy  circadian rhythms.   - Encourage family at bedside   - Keep whiteboard in patient's room current with  the date and name of the members of patient's  team for easy patient self re-orientation.  -- Monitor for signs of seizures including: prolonged starring, changes in eye movement, rhythmic extremity movements   -- Rest of management per primary team.   -- Please call for any questions or concerns; will continue to follow peripherally     Melany Cochran MD   Neurology Resident, PGY2   [facility]

## 2022-05-14 NOTE — NURSING
"Pt stated that at 2100 he did not want his mom to get lacosamide because he did not want her to be " doped up again". MD notified. MD stated he would let you day team know.   "

## 2022-05-15 PROBLEM — I95.9 HYPOTENSION: Status: ACTIVE | Noted: 2022-05-15

## 2022-05-15 LAB
ANION GAP SERPL CALC-SCNC: 11 MMOL/L (ref 8–16)
BASOPHILS # BLD AUTO: 0.07 K/UL (ref 0–0.2)
BASOPHILS NFR BLD: 0.7 % (ref 0–1.9)
BUN SERPL-MCNC: 13 MG/DL (ref 8–23)
CALCIUM SERPL-MCNC: 8.6 MG/DL (ref 8.7–10.5)
CHLORIDE SERPL-SCNC: 94 MMOL/L (ref 95–110)
CO2 SERPL-SCNC: 24 MMOL/L (ref 23–29)
CREAT SERPL-MCNC: 0.7 MG/DL (ref 0.5–1.4)
DIFFERENTIAL METHOD: ABNORMAL
EOSINOPHIL # BLD AUTO: 0.1 K/UL (ref 0–0.5)
EOSINOPHIL NFR BLD: 0.8 % (ref 0–8)
ERYTHROCYTE [DISTWIDTH] IN BLOOD BY AUTOMATED COUNT: 13.1 % (ref 11.5–14.5)
EST. GFR  (AFRICAN AMERICAN): >60 ML/MIN/1.73 M^2
EST. GFR  (NON AFRICAN AMERICAN): >60 ML/MIN/1.73 M^2
GLUCOSE SERPL-MCNC: 71 MG/DL (ref 70–110)
HCT VFR BLD AUTO: 30.7 % (ref 37–48.5)
HGB BLD-MCNC: 10.3 G/DL (ref 12–16)
IMM GRANULOCYTES # BLD AUTO: 0.13 K/UL (ref 0–0.04)
IMM GRANULOCYTES NFR BLD AUTO: 1.2 % (ref 0–0.5)
LYMPHOCYTES # BLD AUTO: 1.8 K/UL (ref 1–4.8)
LYMPHOCYTES NFR BLD: 16.4 % (ref 18–48)
MAGNESIUM SERPL-MCNC: 1.8 MG/DL (ref 1.6–2.6)
MCH RBC QN AUTO: 32.3 PG (ref 27–31)
MCHC RBC AUTO-ENTMCNC: 33.6 G/DL (ref 32–36)
MCV RBC AUTO: 96 FL (ref 82–98)
MONOCYTES # BLD AUTO: 1.7 K/UL (ref 0.3–1)
MONOCYTES NFR BLD: 15.6 % (ref 4–15)
NEUTROPHILS # BLD AUTO: 7 K/UL (ref 1.8–7.7)
NEUTROPHILS NFR BLD: 65.3 % (ref 38–73)
NRBC BLD-RTO: 0 /100 WBC
PLATELET # BLD AUTO: 288 K/UL (ref 150–450)
PMV BLD AUTO: 11.3 FL (ref 9.2–12.9)
POTASSIUM SERPL-SCNC: 3.7 MMOL/L (ref 3.5–5.1)
RBC # BLD AUTO: 3.19 M/UL (ref 4–5.4)
SODIUM SERPL-SCNC: 129 MMOL/L (ref 136–145)
VALPROATE SERPL-MCNC: 61.5 UG/ML (ref 50–100)
WBC # BLD AUTO: 10.7 K/UL (ref 3.9–12.7)

## 2022-05-15 PROCEDURE — 97116 GAIT TRAINING THERAPY: CPT | Mod: CQ

## 2022-05-15 PROCEDURE — 83735 ASSAY OF MAGNESIUM: CPT | Performed by: STUDENT IN AN ORGANIZED HEALTH CARE EDUCATION/TRAINING PROGRAM

## 2022-05-15 PROCEDURE — 25000003 PHARM REV CODE 250

## 2022-05-15 PROCEDURE — 25000003 PHARM REV CODE 250: Performed by: INTERNAL MEDICINE

## 2022-05-15 PROCEDURE — 36415 COLL VENOUS BLD VENIPUNCTURE: CPT

## 2022-05-15 PROCEDURE — 85025 COMPLETE CBC W/AUTO DIFF WBC: CPT | Performed by: STUDENT IN AN ORGANIZED HEALTH CARE EDUCATION/TRAINING PROGRAM

## 2022-05-15 PROCEDURE — 20600001 HC STEP DOWN PRIVATE ROOM

## 2022-05-15 PROCEDURE — 25000003 PHARM REV CODE 250: Performed by: STUDENT IN AN ORGANIZED HEALTH CARE EDUCATION/TRAINING PROGRAM

## 2022-05-15 PROCEDURE — 99233 PR SUBSEQUENT HOSPITAL CARE,LEVL III: ICD-10-PCS | Mod: ,,, | Performed by: INTERNAL MEDICINE

## 2022-05-15 PROCEDURE — 80048 BASIC METABOLIC PNL TOTAL CA: CPT | Performed by: STUDENT IN AN ORGANIZED HEALTH CARE EDUCATION/TRAINING PROGRAM

## 2022-05-15 PROCEDURE — 80164 ASSAY DIPROPYLACETIC ACD TOT: CPT

## 2022-05-15 PROCEDURE — 25000003 PHARM REV CODE 250: Performed by: PHYSICIAN ASSISTANT

## 2022-05-15 PROCEDURE — 25000003 PHARM REV CODE 250: Performed by: HOSPITALIST

## 2022-05-15 PROCEDURE — 97530 THERAPEUTIC ACTIVITIES: CPT | Mod: CQ

## 2022-05-15 PROCEDURE — 99233 SBSQ HOSP IP/OBS HIGH 50: CPT | Mod: ,,, | Performed by: INTERNAL MEDICINE

## 2022-05-15 RX ORDER — AMIODARONE HYDROCHLORIDE 200 MG/1
400 TABLET ORAL 2 TIMES DAILY
Status: DISPENSED | OUTPATIENT
Start: 2022-05-15 | End: 2022-05-18

## 2022-05-15 RX ORDER — METOPROLOL TARTRATE 50 MG/1
100 TABLET ORAL EVERY 6 HOURS
Status: DISCONTINUED | OUTPATIENT
Start: 2022-05-15 | End: 2022-05-15

## 2022-05-15 RX ORDER — SODIUM CHLORIDE 9 MG/ML
INJECTION, SOLUTION INTRAVENOUS CONTINUOUS
Status: ACTIVE | OUTPATIENT
Start: 2022-05-15 | End: 2022-05-15

## 2022-05-15 RX ORDER — AMIODARONE HYDROCHLORIDE 200 MG/1
200 TABLET ORAL DAILY
Status: DISCONTINUED | OUTPATIENT
Start: 2022-05-19 | End: 2022-05-15

## 2022-05-15 RX ORDER — AMIODARONE HYDROCHLORIDE 200 MG/1
200 TABLET ORAL DAILY
Status: DISCONTINUED | OUTPATIENT
Start: 2022-05-15 | End: 2022-05-16

## 2022-05-15 RX ORDER — METOPROLOL TARTRATE 50 MG/1
50 TABLET ORAL EVERY 6 HOURS
Status: DISCONTINUED | OUTPATIENT
Start: 2022-05-15 | End: 2022-05-15

## 2022-05-15 RX ORDER — AMOXICILLIN 250 MG
2 CAPSULE ORAL DAILY
Status: DISCONTINUED | OUTPATIENT
Start: 2022-05-19 | End: 2022-05-25 | Stop reason: HOSPADM

## 2022-05-15 RX ORDER — DILTIAZEM HYDROCHLORIDE 30 MG/1
30 TABLET, FILM COATED ORAL EVERY 8 HOURS
Status: DISCONTINUED | OUTPATIENT
Start: 2022-05-15 | End: 2022-05-15

## 2022-05-15 RX ORDER — LACOSAMIDE 50 MG/1
200 TABLET ORAL EVERY 12 HOURS
Status: DISCONTINUED | OUTPATIENT
Start: 2022-05-15 | End: 2022-05-16

## 2022-05-15 RX ORDER — METOPROLOL SUCCINATE 100 MG/1
200 TABLET, EXTENDED RELEASE ORAL DAILY
Status: DISCONTINUED | OUTPATIENT
Start: 2022-05-16 | End: 2022-05-15

## 2022-05-15 RX ORDER — DILTIAZEM HYDROCHLORIDE 120 MG/1
120 CAPSULE, COATED, EXTENDED RELEASE ORAL NIGHTLY
Status: DISCONTINUED | OUTPATIENT
Start: 2022-05-15 | End: 2022-05-15

## 2022-05-15 RX ORDER — AMIODARONE HYDROCHLORIDE 200 MG/1
400 TABLET ORAL 2 TIMES DAILY
Status: DISCONTINUED | OUTPATIENT
Start: 2022-05-15 | End: 2022-05-15

## 2022-05-15 RX ORDER — DILTIAZEM HYDROCHLORIDE 30 MG/1
30 TABLET, FILM COATED ORAL ONCE
Status: DISCONTINUED | OUTPATIENT
Start: 2022-05-15 | End: 2022-05-15

## 2022-05-15 RX ORDER — FUROSEMIDE 80 MG/1
80 TABLET ORAL DAILY
Status: DISCONTINUED | OUTPATIENT
Start: 2022-05-16 | End: 2022-05-17

## 2022-05-15 RX ORDER — AMIODARONE HYDROCHLORIDE 200 MG/1
200 TABLET ORAL DAILY
Status: DISCONTINUED | OUTPATIENT
Start: 2022-05-19 | End: 2022-05-25 | Stop reason: HOSPADM

## 2022-05-15 RX ADMIN — APIXABAN 5 MG: 5 TABLET, FILM COATED ORAL at 09:05

## 2022-05-15 RX ADMIN — SILODOSIN 8 MG: 8 CAPSULE ORAL at 10:05

## 2022-05-15 RX ADMIN — METOPROLOL TARTRATE 50 MG: 50 TABLET, FILM COATED ORAL at 10:05

## 2022-05-15 RX ADMIN — LACOSAMIDE 200 MG: 50 TABLET, FILM COATED ORAL at 09:05

## 2022-05-15 RX ADMIN — METOPROLOL TARTRATE 50 MG: 50 TABLET, FILM COATED ORAL at 12:05

## 2022-05-15 RX ADMIN — VALPROIC ACID 250 MG: 250 CAPSULE, LIQUID FILLED ORAL at 10:05

## 2022-05-15 RX ADMIN — AMIODARONE HYDROCHLORIDE 400 MG: 200 TABLET ORAL at 09:05

## 2022-05-15 RX ADMIN — VALPROIC ACID 250 MG: 250 CAPSULE, LIQUID FILLED ORAL at 09:05

## 2022-05-15 RX ADMIN — DILTIAZEM HYDROCHLORIDE 30 MG: 30 TABLET, FILM COATED ORAL at 05:05

## 2022-05-15 RX ADMIN — DILTIAZEM HYDROCHLORIDE 30 MG: 30 TABLET, FILM COATED ORAL at 02:05

## 2022-05-15 RX ADMIN — DILTIAZEM HYDROCHLORIDE 30 MG: 30 TABLET, FILM COATED ORAL at 12:05

## 2022-05-15 RX ADMIN — PANTOPRAZOLE SODIUM 40 MG: 40 TABLET, DELAYED RELEASE ORAL at 10:05

## 2022-05-15 RX ADMIN — MELATONIN TAB 3 MG 6 MG: 3 TAB at 09:05

## 2022-05-15 RX ADMIN — AMIODARONE HYDROCHLORIDE 200 MG: 200 TABLET ORAL at 10:05

## 2022-05-15 RX ADMIN — METOPROLOL TARTRATE 75 MG: 50 TABLET, FILM COATED ORAL at 09:05

## 2022-05-15 RX ADMIN — LEVETIRACETAM 1000 MG: 500 TABLET, FILM COATED ORAL at 09:05

## 2022-05-15 RX ADMIN — SODIUM CHLORIDE: 0.9 INJECTION, SOLUTION INTRAVENOUS at 05:05

## 2022-05-15 RX ADMIN — DONEPEZIL HYDROCHLORIDE 5 MG: 5 TABLET ORAL at 09:05

## 2022-05-15 RX ADMIN — METOPROLOL TARTRATE 50 MG: 50 TABLET, FILM COATED ORAL at 02:05

## 2022-05-15 RX ADMIN — APIXABAN 5 MG: 5 TABLET, FILM COATED ORAL at 10:05

## 2022-05-15 RX ADMIN — LEVETIRACETAM 1000 MG: 500 TABLET, FILM COATED ORAL at 10:05

## 2022-05-15 RX ADMIN — FUROSEMIDE 40 MG: 40 TABLET ORAL at 10:05

## 2022-05-15 RX ADMIN — VALPROIC ACID 250 MG: 250 CAPSULE, LIQUID FILLED ORAL at 02:05

## 2022-05-15 RX ADMIN — ATORVASTATIN CALCIUM 40 MG: 20 TABLET, FILM COATED ORAL at 09:05

## 2022-05-15 NOTE — PLAN OF CARE
Pt remains confused. Son reports pt having hallucinations. None observed by me. O2 remains stable on 3L NC. Son at bedside. No acute events overnight. Turned Q2. Side rails x2. Bed in lowest position and locked. Call bell in reach. Instructed to call if assistance is needed. Pt and family verbalize understanding

## 2022-05-15 NOTE — ASSESSMENT & PLAN NOTE
HR into 170s-180 prior to transfer to [facility] admission given BB and loaded with amiodarone  Improving  - Continue apixaban for anticoagulation  - amiodarone 400 mg BID x 6 doses to complete load and then 200 mg daily  -hold diltiazem due to hypotension requiring bolus fluids  - increase metoprolol to 75 mg q6h - can escalate to 400 mg daily. Transition to XL when optimal dosing determined

## 2022-05-15 NOTE — ASSESSMENT & PLAN NOTE
Volume overload  Acute diastolic heart failure  Likely due to volume overload, not on O2 at home,. Potentially due to afib w/ RVR leading to volume overload. EF shows normal systolic function on 5/5. No description of diastolic function and CVP 8.  - Continue furosemide 80 mg daily  - Repeat ECHO to assess volume status and cardiac function

## 2022-05-15 NOTE — ASSESSMENT & PLAN NOTE
Mild per family reports, remains independent of ADLs     - c/w home donepezil 5 mg  - Delirium precautions  - Melatonin 6 mg qhs  - Avoid seroquel/antipsychotics given prolonged QTc

## 2022-05-15 NOTE — ASSESSMENT & PLAN NOTE
Hypotensive episodes  Stop home amlodipine and irbesartan-HCTZ in favor AV kwabena blockade  Goal SBP <160 due to age and comorbidites

## 2022-05-15 NOTE — PROGRESS NOTES
Hospital Medicine  Progress note    Team: [facility] MED Z Keiko Marlow MD  Admit Date: 5/4/2022    Principal Problem:  Seizure-like activity    Interval hx:  Son refused a dose of lacosamide last due to concerns of sedation. Nursing did not report any other concerns.    ROS   Respiratory: neg for cough neg for shortness of breath  Cardiovascular: neg for chest pain neg for palpitations  Gastrointestinal: neg for nausea neg for vomiting, neg for abdominal pain neg for diarrhea neg for constipation   Behavioral/Psych: neg for depression neg for anxiety    PEx  Temp:  [97.5 °F (36.4 °C)-98.6 °F (37 °C)]   Pulse:  []   Resp:  [16-24]   BP: (112-156)/(70-87)   SpO2:  [92 %-94 %]     Intake/Output Summary (Last 24 hours) at 5/15/2022 0206  Last data filed at 5/15/2022 0000  Gross per 24 hour   Intake 375 ml   Output 500 ml   Net -125 ml       General Appearance: no acute distress, WD, elderly, ill-appearing  Heart: regular rate and rhythm, no heave  Respiratory: Normal respiratory effort, symmetric excursion, bilateral vesicular breath sounds   Abdomen: Soft, non-tender; bowel sounds active  Skin: intact, no rash, no ulcers  Neurologic:  No focal numbness or weakness  Mental status: Alert, oriented x 4, affect appropriate     Recent Labs   Lab 05/11/22  0237 05/13/22  0541 05/14/22  0914   WBC 11.75 12.08 10.59   HGB 10.3* 9.7* 11.5*   HCT 31.3* 29.0* 34.2*    281 293     Recent Labs   Lab 05/10/22  0055 05/10/22  1454 05/11/22  0237 05/12/22  0535 05/13/22  0541 05/14/22  0913   * 135* 132* 133* 130* 133*   K 4.2 4.1 4.6 3.6 4.2 4.0    101 100 98 97 98   CO2 23 23 24 27 24 25   BUN 13 16 17 18 20 14   CREATININE 0.9 1.0 0.8 0.8 0.8 0.7   GLU 98 120* 82 89 98 80   CALCIUM 8.3* 8.5* 8.1* 8.4* 8.5* 8.8   MG 2.1 2.1 2.0  --  1.8 2.0   PHOS 3.2 2.9 3.2  --   --   --      Recent Labs   Lab 05/10/22  1454 05/11/22  0237 05/12/22  0535   ALKPHOS 58 46* 50*   ALT 20 15 12   AST 27 34 20   ALBUMIN 2.6*  2.4* 2.2*   PROT 5.6* 5.4* 5.4*   BILITOT 0.5 0.6 0.6        No results for input(s): POCTGLUCOSE in the last 168 hours.    Scheduled Meds:   amiodarone  200 mg Oral BID    apixaban  5 mg Oral BID    atorvastatin  40 mg Oral QHS    diltiaZEM  30 mg Oral Q6H    donepeziL  5 mg Oral Nightly    furosemide  40 mg Oral Daily    lacosamide  100 mg Oral Q12H    levETIRAcetam  1,000 mg Oral BID    melatonin  6 mg Oral Nightly    metoprolol tartrate  50 mg Oral Q6H    pantoprazole  40 mg Oral Daily    polyethylene glycol  17 g Oral BID    senna-docusate 8.6-50 mg  2 tablet Oral BID    silodosin  8 mg Oral Daily    sodium chloride  1 g Oral BID    valproic acid  250 mg Oral TID     Continuous Infusions:    As Needed:  acetaminophen, aluminum-magnesium hydroxide-simethicone, bisacodyL, melatonin, naloxone, ondansetron, prochlorperazine, sodium chloride 0.9%    Assessment and Plan  / Problems managed today    * Seizure-like activity  76 y/o female with new onset seizures and AF presents with recurrent focal right sided seizures. Likely 2/2 prior stroke, +/- provoked by recent course of ciprofloxacin for UTI, then developed volume overload, and was diuresed.      Siezures  - Epilepsy following, appreciate their recs  - keppra 1 g BID  - Reduce vimpat to 100mg BID  - depakote 250mg Q8H  - Ammonia wnl, depakote level therapeutic (goal )  - Infectious w/u initiated on arrival to [facility] on 5/4, NGTD and UA noninfectious, has completed 7d treatment for previously diagnosed UTI  - MRI unremarkable  - neurology consulted on 5/13 as patient was overly sedated, likely due to AEDs -- patient alertness improved the following day. Neurology recommended continue current AEDs     Acute hypoxemic respiratory failure  Volume overload  Likely due to volume overload, not on O2 at home,. Potentially due to afib w/ RVR leading to volume overload. EF shows normal systolic function on 5/5  - Lasix 40mg PO daily    Prolonged Q-T  interval on ECG  May have been secondary to amiodarone gtt -> improved     - QTc 490 on AM ECG   - Trend daily  - Aggressively replete K/Mag  - Avoid additional QTc prolonging medications      Urinary retention  Requiring frequent straight caths      - UA on admission non-infectious   - C/w silodosin 8 mg qday  - Bladder scans per protocol and straight cath prn ordered  - If requiring straight cath often will consider paula and urology follow up as frequent retention can lead to UTI      Dementia  Mild per family reports, remains independent of ADLs     - c/w home donepezil 5 mg  - Delirium precautions, q4hr neuro checks o/n  - Melatonin 6 mg qhs  - Avoid seroquel/antipsychotics given prolonged QTc       Other hyperlipidemia  Continue atorvastatin      Gastroesophageal reflux disease  Continue protonix in place of home PPI      Atrial fibrillation with RVR  HR into 170s-180 prior to transfer to [facility] admission given BB and loaded with amiodarone     - Currently maintaining adequate MAPs  - Continue apixaban for anticoagulation  - C/w amiodarone 200 mg BID, lopressor 50 mg q6hrs, dilt 30 mg q6hrs  - C/w short acting agents today, if HR remains stable can likely transition to dilt XR and toprol XL tomorrow to facilitate home medication compliance     Essential hypertension  BP soft on admission, holding home antihypertensives.      - Hold home amlodipine 5 mg qday, irbesartan-HCTZ 300-12.5 mg qday  - Goal normotension     Discharge Planning   MONA: 5/16/2022     Code Status: Full Code   Is the patient medically ready for discharge?: No    Reason for patient still in hospital (select all that apply): Patient unstable and Patient trending condition  Discharge Plan A: Home with family        Diet:  regular diet  GI PPx: protonix  DVT PPx:  apixaban  Airways: room air  Wounds: none    Goals of Care:  Return to prior functional status       Time (minutes) spent in care of the patient (Greater than 1/2 spent in direct  face-to-face contact and care coordination on unit)  35 min     Keiko Marlow MD

## 2022-05-15 NOTE — PT/OT/SLP PROGRESS
"Physical Therapy Treatment    Patient Name:  Teresa Leos   MRN:  [id number]    Recommendations:     Discharge Recommendations:  nursing facility, skilled   Discharge Equipment Recommendations: shower chair, wheelchair   Barriers to discharge: Inaccessible home    Assessment:     Teresa Leos is a 77 y.o. female admitted with a medical diagnosis of Seizure-like activity.  She presents with the following impairments/functional limitations:  weakness, impaired endurance, impaired self care skills, impaired functional mobilty, gait instability, impaired balance, impaired cognition, decreased coordination, decreased safety awareness, decreased ROM, impaired cardiopulmonary response to activity . Patient showed improved balance and gait quality with the use of RW, but altered mental status limits motor planning and patient has difficulty transferring from sit<>stand.    Rehab Prognosis: Good and Fair; patient would benefit from acute skilled PT services to address these deficits and reach maximum level of function.    Recent Surgery: * No surgery found *      Plan:     During this hospitalization, patient to be seen 4 x/week to address the identified rehab impairments via gait training, therapeutic activities, therapeutic exercises, neuromuscular re-education and progress toward the following goals:    · Plan of Care Expires:  06/10/22    Subjective     Chief Complaint: none stated  Patient/Family Comments/goals: son' For her to go to SNF closer to her home"  Pain/Comfort:  · Pain Rating 1: 0/10  · Pain Rating Post-Intervention 1: 0/10      Objective:     Communicated with NSG prior to session.  Patient found up in chair with blood pressure cuff, telemetry, pulse ox (continuous) upon PT entry to room.     General Precautions: Standard, fall   Orthopedic Precautions:N/A   Braces: N/A  Respiratory Status: Room air     Functional Mobility:  · Transfers:     · Sit to Stand:  moderate assistance and maximal assistance " with rolling walker  · Gait: 8 ft x 4 trials with RW and min assistance, with chair in tow.      AM-PAC 6 CLICK MOBILITY  Turning over in bed (including adjusting bedclothes, sheets and blankets)?: 2  Sitting down on and standing up from a chair with arms (e.g., wheelchair, bedside commode, etc.): 2  Moving from lying on back to sitting on the side of the bed?: 2  Moving to and from a bed to a chair (including a wheelchair)?: 2  Need to walk in hospital room?: 3  Climbing 3-5 steps with a railing?: 1  Basic Mobility Total Score: 12       Therapeutic Activities and Exercises:   Donned a second gown. Static and weight shifting activity in standing 2x30 secs with RW for support and min assistance.    Patient left up in chair with all lines intact, call button in reach and family present..    GOALS:   Multidisciplinary Problems     Physical Therapy Goals        Problem: Physical Therapy    Goal Priority Disciplines Outcome Goal Variances Interventions   Physical Therapy Goal     PT, PT/OT Ongoing, Progressing     Description: Goals to be met by: 22    Patient will increase functional independence with mobility by performin. Supine to sit with supervision  2. Sit to supine with supervision  3. Sit to stand transfer with independence  4. Gait  x 80 feet with supervision using LRAD as needed  5. Ascend/descend 5 stair with left handrails supervision using LRAD as needed  6. Lower extremity exercise program x10 reps per handout, with independence                     Time Tracking:     PT Received On: 05/15/22  PT Start Time: 1127     PT Stop Time: 1152  PT Total Time (min): 25 min     Billable Minutes: Gait Training 15 and Therapeutic Activity 10    Treatment Type: Treatment  PT/PTA: PTA     PTA Visit Number: 1     05/15/2022

## 2022-05-15 NOTE — PROGRESS NOTES
Hospital Medicine  Progress note    Team: [facility] Keiko Marlow MD  Admit Date: 5/4/2022    Principal Problem:  Seizure-like activity    Interval hx:  Patient cooperative. Nursing reported low blood pressures but she was alert and asymptomatic. Drinking some fluids orally and she had multiple stools yesterday    ROS   Respiratory: neg for cough neg for shortness of breath  Cardiovascular: neg for chest pain neg for palpitations  Gastrointestinal: neg for nausea neg for vomiting, neg for abdominal pain neg for diarrhea neg for constipation   Behavioral/Psych: neg for depression neg for anxiety    PEx  Temp:  [97.7 °F (36.5 °C)-98.6 °F (37 °C)]   Pulse:  []   Resp:  [18-20]   BP: ()/(50-96)   SpO2:  [92 %-96 %]     Intake/Output Summary (Last 24 hours) at 5/15/2022 1759  Last data filed at 5/15/2022 0000  Gross per 24 hour   Intake 240 ml   Output 300 ml   Net -60 ml       General Appearance: no acute distress, WD, elderly, ill-appearing  Heart: regular rate and rhythm, no heave  Respiratory: Normal respiratory effort, symmetric excursion, bilateral vesicular breath sounds   Abdomen: Soft, non-tender; bowel sounds active  Skin: intact, no rash, no ulcers  Neurologic:  No focal numbness or weakness  Mental status: Alert, oriented x 4, affect appropriate     Recent Labs   Lab 05/13/22  0541 05/14/22  0914 05/15/22  0436   WBC 12.08 10.59 10.70   HGB 9.7* 11.5* 10.3*   HCT 29.0* 34.2* 30.7*    293 288     Recent Labs   Lab 05/10/22  0055 05/10/22  1454 05/11/22  0237 05/12/22  0535 05/13/22  0541 05/14/22  0913 05/15/22  0436   * 135* 132*   < > 130* 133* 129*   K 4.2 4.1 4.6   < > 4.2 4.0 3.7    101 100   < > 97 98 94*   CO2 23 23 24   < > 24 25 24   BUN 13 16 17   < > 20 14 13   CREATININE 0.9 1.0 0.8   < > 0.8 0.7 0.7   GLU 98 120* 82   < > 98 80 71   CALCIUM 8.3* 8.5* 8.1*   < > 8.5* 8.8 8.6*   MG 2.1 2.1 2.0  --  1.8 2.0 1.8   PHOS 3.2 2.9 3.2  --   --   --   --     < > = values  in this interval not displayed.     Recent Labs   Lab 05/10/22  1454 05/11/22  0237 05/12/22  0535   ALKPHOS 58 46* 50*   ALT 20 15 12   AST 27 34 20   ALBUMIN 2.6* 2.4* 2.2*   PROT 5.6* 5.4* 5.4*   BILITOT 0.5 0.6 0.6        No results for input(s): POCTGLUCOSE in the last 168 hours.    Scheduled Meds:   amiodarone  200 mg Oral Daily    amiodarone  400 mg Oral BID    Followed by    [START ON 5/19/2022] amiodarone  200 mg Oral Daily    apixaban  5 mg Oral BID    atorvastatin  40 mg Oral QHS    diltiaZEM  30 mg Oral Q8H    donepeziL  5 mg Oral Nightly    [START ON 5/16/2022] furosemide  80 mg Oral Daily    lacosamide  100 mg Oral Q12H    levETIRAcetam  1,000 mg Oral BID    melatonin  6 mg Oral Nightly    [START ON 5/16/2022] metoprolol succinate  200 mg Oral Daily    metoprolol tartrate  50 mg Oral Q6H    pantoprazole  40 mg Oral Daily    [START ON 5/19/2022] senna-docusate 8.6-50 mg  2 tablet Oral Daily    silodosin  8 mg Oral Daily    valproic acid  250 mg Oral TID     Continuous Infusions:   sodium chloride 0.9% 125 mL/hr at 05/15/22 1700     As Needed:  acetaminophen, aluminum-magnesium hydroxide-simethicone, bisacodyL, melatonin, naloxone, ondansetron, prochlorperazine, sodium chloride 0.9%    Assessment and Plan  / Problems managed today    * Seizure-like activity  76 y/o female with new onset seizures and AF presents with recurrent focal right sided seizures. Likely 2/2 prior stroke, +/- provoked by recent course of ciprofloxacin for UTI, then developed volume overload, and was diuresed.      Siezures  - Epilepsy following, appreciate their recs  - keppra 1 g BID  - vimpat 200mg BID  - depakote 250mg Q8H  - Ammonia wnl, depakote level therapeutic (goal )  - Infectious w/u initiated on arrival to [facility] on 5/4, NGTD and UA noninfectious, has completed 7d treatment for previously diagnosed UTI  - MRI unremarkable  - neurology consulted on 5/13 as patient was overly sedated, likely due to  AEDs -- patient alertness improved the following day. Neurology recommended continue current AEDs     Acute hypoxemic respiratory failure  Volume overload  Acute diastolic heart failure  Likely due to volume overload, not on O2 at home,. Potentially due to afib w/ RVR leading to volume overload. EF shows normal systolic function on 5/5. No description of diastolic function and CVP 8.  - Increase furosemide 80 mg daily  - consider repeat ECHO if BNP elevated    Hypotension  Stop diltiazem   mL to support patient while diltiazem wears off  Judicious fluids due to aparrent hypervolemia    Atrial fibrillation with RVR  HR into 170s-180 prior to transfer to [facility] admission given BB and loaded with amiodarone  Improving  - Continue apixaban for anticoagulation  - amiodarone 400 mg BID x 6 doses to complete load and then 200 mg daily  -hold diltiazem due to hypotension requiring bolus fluids  - increase metoprolol to 75 mg q6h - can escalate to 400 mg daily. Transition to XL when optimal dosing determined    Prolonged Q-T interval on ECG  May have been secondary to amiodarone gtt -> improved  - QTc 490 on AM ECG   - Trend daily  - Aggressively replete K/Mag  - Avoid additional QTc prolonging medications    Urinary retention  Requiring frequent straight caths      - UA on admission non-infectious   - C/w silodosin 8 mg qday  - Bladder scans per protocol and straight cath prn ordered  - If requiring straight cath often will consider paula and urology follow up as frequent retention can lead to UTI    Dementia  Mild per family reports, remains independent of ADLs     - c/w home donepezil 5 mg  - Delirium precautions  - Melatonin 6 mg qhs  - Avoid seroquel/antipsychotics given prolonged QTc     Hyponatremia  Stopped thiazide  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  Continue fluid restriction and diuresis    Other hyperlipidemia  Continue atorvastatin    Gastroesophageal reflux disease  Continue protonix in place of home  PPI    Essential hypertension  Hypotensive episodes  Stop home amlodipine and irbesartan-HCTZ in favor AV kwabena blockade  Goal SBP <160 due to age and comorbidites    Discharge Planning   MONA: 5/16/2022     Code Status: Full Code   Is the patient medically ready for discharge?: No    Reason for patient still in hospital (select all that apply): Patient unstable and Patient trending condition  Discharge Plan A: Home with family        Diet:  regular diet  GI PPx: protonix  DVT PPx:  apixaban  Airways: room air  Wounds: none    Goals of Care:  Return to prior functional status       Time (minutes) spent in care of the patient (Greater than 1/2 spent in direct face-to-face contact and care coordination on unit)  35 min     Keiko Marlow MD

## 2022-05-15 NOTE — SIGNIFICANT EVENT
05/15/22 1600   Vital Signs   Pulse 69   SpO2 (!) 93 %   BP (!) 94/50   MAP (mmHg) 66   Patient Position Sitting   1605 B/P 83/53. Charge Nurse @ bedside assessing. Repeat B/P 94/50. Patient asymptomatic. Resting in chair.  Secure chat sent to Dr. Ele MD. MD also paged via hospital . Awaiting response.     1621 Notified Dr. Ele MD of the above mentioned. Patient is asymptomatic. No new orders given. SAQIB Bateman aware.

## 2022-05-15 NOTE — ASSESSMENT & PLAN NOTE
76 y/o female with new onset seizures and AF presents with recurrent focal right sided seizures. Likely 2/2 prior stroke, +/- provoked by recent course of ciprofloxacin for UTI, then developed volume overload, and was diuresed.      Siezures  - Epilepsy following, appreciate their recs  - keppra 1 g BID  - vimpat 100mg BID  - depakote 250mg Q8H  - Ammonia wnl, depakote level therapeutic (goal )  - Infectious w/u initiated on arrival to [facility] on 5/4, NGTD and UA noninfectious, has completed 7d treatment for previously diagnosed UTI  - MRI unremarkable  - neurology consulted on 5/13 as patient was overly sedated, likely due to AEDs -- patient alertness improved the following day. Neurology recommended continue current AEDs

## 2022-05-15 NOTE — PLAN OF CARE
Problem: Physical Therapy  Goal: Physical Therapy Goal  Description: Goals to be met by: 22    Patient will increase functional independence with mobility by performin. Supine to sit with supervision  2. Sit to supine with supervision  3. Sit to stand transfer with independence  4. Gait  x 80 feet with supervision using LRAD as needed  5. Ascend/descend 5 stair with left handrails supervision using LRAD as needed  6. Lower extremity exercise program x10 reps per handout, with independence    Outcome: Ongoing, Progressing.  Improved ability to ambulate with the use of RW noted today.

## 2022-05-16 LAB
ANION GAP SERPL CALC-SCNC: 9 MMOL/L (ref 8–16)
BASOPHILS # BLD AUTO: 0.07 K/UL (ref 0–0.2)
BASOPHILS NFR BLD: 0.6 % (ref 0–1.9)
BNP SERPL-MCNC: 411 PG/ML (ref 0–99)
BUN SERPL-MCNC: 10 MG/DL (ref 8–23)
CALCIUM SERPL-MCNC: 8.2 MG/DL (ref 8.7–10.5)
CHLORIDE SERPL-SCNC: 95 MMOL/L (ref 95–110)
CO2 SERPL-SCNC: 24 MMOL/L (ref 23–29)
CREAT SERPL-MCNC: 0.7 MG/DL (ref 0.5–1.4)
DIFFERENTIAL METHOD: ABNORMAL
EOSINOPHIL # BLD AUTO: 0.2 K/UL (ref 0–0.5)
EOSINOPHIL NFR BLD: 1.6 % (ref 0–8)
ERYTHROCYTE [DISTWIDTH] IN BLOOD BY AUTOMATED COUNT: 13.1 % (ref 11.5–14.5)
EST. GFR  (AFRICAN AMERICAN): >60 ML/MIN/1.73 M^2
EST. GFR  (NON AFRICAN AMERICAN): >60 ML/MIN/1.73 M^2
GLUCOSE SERPL-MCNC: 69 MG/DL (ref 70–110)
HCT VFR BLD AUTO: 33.8 % (ref 37–48.5)
HGB BLD-MCNC: 11.5 G/DL (ref 12–16)
IMM GRANULOCYTES # BLD AUTO: 0.11 K/UL (ref 0–0.04)
IMM GRANULOCYTES NFR BLD AUTO: 0.9 % (ref 0–0.5)
LYMPHOCYTES # BLD AUTO: 1.9 K/UL (ref 1–4.8)
LYMPHOCYTES NFR BLD: 15.9 % (ref 18–48)
MAGNESIUM SERPL-MCNC: 1.8 MG/DL (ref 1.6–2.6)
MCH RBC QN AUTO: 32.7 PG (ref 27–31)
MCHC RBC AUTO-ENTMCNC: 34 G/DL (ref 32–36)
MCV RBC AUTO: 96 FL (ref 82–98)
MONOCYTES # BLD AUTO: 2 K/UL (ref 0.3–1)
MONOCYTES NFR BLD: 16.9 % (ref 4–15)
NEUTROPHILS # BLD AUTO: 7.5 K/UL (ref 1.8–7.7)
NEUTROPHILS NFR BLD: 64.1 % (ref 38–73)
NRBC BLD-RTO: 0 /100 WBC
PLATELET # BLD AUTO: 309 K/UL (ref 150–450)
PMV BLD AUTO: 11.1 FL (ref 9.2–12.9)
POTASSIUM SERPL-SCNC: 4 MMOL/L (ref 3.5–5.1)
RBC # BLD AUTO: 3.52 M/UL (ref 4–5.4)
SODIUM SERPL-SCNC: 128 MMOL/L (ref 136–145)
VALPROATE SERPL-MCNC: 58.2 UG/ML (ref 50–100)
WBC # BLD AUTO: 11.73 K/UL (ref 3.9–12.7)

## 2022-05-16 PROCEDURE — 25000003 PHARM REV CODE 250: Performed by: EMERGENCY MEDICINE

## 2022-05-16 PROCEDURE — 25000003 PHARM REV CODE 250: Performed by: HOSPITALIST

## 2022-05-16 PROCEDURE — 99233 SBSQ HOSP IP/OBS HIGH 50: CPT | Mod: ,,, | Performed by: STUDENT IN AN ORGANIZED HEALTH CARE EDUCATION/TRAINING PROGRAM

## 2022-05-16 PROCEDURE — 85025 COMPLETE CBC W/AUTO DIFF WBC: CPT | Performed by: INTERNAL MEDICINE

## 2022-05-16 PROCEDURE — 94640 AIRWAY INHALATION TREATMENT: CPT

## 2022-05-16 PROCEDURE — 25000242 PHARM REV CODE 250 ALT 637 W/ HCPCS: Performed by: STUDENT IN AN ORGANIZED HEALTH CARE EDUCATION/TRAINING PROGRAM

## 2022-05-16 PROCEDURE — 83735 ASSAY OF MAGNESIUM: CPT | Performed by: STUDENT IN AN ORGANIZED HEALTH CARE EDUCATION/TRAINING PROGRAM

## 2022-05-16 PROCEDURE — 99233 PR SUBSEQUENT HOSPITAL CARE,LEVL III: ICD-10-PCS | Mod: ,,, | Performed by: STUDENT IN AN ORGANIZED HEALTH CARE EDUCATION/TRAINING PROGRAM

## 2022-05-16 PROCEDURE — 25000003 PHARM REV CODE 250: Performed by: PHYSICIAN ASSISTANT

## 2022-05-16 PROCEDURE — 25000003 PHARM REV CODE 250: Performed by: INTERNAL MEDICINE

## 2022-05-16 PROCEDURE — 99900035 HC TECH TIME PER 15 MIN (STAT)

## 2022-05-16 PROCEDURE — 20600001 HC STEP DOWN PRIVATE ROOM

## 2022-05-16 PROCEDURE — 80048 BASIC METABOLIC PNL TOTAL CA: CPT | Performed by: STUDENT IN AN ORGANIZED HEALTH CARE EDUCATION/TRAINING PROGRAM

## 2022-05-16 PROCEDURE — 25000003 PHARM REV CODE 250

## 2022-05-16 PROCEDURE — 80164 ASSAY DIPROPYLACETIC ACD TOT: CPT

## 2022-05-16 PROCEDURE — 36415 COLL VENOUS BLD VENIPUNCTURE: CPT | Performed by: INTERNAL MEDICINE

## 2022-05-16 PROCEDURE — 83880 ASSAY OF NATRIURETIC PEPTIDE: CPT | Performed by: INTERNAL MEDICINE

## 2022-05-16 PROCEDURE — 27000221 HC OXYGEN, UP TO 24 HOURS

## 2022-05-16 PROCEDURE — 25000003 PHARM REV CODE 250: Performed by: STUDENT IN AN ORGANIZED HEALTH CARE EDUCATION/TRAINING PROGRAM

## 2022-05-16 RX ORDER — LACOSAMIDE 50 MG/1
100 TABLET ORAL EVERY 12 HOURS
Status: DISCONTINUED | OUTPATIENT
Start: 2022-05-16 | End: 2022-05-25 | Stop reason: HOSPADM

## 2022-05-16 RX ORDER — LEVALBUTEROL 1.25 MG/.5ML
1.25 SOLUTION, CONCENTRATE RESPIRATORY (INHALATION) EVERY 12 HOURS
Status: DISCONTINUED | OUTPATIENT
Start: 2022-05-16 | End: 2022-05-25 | Stop reason: HOSPADM

## 2022-05-16 RX ADMIN — APIXABAN 5 MG: 5 TABLET, FILM COATED ORAL at 08:05

## 2022-05-16 RX ADMIN — AMIODARONE HYDROCHLORIDE 400 MG: 200 TABLET ORAL at 08:05

## 2022-05-16 RX ADMIN — VALPROIC ACID 250 MG: 250 CAPSULE, LIQUID FILLED ORAL at 02:05

## 2022-05-16 RX ADMIN — LACOSAMIDE 200 MG: 50 TABLET, FILM COATED ORAL at 08:05

## 2022-05-16 RX ADMIN — LACOSAMIDE 100 MG: 50 TABLET, FILM COATED ORAL at 08:05

## 2022-05-16 RX ADMIN — METOPROLOL TARTRATE 75 MG: 50 TABLET, FILM COATED ORAL at 06:05

## 2022-05-16 RX ADMIN — PANTOPRAZOLE SODIUM 40 MG: 40 TABLET, DELAYED RELEASE ORAL at 08:05

## 2022-05-16 RX ADMIN — ATORVASTATIN CALCIUM 40 MG: 20 TABLET, FILM COATED ORAL at 08:05

## 2022-05-16 RX ADMIN — METOPROLOL TARTRATE 75 MG: 50 TABLET, FILM COATED ORAL at 12:05

## 2022-05-16 RX ADMIN — VALPROIC ACID 250 MG: 250 CAPSULE, LIQUID FILLED ORAL at 08:05

## 2022-05-16 RX ADMIN — FUROSEMIDE 80 MG: 80 TABLET ORAL at 08:05

## 2022-05-16 RX ADMIN — METOPROLOL TARTRATE 75 MG: 50 TABLET, FILM COATED ORAL at 01:05

## 2022-05-16 RX ADMIN — LEVETIRACETAM 1000 MG: 500 TABLET, FILM COATED ORAL at 08:05

## 2022-05-16 RX ADMIN — LEVALBUTEROL HYDROCHLORIDE 1.25 MG: 1.25 SOLUTION, CONCENTRATE RESPIRATORY (INHALATION) at 03:05

## 2022-05-16 RX ADMIN — DONEPEZIL HYDROCHLORIDE 5 MG: 5 TABLET ORAL at 08:05

## 2022-05-16 RX ADMIN — SILODOSIN 8 MG: 8 CAPSULE ORAL at 08:05

## 2022-05-16 RX ADMIN — MELATONIN TAB 3 MG 6 MG: 3 TAB at 08:05

## 2022-05-16 NOTE — PLAN OF CARE
[facility]  Neurology     Interval history: NAEO, patient's mental status has significantly improved in the last 2 days. Seen today and awake and alert, able to have a full conversation.   No seizure like activity reported    Recommendations:   -- Continue current AED regimen: keppra 1g BID, vimpat 100mg BID, depakote 250mg TID all in oral form   -- Start delirium precations including:               - Minimize use of restraints; if physical restraints      necessary, please utilize medical/chemical prns           for agitation.              - Keep shades open and room lit during day and      room dim at night in order to promote healthy         circadian rhythms.              - Encourage family at bedside              - Keep whiteboard in patient's room current with       the date and name of the members of patient's  team for easy patient self re-orientation.  -- No new recommendations.     Thank you for the consult. Neurology will sign off at this time. Please call or consult for any questions or concerns.     Melany Cochran MD   Neurology Resident, PGY2   [facility]       povidone iodine (if allergic to chlorhexidine)

## 2022-05-16 NOTE — PROGRESS NOTES
[facility] Medicine  Progress Note    Patient Name: Teresa Leos  MRN: [id number]  Patient Class: IP- Inpatient   Admission Date: 5/4/2022  Length of Stay: 11 days  Attending Physician: Joaquín Aldana MD  Primary Care Provider: Joseph Mcpherson NP        Subjective:     Principal Problem:Seizure-like activity        HPI:  78 y/o WF hx of recently diagnosed Atrial Fibrillation, Alzheimers Dementia, Hypertension, Hyperlipidemia who is transferred for concern of seizure and acute encephalopathy.     History provided by EMR review and by pts Son-Daughter-in-Law at bedside.     For the last 2 weeks patient has  noted to have progressive confusion worse 1 week ago since Wednesday.  This past Friday - April 29th, daughter-in-law noted patient was outside and throwing rocks away in the trash can, when she went outside to see the patient patient had fallen down and was convulsing, reported seizure activity for 30-40 minutes.  Patient was confused afterward, EMS called and patient admitted to [facility] from 4/29 - Mon 5/1.  Family states @ [place], [facility] states patient had just passed out/syncope, stroke was ruled out with CNS imaging reported CT & MRI, records not available for review.  Atrial fibrillation was noted as new diagnosis and patient was discharged on multiple new medications - including Toprol XL 25mg BID & Apixaban 5mg BID.      Today patient was at dining table eating, when she suddenly stopped responding, reported staring spells and then developed convulsions.   En Route via EMS to [facility] reported pt with additional seizures.      Tele stroke Neurology eval completed @ [facility], pt transferred to [facility] for further w/u due to concern of seizures as inciting issue.     Bedside interview - patient is awake, alert, oriented to self, location, denies any acute complaints, no chest pain, dyspnea, palpitations, no LE swelling, no visual complaints, no traumatic  injury     ED [facility] -  Keppra 2gm iv x1,  Amiodarone - unclear amt.  Metoprolol 5mg iv x 1,  LR bolus 500cc      Overview/Hospital Course:  Patient was admitted for new onset seizures and uncontrolled afib in RVR. 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 She was placed on valproic acid, lacosamide, and leveitracetam.   During her [facility] stay she was also in atrial fibrillation RVR which was difficult to control. She is on metoprolol, diltiazem, and amiodarone. She developed hypoxia during her admission. CXR showed moderate bilateral pleural effusions that occurred over two days. Started on furosemide IV, and underwent diuresis, was hypotensive on 5/12. She was given 250cc LR with improvement in BP. Then changed to oral diuretics. Neurology was consulted 5/13 due to ongoing oversedation with AEDs for help with AED adjustment. Recommended increasing vimpat back to 200 mg BID. Hypotensive again. Stopped diltiazem and increased metoprolol. Gave bolus fluids. However still with hypoxia due to pulmonary edema. Increased furosemide to 80 mg daily. Also with hyponatremia likely from hypervolemia and SIADH.    PT/OT recommended SNF      Interval History: NAEON    Review of Systems   Constitutional:  Negative for fatigue and fever.   Respiratory:  Negative for cough and shortness of breath.    Cardiovascular:  Negative for chest pain and leg swelling.   Gastrointestinal:  Negative for abdominal distention and abdominal pain.   Objective:     Vital Signs (Most Recent):  Temp: 97.1 °F (36.2 °C) (05/16/22 1124)  Pulse: 102 (05/16/22 1124)  Resp: (!) 21 (05/16/22 1124)  BP: 108/64 (05/16/22 1124)  SpO2: (!) 94 % (05/16/22 1124) Vital Signs (24h Range):  Temp:  [97.1 °F (36.2 °C)-98.1 °F (36.7 °C)] 97.1 °F (36.2 °C)  Pulse:  [] 102  Resp:  [16-21] 21  SpO2:  [92 %-96 %] 94 %  BP: ()/(50-87) 108/64     Weight: 51.3 kg (113  lb)  Body mass index is 21.35 kg/m².    Intake/Output Summary (Last 24 hours) at 5/16/2022 1507  Last data filed at 5/16/2022 1200  Gross per 24 hour   Intake 325 ml   Output 900 ml   Net -575 ml        Physical Exam  Constitutional:       General: She is not in acute distress.     Appearance: Normal appearance. She is not ill-appearing.   Cardiovascular:      Rate and Rhythm: Normal rate and regular rhythm.      Pulses: Normal pulses.      Heart sounds: Normal heart sounds.   Pulmonary:      Effort: Pulmonary effort is normal.      Comments: Crackles at the bases  Abdominal:      General: Abdomen is flat. Bowel sounds are normal. There is no distension.      Palpations: Abdomen is soft.   Musculoskeletal:      Right lower leg: No edema.      Left lower leg: No edema.   Skin:     General: Skin is warm.      Findings: No lesion or rash.   Neurological:      General: No focal deficit present.      Mental Status: She is alert.   Psychiatric:         Mood and Affect: Mood normal.         Behavior: Behavior normal.       Significant Labs: All pertinent labs within the past 24 hours have been reviewed.  CBC:   Recent Labs   Lab 05/15/22  0436 05/16/22  0636   WBC 10.70 11.73   HGB 10.3* 11.5*   HCT 30.7* 33.8*    309       CMP:   Recent Labs   Lab 05/15/22  0436 05/16/22  0322   * 128*   K 3.7 4.0   CL 94* 95   CO2 24 24   GLU 71 69*   BUN 13 10   CREATININE 0.7 0.7   CALCIUM 8.6* 8.2*   ANIONGAP 11 9   EGFRNONAA >60.0 >60.0         Significant Imaging: I have reviewed all pertinent imaging results/findings within the past 24 hours.      Assessment/Plan:      * Seizure-like activity  78 y/o female with new onset seizures and AF presents with recurrent focal right sided seizures. Likely 2/2 prior stroke, +/- provoked by recent course of ciprofloxacin for UTI, then developed volume overload, and was diuresed.      Siezures  - Epilepsy following, appreciate their recs  - keppra 1 g BID  - vimpat 100mg BID  -  depakote 250mg Q8H  - Ammonia wnl, depakote level therapeutic (goal )  - Infectious w/u initiated [place] [facility] [place], NGTD and UA noninfectious, has completed 7d treatment for previously diagnosed UTI  - MRI unremarkable  - neurology consulted on 5/13 as patient was overly sedated, likely due to AEDs -- patient alertness improved the following day. Neurology recommended continue current AEDs     Hypotension  Stop diltiazem  Judicious fluids due to aparrent hypervolemia    Acute hypoxemic respiratory failure  Volume overload  Acute diastolic heart failure  Likely due to volume overload, not on O2 at home,. Potentially due to afib w/ RVR leading to volume overload. EF shows normal systolic function on 5/5. No description of diastolic function and CVP 8.  - Continue furosemide 80 mg daily  - Repeat ECHO to assess volume status and cardiac function    Prolonged Q-T interval on ECG  May have been secondary to amiodarone gtt -> improved  - QTc 490 on AM ECG   - Trend daily  - Aggressively replete K/Mag  - Avoid additional QTc prolonging medications    Urinary retention  Requiring frequent straight caths      - UA on admission non-infectious   - C/w silodosin 8 mg qday  - Bladder scans per protocol and straight cath prn ordered  - If requiring straight cath often will consider paula and urology follow up as frequent retention can lead to UTI    Dementia  Mild per family reports, remains independent of ADLs     - c/w home donepezil 5 mg  - Delirium precautions  - Melatonin 6 mg qhs  - Avoid seroquel/antipsychotics given prolonged QTc     Hyponatremia  Stopped thiazide  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 Transition to XL when optimal dosing determined    Essential hypertension  Hypotensive episodes  Stop home amlodipine and irbesartan-HCTZ in favor AV kwabena blockade  Goal SBP <160 due to age and comorbidites      VTE Risk Mitigation (From admission, onward)         Ordered     apixaban tablet 5 mg  2 times daily         05/10/22 0923     IP VTE HIGH RISK PATIENT  Once         05/04/22 2039     Place sequential compression device  Until discontinued         05/04/22 2039                Discharge Planning   MONA: 5/16/2022     Code Status: Full Code   Is the patient medically ready for discharge?: No    Reason for patient still in hospital (select all that apply): Patient trending condition and Treatment  Discharge Plan A: Home with family                  Joaquín Aldana MD  Department of Hospital Medicine   [facility] - Telemetry Stepdown

## 2022-05-16 NOTE — SUBJECTIVE & OBJECTIVE
Interval History: NAEON    Review of Systems   Constitutional:  Negative for fatigue and fever.   Respiratory:  Negative for cough and shortness of breath.    Cardiovascular:  Negative for chest pain and leg swelling.   Gastrointestinal:  Negative for abdominal distention and abdominal pain.   Objective:     Vital Signs (Most Recent):  Temp: 97.1 °F (36.2 °C) (05/16/22 1124)  Pulse: 102 (05/16/22 1124)  Resp: (!) 21 (05/16/22 1124)  BP: 108/64 (05/16/22 1124)  SpO2: (!) 94 % (05/16/22 1124) Vital Signs (24h Range):  Temp:  [97.1 °F (36.2 °C)-98.1 °F (36.7 °C)] 97.1 °F (36.2 °C)  Pulse:  [] 102  Resp:  [16-21] 21  SpO2:  [92 %-96 %] 94 %  BP: ()/(50-87) 108/64     Weight: 51.3 kg (113 lb)  Body mass index is 21.35 kg/m².    Intake/Output Summary (Last 24 hours) at 5/16/2022 1507  Last data filed at 5/16/2022 1200  Gross per 24 hour   Intake 325 ml   Output 900 ml   Net -575 ml        Physical Exam  Constitutional:       General: She is not in acute distress.     Appearance: Normal appearance. She is not ill-appearing.   Cardiovascular:      Rate and Rhythm: Normal rate and regular rhythm.      Pulses: Normal pulses.      Heart sounds: Normal heart sounds.   Pulmonary:      Effort: Pulmonary effort is normal.      Comments: Crackles at the bases  Abdominal:      General: Abdomen is flat. Bowel sounds are normal. There is no distension.      Palpations: Abdomen is soft.   Musculoskeletal:      Right lower leg: No edema.      Left lower leg: No edema.   Skin:     General: Skin is warm.      Findings: No lesion or rash.   Neurological:      General: No focal deficit present.      Mental Status: She is alert.   Psychiatric:         Mood and Affect: Mood normal.         Behavior: Behavior normal.       Significant Labs: All pertinent labs within the past 24 hours have been reviewed.  CBC:   Recent Labs   Lab 05/15/22  0436 05/16/22  0636   WBC 10.70 11.73   HGB 10.3* 11.5*   HCT 30.7* 33.8*    309        CMP:   Recent Labs   Lab 05/15/22  0436 05/16/22  0322   * 128*   K 3.7 4.0   CL 94* 95   CO2 24 24   GLU 71 69*   BUN 13 10   CREATININE 0.7 0.7   CALCIUM 8.6* 8.2*   ANIONGAP 11 9   EGFRNONAA >60.0 >60.0         Significant Imaging: I have reviewed all pertinent imaging results/findings within the past 24 hours.

## 2022-05-16 NOTE — PLAN OF CARE
"  Problem: Infection  Goal: Absence of Infection Signs and Symptoms  Outcome: Ongoing, Progressing     Problem: Adult Inpatient Plan of Care  Goal: Plan of Care Review  Outcome: Ongoing, Progressing  Goal: Patient-Specific Goal (Individualized)  Description: Admit Date: 5/4/2022    Focal seizures    Past Medical History:  No date: Hyperlipidemia  No date: Hypertension  No date: Osteoporosis    Past Surgical History:  No date: HIP REPLACEMENT ARTHROPLASTY; Right  No date: HYSTERECTOMY  No date: LEG SURGERY  No date: SKIN CANCER EXCISION  No date: TONSILLECTOMY, ADENOIDECTOMY    Individualization:   1. Pt likes to be called "Tuyet"     Restraints: none        Outcome: Ongoing, Progressing  Goal: Absence of Hospital-Acquired Illness or Injury  Outcome: Ongoing, Progressing  Goal: Readiness for Transition of Care  Outcome: Ongoing, Progressing     Problem: Skin Injury Risk Increased  Goal: Skin Health and Integrity  Outcome: Ongoing, Progressing     Problem: Impaired Wound Healing  Goal: Optimal Wound Healing  Outcome: Ongoing, Progressing     Problem: Infection  Goal: Absence of Infection Signs and Symptoms  Outcome: Ongoing, Progressing     Problem: Adult Inpatient Plan of Care  Goal: Plan of Care Review  Outcome: Ongoing, Progressing  Goal: Patient-Specific Goal (Individualized)  Description: Admit Date: 5/4/2022    Focal seizures    Past Medical History:  No date: Hyperlipidemia  No date: Hypertension  No date: Osteoporosis    Past Surgical History:  No date: HIP REPLACEMENT ARTHROPLASTY; Right  No date: HYSTERECTOMY  No date: LEG SURGERY  No date: SKIN CANCER EXCISION  No date: TONSILLECTOMY, ADENOIDECTOMY    Individualization:   1. Pt likes to be called "Tuyet"     Restraints: none        Outcome: Ongoing, Progressing  Goal: Absence of Hospital-Acquired Illness or Injury  Outcome: Ongoing, Progressing  Goal: Readiness for Transition of Care  Outcome: Ongoing, Progressing     Problem: Skin Injury Risk " Increased  Goal: Skin Health and Integrity  Outcome: Ongoing, Progressing     Problem: Impaired Wound Healing  Goal: Optimal Wound Healing  Outcome: Ongoing, Progressing     Patient A&O x 2-3 occasional episodes of confusion. POC discussed with son at bedside and all questions answered. Patient resting comfortably in bed. Will continue to monitor.

## 2022-05-17 PROBLEM — I95.9 HYPOTENSION: Status: RESOLVED | Noted: 2022-05-15 | Resolved: 2022-05-17

## 2022-05-17 LAB
ALBUMIN SERPL BCP-MCNC: 2.1 G/DL (ref 3.5–5.2)
ALLENS TEST: ABNORMAL
ALLENS TEST: ABNORMAL
ALP SERPL-CCNC: 50 U/L (ref 55–135)
ALT SERPL W/O P-5'-P-CCNC: 13 U/L (ref 10–44)
ANION GAP SERPL CALC-SCNC: 10 MMOL/L (ref 8–16)
ANION GAP SERPL CALC-SCNC: 11 MMOL/L (ref 8–16)
AST SERPL-CCNC: 26 U/L (ref 10–40)
BASOPHILS # BLD AUTO: 0.05 K/UL (ref 0–0.2)
BASOPHILS # BLD AUTO: 0.06 K/UL (ref 0–0.2)
BASOPHILS NFR BLD: 0.4 % (ref 0–1.9)
BASOPHILS NFR BLD: 0.5 % (ref 0–1.9)
BILIRUB SERPL-MCNC: 0.4 MG/DL (ref 0.1–1)
BNP SERPL-MCNC: 345 PG/ML (ref 0–99)
BUN SERPL-MCNC: 11 MG/DL (ref 8–23)
BUN SERPL-MCNC: 13 MG/DL (ref 8–23)
CALCIUM SERPL-MCNC: 8.2 MG/DL (ref 8.7–10.5)
CALCIUM SERPL-MCNC: 8.5 MG/DL (ref 8.7–10.5)
CHLORIDE SERPL-SCNC: 94 MMOL/L (ref 95–110)
CHLORIDE SERPL-SCNC: 95 MMOL/L (ref 95–110)
CO2 SERPL-SCNC: 27 MMOL/L (ref 23–29)
CO2 SERPL-SCNC: 30 MMOL/L (ref 23–29)
CREAT SERPL-MCNC: 0.8 MG/DL (ref 0.5–1.4)
CREAT SERPL-MCNC: 0.8 MG/DL (ref 0.5–1.4)
DELSYS: ABNORMAL
DELSYS: ABNORMAL
DIFFERENTIAL METHOD: ABNORMAL
DIFFERENTIAL METHOD: ABNORMAL
EOSINOPHIL # BLD AUTO: 0.1 K/UL (ref 0–0.5)
EOSINOPHIL # BLD AUTO: 0.2 K/UL (ref 0–0.5)
EOSINOPHIL NFR BLD: 1 % (ref 0–8)
EOSINOPHIL NFR BLD: 1.3 % (ref 0–8)
ERYTHROCYTE [DISTWIDTH] IN BLOOD BY AUTOMATED COUNT: 13.1 % (ref 11.5–14.5)
ERYTHROCYTE [DISTWIDTH] IN BLOOD BY AUTOMATED COUNT: 13.1 % (ref 11.5–14.5)
EST. GFR  (AFRICAN AMERICAN): >60 ML/MIN/1.73 M^2
EST. GFR  (AFRICAN AMERICAN): >60 ML/MIN/1.73 M^2
EST. GFR  (NON AFRICAN AMERICAN): >60 ML/MIN/1.73 M^2
EST. GFR  (NON AFRICAN AMERICAN): >60 ML/MIN/1.73 M^2
FLOW: 10
FLOW: 5
GLUCOSE SERPL-MCNC: 83 MG/DL (ref 70–110)
GLUCOSE SERPL-MCNC: 87 MG/DL (ref 70–110)
HCO3 UR-SCNC: 31.6 MMOL/L (ref 24–28)
HCO3 UR-SCNC: 33.8 MMOL/L (ref 24–28)
HCT VFR BLD AUTO: 30.9 % (ref 37–48.5)
HCT VFR BLD AUTO: 33.9 % (ref 37–48.5)
HGB BLD-MCNC: 10 G/DL (ref 12–16)
HGB BLD-MCNC: 11.6 G/DL (ref 12–16)
IMM GRANULOCYTES # BLD AUTO: 0.09 K/UL (ref 0–0.04)
IMM GRANULOCYTES # BLD AUTO: 0.1 K/UL (ref 0–0.04)
IMM GRANULOCYTES NFR BLD AUTO: 0.7 % (ref 0–0.5)
IMM GRANULOCYTES NFR BLD AUTO: 0.8 % (ref 0–0.5)
LACTATE SERPL-SCNC: 1.1 MMOL/L (ref 0.5–2.2)
LYMPHOCYTES # BLD AUTO: 2.4 K/UL (ref 1–4.8)
LYMPHOCYTES # BLD AUTO: 2.5 K/UL (ref 1–4.8)
LYMPHOCYTES NFR BLD: 19.4 % (ref 18–48)
LYMPHOCYTES NFR BLD: 20 % (ref 18–48)
MAGNESIUM SERPL-MCNC: 1.7 MG/DL (ref 1.6–2.6)
MCH RBC QN AUTO: 31.5 PG (ref 27–31)
MCH RBC QN AUTO: 32.5 PG (ref 27–31)
MCHC RBC AUTO-ENTMCNC: 32.4 G/DL (ref 32–36)
MCHC RBC AUTO-ENTMCNC: 34.2 G/DL (ref 32–36)
MCV RBC AUTO: 95 FL (ref 82–98)
MCV RBC AUTO: 98 FL (ref 82–98)
MODE: ABNORMAL
MODE: ABNORMAL
MONOCYTES # BLD AUTO: 1.8 K/UL (ref 0.3–1)
MONOCYTES # BLD AUTO: 2.1 K/UL (ref 0.3–1)
MONOCYTES NFR BLD: 14 % (ref 4–15)
MONOCYTES NFR BLD: 16.8 % (ref 4–15)
NEUTROPHILS # BLD AUTO: 7.7 K/UL (ref 1.8–7.7)
NEUTROPHILS # BLD AUTO: 8 K/UL (ref 1.8–7.7)
NEUTROPHILS NFR BLD: 61.7 % (ref 38–73)
NEUTROPHILS NFR BLD: 63.4 % (ref 38–73)
NRBC BLD-RTO: 0 /100 WBC
NRBC BLD-RTO: 0 /100 WBC
PCO2 BLDA: 42.7 MMHG (ref 35–45)
PCO2 BLDA: 45.5 MMHG (ref 35–45)
PH SMN: 7.45 [PH] (ref 7.35–7.45)
PH SMN: 7.51 [PH] (ref 7.35–7.45)
PLATELET # BLD AUTO: 366 K/UL (ref 150–450)
PLATELET # BLD AUTO: 452 K/UL (ref 150–450)
PMV BLD AUTO: 10.7 FL (ref 9.2–12.9)
PMV BLD AUTO: 10.9 FL (ref 9.2–12.9)
PO2 BLDA: 59 MMHG (ref 80–100)
PO2 BLDA: 98 MMHG (ref 80–100)
POC BE: 11 MMOL/L
POC BE: 8 MMOL/L
POC SATURATED O2: 91 % (ref 95–100)
POC SATURATED O2: 98 % (ref 95–100)
POC TCO2: 33 MMOL/L (ref 23–27)
POC TCO2: 35 MMOL/L (ref 23–27)
POTASSIUM SERPL-SCNC: 3.3 MMOL/L (ref 3.5–5.1)
POTASSIUM SERPL-SCNC: 3.7 MMOL/L (ref 3.5–5.1)
PROCALCITONIN SERPL IA-MCNC: 0.07 NG/ML
PROT SERPL-MCNC: 5.6 G/DL (ref 6–8.4)
RBC # BLD AUTO: 3.17 M/UL (ref 4–5.4)
RBC # BLD AUTO: 3.57 M/UL (ref 4–5.4)
SAMPLE: ABNORMAL
SAMPLE: ABNORMAL
SARS-COV-2 RNA RESP QL NAA+PROBE: NOT DETECTED
SITE: ABNORMAL
SITE: ABNORMAL
SODIUM SERPL-SCNC: 132 MMOL/L (ref 136–145)
SODIUM SERPL-SCNC: 135 MMOL/L (ref 136–145)
SP02: 98
VALPROATE SERPL-MCNC: 59.9 UG/ML (ref 50–100)
WBC # BLD AUTO: 12.41 K/UL (ref 3.9–12.7)
WBC # BLD AUTO: 12.6 K/UL (ref 3.9–12.7)

## 2022-05-17 PROCEDURE — 83880 ASSAY OF NATRIURETIC PEPTIDE: CPT | Performed by: STUDENT IN AN ORGANIZED HEALTH CARE EDUCATION/TRAINING PROGRAM

## 2022-05-17 PROCEDURE — 25000003 PHARM REV CODE 250

## 2022-05-17 PROCEDURE — 83735 ASSAY OF MAGNESIUM: CPT | Performed by: STUDENT IN AN ORGANIZED HEALTH CARE EDUCATION/TRAINING PROGRAM

## 2022-05-17 PROCEDURE — 83605 ASSAY OF LACTIC ACID: CPT | Performed by: STUDENT IN AN ORGANIZED HEALTH CARE EDUCATION/TRAINING PROGRAM

## 2022-05-17 PROCEDURE — 25000003 PHARM REV CODE 250: Performed by: HOSPITALIST

## 2022-05-17 PROCEDURE — 25000003 PHARM REV CODE 250: Performed by: INTERNAL MEDICINE

## 2022-05-17 PROCEDURE — 27000221 HC OXYGEN, UP TO 24 HOURS

## 2022-05-17 PROCEDURE — 99233 SBSQ HOSP IP/OBS HIGH 50: CPT | Mod: ,,, | Performed by: STUDENT IN AN ORGANIZED HEALTH CARE EDUCATION/TRAINING PROGRAM

## 2022-05-17 PROCEDURE — 25000003 PHARM REV CODE 250: Performed by: PHYSICIAN ASSISTANT

## 2022-05-17 PROCEDURE — 82800 BLOOD PH: CPT

## 2022-05-17 PROCEDURE — 97530 THERAPEUTIC ACTIVITIES: CPT

## 2022-05-17 PROCEDURE — 25000242 PHARM REV CODE 250 ALT 637 W/ HCPCS: Performed by: STUDENT IN AN ORGANIZED HEALTH CARE EDUCATION/TRAINING PROGRAM

## 2022-05-17 PROCEDURE — 36600 WITHDRAWAL OF ARTERIAL BLOOD: CPT

## 2022-05-17 PROCEDURE — 85025 COMPLETE CBC W/AUTO DIFF WBC: CPT | Performed by: STUDENT IN AN ORGANIZED HEALTH CARE EDUCATION/TRAINING PROGRAM

## 2022-05-17 PROCEDURE — U0005 INFEC AGEN DETEC AMPLI PROBE: HCPCS | Performed by: STUDENT IN AN ORGANIZED HEALTH CARE EDUCATION/TRAINING PROGRAM

## 2022-05-17 PROCEDURE — 80164 ASSAY DIPROPYLACETIC ACD TOT: CPT

## 2022-05-17 PROCEDURE — 99900035 HC TECH TIME PER 15 MIN (STAT)

## 2022-05-17 PROCEDURE — 80048 BASIC METABOLIC PNL TOTAL CA: CPT | Mod: XB | Performed by: STUDENT IN AN ORGANIZED HEALTH CARE EDUCATION/TRAINING PROGRAM

## 2022-05-17 PROCEDURE — 36415 COLL VENOUS BLD VENIPUNCTURE: CPT | Performed by: STUDENT IN AN ORGANIZED HEALTH CARE EDUCATION/TRAINING PROGRAM

## 2022-05-17 PROCEDURE — 84145 PROCALCITONIN (PCT): CPT | Performed by: STUDENT IN AN ORGANIZED HEALTH CARE EDUCATION/TRAINING PROGRAM

## 2022-05-17 PROCEDURE — 94640 AIRWAY INHALATION TREATMENT: CPT

## 2022-05-17 PROCEDURE — 80053 COMPREHEN METABOLIC PANEL: CPT | Performed by: STUDENT IN AN ORGANIZED HEALTH CARE EDUCATION/TRAINING PROGRAM

## 2022-05-17 PROCEDURE — 20600001 HC STEP DOWN PRIVATE ROOM

## 2022-05-17 PROCEDURE — 82803 BLOOD GASES ANY COMBINATION: CPT

## 2022-05-17 PROCEDURE — 25000003 PHARM REV CODE 250: Performed by: STUDENT IN AN ORGANIZED HEALTH CARE EDUCATION/TRAINING PROGRAM

## 2022-05-17 PROCEDURE — 99233 PR SUBSEQUENT HOSPITAL CARE,LEVL III: ICD-10-PCS | Mod: ,,, | Performed by: STUDENT IN AN ORGANIZED HEALTH CARE EDUCATION/TRAINING PROGRAM

## 2022-05-17 PROCEDURE — 94761 N-INVAS EAR/PLS OXIMETRY MLT: CPT

## 2022-05-17 PROCEDURE — U0003 INFECTIOUS AGENT DETECTION BY NUCLEIC ACID (DNA OR RNA); SEVERE ACUTE RESPIRATORY SYNDROME CORONAVIRUS 2 (SARS-COV-2) (CORONAVIRUS DISEASE [COVID-19]), AMPLIFIED PROBE TECHNIQUE, MAKING USE OF HIGH THROUGHPUT TECHNOLOGIES AS DESCRIBED BY CMS-2020-01-R: HCPCS | Performed by: STUDENT IN AN ORGANIZED HEALTH CARE EDUCATION/TRAINING PROGRAM

## 2022-05-17 PROCEDURE — 63600175 PHARM REV CODE 636 W HCPCS: Performed by: STUDENT IN AN ORGANIZED HEALTH CARE EDUCATION/TRAINING PROGRAM

## 2022-05-17 PROCEDURE — 97530 THERAPEUTIC ACTIVITIES: CPT | Mod: CO

## 2022-05-17 RX ORDER — FUROSEMIDE 10 MG/ML
40 INJECTION INTRAMUSCULAR; INTRAVENOUS ONCE
Status: DISCONTINUED | OUTPATIENT
Start: 2022-05-17 | End: 2022-05-17

## 2022-05-17 RX ORDER — HYDRALAZINE HYDROCHLORIDE 20 MG/ML
10 INJECTION INTRAMUSCULAR; INTRAVENOUS EVERY 6 HOURS PRN
Status: DISCONTINUED | OUTPATIENT
Start: 2022-05-17 | End: 2022-05-25 | Stop reason: HOSPADM

## 2022-05-17 RX ORDER — FUROSEMIDE 10 MG/ML
80 INJECTION INTRAMUSCULAR; INTRAVENOUS ONCE
Status: COMPLETED | OUTPATIENT
Start: 2022-05-17 | End: 2022-05-17

## 2022-05-17 RX ORDER — POTASSIUM CHLORIDE 20 MEQ/1
40 TABLET, EXTENDED RELEASE ORAL EVERY 6 HOURS
Status: DISPENSED | OUTPATIENT
Start: 2022-05-17 | End: 2022-05-18

## 2022-05-17 RX ORDER — VANCOMYCIN HCL IN 5 % DEXTROSE 1G/250ML
20 PLASTIC BAG, INJECTION (ML) INTRAVENOUS
Status: DISCONTINUED | OUTPATIENT
Start: 2022-05-18 | End: 2022-05-19

## 2022-05-17 RX ORDER — HYDRALAZINE HYDROCHLORIDE 20 MG/ML
10 INJECTION INTRAMUSCULAR; INTRAVENOUS ONCE
Status: COMPLETED | OUTPATIENT
Start: 2022-05-17 | End: 2022-05-17

## 2022-05-17 RX ORDER — FUROSEMIDE 10 MG/ML
80 INJECTION INTRAMUSCULAR; INTRAVENOUS
Status: DISCONTINUED | OUTPATIENT
Start: 2022-05-17 | End: 2022-05-18

## 2022-05-17 RX ADMIN — LACOSAMIDE 100 MG: 50 TABLET, FILM COATED ORAL at 10:05

## 2022-05-17 RX ADMIN — SILODOSIN 8 MG: 8 CAPSULE ORAL at 10:05

## 2022-05-17 RX ADMIN — VALPROIC ACID 250 MG: 250 CAPSULE, LIQUID FILLED ORAL at 04:05

## 2022-05-17 RX ADMIN — FUROSEMIDE 80 MG: 10 INJECTION, SOLUTION INTRAMUSCULAR; INTRAVENOUS at 08:05

## 2022-05-17 RX ADMIN — PIPERACILLIN SODIUM AND TAZOBACTAM SODIUM 4.5 G: 4; .5 INJECTION, POWDER, LYOPHILIZED, FOR SOLUTION INTRAVENOUS at 03:05

## 2022-05-17 RX ADMIN — PIPERACILLIN SODIUM AND TAZOBACTAM SODIUM 4.5 G: 4; .5 INJECTION, POWDER, LYOPHILIZED, FOR SOLUTION INTRAVENOUS at 11:05

## 2022-05-17 RX ADMIN — DONEPEZIL HYDROCHLORIDE 5 MG: 5 TABLET ORAL at 08:05

## 2022-05-17 RX ADMIN — LEVETIRACETAM 1000 MG: 500 TABLET, FILM COATED ORAL at 08:05

## 2022-05-17 RX ADMIN — VANCOMYCIN HYDROCHLORIDE 1250 MG: 1.25 INJECTION, POWDER, LYOPHILIZED, FOR SOLUTION INTRAVENOUS at 01:05

## 2022-05-17 RX ADMIN — ATORVASTATIN CALCIUM 40 MG: 20 TABLET, FILM COATED ORAL at 08:05

## 2022-05-17 RX ADMIN — APIXABAN 5 MG: 5 TABLET, FILM COATED ORAL at 08:05

## 2022-05-17 RX ADMIN — METOPROLOL TARTRATE 75 MG: 50 TABLET, FILM COATED ORAL at 12:05

## 2022-05-17 RX ADMIN — VALPROIC ACID 250 MG: 250 CAPSULE, LIQUID FILLED ORAL at 10:05

## 2022-05-17 RX ADMIN — HYDRALAZINE HYDROCHLORIDE 10 MG: 20 INJECTION, SOLUTION INTRAMUSCULAR; INTRAVENOUS at 08:05

## 2022-05-17 RX ADMIN — PANTOPRAZOLE SODIUM 40 MG: 40 TABLET, DELAYED RELEASE ORAL at 10:05

## 2022-05-17 RX ADMIN — LACOSAMIDE 100 MG: 50 TABLET, FILM COATED ORAL at 08:05

## 2022-05-17 RX ADMIN — POTASSIUM CHLORIDE 40 MEQ: 1500 TABLET, EXTENDED RELEASE ORAL at 06:05

## 2022-05-17 RX ADMIN — AMIODARONE HYDROCHLORIDE 400 MG: 200 TABLET ORAL at 10:05

## 2022-05-17 RX ADMIN — AMIODARONE HYDROCHLORIDE 400 MG: 200 TABLET ORAL at 08:05

## 2022-05-17 RX ADMIN — FUROSEMIDE 80 MG: 10 INJECTION, SOLUTION INTRAMUSCULAR; INTRAVENOUS at 05:05

## 2022-05-17 RX ADMIN — MELATONIN TAB 3 MG 6 MG: 3 TAB at 08:05

## 2022-05-17 RX ADMIN — APIXABAN 5 MG: 5 TABLET, FILM COATED ORAL at 10:05

## 2022-05-17 RX ADMIN — LEVETIRACETAM 1000 MG: 500 TABLET, FILM COATED ORAL at 10:05

## 2022-05-17 RX ADMIN — LEVALBUTEROL HYDROCHLORIDE 1.25 MG: 1.25 SOLUTION, CONCENTRATE RESPIRATORY (INHALATION) at 07:05

## 2022-05-17 RX ADMIN — VALPROIC ACID 250 MG: 250 CAPSULE, LIQUID FILLED ORAL at 08:05

## 2022-05-17 NOTE — RESPIRATORY THERAPY
RAPID RESPONSE RESPIRATORY THERAPY PROACTIVE NOTE           Time of visit: 1335    Code Status: Full Code   : 1945  Bed: 8085/8085 A:   MRN: [id number]  Time spent at the bedside: < 15 min    SITUATION    Evaluated patient for: O2 status    BACKGROUND    Why is the patient in the hospital?: Seizure-like activity    Patient has a past medical history of Hyperlipidemia, Hypertension, and Osteoporosis.    24 Hours Vitals Range:  Temp:  [96.8 °F (36 °C)-98.5 °F (36.9 °C)]   Pulse:  [52-97]   Resp:  [18-20]   BP: (117-190)/(52-88)   SpO2:  [87 %-99 %]     Labs:    Recent Labs     05/15/22  0436 22  0322 22  0421   * 128* 135*   K 3.7 4.0 3.3*   CL 94* 95 95   CO2 24 24 30*   CREATININE 0.7 0.7 0.8   GLU 71 69* 87   MG 1.8 1.8 1.7        Recent Labs     22  1249   PH 7.506*   PCO2 42.7   PO2 98   HCO3 33.8*   POCSATURATED 98   BE 11       ASSESSMENT/INTERVENTIONS    Upon arrival in room pt weaned to 5L.  The goal is to have her at     Last VS   Temp: 96.8 °F (36 °C) ( 1206)  Pulse: 61 ( 1400)  Resp: 18 ( 1206)  BP: 120/56 ( 1206)  SpO2: 93 % ( 1400)    Level of Consciousness: Level of Consciousness (AVPU): responds to voice  Respiratory Effort: Respiratory Effort: Normal, Unlabored Expansion/Accessory Muscle Usage: Expansion/Accessory Muscles/Retractions: expansion symmetric, no retractions, no use of accessory muscles  All Lung Field Breath Sounds: All Lung Fields Breath Sounds: Anterior:, Lateral:, diminished, clear  WARNER Breath Sounds: clear  LLL Breath Sounds: diminished  RUL Breath Sounds: clear  RML Breath Sounds: diminished  RLL Breath Sounds: diminished  O2 Device/Concentration: Flow (L/min): 5, Oxygen Concentration (%): 99, O2 Device (Oxygen Therapy): High Flow nasal Cannula  NIPPV: No Surgical airway: No  ETCO2 monitored:    Ambu at bedside: Ambu bag with the patient?: Yes, Adult Ambu    Active Orders   Respiratory Care    Incentive spirometry     Frequency: Q4H      Number of Occurrences: Until Specified    Inhalation Treatment Q12H     Frequency: Q12H     Number of Occurrences: Until Specified    Oxygen Continuous     Frequency: Continuous     Number of Occurrences: Until Specified     Order Questions:      Device type: Low flow      Device: Nasal Cannula (1- 5 Liters)      LPM: 2      Titrate O2 per Oxygen Titration Protocol: Yes      To maintain SpO2 goal of: >= 88%      Notify MD of: Inability to achieve desired SpO2; Sudden change in patient status and requires 20% increase in FiO2; Patient requires >60% FiO2    POCT ARTERIAL BLOOD GAS Blood Gas     Frequency: Once     Number of Occurrences: 1 Occurrences     Order Comments: Notify Physician if: see parameters below.       Order Questions:      Component: Blood Gas       RECOMMENDATIONS    We recommend: RRT Recs: wean O2 as tolerated    ESCALATION        FOLLOW-UP    Please call back the Rapid Response RTKaylee RRT at x [phone] for any questions or concerns.

## 2022-05-17 NOTE — NURSING
Patient was lethargic, unable to take medications. Med team Z aware order vbg and labs stat. B/p 101/52

## 2022-05-17 NOTE — PROGRESS NOTES
Pharmacokinetic Initial Assessment: IV Vancomycin    Assessment/Plan:    Initiate intravenous vancomycin with loading dose of 1250 mg once followed by a maintenance dose of vancomycin 1000 mg IV every 24 hours  Desired empiric serum trough concentration is 15 to 20 mcg/mL  Draw vancomycin trough level 60 min prior to third dose on 5/19 at approximately 1230  Pharmacy will continue to follow and monitor vancomycin.      Please contact pharmacy at extension [phone] with any questions regarding this assessment.     Thank you for the consult,   Stephon Wolff       Patient brief summary:  Teresa Leos is a 77 y.o. female initiated on antimicrobial therapy with IV Vancomycin for treatment of suspected  Pneumonia    Drug Allergies:   Review of patient's allergies indicates:   Allergen Reactions    Ciprofloxacin Other (See Comments)     Seizures       Actual Body Weight:   51.3 kg    Renal Function:   Estimated Creatinine Clearance: 43.7 mL/min (based on SCr of 0.8 mg/dL).,     Dialysis Method (if applicable):  N/A    CBC (last 72 hours):  Recent Labs   Lab Result Units 05/15/22  0436 05/16/22  0636 05/17/22  0421   WBC K/uL 10.70 11.73 12.60   Hemoglobin g/dL 10.3* 11.5* 10.0*   Hematocrit % 30.7* 33.8* 30.9*   Platelets K/uL 288 309 452*   Gran % % 65.3 64.1 63.4   Lymph % % 16.4* 15.9* 20.0   Mono % % 15.6* 16.9* 14.0   Eosinophil % % 0.8 1.6 1.3   Basophil % % 0.7 0.6 0.5   Differential Method  Automated Automated Automated       Metabolic Panel (last 72 hours):  Recent Labs   Lab Result Units 05/15/22  0436 05/16/22  0322 05/17/22  0421   Sodium mmol/L 129* 128* 135*   Potassium mmol/L 3.7 4.0 3.3*   Chloride mmol/L 94* 95 95   CO2 mmol/L 24 24 30*   Glucose mg/dL 71 69* 87   BUN mg/dL 13 10 11   Creatinine mg/dL 0.7 0.7 0.8   Magnesium mg/dL 1.8 1.8 1.7       Drug levels (last 3 results):  No results for input(s): VANCOMYCINRA, VANCOMYCINPE, VANCOMYCINTR in the last 72 hours.    Microbiologic  Results:  Microbiology Results (last 7 days)       ** No results found for the last 168 hours. **

## 2022-05-17 NOTE — PROGRESS NOTES
[facility]  Progress Note    Patient Name: Teresa Leos  MRN: [id number]  Patient Class: IP- Inpatient   Admission Date: 5/4/2022  Length of Stay: 12 days  Attending Physician: Joaquín Aldana MD  Primary Care Provider: Joseph Mcpherson NP        Subjective:     Principal Problem:Seizure-like activity        HPI:  78 y/o WF hx of recently diagnosed Atrial Fibrillation, Alzheimers Dementia, Hypertension, Hyperlipidemia who is transferred for concern of seizure and acute encephalopathy.     History provided by EMR review and by pts Son-Daughter-in-Law at bedside.     For the last 2 weeks patient has  noted to have progressive confusion worse 1 week ago since Wednesday.  This past Friday - April 29th, daughter-in-law noted patient was outside and throwing rocks away in the trash can, when she went outside to see the patient patient had fallen down and was convulsing, reported seizure activity for 30-40 minutes.  Patient was confused afterward, EMS called and patient admitted to [facility] from 4/29 - Mon 5/1.  Family states @ [place], [facility] states patient had just passed out/syncope, stroke was ruled out with CNS imaging reported CT & MRI, records not available for review.  Atrial fibrillation was noted as new diagnosis and patient was discharged on multiple new medications - including Toprol XL 25mg BID & Apixaban 5mg BID.      Today patient was at dining table eating, when she suddenly stopped responding, reported staring spells and then developed convulsions.   En Route via EMS to [facility] reported pt with additional seizures.      Tele stroke Neurology eval completed @ [facility], pt transferred to [facility] for further w/u due to concern of seizures as inciting issue.     Bedside interview - patient is awake, alert, oriented to self, location, denies any acute complaints, no chest pain, dyspnea, palpitations, no LE swelling, no visual complaints, no traumatic  injury     ED [facility] -  Keppra 2gm iv x1,  Amiodarone - unclear amt.  Metoprolol 5mg iv x 1,  LR bolus 500cc      Overview/Hospital Course:  Patient was admitted for new onset seizures and uncontrolled afib in RVR. 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 She was placed on valproic acid, lacosamide, and leveitracetam.   During her [facility] stay she was also in atrial fibrillation RVR which was difficult to control. She is on metoprolol, diltiazem, and amiodarone. She developed hypoxia during her admission. CXR showed moderate bilateral pleural effusions that occurred over two days. Started on furosemide IV, and underwent diuresis, was hypotensive on 5/12. She was given 250cc LR with improvement in BP. Then changed to oral diuretics. Neurology was consulted 5/13 due to ongoing oversedation with AEDs for help with AED adjustment. Recommended increasing vimpat back to 200 mg BID. Hypotensive again. Stopped diltiazem and increased metoprolol. Gave bolus fluids. However still with hypoxia due to pulmonary edema. Increased furosemide to 80 mg daily. Also with hyponatremia likely from hypervolemia and SIADH.    On 5/17 she became significantly more hypoxic, requiring as much as 10-16 L HFNC to maintain her oxygen saturation. Was HTN to 180s/90s, was given 20mg IV hydralazine with improvement and 80mg IV lasix, she was weaned back to 5L NC.     PT/OT recommended SNF      Interval History: This AM was hypoxic, required as much as 16L HFNC, likely flash pulm edema    Review of Systems   Constitutional:  Negative for fatigue and fever.   Respiratory:  Negative for cough and shortness of breath.    Cardiovascular:  Negative for chest pain and leg swelling.   Gastrointestinal:  Negative for abdominal distention and abdominal pain.   Objective:     Vital Signs (Most Recent):  Temp: 96.8 °F (36 °C) (05/17/22 1206)  Pulse: 60 (05/17/22  1500)  Resp: 18 (05/17/22 1500)  BP: (!) 120/56 (05/17/22 1206)  SpO2: (!) 92 % (05/17/22 1500) Vital Signs (24h Range):  Temp:  [96.8 °F (36 °C)-98.5 °F (36.9 °C)] 96.8 °F (36 °C)  Pulse:  [52-97] 60  Resp:  [18-20] 18  SpO2:  [87 %-99 %] 92 %  BP: (117-190)/(52-88) 120/56     Weight: 51.3 kg (113 lb)  Body mass index is 21.35 kg/m².    Intake/Output Summary (Last 24 hours) at 5/17/2022 1512  Last data filed at 5/17/2022 1000  Gross per 24 hour   Intake 315 ml   Output 400 ml   Net -85 ml        Physical Exam  Constitutional:       General: She is not in acute distress.     Appearance: Normal appearance. She is not ill-appearing.   Cardiovascular:      Rate and Rhythm: Normal rate and regular rhythm.      Pulses: Normal pulses.      Heart sounds: Normal heart sounds.   Pulmonary:      Effort: Pulmonary effort is normal.      Comments: Crackles at the bases  Abdominal:      General: Abdomen is flat. Bowel sounds are normal. There is no distension.      Palpations: Abdomen is soft.   Musculoskeletal:      Right lower leg: No edema.      Left lower leg: No edema.   Skin:     General: Skin is warm.      Findings: No lesion or rash.   Neurological:      General: No focal deficit present.      Mental Status: She is alert.   Psychiatric:         Mood and Affect: Mood normal.         Behavior: Behavior normal.       Significant Labs: All pertinent labs within the past 24 hours have been reviewed.  CBC:   Recent Labs   Lab 05/16/22  0636 05/17/22  0421   WBC 11.73 12.60   HGB 11.5* 10.0*   HCT 33.8* 30.9*    452*       CMP:   Recent Labs   Lab 05/16/22  0322 05/17/22  0421   * 135*   K 4.0 3.3*   CL 95 95   CO2 24 30*   GLU 69* 87   BUN 10 11   CREATININE 0.7 0.8   CALCIUM 8.2* 8.5*   ANIONGAP 9 10   EGFRNONAA >60.0 >60.0         Significant Imaging: I have reviewed all pertinent imaging results/findings within the past 24 hours.      Assessment/Plan:      * Seizure-like activity  Siezures  - Epilepsy  following, appreciate their recs  - keppra 1 g BID  - vimpat 100mg BID  - depakote 250mg Q8H  - Ammonia wnl, depakote level therapeutic (goal )  - Infectious w/u initiated on arrival to [facility] on 5/4, NGTD and UA noninfectious, has completed 7d treatment for previously diagnosed UTI  - MRI unremarkable  - neurology consulted on 5/13 as patient was overly sedated, likely due to AEDs -- patient alertness improved the following day. Neurology recommended continue current AEDs     Acute hypoxemic respiratory failure  78 y/o female with new onset seizures and AF presents with recurrent focal right sided seizures. Likely 2/2 prior stroke, +/- provoked by recent course of ciprofloxacin for UTI, then developed volume overload, and was diuresed.        Volume overload  Acute diastolic heart failure  Flash pulmonary edema  Likely due to volume overload, not on O2 at home,. Potentially due to afib w/ RVR leading to volume overload. EF showed normal systolic function on 5/5. No description of diastolic function and CVP 8. However on 5/17 she became significantly more hypoxic, hypertensive, requiring up to 16L HFNC, she was diuresed, BP controlled, and she was weaned to 5L NC.  - Incrase furosemide to  80 mg IV daily  - Repeat ECHO to assess volume status and cardiac function  - CXR showed likely volume overload  - ABG did not show hypercarbia  - Control BP with hydralazine PRN    Prolonged Q-T interval on ECG  May have been secondary to amiodarone gtt -> improved  - QTc 490 on AM ECG   - Trend daily  - Aggressively replete K/Mag  - Avoid additional QTc prolonging medications    Urinary retention  Requiring frequent straight caths      - UA on admission non-infectious   - C/w silodosin 8 mg qday  - Bladder scans per protocol and straight cath prn ordered  - If requiring straight cath often will consider paula and urology follow up as frequent retention can lead to UTI    Dementia  Mild per family reports, remains independent  of ADLs     - c/w home donepezil 5 mg  - Delirium precautions  - Melatonin 6 mg qhs  - Avoid seroquel/antipsychotics given prolonged QTc     Hyponatremia  Stopped thiazide. Improved with fluid restriction and diuresis  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  Continue fluid restriction and diuresis    Other hyperlipidemia  Continue atorvastatin    Gastroesophageal reflux disease  Continue protonix in place of home PPI    Atrial fibrillation with RVR  HR into 170s-180 prior to transfer to [facility] admission given BB and loaded with amiodarone  Improving  - Continue apixaban for anticoagulation  - amiodarone 400 mg BID x 6 doses to complete load and then 200 mg daily  -hold diltiazem due to hypotension requiring bolus fluids  - increase metoprolol to 75 mg q6h - can escalate to 400 mg daily. Transition to XL when optimal dosing determined    Essential hypertension  Hypotensive episodes initially but now appears to have increasing hypertension, will monitor closely  Stop home amlodipine and irbesartan-HCTZ in favor AV wkabena blockade  Goal SBP <160 due to age and comorbidites  - Hydralazine PRN while BP is labile      VTE Risk Mitigation (From admission, onward)         Ordered     apixaban tablet 5 mg  2 times daily         05/10/22 0923     IP VTE HIGH RISK PATIENT  Once         05/04/22 2039     Place sequential compression device  Until discontinued         05/04/22 2039                Discharge Planning   MONA: 5/19/2022     Code Status: Full Code   Is the patient medically ready for discharge?: No    Reason for patient still in hospital (select all that apply): Patient new problem, Patient trending condition, Treatment and Consult recommendations  Discharge Plan A: Skilled Nursing Facility   Discharge Delays: None known at this time              Joaquín Aldana MD  Department of Hospital Medicine   [facility] - Telemetry Stepdown

## 2022-05-17 NOTE — ASSESSMENT & PLAN NOTE
Siezures  - Epilepsy following, appreciate their recs  - keppra 1 g BID  - vimpat 100mg BID  - depakote 250mg Q8H  - Ammonia wnl, depakote level therapeutic (goal )  - Infectious w/u initiated on arrival to [facility] on 5/4, NGTD and UA noninfectious, has completed 7d treatment for previously diagnosed UTI  - MRI unremarkable  - neurology consulted on 5/13 as patient was overly sedated, likely due to AEDs -- patient alertness improved the following day. Neurology recommended continue current AEDs

## 2022-05-17 NOTE — SUBJECTIVE & OBJECTIVE
Interval History: This AM was hypoxic, required as much as 16L HFNC, likely flash pulm edema    Review of Systems   Constitutional:  Negative for fatigue and fever.   Respiratory:  Negative for cough and shortness of breath.    Cardiovascular:  Negative for chest pain and leg swelling.   Gastrointestinal:  Negative for abdominal distention and abdominal pain.   Objective:     Vital Signs (Most Recent):  Temp: 96.8 °F (36 °C) (05/17/22 1206)  Pulse: 60 (05/17/22 1500)  Resp: 18 (05/17/22 1500)  BP: (!) 120/56 (05/17/22 1206)  SpO2: (!) 92 % (05/17/22 1500) Vital Signs (24h Range):  Temp:  [96.8 °F (36 °C)-98.5 °F (36.9 °C)] 96.8 °F (36 °C)  Pulse:  [52-97] 60  Resp:  [18-20] 18  SpO2:  [87 %-99 %] 92 %  BP: (117-190)/(52-88) 120/56     Weight: 51.3 kg (113 lb)  Body mass index is 21.35 kg/m².    Intake/Output Summary (Last 24 hours) at 5/17/2022 1512  Last data filed at 5/17/2022 1000  Gross per 24 hour   Intake 315 ml   Output 400 ml   Net -85 ml        Physical Exam  Constitutional:       General: She is not in acute distress.     Appearance: Normal appearance. She is not ill-appearing.   Cardiovascular:      Rate and Rhythm: Normal rate and regular rhythm.      Pulses: Normal pulses.      Heart sounds: Normal heart sounds.   Pulmonary:      Effort: Pulmonary effort is normal.      Comments: Crackles at the bases  Abdominal:      General: Abdomen is flat. Bowel sounds are normal. There is no distension.      Palpations: Abdomen is soft.   Musculoskeletal:      Right lower leg: No edema.      Left lower leg: No edema.   Skin:     General: Skin is warm.      Findings: No lesion or rash.   Neurological:      General: No focal deficit present.      Mental Status: She is alert.   Psychiatric:         Mood and Affect: Mood normal.         Behavior: Behavior normal.       Significant Labs: All pertinent labs within the past 24 hours have been reviewed.  CBC:   Recent Labs   Lab 05/16/22  0636 05/17/22  0421   WBC 11.73  12.60   HGB 11.5* 10.0*   HCT 33.8* 30.9*    452*       CMP:   Recent Labs   Lab 05/16/22  0322 05/17/22  0421   * 135*   K 4.0 3.3*   CL 95 95   CO2 24 30*   GLU 69* 87   BUN 10 11   CREATININE 0.7 0.8   CALCIUM 8.2* 8.5*   ANIONGAP 9 10   EGFRNONAA >60.0 >60.0         Significant Imaging: I have reviewed all pertinent imaging results/findings within the past 24 hours.

## 2022-05-17 NOTE — ASSESSMENT & PLAN NOTE
76 y/o female with new onset seizures and AF presents with recurrent focal right sided seizures. Likely 2/2 prior stroke, +/- provoked by recent course of ciprofloxacin for UTI, then developed volume overload, and was diuresed.        Volume overload  Acute diastolic heart failure  Flash pulmonary edema  Likely due to volume overload, not on O2 at home,. Potentially due to afib w/ RVR leading to volume overload. EF showed normal systolic function on 5/5. No description of diastolic function and CVP 8. However on 5/17 she became significantly more hypoxic, hypertensive, requiring up to 16L HFNC, she was diuresed, BP controlled, and she was weaned to 5L NC.  - Incrase furosemide to  80 mg IV daily  - Repeat ECHO to assess volume status and cardiac function  - CXR showed likely volume overload  - ABG did not show hypercarbia  - Control BP with hydralazine PRN

## 2022-05-17 NOTE — PLAN OF CARE
Per Andree, pt would like referrals for SNF sent to [facility] and [facility].       05/17/22 1158   Post-Acute Status   Post-Acute Authorization Placement   Post-Acute Placement Status Referrals Sent   Discharge Delays None known at this time   Discharge Plan   Discharge Plan A Skilled Nursing Facility   Discharge Plan B Home;Home Health     Tracey Billings LMSW  PRN-  [facility]  Ext. [phone]

## 2022-05-17 NOTE — ASSESSMENT & PLAN NOTE
Hypotensive episodes initially but now appears to have increasing hypertension, will monitor closely  Stop home amlodipine and irbesartan-HCTZ in favor AV kwabena blockade  Goal SBP <160 due to age and comorbidites  - Hydralazine PRN while BP is labile

## 2022-05-17 NOTE — PT/OT/SLP PROGRESS
Physical Therapy partial co Treatment    Patient Name:  Teresa Leos   MRN:  [id number]  *Co treatment of 2 skilled therapists indicated in order to maximize function and safety of Pt.  Recommendations:     Discharge Recommendations:  nursing facility, skilled   Discharge Equipment Recommendations: shower chair, wheelchair, walker, rolling   Barriers to discharge: Inaccessible home and requires extensive assistance    Assessment:     Teresa Leos is a 77 y.o. female admitted with a medical diagnosis of Seizure-like activity.  She presents with the following impairments/functional limitations:  weakness, impaired endurance, impaired self care skills, impaired functional mobilty, gait instability, impaired balance, impaired cognition, decreased coordination, decreased upper extremity function, decreased lower extremity function, decreased safety awareness, decreased ROM, impaired coordination, impaired cardiopulmonary response to activity. Teresa Leos would benefit from acute PT intervention to address listed functional deficits, provide patient/caregiver education, reduce fall risk, and maximize (I) and safety with functional mobility.    Pt presents with significant functional mobility deficits and is functioning below baseline. Pt perseverating on her son leaving the room at the beginning of the session. His absence seemed to limit her ability to follow commands 2/2 absence of encouragement which is usually present. Pt required mod-max A for all bed mobility, and modA for sit to stand transfer. Ambulation deferred 2/2 Pt becoming dizzy/lightheaded; symptoms did not improve so Pt returned to supine. Pt will continue to benefit from acute PT services in order to maximize safety and (I) with functional mobility.    After hospital discharge, pt would benefit from SNF to continue addressing therapy impairments.    Rehab Prognosis: Fair; patient would benefit from acute skilled PT services to address these  "deficits and reach maximum level of function.      Plan:     During this hospitalization, patient to be seen 3 x/week to address the identified rehab impairments via gait training, therapeutic activities, therapeutic exercises, neuromuscular re-education and progress toward the following goals:    · Plan of Care Expires:  06/10/22    This plan of care has been discussed with the patient, who was included in its development and is in agreement with the identified goals and treatment plan.     Subjective     Communicated with RN prior to session.  Patient agreeable to participate.     Chief Complaint: none  Patient/Family Comments/goals: "Where's my son? He's supposed to be here."  Pt pain prior to session: 0/10  Pt pain following session: 0/10      Objective:     Patient found HOB elevated with blood pressure cuff, telemetry, pulse ox (continuous), peripheral IV, oxygen, paula catheter  upon PT entry to room.    General Precautions: Standard, fall   Orthopedic Precautions:N/A   Braces: N/A     Functional Mobility:    Bed Mobility:  · Supine to Sit: Moderate Assistance and Maximum Assistance  · Sit to Supine: Moderate Assistance  · Scooting anteriorly to EOB to plant feet on floor: Maximum Assistance  · Scooting/Bridging in supine to HOB: Total Assistance x 2    Transfers:   · Sit to Stand Transfer: Moderate Assistance  from EOB with HHA x 2               Gait:  · Deferred 2/2 dizziness/lightheadedness that increased with sitting and standing and did not improve over time.     Balance:  · Static Sit: Contact-Guard Assist at EOB x ~15 minutes  · Static Stand: Min Assist with Hand-held assist x 2      Therapeutic Activities/Exercises     Patient educated on the importance of early mobility to prevent functional decline during hospital stay    Patient was instructed to utilize staff assistance for mobility/transfers.  Patient was educated on PT POC and all questions answered within PT scope of practice.    Patientr able " to verbalize understanding; will follow-up with pt during current admit for additional questions/concerns within scope of practice.     AM-PAC 6 CLICK MOBILITY  Total Score:11     Patient left HOB elevated with all lines intact, call button in reach and RN notified.        History/Goals:     PAST MEDICAL HISTORY:  Past Medical History:   Diagnosis Date    Hyperlipidemia     Hypertension     Osteoporosis        Past Surgical History:   Procedure Laterality Date    HIP REPLACEMENT ARTHROPLASTY Right     HYSTERECTOMY      LEG SURGERY      SKIN CANCER EXCISION      TONSILLECTOMY, ADENOIDECTOMY         GOALS:   Multidisciplinary Problems     Physical Therapy Goals        Problem: Physical Therapy    Goal Priority Disciplines Outcome Goal Variances Interventions   Physical Therapy Goal     PT, PT/OT Ongoing, Progressing     Description: Goals to be met by: 22    Patient will increase functional independence with mobility by performin. Supine to sit with supervision  2. Sit to supine with supervision  3. Sit to stand transfer with independence  4. Gait  x 80 feet with supervision using LRAD as needed  5. Ascend/descend 5 stair with left handrails supervision using LRAD as needed  6. Lower extremity exercise program x10 reps per handout, with independence                     Time Tracking:     PT Received On: 22  PT Start Time: 1441     PT Stop Time: 1506  PT Total Time (min): 25 min     Billable Minutes: Therapeutic Activity 25      Colette Farias PT  2022

## 2022-05-17 NOTE — PLAN OF CARE
Problem: Adult Inpatient Plan of Care  Goal: Plan of Care Review  Outcome: Ongoing, Progressing  Goal: Patient-Specific Goal (Individualized)  Description: Admit Date: 5/4/2022     Outcome: Ongoing, Progressing     Problem: Infection  Goal: Absence of Infection Signs and Symptoms  Outcome: Ongoing, Progressing     Problem: Skin Injury Risk Increased  Goal: Skin Health and Integrity  Outcome: Ongoing, Progressing     Problem: Fall Injury Risk  Goal: Absence of Fall and Fall-Related Injury  Outcome: Ongoing, Progressing     POC reviewed. AAOX3. VVS. Remain IV Abx. Isolation precaution maintained. Adhere to strict I&O. Sampson patent to gu bag. Oxygen @5L HF Sat>92%. Encourage reposition. Remain with poor appetite. Son @bedside. Call light in reach. Frequent safety checks performed.

## 2022-05-17 NOTE — PHYSICIAN QUERY
"PT Name: Teresa Leos  MR #: [id number]    DOCUMENTATION CLARIFICATION      CDS/:  Burak Son RN, CDS                   Contact Information:  [email or web address]    This form is a permanent document in the medical record.    Query Date: May 17, 2022    By submitting this query, we are merely seeking further clarification of documentation. Please utilize your independent clinical judgment when addressing the question(s) below.    The medical record contains the following:   Indicators   Supporting Clinical Findings Location in Medical Record   X "Pulmonary Edema" However still with hypoxia due to pulmonary edema.   PN 5/16       X Wheezing, Productive cough, SOB, JESSICA, Use of accessory muscles Crackles at the bases  PN 5/16   X Radiology Findings No acute cardiopulmonary disease     Interstitial edema and minimal effusions    Patient has developed some diffuse of infiltrate or pulmonary edema in both lungs which has significantly worsened over the last 2 days believe there are moderate bilateral pleural effusions present.     CXR 5/4    CXR 5/9    CXR 5/11   X CHF documented/Respiratory Failure documented Acute hypoxemic respiratory failure    Acute diastolic heart failure    PN 5/16    Respiratory condition     X RR                  PaO2                  PaCO2                     O2 sat 92% on 3L NC    88% on 6L NC   VS FS 5/14 1146    VS FS 5/15 0815   X Treatment: Increased furosemide to 80 mg daily.    PN 5/16   X Supplemental O2: 2-7L NC    Non rebreather   VS FS    X Oxygen dependence Likely due to volume overload, not on O2 at home  PN 5/16       X Other: Respiratory: Comfortable respirations. Clear to auscultation    CXR w/ mild pulmonary edema, on 3 L O2 via NC, given lasix    She developed hypoxia during her admission. CXR showed moderate bilateral pleural effusions that occurred over two days. Started on furosemide IV, and underwent diuresis    Volume overload:           " Potentially due to afib w/ RVR leading to volume overload   NCC H&P 5/5      NCC PN 5/11      HM PN 5/16       Provider, please clarify acuity of Pulmonary Edema diagnosis associated with above clinical findings.    [ x   ] Acute pulmonary edema, cardiac cause (please specify cardiac condition): ___________________     [    ] Acute pulmonary edema, non-cardiac cause (please specify causative condition): ___________________     [    ] Acute pulmonary edema, unspecified cause     [    ] Other respiratory diagnosis (please specify): _________________________________              [   ] Clinically Undetermined         Please document in your progress notes daily for the duration of treatment, until resolved, and include in your discharge summary.

## 2022-05-17 NOTE — PLAN OF CARE
Problem: Occupational Therapy  Goal: Occupational Therapy Goal  Description: Goals set on 5/11/2022 with expiration date 5/25/2022:  Patient will increase functional independence with ADLs by performing:    Supine <> Sit with Stand-by Assistance.  Grooming while standing at sink with Stand-by Assistance.  UB Dressing with Stand-by Assistance.  LB Dressing with Stand-by Assistance.  Step transfer with Stand-by Assistance with DME as needed.  Pt will demonstrate understanding of education provided regarding energy conservation and task modification through teach-back method.         Outcome: Ongoing, Progressing

## 2022-05-17 NOTE — PT/OT/SLP PROGRESS
"Occupational Therapy   Partial Co-Treatment with Physical Therapy    Name: Teresa Leos  MRN: [id number]  Admitting Diagnosis:  Seizure-like activity      Pre-op Diagnosis: * No surgery found *    Recommendations:     Discharge Recommendations: nursing facility, skilled  Discharge Equipment Recommendations:  walker, rolling, wheelchair, shower chair  Barriers to discharge:  Inaccessible home environment (increased level of (A) required)    Assessment:     Teresa Leos is a 77 y.o. female with a medical diagnosis of Seizure-like activity.  She presents with decreased alertness. Performance deficits affecting function are weakness, impaired endurance, impaired self care skills, impaired functional mobilty, gait instability, impaired cognition, impaired balance, impaired cardiopulmonary response to activity, decreased safety awareness, decreased upper extremity function, edema, decreased lower extremity function, decreased coordination.     Patient with decreased alertness, difficulty following one-step commands, and increased time for processing affecting progress today. Patient required Max(A) for bed mobility, but unable to safetly progress with mobility secondary to decreased alertness. Patient would benefit from continued OT services to address deficits and progress towards goals. Continue OT POC.    Rehab Prognosis:  Fair; patient would benefit from acute skilled OT services to address these deficits and reach maximum level of function.       Plan:     Patient to be seen 3 x/week to address the above listed problems via self-care/home management, therapeutic activities, therapeutic exercises  · Plan of Care Expires: 06/09/22  · Plan of Care Reviewed with: patient    Subjective   Patient agreeable to sitting EOB, but perseverated "Where's my son? Is he coming back?"    Pain/Comfort:  · Pain Rating 1: 0/10  · Pain Rating Post-Intervention 1: 0/10    Objective:     Communicated with: RN and OTR prior to session. "  Patient found HOB elevated with paula catheter, telemetry, oxygen, pulse ox (continuous), blood pressure cuff, peripheral IV upon OT entry to room.  A client care conference was completed by the OTR and the WETZEL prior to treatment by the WETZEL to discuss the patient's POC and current status.    General Precautions: Standard, fall   Orthopedic Precautions:N/A   Braces: N/A  Respiratory Status: High flow, flow 5 L/min, concentration 99%     Occupational Performance:     Bed Mobility:    · Patient completed Scooting anteriorly to plant B feet on floor with maximal assistance, with side rail and max cues for initiating/sequencing  · Scooting in supine towards HOB using draw sheet Total(A) x2  · Patient completed Supine to Sit with maximal assistance, with side rail and HOB elevated with trunk and BLE management; max cues for initiating/sequencing  · Patient completed Sit to Supine with moderate assistance, with side rail and HOB flat with BLE management and cues to initiate     Functional Mobility/Transfers:  · Unable to safely complete at this time due to decreased alertness and processing.    Activities of Daily Living:  · Did not assess today secondary to decreased alertness. Nurse made aware of patient cognitive change during therapy      [facility] 6 Click ADL: 15    Treatment & Education:  Instruction and facilitation in bed mobility techniques including initiating/sequencing and hand placement  RN notified of patient participation in therapy today.   Partial Co-treatment performed with PT due to patient's complexity and benefit of 2 skilled therapists to facilitate functional and safe occupational performance, accommodate patient's activity tolerance, and maximize patient's participation in therapy.     Patient left HOB elevated with all lines intact, call button in reach and RN notifiedEducation:      GOALS:   Multidisciplinary Problems     Occupational Therapy Goals        Problem: Occupational Therapy    Goal  Priority Disciplines Outcome Interventions   Occupational Therapy Goal     OT, PT/OT Ongoing, Progressing    Description: Goals set on 5/11/2022 with expiration date 5/25/2022:  Patient will increase functional independence with ADLs by performing:    Supine <> Sit with Stand-by Assistance.  Grooming while standing at sink with Stand-by Assistance.  UB Dressing with Stand-by Assistance.  LB Dressing with Stand-by Assistance.  Step transfer with Stand-by Assistance with DME as needed.  Pt will demonstrate understanding of education provided regarding energy conservation and task modification through teach-back method.                    Multidisciplinary Problems (Resolved)        Problem: Occupational Therapy    Goal Priority Disciplines Outcome Interventions   Occupational Therapy Goal   (Resolved)     OT, PT/OT Met                    Time Tracking:     OT Date of Treatment: 05/17/22  OT Start Time: 1450  OT Stop Time: 1509  OT Total Time (min): 19 min    Billable Minutes:Therapeutic Activity 19    OT/HAYDEE: HAYDEE HUBBARD Visit Number: 1    5/17/2022

## 2022-05-17 NOTE — NURSING
Inform Med team Z of pt elevated B/P-181/77-HR 52 prns given as ordered-Sat drop top 86-88 on 6L N/C. MD/Respiratory in room assess. Pt place HF-Sat-99%

## 2022-05-17 NOTE — ASSESSMENT & PLAN NOTE
Stopped thiazide. Improved with fluid restriction and diuresis  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  Continue fluid restriction and diuresis

## 2022-05-17 NOTE — NURSING
2000  Received report for pt from AM Nurse. Pt resting in bed. Son at bedside. Pt AAOx3; 3L NC. VSS. No c/o pain/distress. Education regarding fall and safety precaution provided. Safety check performed. Call bell within reach. Will continue to monitor.     0000  Pt resting in bed. Medication administered per MAR. Safety check performed. Call bell within reach. Will continue to monitor.

## 2022-05-18 LAB
ANION GAP SERPL CALC-SCNC: 14 MMOL/L (ref 8–16)
ASCENDING AORTA: 2.78 CM
AV INDEX (PROSTH): 0.7
AV MEAN GRADIENT: 4 MMHG
AV PEAK GRADIENT: 8 MMHG
AV VALVE AREA: 2.44 CM2
AV VELOCITY RATIO: 0.69
BASOPHILS # BLD AUTO: 0.05 K/UL (ref 0–0.2)
BASOPHILS NFR BLD: 0.4 % (ref 0–1.9)
BSA FOR ECHO PROCEDURE: 1.49 M2
BUN SERPL-MCNC: 13 MG/DL (ref 8–23)
CALCIUM SERPL-MCNC: 8.5 MG/DL (ref 8.7–10.5)
CHLORIDE SERPL-SCNC: 95 MMOL/L (ref 95–110)
CO2 SERPL-SCNC: 24 MMOL/L (ref 23–29)
CREAT SERPL-MCNC: 0.8 MG/DL (ref 0.5–1.4)
CV ECHO LV RWT: 0.33 CM
DIFFERENTIAL METHOD: ABNORMAL
DOP CALC AO PEAK VEL: 1.4 M/S
DOP CALC AO VTI: 32 CM
DOP CALC LVOT AREA: 3.5 CM2
DOP CALC LVOT DIAMETER: 2.11 CM
DOP CALC LVOT PEAK VEL: 0.96 M/S
DOP CALC LVOT STROKE VOLUME: 78.18 CM3
DOP CALC MV VTI: 37.63 CM
DOP CALCLVOT PEAK VEL VTI: 22.37 CM
E WAVE DECELERATION TIME: 138.89 MSEC
E/A RATIO: 3.44
E/E' RATIO: 22.77 M/S
ECHO LV POSTERIOR WALL: 0.69 CM (ref 0.6–1.1)
EJECTION FRACTION: 65 %
EOSINOPHIL # BLD AUTO: 0.1 K/UL (ref 0–0.5)
EOSINOPHIL NFR BLD: 1 % (ref 0–8)
ERYTHROCYTE [DISTWIDTH] IN BLOOD BY AUTOMATED COUNT: 13.3 % (ref 11.5–14.5)
EST. GFR  (AFRICAN AMERICAN): >60 ML/MIN/1.73 M^2
EST. GFR  (NON AFRICAN AMERICAN): >60 ML/MIN/1.73 M^2
FRACTIONAL SHORTENING: 35 % (ref 28–44)
GLUCOSE SERPL-MCNC: 77 MG/DL (ref 70–110)
HCT VFR BLD AUTO: 31.9 % (ref 37–48.5)
HGB BLD-MCNC: 11 G/DL (ref 12–16)
IMM GRANULOCYTES # BLD AUTO: 0.19 K/UL (ref 0–0.04)
IMM GRANULOCYTES NFR BLD AUTO: 1.5 % (ref 0–0.5)
INTERVENTRICULAR SEPTUM: 0.93 CM (ref 0.6–1.1)
LA MAJOR: 5.87 CM
LA MINOR: 5.53 CM
LA WIDTH: 3.27 CM
LEFT ATRIUM SIZE: 4.08 CM
LEFT ATRIUM VOLUME INDEX MOD: 39.6 ML/M2
LEFT ATRIUM VOLUME INDEX: 43.6 ML/M2
LEFT ATRIUM VOLUME MOD: 58.63 CM3
LEFT ATRIUM VOLUME: 64.58 CM3
LEFT INTERNAL DIMENSION IN SYSTOLE: 2.71 CM (ref 2.1–4)
LEFT VENTRICLE DIASTOLIC VOLUME INDEX: 52.82 ML/M2
LEFT VENTRICLE DIASTOLIC VOLUME: 78.18 ML
LEFT VENTRICLE MASS INDEX: 69 G/M2
LEFT VENTRICLE SYSTOLIC VOLUME INDEX: 18.5 ML/M2
LEFT VENTRICLE SYSTOLIC VOLUME: 27.32 ML
LEFT VENTRICULAR INTERNAL DIMENSION IN DIASTOLE: 4.19 CM (ref 3.5–6)
LEFT VENTRICULAR MASS: 102.57 G
LV LATERAL E/E' RATIO: 24.67 M/S
LV SEPTAL E/E' RATIO: 21.14 M/S
LYMPHOCYTES # BLD AUTO: 2 K/UL (ref 1–4.8)
LYMPHOCYTES NFR BLD: 15.2 % (ref 18–48)
MAGNESIUM SERPL-MCNC: 1.7 MG/DL (ref 1.6–2.6)
MCH RBC QN AUTO: 32.8 PG (ref 27–31)
MCHC RBC AUTO-ENTMCNC: 34.5 G/DL (ref 32–36)
MCV RBC AUTO: 95 FL (ref 82–98)
MONOCYTES # BLD AUTO: 2.3 K/UL (ref 0.3–1)
MONOCYTES NFR BLD: 18 % (ref 4–15)
MV A" WAVE DURATION": 11.04 MSEC
MV MEAN GRADIENT: 1 MMHG
MV PEAK A VEL: 0.43 M/S
MV PEAK E VEL: 1.48 M/S
MV PEAK GRADIENT: 12 MMHG
MV STENOSIS PRESSURE HALF TIME: 40.28 MS
MV VALVE AREA BY CONTINUITY EQUATION: 2.08 CM2
MV VALVE AREA P 1/2 METHOD: 5.46 CM2
NEUTROPHILS # BLD AUTO: 8.3 K/UL (ref 1.8–7.7)
NEUTROPHILS NFR BLD: 63.9 % (ref 38–73)
NRBC BLD-RTO: 0 /100 WBC
PISA TR MAX VEL: 3.27 M/S
PLATELET # BLD AUTO: 328 K/UL (ref 150–450)
PLATELET BLD QL SMEAR: ABNORMAL
PMV BLD AUTO: 11.5 FL (ref 9.2–12.9)
POTASSIUM SERPL-SCNC: 4.3 MMOL/L (ref 3.5–5.1)
PULM VEIN S/D RATIO: 0.38
PV PEAK D VEL: 0.63 M/S
PV PEAK S VEL: 0.24 M/S
RA MAJOR: 5.15 CM
RA PRESSURE: 8 MMHG
RA WIDTH: 3.94 CM
RBC # BLD AUTO: 3.35 M/UL (ref 4–5.4)
RIGHT VENTRICULAR END-DIASTOLIC DIMENSION: 4.2 CM
SINUS: 2.99 CM
SODIUM SERPL-SCNC: 133 MMOL/L (ref 136–145)
STJ: 1.89 CM
TDI LATERAL: 0.06 M/S
TDI SEPTAL: 0.07 M/S
TDI: 0.07 M/S
TR MAX PG: 43 MMHG
TRICUSPID ANNULAR PLANE SYSTOLIC EXCURSION: 1.73 CM
TV REST PULMONARY ARTERY PRESSURE: 51 MMHG
VALPROATE SERPL-MCNC: 65.1 UG/ML (ref 50–100)
WBC # BLD AUTO: 12.99 K/UL (ref 3.9–12.7)

## 2022-05-18 PROCEDURE — 20600001 HC STEP DOWN PRIVATE ROOM

## 2022-05-18 PROCEDURE — 25000003 PHARM REV CODE 250: Performed by: PHYSICIAN ASSISTANT

## 2022-05-18 PROCEDURE — 83735 ASSAY OF MAGNESIUM: CPT | Performed by: STUDENT IN AN ORGANIZED HEALTH CARE EDUCATION/TRAINING PROGRAM

## 2022-05-18 PROCEDURE — 94761 N-INVAS EAR/PLS OXIMETRY MLT: CPT

## 2022-05-18 PROCEDURE — 80048 BASIC METABOLIC PNL TOTAL CA: CPT | Performed by: STUDENT IN AN ORGANIZED HEALTH CARE EDUCATION/TRAINING PROGRAM

## 2022-05-18 PROCEDURE — 94660 CPAP INITIATION&MGMT: CPT

## 2022-05-18 PROCEDURE — 27000221 HC OXYGEN, UP TO 24 HOURS

## 2022-05-18 PROCEDURE — 99900031 HC PATIENT EDUCATION (STAT)

## 2022-05-18 PROCEDURE — 25000242 PHARM REV CODE 250 ALT 637 W/ HCPCS: Performed by: STUDENT IN AN ORGANIZED HEALTH CARE EDUCATION/TRAINING PROGRAM

## 2022-05-18 PROCEDURE — 85025 COMPLETE CBC W/AUTO DIFF WBC: CPT | Performed by: STUDENT IN AN ORGANIZED HEALTH CARE EDUCATION/TRAINING PROGRAM

## 2022-05-18 PROCEDURE — 25000003 PHARM REV CODE 250: Performed by: HOSPITALIST

## 2022-05-18 PROCEDURE — 25000003 PHARM REV CODE 250: Performed by: INTERNAL MEDICINE

## 2022-05-18 PROCEDURE — 99233 SBSQ HOSP IP/OBS HIGH 50: CPT | Mod: ,,, | Performed by: STUDENT IN AN ORGANIZED HEALTH CARE EDUCATION/TRAINING PROGRAM

## 2022-05-18 PROCEDURE — 36415 COLL VENOUS BLD VENIPUNCTURE: CPT

## 2022-05-18 PROCEDURE — 94640 AIRWAY INHALATION TREATMENT: CPT

## 2022-05-18 PROCEDURE — 99233 PR SUBSEQUENT HOSPITAL CARE,LEVL III: ICD-10-PCS | Mod: ,,, | Performed by: STUDENT IN AN ORGANIZED HEALTH CARE EDUCATION/TRAINING PROGRAM

## 2022-05-18 PROCEDURE — 63600175 PHARM REV CODE 636 W HCPCS: Performed by: STUDENT IN AN ORGANIZED HEALTH CARE EDUCATION/TRAINING PROGRAM

## 2022-05-18 PROCEDURE — 25000003 PHARM REV CODE 250

## 2022-05-18 PROCEDURE — 99900035 HC TECH TIME PER 15 MIN (STAT)

## 2022-05-18 PROCEDURE — 25000003 PHARM REV CODE 250: Performed by: STUDENT IN AN ORGANIZED HEALTH CARE EDUCATION/TRAINING PROGRAM

## 2022-05-18 PROCEDURE — 80164 ASSAY DIPROPYLACETIC ACD TOT: CPT

## 2022-05-18 PROCEDURE — 27000190 HC CPAP FULL FACE MASK W/VALVE

## 2022-05-18 RX ORDER — METOPROLOL TARTRATE 50 MG/1
50 TABLET ORAL 2 TIMES DAILY
Status: DISCONTINUED | OUTPATIENT
Start: 2022-05-18 | End: 2022-05-20

## 2022-05-18 RX ORDER — FUROSEMIDE 10 MG/ML
80 INJECTION INTRAMUSCULAR; INTRAVENOUS EVERY 8 HOURS
Status: DISCONTINUED | OUTPATIENT
Start: 2022-05-18 | End: 2022-05-22

## 2022-05-18 RX ADMIN — LACOSAMIDE 100 MG: 50 TABLET, FILM COATED ORAL at 09:05

## 2022-05-18 RX ADMIN — LACOSAMIDE 100 MG: 50 TABLET, FILM COATED ORAL at 08:05

## 2022-05-18 RX ADMIN — LEVALBUTEROL HYDROCHLORIDE 1.25 MG: 1.25 SOLUTION, CONCENTRATE RESPIRATORY (INHALATION) at 08:05

## 2022-05-18 RX ADMIN — PIPERACILLIN SODIUM AND TAZOBACTAM SODIUM 4.5 G: 4; .5 INJECTION, POWDER, LYOPHILIZED, FOR SOLUTION INTRAVENOUS at 11:05

## 2022-05-18 RX ADMIN — APIXABAN 5 MG: 5 TABLET, FILM COATED ORAL at 09:05

## 2022-05-18 RX ADMIN — VALPROIC ACID 250 MG: 250 CAPSULE, LIQUID FILLED ORAL at 03:05

## 2022-05-18 RX ADMIN — DONEPEZIL HYDROCHLORIDE 5 MG: 5 TABLET ORAL at 08:05

## 2022-05-18 RX ADMIN — METOPROLOL TARTRATE 75 MG: 50 TABLET, FILM COATED ORAL at 06:05

## 2022-05-18 RX ADMIN — VANCOMYCIN HYDROCHLORIDE 1000 MG: 1 INJECTION, POWDER, LYOPHILIZED, FOR SOLUTION INTRAVENOUS at 03:05

## 2022-05-18 RX ADMIN — VALPROIC ACID 250 MG: 250 CAPSULE, LIQUID FILLED ORAL at 08:05

## 2022-05-18 RX ADMIN — MELATONIN TAB 3 MG 6 MG: 3 TAB at 08:05

## 2022-05-18 RX ADMIN — FUROSEMIDE 80 MG: 10 INJECTION, SOLUTION INTRAMUSCULAR; INTRAVENOUS at 03:05

## 2022-05-18 RX ADMIN — FUROSEMIDE 80 MG: 10 INJECTION, SOLUTION INTRAMUSCULAR; INTRAVENOUS at 06:05

## 2022-05-18 RX ADMIN — LEVALBUTEROL HYDROCHLORIDE 1.25 MG: 1.25 SOLUTION, CONCENTRATE RESPIRATORY (INHALATION) at 09:05

## 2022-05-18 RX ADMIN — VALPROIC ACID 250 MG: 250 CAPSULE, LIQUID FILLED ORAL at 09:05

## 2022-05-18 RX ADMIN — LEVETIRACETAM 1000 MG: 500 TABLET, FILM COATED ORAL at 08:05

## 2022-05-18 RX ADMIN — APIXABAN 5 MG: 5 TABLET, FILM COATED ORAL at 08:05

## 2022-05-18 RX ADMIN — FUROSEMIDE 80 MG: 10 INJECTION, SOLUTION INTRAMUSCULAR; INTRAVENOUS at 09:05

## 2022-05-18 RX ADMIN — PANTOPRAZOLE SODIUM 40 MG: 40 TABLET, DELAYED RELEASE ORAL at 09:05

## 2022-05-18 RX ADMIN — PIPERACILLIN SODIUM AND TAZOBACTAM SODIUM 4.5 G: 4; .5 INJECTION, POWDER, LYOPHILIZED, FOR SOLUTION INTRAVENOUS at 04:05

## 2022-05-18 RX ADMIN — LEVETIRACETAM 1000 MG: 500 TABLET, FILM COATED ORAL at 09:05

## 2022-05-18 RX ADMIN — SILODOSIN 8 MG: 8 CAPSULE ORAL at 09:05

## 2022-05-18 RX ADMIN — METOPROLOL TARTRATE 50 MG: 50 TABLET, FILM COATED ORAL at 08:05

## 2022-05-18 RX ADMIN — PIPERACILLIN SODIUM AND TAZOBACTAM SODIUM 4.5 G: 4; .5 INJECTION, POWDER, LYOPHILIZED, FOR SOLUTION INTRAVENOUS at 08:05

## 2022-05-18 RX ADMIN — ATORVASTATIN CALCIUM 40 MG: 20 TABLET, FILM COATED ORAL at 08:05

## 2022-05-18 NOTE — ASSESSMENT & PLAN NOTE
76 y/o female with new onset seizures and AF presents with recurrent focal right sided seizures. Likely 2/2 prior stroke, +/- provoked by recent course of ciprofloxacin for UTI, then developed volume overload, and was diuresed.        Volume overload  Acute diastolic heart failure  Flash pulmonary edema  HFpEF (diastolic dysfunction grade III)  Likely due to volume overload, not on O2 at home,. Potentially due to afib w/ RVR leading to volume overload. EF showed normal systolic function on 5/5. No description of diastolic function and CVP 8. However on 5/17 she became significantly more hypoxic, hypertensive, requiring up to 16L HFNC, she was diuresed, BP controlled, and she was weaned to 7L NC. Remains on 7L HFNC  - Incrase furosemide to 80 mg IV TID  - Repeat ECHO showed diastolic dysfunction, and PH  - CXR showed likely volume overload  - ABG did not show hypercarbia  - Control BP with hydralazine PRN  - Start vanc/zosyn for possible PNA due to persistent hypoxia to treat for possible HAP, plan on 7 day course if necessary (5/17-23)

## 2022-05-18 NOTE — PLAN OF CARE
[facility] - Telemetry Stepdown  Discharge Reassessment    Primary Care Provider: Joseph Mcpherson NP    Expected Discharge Date: 5/19/2022    Reassessment (most recent)     Discharge Reassessment - 05/18/22 0930        Discharge Reassessment    Assessment Type Discharge Planning Reassessment     Did the patient's condition or plan change since previous assessment? No     Discharge Plan discussed with: Adult children;Patient     Discharge Plan A Skilled Nursing Facility     Discharge Plan B Skilled Nursing Facility     DME Needed Upon Discharge  none     Discharge Barriers Identified None     Why the patient remains in the hospital Requires continued medical care        Post-Acute Status    Post-Acute Placement Status Referrals Sent     Discharge Delays None known at this time               she became significantly more hypoxic, requiring as much as 10-16 L HFNC to maintain her oxygen saturation. Was HTN to 180s/90s, was given 20mg IV hydralazine with improvement and 80mg IV lasix, she was weaned back to 5L NC.     CM spoke with son over the phone, and he wanted to add [facility] on [address].  Sent over referrals.  Family also chose [facility] and [facility].

## 2022-05-18 NOTE — PLAN OF CARE
KOURTNEY completed LOCET via phone and faxed PASRR to [facility] (fx. [phone]) to obtain the 142 for NH admission.    Tracey Billings LMSW  PRN-  [facility]  Ext. [phone]

## 2022-05-18 NOTE — PROGRESS NOTES
[facility] - Telemetry [facility]  Progress Note    Patient Name: Teresa Leos  MRN: [id number]  Patient Class: IP- Inpatient   Admission Date: 5/4/2022  Length of Stay: 13 days  Attending Physician: Joaquín Aldana MD  Primary Care Provider: Joseph Mcpherson NP        Subjective:     Principal Problem:Acute hypoxemic respiratory failure        HPI:  78 y/o WF hx of recently diagnosed Atrial Fibrillation, Alzheimers Dementia, Hypertension, Hyperlipidemia who is transferred for concern of seizure and acute encephalopathy.     History provided by EMR review and by pts Son-Daughter-in-Law at bedside.     For the last 2 weeks patient has  noted to have progressive confusion worse 1 week ago since Wednesday.  This past Friday - April 29th, daughter-in-law noted patient was outside and throwing rocks away in the trash can, when she went outside to see the patient patient had fallen down and was convulsing, reported seizure activity for 30-40 minutes.  Patient was confused afterward, EMS called and patient admitted to [facility] from 4/29 - Mon 5/1.  Family states @ [place], [facility] states patient had just passed out/syncope, stroke was ruled out with CNS imaging reported CT & MRI, records not available for review.  Atrial fibrillation was noted as new diagnosis and patient was discharged on multiple new medications - including Toprol XL 25mg BID & Apixaban 5mg BID.      Today patient was at dining table eating, when she suddenly stopped responding, reported staring spells and then developed convulsions.   En Route via EMS to [facility] reported pt with additional seizures.      Tele stroke Neurology eval completed @ [facility], pt transferred to [facility] for further w/u due to concern of seizures as inciting issue.     Bedside interview - patient is awake, alert, oriented to self, location, denies any acute complaints, no chest pain, dyspnea, palpitations, no LE swelling, no visual complaints,  no traumatic injury     ED [facility] -  Keppra 2gm iv x1,  Amiodarone - unclear amt.  Metoprolol 5mg iv x 1,  LR bolus 500cc      Overview/Hospital Course:  Patient was admitted for new onset seizures and uncontrolled afib in RVR. She was transferred to [facility] for concerns for stroke but she was found to have non convulsive status epilepticus and not stroke. She was placed on valproic acid, lacosamide, and leveitracetam.   During her [facility] stay she was also in atrial fibrillation RVR which was difficult to control. She is on metoprolol, diltiazem, and amiodarone. She developed hypoxia during her admission. CXR showed moderate bilateral pleural effusions that occurred over two days. Started on furosemide IV, and underwent diuresis, was hypotensive on 5/12. She was given 250cc LR with improvement in BP. Then changed to oral diuretics. Neurology was consulted 5/13 due to ongoing oversedation with AEDs for help with AED adjustment. Recommended increasing vimpat back to 200 mg BID. Hypotensive again. Stopped diltiazem and increased metoprolol. Gave bolus fluids. However still with hypoxia due to pulmonary edema. Increased furosemide to 80 mg daily. Also with hyponatremia likely from hypervolemia and SIADH.    On 5/17 she became significantly more hypoxic, requiring as much as 10-16 L HFNC to maintain her oxygen saturation. Was HTN to 180s/90s, was given 20mg IV hydralazine with improvement and 80mg IV lasix, she was weaned back to 7L HFNC. Continued on IV lasix 80mg IV TID, CT head done due to intermittent somnolence and confusion, no acute abnormality shown. ABG showed no hypercarbia. Started on vanc/zosyn for possible PNA.    PT/OT recommended SNF      Interval History: intermittently lethargic, on 7L HFNC    Review of Systems   Constitutional:  Negative for fatigue and fever.   Respiratory:  Negative for cough and shortness of breath.    Cardiovascular:  Negative for chest pain and leg swelling.   Gastrointestinal:   Negative for abdominal distention and abdominal pain.   Objective:     Vital Signs (Most Recent):  Temp: 97.7 °F (36.5 °C) (05/18/22 1545)  Pulse: (!) 57 (05/18/22 0915)  Resp: 20 (05/18/22 1545)  BP: (!) 152/70 (05/18/22 1545)  SpO2: 98 % (05/18/22 0328) Vital Signs (24h Range):  Temp:  [97.7 °F (36.5 °C)-98 °F (36.7 °C)] 97.7 °F (36.5 °C)  Pulse:  [57-60] 57  Resp:  [18-20] 20  SpO2:  [92 %-98 %] 98 %  BP: (101-152)/(52-70) 152/70     Weight: 51.3 kg (113 lb)  Body mass index is 21.35 kg/m².    Intake/Output Summary (Last 24 hours) at 5/18/2022 1552  Last data filed at 5/18/2022 1546  Gross per 24 hour   Intake --   Output 3300 ml   Net -3300 ml        Physical Exam  Constitutional:       General: She is not in acute distress.     Appearance: Normal appearance. She is not ill-appearing.   Cardiovascular:      Rate and Rhythm: Normal rate and regular rhythm.      Pulses: Normal pulses.      Heart sounds: Normal heart sounds.   Pulmonary:      Effort: Pulmonary effort is normal.      Comments: Crackles at the bases  Abdominal:      General: Abdomen is flat. Bowel sounds are normal. There is no distension.      Palpations: Abdomen is soft.   Musculoskeletal:      Right lower leg: No edema.      Left lower leg: No edema.   Skin:     General: Skin is warm.      Findings: No lesion or rash.   Neurological:      General: No focal deficit present.      Mental Status: She is alert.   Psychiatric:         Mood and Affect: Mood normal.         Behavior: Behavior normal.       Significant Labs: All pertinent labs within the past 24 hours have been reviewed.  CBC:   Recent Labs   Lab 05/17/22 0421 05/17/22 1910 05/18/22  0526   WBC 12.60 12.41 12.99*   HGB 10.0* 11.6* 11.0*   HCT 30.9* 33.9* 31.9*   * 366 328       CMP:   Recent Labs   Lab 05/17/22 0421 05/17/22 1910 05/18/22  0526   * 132* 133*   K 3.3* 3.7 4.3   CL 95 94* 95   CO2 30* 27 24   GLU 87 83 77   BUN 11 13 13   CREATININE 0.8 0.8 0.8   CALCIUM  8.5* 8.2* 8.5*   PROT  --  5.6*  --    ALBUMIN  --  2.1*  --    BILITOT  --  0.4  --    ALKPHOS  --  50*  --    AST  --  26  --    ALT  --  13  --    ANIONGAP 10 11 14   EGFRNONAA >60.0 >60.0 >60.0         Significant Imaging: I have reviewed all pertinent imaging results/findings within the past 24 hours.      Assessment/Plan:      * Acute hypoxemic respiratory failure  78 y/o female with new onset seizures and AF presents with recurrent focal right sided seizures. Likely 2/2 prior stroke, +/- provoked by recent course of ciprofloxacin for UTI, then developed volume overload, and was diuresed.        Volume overload  Acute diastolic heart failure  Flash pulmonary edema  HFpEF (diastolic dysfunction grade III)  Likely due to volume overload, not on O2 at home,. Potentially due to afib w/ RVR leading to volume overload. EF showed normal systolic function on 5/5. No description of diastolic function and CVP 8. However on 5/17 she became significantly more hypoxic, hypertensive, requiring up to 16L HFNC, she was diuresed, BP controlled, and she was weaned to 7L NC. Remains on 7L HFNC  - Incrase furosemide to 80 mg IV TID  - Repeat ECHO showed diastolic dysfunction, and PH  - CXR showed likely volume overload  - ABG did not show hypercarbia  - Control BP with hydralazine PRN  - Start vanc/zosyn for possible PNA due to persistent hypoxia to treat for possible HAP, plan on 7 day course if necessary (5/17-23)    Prolonged Q-T interval on ECG  May have been secondary to amiodarone gtt -> improved  - QTc 490 on AM ECG   - Trend daily  - Aggressively replete K/Mag  - Avoid additional QTc prolonging medications    Urinary retention  Requiring frequent straight caths      - UA on admission non-infectious   - C/w silodosin 8 mg qday  - Sampson in place for accurate I and Os while critically ill    Dementia    Somnolence/confusion  Mild per family reports, remains independent of ADLs. Has intermittent worsening of mental status,  possibly related to medication or hypoxia, CTH no acute issues, no hypercarbia     - c/w home donepezil 5 mg  - Delirium precautions  - Melatonin 6 mg qhs  - Avoid seroquel/antipsychotics given prolonged QTc   - CTM, consider neurology re-consult to adjust AEDs if it somnolence persists    Hyponatremia  Stopped thiazide. Improved with fluid restriction and diuresis  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  Continue fluid restriction and diuresis    Other hyperlipidemia  Continue atorvastatin    Gastroesophageal reflux disease  Continue protonix in place of home PPI    Atrial fibrillation with RVR  HR into 170s-180 prior to transfer to [facility] admission given BB and loaded with amiodarone  Improving  - Continue apixaban for anticoagulation  - amiodarone 200 mg daily  - stop diltiazem due to hypotension requiring bolus fluids  - decrease metoprolol to 50 mg BID due to bradycardia    Seizure-like activity  Siezures  - Epilepsy following, appreciate their recs  - keppra 1 g BID  - vimpat 100mg BID  - depakote 250mg Q8H  - Ammonia wnl, depakote level therapeutic (goal )  - Infectious w/u initiated on arrival to [facility] on 5/4, NGTD and UA noninfectious, has completed 7d treatment for previously diagnosed UTI  - MRI unremarkable  - neurology consulted on 5/13 as patient was overly sedated, likely due to AEDs -- patient alertness improved the following day. Neurology recommended continue current AEDs  - may need to reconsult neuro if mentation continues to wax and wane    Essential hypertension  Hypotensive episodes initially but now appears to have increasing hypertension, will monitor closely  Stop home amlodipine and irbesartan-HCTZ in favor AV kwabena blockade  Goal SBP <160 due to age and comorbidites  - Hydralazine PRN while BP is labile      VTE Risk Mitigation (From admission, onward)         Ordered     apixaban tablet 5 mg  2 times daily         05/10/22 0923     IP VTE HIGH RISK PATIENT  Once         05/04/22 2039     Place  sequential compression device  Until discontinued         05/04/22 2039                Discharge Planning   MONA: 5/23/2022     Code Status: Full Code   Is the patient medically ready for discharge?: No    Reason for patient still in hospital (select all that apply): Patient trending condition, Treatment and Imaging  Discharge Plan A: Skilled Nursing Facility   Discharge Delays: None known at this time              Joaquín Aldana MD  Department of Hospital Medicine   [facility] - Telemetry Stepdown

## 2022-05-18 NOTE — PLAN OF CARE
"  Problem: Infection  Goal: Absence of Infection Signs and Symptoms  Outcome: Ongoing, Progressing     Problem: Adult Inpatient Plan of Care  Goal: Plan of Care Review  Outcome: Ongoing, Progressing  Goal: Patient-Specific Goal (Individualized)  Description: Admit Date: 5/4/2022    Focal seizures    Past Medical History:  No date: Hyperlipidemia  No date: Hypertension  No date: Osteoporosis    Past Surgical History:  No date: HIP REPLACEMENT ARTHROPLASTY; Right  No date: HYSTERECTOMY  No date: LEG SURGERY  No date: SKIN CANCER EXCISION  No date: TONSILLECTOMY, ADENOIDECTOMY    Individualization:   1. Pt likes to be called "Tuyet"     Restraints: none        Outcome: Ongoing, Progressing  Goal: Absence of Hospital-Acquired Illness or Injury  Outcome: Ongoing, Progressing  Goal: Optimal Comfort and Wellbeing  Outcome: Ongoing, Progressing  Goal: Readiness for Transition of Care  Outcome: Ongoing, Progressing     Problem: Skin Injury Risk Increased  Goal: Skin Health and Integrity  Outcome: Ongoing, Progressing     Problem: Impaired Wound Healing  Goal: Optimal Wound Healing  Outcome: Ongoing, Progressing     Problem: Fall Injury Risk  Goal: Absence of Fall and Fall-Related Injury  Outcome: Ongoing, Progressing     Problem: Seizure, Active Management  Goal: Absence of Seizure/Seizure-Related Injury  Outcome: Ongoing, Progressing     Pt, aaox3, moves all extremities, pt turned every 2 hrs, pt does not have appetite, encourage pt to eat and drink, paula to gravity, denies pain and sob, on 5LNC, desat with movement, went to CT, no distress noted   "

## 2022-05-18 NOTE — NURSING
2000  Received report for pt from AM Nurse. Pt resting in bed. Son at bedside. Pt AAOx4; 5L HFNC. Sampson catheter in place. No c/o pain. Education regarding fall and safety precaution provided. Safety check performed. Call bell within reach. Will continue to monitor.    0000  Pt resting in bed. Medication administered per MAR. Safety check performed. Call bell within reach. Will continue to monitor.

## 2022-05-18 NOTE — PT/OT/SLP PROGRESS
Occupational Therapy      Patient Name:  Teresa Leos   MRN:  [id number]    Patient not seen today secondary to Other (Comment) (Pt HIMA for echo at 1225). Writing therapist unable to return for PM attempt. Will follow-up per POC.    5/18/2022

## 2022-05-18 NOTE — SUBJECTIVE & OBJECTIVE
Interval History: intermittently lethargic, on 7L HFNC    Review of Systems   Constitutional:  Negative for fatigue and fever.   Respiratory:  Negative for cough and shortness of breath.    Cardiovascular:  Negative for chest pain and leg swelling.   Gastrointestinal:  Negative for abdominal distention and abdominal pain.   Objective:     Vital Signs (Most Recent):  Temp: 97.7 °F (36.5 °C) (05/18/22 1545)  Pulse: (!) 57 (05/18/22 0915)  Resp: 20 (05/18/22 1545)  BP: (!) 152/70 (05/18/22 1545)  SpO2: 98 % (05/18/22 0328) Vital Signs (24h Range):  Temp:  [97.7 °F (36.5 °C)-98 °F (36.7 °C)] 97.7 °F (36.5 °C)  Pulse:  [57-60] 57  Resp:  [18-20] 20  SpO2:  [92 %-98 %] 98 %  BP: (101-152)/(52-70) 152/70     Weight: 51.3 kg (113 lb)  Body mass index is 21.35 kg/m².    Intake/Output Summary (Last 24 hours) at 5/18/2022 1552  Last data filed at 5/18/2022 1546  Gross per 24 hour   Intake --   Output 3300 ml   Net -3300 ml        Physical Exam  Constitutional:       General: She is not in acute distress.     Appearance: Normal appearance. She is not ill-appearing.   Cardiovascular:      Rate and Rhythm: Normal rate and regular rhythm.      Pulses: Normal pulses.      Heart sounds: Normal heart sounds.   Pulmonary:      Effort: Pulmonary effort is normal.      Comments: Crackles at the bases  Abdominal:      General: Abdomen is flat. Bowel sounds are normal. There is no distension.      Palpations: Abdomen is soft.   Musculoskeletal:      Right lower leg: No edema.      Left lower leg: No edema.   Skin:     General: Skin is warm.      Findings: No lesion or rash.   Neurological:      General: No focal deficit present.      Mental Status: She is alert.   Psychiatric:         Mood and Affect: Mood normal.         Behavior: Behavior normal.       Significant Labs: All pertinent labs within the past 24 hours have been reviewed.  CBC:   Recent Labs   Lab 05/17/22  0421 05/17/22  1910 05/18/22  0526   WBC 12.60 12.41 12.99*   HGB  10.0* 11.6* 11.0*   HCT 30.9* 33.9* 31.9*   * 366 328       CMP:   Recent Labs   Lab 05/17/22  0421 05/17/22 1910 05/18/22  0526   * 132* 133*   K 3.3* 3.7 4.3   CL 95 94* 95   CO2 30* 27 24   GLU 87 83 77   BUN 11 13 13   CREATININE 0.8 0.8 0.8   CALCIUM 8.5* 8.2* 8.5*   PROT  --  5.6*  --    ALBUMIN  --  2.1*  --    BILITOT  --  0.4  --    ALKPHOS  --  50*  --    AST  --  26  --    ALT  --  13  --    ANIONGAP 10 11 14   EGFRNONAA >60.0 >60.0 >60.0         Significant Imaging: I have reviewed all pertinent imaging results/findings within the past 24 hours.

## 2022-05-18 NOTE — ASSESSMENT & PLAN NOTE
Siezures  - Epilepsy following, appreciate their recs  - keppra 1 g BID  - vimpat 100mg BID  - depakote 250mg Q8H  - Ammonia wnl, depakote level therapeutic (goal )  - Infectious w/u initiated on arrival to [facility] [place]4, NGTD and UA noninfectious, has completed 7d treatment for previously diagnosed UTI  - MRI unremarkable  - neurology consulted on 5/13 as patient was overly sedated, likely due to AEDs -- patient alertness improved the following day. Neurology recommended continue current AEDs  - may need to reconsult neuro if mentation continues to wax and wane

## 2022-05-18 NOTE — PLAN OF CARE
Spoke with Mckayla at [facility] and let her know that patient probably won't be ready for another 4 days, and per facility, must be off of vanc before transferring over there.

## 2022-05-18 NOTE — ASSESSMENT & PLAN NOTE
Somnolence/confusion  Mild per family reports, remains independent of ADLs. Has intermittent worsening of mental status, possibly related to medication or hypoxia, CTH no acute issues, no hypercarbia     - c/w home donepezil 5 mg  - Delirium precautions  - Melatonin 6 mg qhs  - Avoid seroquel/antipsychotics given prolonged QTc   - CTM, consider neurology re-consult to adjust AEDs if it somnolence persists

## 2022-05-18 NOTE — ASSESSMENT & PLAN NOTE
HR into 170s-180 prior to transfer to [facility] admission given BB and loaded with amiodarone  Improving  - Continue apixaban for anticoagulation  - amiodarone 200 mg daily  - stop diltiazem due to hypotension requiring bolus fluids  - decrease metoprolol to 50 mg BID due to bradycardia

## 2022-05-18 NOTE — ASSESSMENT & PLAN NOTE
Requiring frequent straight caths      - UA on admission non-infectious   - C/w silodosin 8 mg qday  - Sampson in place for accurate I and Os while critically ill

## 2022-05-19 PROBLEM — G93.41 ACUTE METABOLIC ENCEPHALOPATHY: Status: ACTIVE | Noted: 2022-05-19

## 2022-05-19 PROBLEM — E87.6 HYPOKALEMIA: Status: ACTIVE | Noted: 2022-05-19

## 2022-05-19 PROBLEM — G40.901 STATUS EPILEPTICUS: Status: ACTIVE | Noted: 2022-05-19

## 2022-05-19 LAB
ANION GAP SERPL CALC-SCNC: 16 MMOL/L (ref 8–16)
ANISOCYTOSIS BLD QL SMEAR: SLIGHT
BASOPHILS # BLD AUTO: 0.05 K/UL (ref 0–0.2)
BASOPHILS NFR BLD: 0.3 % (ref 0–1.9)
BUN SERPL-MCNC: 13 MG/DL (ref 8–23)
CALCIUM SERPL-MCNC: 8.6 MG/DL (ref 8.7–10.5)
CHLORIDE SERPL-SCNC: 91 MMOL/L (ref 95–110)
CO2 SERPL-SCNC: 31 MMOL/L (ref 23–29)
CREAT SERPL-MCNC: 0.9 MG/DL (ref 0.5–1.4)
DIFFERENTIAL METHOD: ABNORMAL
EOSINOPHIL # BLD AUTO: 0.1 K/UL (ref 0–0.5)
EOSINOPHIL NFR BLD: 0.8 % (ref 0–8)
ERYTHROCYTE [DISTWIDTH] IN BLOOD BY AUTOMATED COUNT: 12.9 % (ref 11.5–14.5)
EST. GFR  (AFRICAN AMERICAN): >60 ML/MIN/1.73 M^2
EST. GFR  (NON AFRICAN AMERICAN): >60 ML/MIN/1.73 M^2
GLUCOSE SERPL-MCNC: 83 MG/DL (ref 70–110)
HCT VFR BLD AUTO: 29.6 % (ref 37–48.5)
HGB BLD-MCNC: 10.4 G/DL (ref 12–16)
IMM GRANULOCYTES # BLD AUTO: 0.07 K/UL (ref 0–0.04)
IMM GRANULOCYTES NFR BLD AUTO: 0.5 % (ref 0–0.5)
LYMPHOCYTES # BLD AUTO: 1.7 K/UL (ref 1–4.8)
LYMPHOCYTES NFR BLD: 11.1 % (ref 18–48)
MAGNESIUM SERPL-MCNC: 1.7 MG/DL (ref 1.6–2.6)
MCH RBC QN AUTO: 31.8 PG (ref 27–31)
MCHC RBC AUTO-ENTMCNC: 35.1 G/DL (ref 32–36)
MCV RBC AUTO: 91 FL (ref 82–98)
MONOCYTES # BLD AUTO: 3.2 K/UL (ref 0.3–1)
MONOCYTES NFR BLD: 20.9 % (ref 4–15)
NEUTROPHILS # BLD AUTO: 10.3 K/UL (ref 1.8–7.7)
NEUTROPHILS NFR BLD: 66.4 % (ref 38–73)
NRBC BLD-RTO: 0 /100 WBC
PLATELET # BLD AUTO: 411 K/UL (ref 150–450)
PLATELET BLD QL SMEAR: ABNORMAL
PMV BLD AUTO: 10.3 FL (ref 9.2–12.9)
POIKILOCYTOSIS BLD QL SMEAR: SLIGHT
POTASSIUM SERPL-SCNC: 3.1 MMOL/L (ref 3.5–5.1)
RBC # BLD AUTO: 3.27 M/UL (ref 4–5.4)
SODIUM SERPL-SCNC: 138 MMOL/L (ref 136–145)
VALPROATE SERPL-MCNC: 57.3 UG/ML (ref 50–100)
VANCOMYCIN TROUGH SERPL-MCNC: 12.8 UG/ML (ref 10–22)
WBC # BLD AUTO: 15.47 K/UL (ref 3.9–12.7)

## 2022-05-19 PROCEDURE — 97530 THERAPEUTIC ACTIVITIES: CPT

## 2022-05-19 PROCEDURE — 99900035 HC TECH TIME PER 15 MIN (STAT)

## 2022-05-19 PROCEDURE — 94660 CPAP INITIATION&MGMT: CPT

## 2022-05-19 PROCEDURE — 25000003 PHARM REV CODE 250: Performed by: PHYSICIAN ASSISTANT

## 2022-05-19 PROCEDURE — 94761 N-INVAS EAR/PLS OXIMETRY MLT: CPT

## 2022-05-19 PROCEDURE — 80164 ASSAY DIPROPYLACETIC ACD TOT: CPT

## 2022-05-19 PROCEDURE — 63600175 PHARM REV CODE 636 W HCPCS: Performed by: STUDENT IN AN ORGANIZED HEALTH CARE EDUCATION/TRAINING PROGRAM

## 2022-05-19 PROCEDURE — 25000003 PHARM REV CODE 250

## 2022-05-19 PROCEDURE — 99900031 HC PATIENT EDUCATION (STAT)

## 2022-05-19 PROCEDURE — 94640 AIRWAY INHALATION TREATMENT: CPT

## 2022-05-19 PROCEDURE — 85025 COMPLETE CBC W/AUTO DIFF WBC: CPT | Performed by: STUDENT IN AN ORGANIZED HEALTH CARE EDUCATION/TRAINING PROGRAM

## 2022-05-19 PROCEDURE — 83735 ASSAY OF MAGNESIUM: CPT | Performed by: STUDENT IN AN ORGANIZED HEALTH CARE EDUCATION/TRAINING PROGRAM

## 2022-05-19 PROCEDURE — 20600001 HC STEP DOWN PRIVATE ROOM

## 2022-05-19 PROCEDURE — 25000003 PHARM REV CODE 250: Performed by: HOSPITALIST

## 2022-05-19 PROCEDURE — 25000003 PHARM REV CODE 250: Performed by: INTERNAL MEDICINE

## 2022-05-19 PROCEDURE — 80048 BASIC METABOLIC PNL TOTAL CA: CPT | Performed by: STUDENT IN AN ORGANIZED HEALTH CARE EDUCATION/TRAINING PROGRAM

## 2022-05-19 PROCEDURE — 63600175 PHARM REV CODE 636 W HCPCS: Performed by: INTERNAL MEDICINE

## 2022-05-19 PROCEDURE — 27000221 HC OXYGEN, UP TO 24 HOURS

## 2022-05-19 PROCEDURE — 80202 ASSAY OF VANCOMYCIN: CPT | Performed by: STUDENT IN AN ORGANIZED HEALTH CARE EDUCATION/TRAINING PROGRAM

## 2022-05-19 PROCEDURE — 97535 SELF CARE MNGMENT TRAINING: CPT

## 2022-05-19 PROCEDURE — 36415 COLL VENOUS BLD VENIPUNCTURE: CPT

## 2022-05-19 PROCEDURE — 99233 PR SUBSEQUENT HOSPITAL CARE,LEVL III: ICD-10-PCS | Mod: ,,, | Performed by: INTERNAL MEDICINE

## 2022-05-19 PROCEDURE — 25000242 PHARM REV CODE 250 ALT 637 W/ HCPCS: Performed by: STUDENT IN AN ORGANIZED HEALTH CARE EDUCATION/TRAINING PROGRAM

## 2022-05-19 PROCEDURE — 36406 VNPNXR<3YRS PHY/QHP OTHER VN: CPT

## 2022-05-19 PROCEDURE — 99233 SBSQ HOSP IP/OBS HIGH 50: CPT | Mod: ,,, | Performed by: INTERNAL MEDICINE

## 2022-05-19 PROCEDURE — 25000003 PHARM REV CODE 250: Performed by: STUDENT IN AN ORGANIZED HEALTH CARE EDUCATION/TRAINING PROGRAM

## 2022-05-19 RX ORDER — POTASSIUM CHLORIDE 750 MG/1
30 CAPSULE, EXTENDED RELEASE ORAL
Status: COMPLETED | OUTPATIENT
Start: 2022-05-20 | End: 2022-05-21

## 2022-05-19 RX ADMIN — LACOSAMIDE 100 MG: 50 TABLET, FILM COATED ORAL at 09:05

## 2022-05-19 RX ADMIN — PIPERACILLIN SODIUM AND TAZOBACTAM SODIUM 4.5 G: 4; .5 INJECTION, POWDER, LYOPHILIZED, FOR SOLUTION INTRAVENOUS at 07:05

## 2022-05-19 RX ADMIN — LEVETIRACETAM 1000 MG: 500 TABLET, FILM COATED ORAL at 09:05

## 2022-05-19 RX ADMIN — ATORVASTATIN CALCIUM 40 MG: 20 TABLET, FILM COATED ORAL at 09:05

## 2022-05-19 RX ADMIN — FUROSEMIDE 80 MG: 10 INJECTION, SOLUTION INTRAMUSCULAR; INTRAVENOUS at 06:05

## 2022-05-19 RX ADMIN — PANTOPRAZOLE SODIUM 40 MG: 40 TABLET, DELAYED RELEASE ORAL at 09:05

## 2022-05-19 RX ADMIN — APIXABAN 5 MG: 5 TABLET, FILM COATED ORAL at 09:05

## 2022-05-19 RX ADMIN — VANCOMYCIN HYDROCHLORIDE 1250 MG: 1.25 INJECTION, POWDER, LYOPHILIZED, FOR SOLUTION INTRAVENOUS at 03:05

## 2022-05-19 RX ADMIN — LEVALBUTEROL HYDROCHLORIDE 1.25 MG: 1.25 SOLUTION, CONCENTRATE RESPIRATORY (INHALATION) at 08:05

## 2022-05-19 RX ADMIN — DONEPEZIL HYDROCHLORIDE 5 MG: 5 TABLET ORAL at 09:05

## 2022-05-19 RX ADMIN — AMIODARONE HYDROCHLORIDE 200 MG: 200 TABLET ORAL at 09:05

## 2022-05-19 RX ADMIN — VALPROIC ACID 250 MG: 250 CAPSULE, LIQUID FILLED ORAL at 03:05

## 2022-05-19 RX ADMIN — METOPROLOL TARTRATE 50 MG: 50 TABLET, FILM COATED ORAL at 09:05

## 2022-05-19 RX ADMIN — MELATONIN TAB 3 MG 6 MG: 3 TAB at 09:05

## 2022-05-19 RX ADMIN — FUROSEMIDE 80 MG: 10 INJECTION, SOLUTION INTRAMUSCULAR; INTRAVENOUS at 03:05

## 2022-05-19 RX ADMIN — SILODOSIN 8 MG: 8 CAPSULE ORAL at 09:05

## 2022-05-19 RX ADMIN — VALPROIC ACID 250 MG: 250 CAPSULE, LIQUID FILLED ORAL at 09:05

## 2022-05-19 RX ADMIN — FUROSEMIDE 80 MG: 10 INJECTION, SOLUTION INTRAMUSCULAR; INTRAVENOUS at 09:05

## 2022-05-19 RX ADMIN — PIPERACILLIN SODIUM AND TAZOBACTAM SODIUM 4.5 G: 4; .5 INJECTION, POWDER, LYOPHILIZED, FOR SOLUTION INTRAVENOUS at 04:05

## 2022-05-19 RX ADMIN — PIPERACILLIN SODIUM AND TAZOBACTAM SODIUM 4.5 G: 4; .5 INJECTION, POWDER, LYOPHILIZED, FOR SOLUTION INTRAVENOUS at 11:05

## 2022-05-19 NOTE — ASSESSMENT & PLAN NOTE
HR into 170s-180 prior to transfer to [facility] admission given BB and completed amiodarone loading. Continue apixaban for anticoagulation. Initially placed on diltiazem but stopped due to hypotension requiring bolus fluids. Metoprolol decreased to 50 mg BID due to bradycardia (into 40s).  Metoprolol increased to  mg daily due to uncontrolled HR.   -Goal Hr <60

## 2022-05-19 NOTE — PT/OT/SLP PROGRESS
Occupational Therapy Treatment    Patient Name:  Teresa Leos   MRN:  [id number]  Admit Date: 5/4/2022  Admitting Diagnosis:  Acute hypoxemic respiratory failure   Length of Stay: 14 days  Recent Surgery: * No surgery found *      Recommendations:     Discharge Recommendations: nursing facility, skilled  Discharge Equipment Recommendations:  walker, rolling, wheelchair, shower chair  Barriers to discharge:  Inaccessible home environment, Other (Comment) (Increased skilled assistance required)    Plan:     Patient to be seen 3 x/week to address the above listed problems via self-care/home management, therapeutic activities, therapeutic exercises  · Plan of Care Expires: 06/09/22  · Plan of Care Reviewed with: patient    Assessment:   Teresa Leos is a 77 y.o. female with a medical diagnosis of Acute hypoxemic respiratory failure.  She presents with the following performance deficits affecting function: weakness, impaired endurance, impaired self care skills, impaired functional mobilty, gait instability, impaired balance, pain, decreased safety awareness, decreased lower extremity function, decreased upper extremity function, decreased coordination, impaired coordination, impaired cognition, impaired skin, impaired cardiopulmonary response to activity, impaired fine motor.      Pt pleasantly confused, tolerated session fairly this date and was willing to participate. Demonstrating continued impaired cognition, increased weakness, decreased ROM, impaired balance, poor trunk stability/postural control, decreased safety awareness and impaired cardiopulmonary response to activity, requiring increased assistance and time to complete functional tasks. Patient completed bed mobility with Total A while HOB elevated, requiring increased cueing for task initiation. Patient tolerated EOB sitting for ~12 minutes with CGA while RUE supported onto bed rail while engaged in functional tasks. Patient desats to low 80s while on  "7L, slow recovery despite pursed breathing cueing and increased time, returned to supine with slow recovery to 88%, nurse notified and aware. Patient is progressing towards established goals, and continues to benefit from acute skilled OT services to increase functional performance and improve quality of life. OT to continue to recommend SNF at discharge to improve pt functional independence and increase patient safety before returning home.      Rehab Prognosis: Fair; patient would benefit from acute skilled OT services to address these deficits and reach maximum level of function.        Subjective   Communicated with: Nurse Giraldo prior to session.  Patient found HOB elevated with bed alarm, telemetry, oxygen, peripheral IV, blood pressure cuff, pulse ox (continuous) upon OT entry to room. Pt agreeable to participate at this time.    Patient: " Can my daughter in law bring me breakfast? "    Pain/Comfort:  · Pain Rating 1: 0/10  · Location - Side 1:  (unable to state)  · Location - Orientation 1: generalized  · Location 1: arm  · Pain Addressed 1: Distraction, Cessation of Activity  · Pain Rating Post-Intervention 1: other (see comments) (patient did not rate; complaint during mobility)    Objective:   Patient found with: bed alarm, telemetry, oxygen, peripheral IV, blood pressure cuff, pulse ox (continuous)   General Precautions: Standard, Cardiac fall   Orthopedic Precautions:N/A   Braces: N/A   Oxygen Device: High Flow Nasal Cannula 60*% & 7L  Vitals: BP (!) 111/55 (BP Location: Right arm, Patient Position: Lying)   Pulse (!) 57   Temp 97.9 °F (36.6 °C) (Axillary)   Resp 18   Ht 5' 1" (1.549 m)   Wt 51.3 kg (113 lb)   SpO2 (!) 93%   BMI 21.35 kg/m²     Outcome Measures:  [facility] 6 Click ADL: 14    Cognition:   · Alert to self and Cooperative  · Command following: easily distracted by internal stimuli and follows one-step commands  · Communication: clear/fluent    Occupational Performance:  Bed Mobility:  "   · Patient completed Rolling/Turning to Left with  total assistance  · Patient completed Supine to Sit with total assistance on L side of bed  · Scooting anteriorly to EOB to have both feet planted on floor: total assistance  · Patient completed Sit to Supine with total assistance on L side of bed  · Scooting to HOB in supine: total assistance, 2 persons and drawsheet pull    Functional Mobility/Transfers:   Static Sitting EOB: CGA, RUE support on side rail   Deferred additional mobility trials due to poor sitting balance and desatting into low 80s      Activities of Daily Living:  · Grooming: stand by assistance to wipe face seated EOB    AMPA 6 Click ADL:  [facility] Total Score: 14    Treatment & Education:  -Pt education on OT role and POC.  -Importance of E/OOB activity with staff assistance, emphasis on daily participation  -Safety during functional transfer and mobility ensured  -Patient provided with education on importance of Bilateral UB/LB integration during functional tasks for improvement in functional performance.   -Education provided/reviewed, questions answered within OT scope of practice.   -Patient will require continued reinforcement to demonstrats understanding and learning this date.         Patient left HOB elevated with all lines intact, call button in reach, bed alarm on and nurse notified    GOALS:   Multidisciplinary Problems     Occupational Therapy Goals        Problem: Occupational Therapy    Goal Priority Disciplines Outcome Interventions   Occupational Therapy Goal     OT, PT/OT Ongoing, Progressing    Description: Goals set on 5/11/2022 with expiration date 5/25/2022:  Patient will increase functional independence with ADLs by performing:    Supine <> Sit with Stand-by Assistance.  Grooming while standing at sink with Stand-by Assistance.  UB Dressing with Stand-by Assistance.  LB Dressing with Stand-by Assistance.  Step transfer with Stand-by Assistance with DME as needed.  Pt will  demonstrate understanding of education provided regarding energy conservation and task modification through teach-back method.                    Multidisciplinary Problems (Resolved)        Problem: Occupational Therapy    Goal Priority Disciplines Outcome Interventions   Occupational Therapy Goal   (Resolved)     OT, PT/OT Met                    Time Tracking:     OT Date of Treatment: 05/19/22  OT Start Time: 1522  OT Stop Time: 1548  OT Total Time (min): 26 min    Billable Minutes:Self Care/Home Management 15  Therapeutic Activity 11      5/19/2022

## 2022-05-19 NOTE — ASSESSMENT & PLAN NOTE
Mild per family reports, remains independent of ADLs.      - c/w home donepezil 5 mg  - Delirium precautions  - Melatonin 6 mg qhs  - Avoid seroquel/antipsychotics given prolonged QTc

## 2022-05-19 NOTE — ASSESSMENT & PLAN NOTE
Infectious work upon arrival negative. However she completed 7 day antibiotic treatment for previously diagnosed UTI. MRI head 5/7 unremarkable for acute events. CT head 5/18 also unremarkable for acute events. Neurology was consulted for concerns of AED causing oversedation. Recommended to decrease vimpat to 100 mg BID. Patient also with acute illness and hyponatremia. She has baseline low neurologic reserve and any acute physiologic event. However, low threshhold to reconsult neurology for assistance if still with mental status change with stabilization or resolution of above acute events. Delirium precautions. Minimized nighttime interruptions.

## 2022-05-19 NOTE — ASSESSMENT & PLAN NOTE
Admitted to neuroICU. Epilepsy saw patient. Keppra 1 g BID, vimpat 100 mg BID and depakote 250 mg TID. Vimpat was decreased from 200 mg to 100 mg over concerns of oversedation.

## 2022-05-19 NOTE — ASSESSMENT & PLAN NOTE
76 y/o female with new onset seizures and AF presents with recurrent focal right sided seizures. Likely 2/2 prior stroke, +/- provoked by recent course of ciprofloxacin for UTI, then developed volume overload, and was diuresed.     Volume overload  Acute diastolic heart failure  Flash pulmonary edema  HFpEF (diastolic dysfunction grade III)  Pulmonary hypertension (PASP 51 mmHg)  Likely due to volume overload, not on O2 at home,. Potentially due to afib w/ RVR leading to volume overload. EF showed normal systolic function on 5/5. No description of diastolic function and CVP 8. She received multiple boluses for hypotensions. However on 5/17 she became significantly more hypoxic, hypertensive, requiring up to 16L HFNC. Repeat ECHO showed diastolic dysfunction, pulmonary hypertension and CVP 8 mmHg. CXR showed likely volume overload. She was started furosemide 80 mg IV TID and broad spectrum antibiotics. BP controlled. CXR 5/20 showed improved right lung consolidation. Will continue vancomycin and zosyn for total of 7 days. Progressively decreasing oxygen requirement. Difficult to determine volume status on exam. Labs showed some volume contraction. IV diuresis held a few days. Furosemide 80 mg daily tomorrow. Goal UOP -1L

## 2022-05-19 NOTE — PLAN OF CARE
"  Problem: Infection  Goal: Absence of Infection Signs and Symptoms  Outcome: Ongoing, Progressing     Problem: Adult Inpatient Plan of Care  Goal: Plan of Care Review  Outcome: Ongoing, Progressing  Goal: Patient-Specific Goal (Individualized)  Description: Admit Date: 5/4/2022    Focal seizures    Past Medical History:  No date: Hyperlipidemia  No date: Hypertension  No date: Osteoporosis    Past Surgical History:  No date: HIP REPLACEMENT ARTHROPLASTY; Right  No date: HYSTERECTOMY  No date: LEG SURGERY  No date: SKIN CANCER EXCISION  No date: TONSILLECTOMY, ADENOIDECTOMY    Individualization:   1. Pt likes to be called "Tuyet"     Restraints: none        Outcome: Ongoing, Progressing  Goal: Absence of Hospital-Acquired Illness or Injury  Outcome: Ongoing, Progressing  Goal: Optimal Comfort and Wellbeing  Outcome: Ongoing, Progressing  Goal: Readiness for Transition of Care  Outcome: Ongoing, Progressing     Problem: Skin Injury Risk Increased  Goal: Skin Health and Integrity  Outcome: Ongoing, Progressing     Problem: Impaired Wound Healing  Goal: Optimal Wound Healing  Outcome: Ongoing, Progressing     Problem: Fall Injury Risk  Goal: Absence of Fall and Fall-Related Injury  Outcome: Ongoing, Progressing     Problem: Seizure, Active Management  Goal: Absence of Seizure/Seizure-Related Injury  Outcome: Ongoing, Progressing     Pt aaox4, moves all extremities, denies pain and sob, CPAP @HS then to 7LHFNC, turned every 2 hrs, encouraged pt to eat and drink, paula to gravity, had multiple bm. Worked with therapy. No distress noted  "

## 2022-05-19 NOTE — CARE UPDATE
RAPID RESPONSE NURSE ROUND       Rounding completed with charge RN, Pina. Patient on 7L HFNC satting 93%. No concerns verbalized at this time. Instructed to call [phone] for further concerns or assistance.

## 2022-05-19 NOTE — PROGRESS NOTES
[facility] Medicine  Progress note    [place]: [facility] Keiko Marlow MD  Admit Date: 5/4/2022    Principal Problem:  Acute hypoxemic respiratory failure    Interval hx:  Patient alert.    ROS   Respiratory: neg for cough neg for shortness of breath  Cardiovascular: neg for chest pain neg for palpitations  Gastrointestinal: neg for nausea neg for vomiting, neg for abdominal pain neg for diarrhea neg for constipation   Behavioral/Psych: neg for depression neg for anxiety    PEx  Temp:  [97.6 °F (36.4 °C)-98.2 °F (36.8 °C)]   Pulse:  [57-69]   Resp:  [12-20]   BP: (111-136)/(55-75)   SpO2:  [90 %-98 %]     Intake/Output Summary (Last 24 hours) at 5/19/2022 1731  Last data filed at 5/19/2022 0500  Gross per 24 hour   Intake --   Output 2100 ml   Net -2100 ml       General Appearance: no acute distress, WD, elderly, ill-appearing  Heart: regular rate and rhythm, no heave  Respiratory: Normal respiratory effort, symmetric excursion, bilateral vesicular breath sounds   Abdomen: Soft, non-tender; bowel sounds active  Skin: intact, no rash, no ulcers  Neurologic:  No focal numbness or weakness  Mental status: Alert, oriented x 4, affect appropriate     Recent Labs   Lab 05/17/22 1910 05/18/22 0526 05/19/22  0403   WBC 12.41 12.99* 15.47*   HGB 11.6* 11.0* 10.4*   HCT 33.9* 31.9* 29.6*    328 411     Recent Labs   Lab 05/17/22  0421 05/17/22 1910 05/18/22  0526 05/19/22  0403   * 132* 133* 138   K 3.3* 3.7 4.3 3.1*   CL 95 94* 95 91*   CO2 30* 27 24 31*   BUN 11 13 13 13   CREATININE 0.8 0.8 0.8 0.9   GLU 87 83 77 83   CALCIUM 8.5* 8.2* 8.5* 8.6*   MG 1.7  --  1.7 1.7     Recent Labs   Lab 05/17/22 1910   ALKPHOS 50*   ALT 13   AST 26   ALBUMIN 2.1*   PROT 5.6*   BILITOT 0.4        Scheduled Meds:   amiodarone  200 mg Oral Daily    apixaban  5 mg Oral BID    atorvastatin  40 mg Oral QHS    donepeziL  5 mg Oral Nightly    furosemide (LASIX) injection  80 mg Intravenous Q8H    lacosamide  100 mg Oral  Q12H    levalbuterol  1.25 mg Nebulization Q12H    levETIRAcetam  1,000 mg Oral BID    melatonin  6 mg Oral Nightly    metoprolol tartrate  50 mg Oral BID    pantoprazole  40 mg Oral Daily    piperacillin-tazobactam (ZOSYN) IVPB  4.5 g Intravenous Q8H    senna-docusate 8.6-50 mg  2 tablet Oral Daily    silodosin  8 mg Oral Daily    valproic acid  250 mg Oral TID    vancomycin (VANCOCIN) IVPB  1,250 mg Intravenous Q24H     Continuous Infusions:    As Needed:  acetaminophen, aluminum-magnesium hydroxide-simethicone, bisacodyL, hydrALAZINE, melatonin, naloxone, ondansetron, prochlorperazine, sodium chloride 0.9%, Pharmacy to dose Vancomycin consult **AND** vancomycin - pharmacy to dose    Assessment and Plan  / Problems managed today    * Acute hypoxemic respiratory failure  78 y/o female with new onset seizures and AF presents with recurrent focal right sided seizures. Likely 2/2 prior stroke, +/- provoked by recent course of ciprofloxacin for UTI, then developed volume overload, and was diuresed.     Volume overload  Acute diastolic heart failure  Flash pulmonary edema  HFpEF (diastolic dysfunction grade III)  Pulmonary hypertension (PASP 51 mmHg)  Likely due to volume overload, not on O2 at home,. Potentially due to afib w/ RVR leading to volume overload. EF showed normal systolic function on 5/5. No description of diastolic function and CVP 8. She received multiple boluses for hypotensions. However on 5/17 she became significantly more hypoxic, hypertensive, requiring up to 16L HFNC. Repeat ECHO showed diastolic dysfunction, pulmonary hypertension and CVP 8 mmHg. CXR showed likely volume overload. She was started furosemide 80 mg IV TID and broad spectrum antibiotics. BP controlled. She was weaned to 7L NC.     Status epilepticus  Admitted to neuroICU. Epilepsy saw patient. Keppra 1 g BID, vimpat 100 mg BID and depakote 250 mg TID. Vimpat was decreased from 200 mg to 100 mg over concerns of oversedation.      Acute metabolic encephalopathy  Infectious work upon arrival negative. However she completed 7 day antibiotic treatment for previously diagnosed UTI. MRI head 5/7 unremarkable for acute events. CT head 5/18 also unremarkable for acute events. Neurology was consulted for concerns of AED causing oversedation. Recommended to decrease vimpat to 100 mg BID. Patient also with acute illness and hyponatremia. She has baseline low neurologic reserve and any acute physiologic event. However, low threshhold to reconsult neurology for assistance if still with mental status change with stabilization or resolution of above acute events. Delirium precautions. Minimized nighttime interruptions.    Atrial fibrillation with RVR  HR into 170s-180 prior to transfer to [facility] admission given BB and completed amiodarone loading. Continue apixaban for anticoagulation. Initially placed on diltiazem but stopped due to hypotension requiring bolus fluids. Metoprolol decreased to 50 mg BID due to bradycardia (into 40s). Can transition to XL at discharge.  -Goal Hr <60    Prolonged Q-T interval on ECG  May have been secondary to amiodarone gtt -> improved  - QTc 490 on AM ECG   - Trend daily  - Aggressively replete K/Mag  - Avoid additional QTc prolonging medications    Urinary retention  Requiring frequent straight caths      - UA on admission non-infectious   - C/w silodosin 8 mg qday  - Sampson in place for accurate I and Os while critically ill    Dementia  Mild per family reports, remains independent of ADLs.      - c/w home donepezil 5 mg  - Delirium precautions  - Melatonin 6 mg qhs  - Avoid seroquel/antipsychotics given prolonged QTc     Hyponatremia  Stopped thiazide. Improved with fluid restriction and diuresis  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  Continue fluid restriction and diuresis    Other hyperlipidemia  Continue atorvastatin    Gastroesophageal reflux disease  Continue protonix in place of home PPI    Essential hypertension  Stopped  home amlodipine and irbesartan-HCTZ in favor AV kwabena blockade. Patient with multiple hypotensive episodes with metoprolol and diltiazem requiring boluses. Hypotension improved on cessation of diltiazem. Has a few isolated episodes of elevated BPs.   Goal SBP <160 due to age and comorbidites  - Hydralazine PRN while BP is labile    Discharge Planning   MONA: 5/23/2022     Code Status: Full Code   Is the patient medically ready for discharge?: No    Reason for patient still in hospital (select all that apply): Patient unstable and Patient trending condition  Discharge Plan A: Skilled Nursing Facility   Discharge Delays: None known at this time    Diet:  regular diet  GI PPx: protonix  DVT PPx:  apixaban  Airways: room air  Wounds: none    Goals of Care:  Return to prior functional status       Time (minutes) spent in care of the patient (Greater than 1/2 spent in direct face-to-face contact and care coordination on unit)  35 min     Keiko Marlow MD

## 2022-05-19 NOTE — ASSESSMENT & PLAN NOTE
- Ammonia wnl, depakote level therapeutic (goal )  - Infectious w/u initiated on arrival to [facility] on 5/4, NGTD and UA noninfectious, has completed 7d treatment for previously diagnosed UTI  - MRI unremarkable  - neurology consulted on 5/13 as patient was overly sedated, likely due to AEDs -- patient alertness improved the following day. Neurology recommended continue current AEDs  - may need to reconsult neuro if mentation continues to wax and wane

## 2022-05-19 NOTE — PROGRESS NOTES
Pharmacokinetic Assessment Follow Up: IV Vancomycin    Vancomycin serum concentration assessment(s):    The trough level (~20hr trough) resulted in 12.8, was drawn a little earlier and can be used to guide therapy at this time. The measurement is below the desired definitive target range of 15 to 20 mcg/mL.    Vancomycin Regimen Plan:    Change regimen to Vancomycin 1250 mg IV every 24 hours with next serum trough concentration measured at 1400 prior to 3rd dose on 5/21    Drug levels (last 3 results):  Recent Labs   Lab Result Units 05/19/22  1207   Vancomycin-Trough ug/mL 12.8       Pharmacy will continue to follow and monitor vancomycin.    Please contact pharmacy at extension [phone] for questions regarding this assessment.    Thank you for the consult,   Twan Adkins       Patient brief summary:  Teresa Leos is a 77 y.o. female initiated on antimicrobial therapy with IV Vancomycin for treatment of lower respiratory infection    The patient's current regimen is vancomycin 1000mg IV every 24 hours.     Drug Allergies:   Review of patient's allergies indicates:   Allergen Reactions    Ciprofloxacin Other (See Comments)     Seizures       Actual Body Weight:   51.3kg    Renal Function:   Estimated Creatinine Clearance: 38.9 mL/min (based on SCr of 0.9 mg/dL).,     Dialysis Method (if applicable):  N/A    CBC (last 72 hours):  Recent Labs   Lab Result Units 05/17/22 0421 05/17/22 1910 05/18/22 0526 05/19/22  0403   WBC K/uL 12.60 12.41 12.99* 15.47*   Hemoglobin g/dL 10.0* 11.6* 11.0* 10.4*   Hematocrit % 30.9* 33.9* 31.9* 29.6*   Platelets K/uL 452* 366 328 411   Gran % % 63.4 61.7 63.9 66.4   Lymph % % 20.0 19.4 15.2* 11.1*   Mono % % 14.0 16.8* 18.0* 20.9*   Eosinophil % % 1.3 1.0 1.0 0.8   Basophil % % 0.5 0.4 0.4 0.3   Differential Method  Automated Automated Automated Automated       Metabolic Panel (last 72 hours):  Recent Labs   Lab Result Units 05/17/22  0421 05/17/22 1910 05/18/22 0526 05/19/22  0403    Sodium mmol/L 135* 132* 133* 138   Potassium mmol/L 3.3* 3.7 4.3 3.1*   Chloride mmol/L 95 94* 95 91*   CO2 mmol/L 30* 27 24 31*   Glucose mg/dL 87 83 77 83   BUN mg/dL 11 13 13 13   Creatinine mg/dL 0.8 0.8 0.8 0.9   Albumin g/dL  --  2.1*  --   --    Total Bilirubin mg/dL  --  0.4  --   --    Alkaline Phosphatase U/L  --  50*  --   --    AST U/L  --  26  --   --    ALT U/L  --  13  --   --    Magnesium mg/dL 1.7  --  1.7 1.7       Vancomycin Administrations:  vancomycin given in the last 96 hours                   vancomycin in dextrose 5 % 1 gram/250 mL IVPB 1,000 mg (mg) 1,000 mg New Bag 05/18/22 1507    vancomycin 1.25 g in dextrose 5% 250 mL IVPB (ready to mix) (mg) 1,250 mg New Bag 05/17/22 1355                Microbiologic Results:  Microbiology Results (last 7 days)     ** No results found for the last 168 hours. **         No

## 2022-05-19 NOTE — ASSESSMENT & PLAN NOTE
Stopped home amlodipine and irbesartan-HCTZ in favor AV kwabena blockade. Patient with multiple hypotensive episodes with metoprolol and diltiazem requiring boluses. Hypotension improved on cessation of diltiazem. Has a few isolated episodes of elevated BPs.   Goal SBP <160 due to age and comorbidites  - Hydralazine PRN while BP is labile

## 2022-05-20 LAB
ANION GAP SERPL CALC-SCNC: 14 MMOL/L (ref 8–16)
ANISOCYTOSIS BLD QL SMEAR: SLIGHT
BASOPHILS # BLD AUTO: 0.07 K/UL (ref 0–0.2)
BASOPHILS NFR BLD: 0.5 % (ref 0–1.9)
BUN SERPL-MCNC: 16 MG/DL (ref 8–23)
CALCIUM SERPL-MCNC: 8.4 MG/DL (ref 8.7–10.5)
CHLORIDE SERPL-SCNC: 88 MMOL/L (ref 95–110)
CO2 SERPL-SCNC: 30 MMOL/L (ref 23–29)
CREAT SERPL-MCNC: 0.9 MG/DL (ref 0.5–1.4)
DIFFERENTIAL METHOD: ABNORMAL
EOSINOPHIL # BLD AUTO: 0.1 K/UL (ref 0–0.5)
EOSINOPHIL NFR BLD: 1 % (ref 0–8)
ERYTHROCYTE [DISTWIDTH] IN BLOOD BY AUTOMATED COUNT: 12.9 % (ref 11.5–14.5)
EST. GFR  (AFRICAN AMERICAN): >60 ML/MIN/1.73 M^2
EST. GFR  (NON AFRICAN AMERICAN): >60 ML/MIN/1.73 M^2
GLUCOSE SERPL-MCNC: 74 MG/DL (ref 70–110)
HCT VFR BLD AUTO: 30.5 % (ref 37–48.5)
HGB BLD-MCNC: 10.6 G/DL (ref 12–16)
HYPOCHROMIA BLD QL SMEAR: ABNORMAL
IMM GRANULOCYTES # BLD AUTO: 0.12 K/UL (ref 0–0.04)
IMM GRANULOCYTES NFR BLD AUTO: 0.9 % (ref 0–0.5)
LYMPHOCYTES # BLD AUTO: 2 K/UL (ref 1–4.8)
LYMPHOCYTES NFR BLD: 14.4 % (ref 18–48)
MAGNESIUM SERPL-MCNC: 1.7 MG/DL (ref 1.6–2.6)
MCH RBC QN AUTO: 32.4 PG (ref 27–31)
MCHC RBC AUTO-ENTMCNC: 34.8 G/DL (ref 32–36)
MCV RBC AUTO: 93 FL (ref 82–98)
MONOCYTES # BLD AUTO: 3.1 K/UL (ref 0.3–1)
MONOCYTES NFR BLD: 22 % (ref 4–15)
NEUTROPHILS # BLD AUTO: 8.5 K/UL (ref 1.8–7.7)
NEUTROPHILS NFR BLD: 61.2 % (ref 38–73)
NRBC BLD-RTO: 0 /100 WBC
OVALOCYTES BLD QL SMEAR: ABNORMAL
PLATELET # BLD AUTO: 422 K/UL (ref 150–450)
PMV BLD AUTO: 11 FL (ref 9.2–12.9)
POIKILOCYTOSIS BLD QL SMEAR: SLIGHT
POLYCHROMASIA BLD QL SMEAR: ABNORMAL
POTASSIUM SERPL-SCNC: 2.9 MMOL/L (ref 3.5–5.1)
RBC # BLD AUTO: 3.27 M/UL (ref 4–5.4)
SODIUM SERPL-SCNC: 132 MMOL/L (ref 136–145)
VALPROATE SERPL-MCNC: 47.5 UG/ML (ref 50–100)
WBC # BLD AUTO: 13.89 K/UL (ref 3.9–12.7)

## 2022-05-20 PROCEDURE — 99233 PR SUBSEQUENT HOSPITAL CARE,LEVL III: ICD-10-PCS | Mod: ,,, | Performed by: INTERNAL MEDICINE

## 2022-05-20 PROCEDURE — 80048 BASIC METABOLIC PNL TOTAL CA: CPT | Performed by: STUDENT IN AN ORGANIZED HEALTH CARE EDUCATION/TRAINING PROGRAM

## 2022-05-20 PROCEDURE — 85025 COMPLETE CBC W/AUTO DIFF WBC: CPT | Performed by: STUDENT IN AN ORGANIZED HEALTH CARE EDUCATION/TRAINING PROGRAM

## 2022-05-20 PROCEDURE — 20600001 HC STEP DOWN PRIVATE ROOM

## 2022-05-20 PROCEDURE — 25000003 PHARM REV CODE 250: Performed by: HOSPITALIST

## 2022-05-20 PROCEDURE — 94640 AIRWAY INHALATION TREATMENT: CPT

## 2022-05-20 PROCEDURE — 94761 N-INVAS EAR/PLS OXIMETRY MLT: CPT

## 2022-05-20 PROCEDURE — 25000003 PHARM REV CODE 250: Performed by: INTERNAL MEDICINE

## 2022-05-20 PROCEDURE — 99900035 HC TECH TIME PER 15 MIN (STAT)

## 2022-05-20 PROCEDURE — 94668 MNPJ CHEST WALL SBSQ: CPT

## 2022-05-20 PROCEDURE — 97530 THERAPEUTIC ACTIVITIES: CPT | Mod: CQ

## 2022-05-20 PROCEDURE — 25000003 PHARM REV CODE 250: Performed by: STUDENT IN AN ORGANIZED HEALTH CARE EDUCATION/TRAINING PROGRAM

## 2022-05-20 PROCEDURE — 25000003 PHARM REV CODE 250: Performed by: PHYSICIAN ASSISTANT

## 2022-05-20 PROCEDURE — 25000242 PHARM REV CODE 250 ALT 637 W/ HCPCS: Performed by: STUDENT IN AN ORGANIZED HEALTH CARE EDUCATION/TRAINING PROGRAM

## 2022-05-20 PROCEDURE — 80164 ASSAY DIPROPYLACETIC ACD TOT: CPT

## 2022-05-20 PROCEDURE — 25000003 PHARM REV CODE 250

## 2022-05-20 PROCEDURE — 97112 NEUROMUSCULAR REEDUCATION: CPT | Mod: CQ

## 2022-05-20 PROCEDURE — 36415 COLL VENOUS BLD VENIPUNCTURE: CPT

## 2022-05-20 PROCEDURE — 99233 SBSQ HOSP IP/OBS HIGH 50: CPT | Mod: ,,, | Performed by: INTERNAL MEDICINE

## 2022-05-20 PROCEDURE — 94660 CPAP INITIATION&MGMT: CPT

## 2022-05-20 PROCEDURE — 83735 ASSAY OF MAGNESIUM: CPT | Performed by: STUDENT IN AN ORGANIZED HEALTH CARE EDUCATION/TRAINING PROGRAM

## 2022-05-20 PROCEDURE — 63600175 PHARM REV CODE 636 W HCPCS: Performed by: STUDENT IN AN ORGANIZED HEALTH CARE EDUCATION/TRAINING PROGRAM

## 2022-05-20 PROCEDURE — 27000221 HC OXYGEN, UP TO 24 HOURS

## 2022-05-20 PROCEDURE — 94799 UNLISTED PULMONARY SVC/PX: CPT

## 2022-05-20 PROCEDURE — 36406 VNPNXR<3YRS PHY/QHP OTHER VN: CPT

## 2022-05-20 RX ORDER — POTASSIUM CHLORIDE 750 MG/1
30 CAPSULE, EXTENDED RELEASE ORAL ONCE
Status: COMPLETED | OUTPATIENT
Start: 2022-05-20 | End: 2022-05-20

## 2022-05-20 RX ORDER — METOPROLOL TARTRATE 50 MG/1
50 TABLET ORAL 4 TIMES DAILY
Status: DISCONTINUED | OUTPATIENT
Start: 2022-05-20 | End: 2022-05-21

## 2022-05-20 RX ADMIN — LEVALBUTEROL HYDROCHLORIDE 1.25 MG: 1.25 SOLUTION, CONCENTRATE RESPIRATORY (INHALATION) at 08:05

## 2022-05-20 RX ADMIN — METOPROLOL TARTRATE 50 MG: 50 TABLET, FILM COATED ORAL at 05:05

## 2022-05-20 RX ADMIN — LACOSAMIDE 100 MG: 50 TABLET, FILM COATED ORAL at 09:05

## 2022-05-20 RX ADMIN — FUROSEMIDE 80 MG: 10 INJECTION, SOLUTION INTRAMUSCULAR; INTRAVENOUS at 01:05

## 2022-05-20 RX ADMIN — METOPROLOL TARTRATE 50 MG: 50 TABLET, FILM COATED ORAL at 09:05

## 2022-05-20 RX ADMIN — LEVETIRACETAM 1000 MG: 500 TABLET, FILM COATED ORAL at 09:05

## 2022-05-20 RX ADMIN — SENNOSIDES AND DOCUSATE SODIUM 2 TABLET: 50; 8.6 TABLET ORAL at 08:05

## 2022-05-20 RX ADMIN — AMIODARONE HYDROCHLORIDE 200 MG: 200 TABLET ORAL at 09:05

## 2022-05-20 RX ADMIN — METOPROLOL TARTRATE 50 MG: 50 TABLET, FILM COATED ORAL at 08:05

## 2022-05-20 RX ADMIN — PIPERACILLIN SODIUM AND TAZOBACTAM SODIUM 4.5 G: 4; .5 INJECTION, POWDER, LYOPHILIZED, FOR SOLUTION INTRAVENOUS at 03:05

## 2022-05-20 RX ADMIN — DONEPEZIL HYDROCHLORIDE 5 MG: 5 TABLET ORAL at 09:05

## 2022-05-20 RX ADMIN — VALPROIC ACID 250 MG: 250 CAPSULE, LIQUID FILLED ORAL at 03:05

## 2022-05-20 RX ADMIN — PANTOPRAZOLE SODIUM 40 MG: 40 TABLET, DELAYED RELEASE ORAL at 08:05

## 2022-05-20 RX ADMIN — FUROSEMIDE 80 MG: 10 INJECTION, SOLUTION INTRAMUSCULAR; INTRAVENOUS at 09:05

## 2022-05-20 RX ADMIN — MELATONIN TAB 3 MG 6 MG: 3 TAB at 09:05

## 2022-05-20 RX ADMIN — LACOSAMIDE 100 MG: 50 TABLET, FILM COATED ORAL at 08:05

## 2022-05-20 RX ADMIN — PIPERACILLIN SODIUM AND TAZOBACTAM SODIUM 4.5 G: 4; .5 INJECTION, POWDER, LYOPHILIZED, FOR SOLUTION INTRAVENOUS at 11:05

## 2022-05-20 RX ADMIN — ATORVASTATIN CALCIUM 40 MG: 20 TABLET, FILM COATED ORAL at 09:05

## 2022-05-20 RX ADMIN — PIPERACILLIN SODIUM AND TAZOBACTAM SODIUM 4.5 G: 4; .5 INJECTION, POWDER, LYOPHILIZED, FOR SOLUTION INTRAVENOUS at 08:05

## 2022-05-20 RX ADMIN — APIXABAN 5 MG: 5 TABLET, FILM COATED ORAL at 08:05

## 2022-05-20 RX ADMIN — VALPROIC ACID 250 MG: 250 CAPSULE, LIQUID FILLED ORAL at 08:05

## 2022-05-20 RX ADMIN — FUROSEMIDE 80 MG: 10 INJECTION, SOLUTION INTRAMUSCULAR; INTRAVENOUS at 06:05

## 2022-05-20 RX ADMIN — SILODOSIN 8 MG: 8 CAPSULE ORAL at 08:05

## 2022-05-20 RX ADMIN — POTASSIUM CHLORIDE 30 MEQ: 10 CAPSULE, COATED, EXTENDED RELEASE ORAL at 01:05

## 2022-05-20 RX ADMIN — VALPROIC ACID 250 MG: 250 CAPSULE, LIQUID FILLED ORAL at 09:05

## 2022-05-20 RX ADMIN — POTASSIUM CHLORIDE 30 MEQ: 10 CAPSULE, COATED, EXTENDED RELEASE ORAL at 08:05

## 2022-05-20 RX ADMIN — LEVETIRACETAM 1000 MG: 500 TABLET, FILM COATED ORAL at 08:05

## 2022-05-20 RX ADMIN — APIXABAN 5 MG: 5 TABLET, FILM COATED ORAL at 09:05

## 2022-05-20 NOTE — PT/OT/SLP PROGRESS
"Physical Therapy Treatment    Patient Name:  Teresa Leos   MRN:  [id number]  Admitting Diagnosis: Acute hypoxemic respiratory failure  Recent Surgery: * No surgery found *      Recommendations:     Discharge Recommendations:  nursing facility, skilled   Discharge Equipment Recommendations: shower chair, walker, rolling, wheelchair   Barriers to discharge: Inaccessible home and requires extensive assistance    Plan:     During this hospitalization, patient to be seen 3 x/week to address the above listed problems via gait training, therapeutic activities, therapeutic exercises, neuromuscular re-education  · Plan of Care Expires:  06/10/22   Plan of Care Reviewed with: patient    This Plan of care has been discussed with the patient who was involved in its development and understands and is in agreement with the identified goals and treatment plan    Subjective     Communicated with nurse (Stephanie) prior to session.     Patient comments: "Do you want some of my fish?"  Pain/Comfort:  · Pain Rating 1: 10/10  · Location - Side 1: Right  · Location - Orientation 1: generalized  · Location 1: arm (with movement)  · Pain Addressed 1: Reposition, Nurse notified  · Pain Rating Post-Intervention 1:  (No rating given)    Objective:     Patient found with: blood pressure cuff, telemetry, pulse ox (continuous), peripheral IV, oxygen, paula catheter (waffle pad)    Patient found supine with HOB elevated upon PT entry to room, agreeable to treatment.  No family present in the room.    Respiratory Status: Nasal cannula, flow 7 L/min with humidifier    General Precautions: Standard, fall   Orthopedic Precautions:N/A   Braces: N/A       BED MOBILITY (vc's for hand placement sequencing of task):        Rolling to the R:  Max A.       Rolling to the L:  Max A.        Sup > sit at the EOB:  Max A/Total A for trunk elevation and BLE management, exiting on the L side, HOB 20* angle.       Sit > sup:  Max A for lowering of trunk and " management of BLE.       Scooting hips to EOB: Total A for lateral weight shifting       Scooting lateral to L: Total A for anterior leaning of trunk and elevation of hips x4 trial(s)                SITTING AT THE EDGE OF THE BED (~15 min)   Assistance Level Required: Min A/Mod A for trunk stability   Postural deviations noted: R posterior leaning, flexed thoracic/cervical spine, and PPT   Encouraged: midline orientation and to sit upright with feet flat on the floor    Attempted to have pt reach forward to L with LUE, pt unable to perform 2* poor command following       EDUCATION  Patient provided with daily orientation and goals of this PT session. They were educated to call for assistance and to transfer with hospital staff only.       Whiteboard updated with correct mobility information. RN/PCT notified.  Pt requires Max A/Total A for bed mobility.    Patient left supine with R UE supported on pillow, with  all lines intact, call button in reach and nurse notified    AM-PAC 6 CLICK MOBILITY  Turning over in bed (including adjusting bedclothes, sheets and blankets)?: 2  Sitting down on and standing up from a chair with arms (e.g., wheelchair, bedside commode, etc.): 1  Moving from lying on back to sitting on the side of the bed?: 1  Moving to and from a bed to a chair (including a wheelchair)?: 1  Need to walk in hospital room?: 1  Climbing 3-5 steps with a railing?: 1  Basic Mobility Total Score: 7     Assessment:     Teresa Leos is a 77 y.o. female admitted with a medical diagnosis of Acute hypoxemic respiratory failure.  She presents with the following impairments/functional limitations:  weakness, impaired endurance, impaired self care skills, impaired functional mobilty, gait instability, impaired balance, impaired cognition, decreased coordination, decreased upper extremity function, decreased lower extremity function, decreased safety awareness, pain, impaired coordination, impaired fine motor,  impaired cardiopulmonary response to activity. requiring max/total assistance and verbal cues for bed mobility to prevent falls due to weakness and decreased mentation.   In light of pt's current functional level and deficits, it is anticipated that pt will need to participate in an intense rehab program consisting of PT and OT in order to achieve full rehab potential to return to previous level of function and roles.  Pt will cont to benefit from skilled PT intervention to address deficits and improve functional mobility.       Rehab Prognosis:  Fair; patient would benefit from acute skilled PT services to address these deficits and reach maximum level of function.      GOALS:   Multidisciplinary Problems     Physical Therapy Goals        Problem: Physical Therapy    Goal Priority Disciplines Outcome Goal Variances Interventions   Physical Therapy Goal     PT, PT/OT Ongoing, Progressing     Description: Goals to be met by: 22    Patient will increase functional independence with mobility by performin. Supine to sit with supervision  2. Sit to supine with supervision  3. Sit to stand transfer with independence  4. Gait  x 80 feet with supervision using LRAD as needed  5. Ascend/descend 5 stair with left handrails supervision using LRAD as needed  6. Lower extremity exercise program x10 reps per handout, with independence                     Time Tracking:     PT Received On: 22  PT Start Time: 1346     PT Stop Time: 1430  PT Total Time (min): 44 min     Billable Minutes: Therapeutic Activity 29 and Neuromuscular Re-education 15    Treatment Type: Treatment  PT/PTA: PTA     PTA Visit Number: 1       2022    .

## 2022-05-20 NOTE — NURSING
Patient noted with decreased appetitte , mood pleasant. Remains on 02 at 7 liters per NC. Patient does not like to be turned and repositioned . Patient requires assistance x 1 with adls and transfers. Siderails up x 2, call light in reach . Will continue to monitor.

## 2022-05-20 NOTE — PLAN OF CARE
"  Problem: Infection  Goal: Absence of Infection Signs and Symptoms  Outcome: Ongoing, Progressing     Problem: Adult Inpatient Plan of Care  Goal: Plan of Care Review  Outcome: Ongoing, Progressing  Goal: Patient-Specific Goal (Individualized)  Description: Admit Date: 5/4/2022    Focal seizures    Past Medical History:  No date: Hyperlipidemia  No date: Hypertension  No date: Osteoporosis    Past Surgical History:  No date: HIP REPLACEMENT ARTHROPLASTY; Right  No date: HYSTERECTOMY  No date: LEG SURGERY  No date: SKIN CANCER EXCISION  No date: TONSILLECTOMY, ADENOIDECTOMY    Individualization:   1. Pt likes to be called "Tuyet"     Restraints: none        Outcome: Ongoing, Progressing  Goal: Absence of Hospital-Acquired Illness or Injury  Outcome: Ongoing, Progressing  Goal: Optimal Comfort and Wellbeing  Outcome: Ongoing, Progressing  Goal: Readiness for Transition of Care  Outcome: Ongoing, Progressing     Problem: Skin Injury Risk Increased  Goal: Skin Health and Integrity  Outcome: Ongoing, Progressing     Problem: Impaired Wound Healing  Goal: Optimal Wound Healing  Outcome: Ongoing, Progressing     Problem: Adjustment to Illness (Delirium)  Goal: Optimal Coping  Outcome: Ongoing, Progressing     Problem: Altered Behavior (Delirium)  Goal: Improved Behavioral Control  Outcome: Ongoing, Progressing     Problem: Attention and Thought Clarity Impairment (Delirium)  Goal: Improved Attention and Thought Clarity  Outcome: Ongoing, Progressing     Problem: Sleep Disturbance (Delirium)  Goal: Improved Sleep  Outcome: Ongoing, Progressing     Problem: Fall Injury Risk  Goal: Absence of Fall and Fall-Related Injury  Outcome: Ongoing, Progressing     Problem: Restraint, Nonbehavioral (Nonviolent)  Goal: Absence of Harm or Injury  Outcome: Ongoing, Progressing     Problem: Seizure, Active Management  Goal: Absence of Seizure/Seizure-Related Injury  Outcome: Ongoing, Progressing   Patient noted with decreased appetitte , " mood pleasant. Remains on 02 at 7 liters per NC. Patient does not like to be turned and repositioned . Patient requires assistance x 1 with adls and transfers. Siderails up x 2, call light in reach . Will continue to monitor.

## 2022-05-20 NOTE — PLAN OF CARE
"  Problem: Infection  Goal: Absence of Infection Signs and Symptoms  Outcome: Ongoing, Progressing     Problem: Adult Inpatient Plan of Care  Goal: Plan of Care Review  Outcome: Ongoing, Progressing  Goal: Patient-Specific Goal (Individualized)  Description: Admit Date: 5/4/2022    Focal seizures    Past Medical History:  No date: Hyperlipidemia  No date: Hypertension  No date: Osteoporosis    Past Surgical History:  No date: HIP REPLACEMENT ARTHROPLASTY; Right  No date: HYSTERECTOMY  No date: LEG SURGERY  No date: SKIN CANCER EXCISION  No date: TONSILLECTOMY, ADENOIDECTOMY    Individualization:   1. Pt likes to be called "Tuyet"     Restraints: none        Outcome: Ongoing, Progressing  Goal: Absence of Hospital-Acquired Illness or Injury  Outcome: Ongoing, Progressing  Goal: Optimal Comfort and Wellbeing  Outcome: Ongoing, Progressing  Goal: Readiness for Transition of Care  Outcome: Ongoing, Progressing     Problem: Skin Injury Risk Increased  Goal: Skin Health and Integrity  Outcome: Ongoing, Progressing     Problem: Impaired Wound Healing  Goal: Optimal Wound Healing  Outcome: Ongoing, Progressing     Problem: Attention and Thought Clarity Impairment (Delirium)  Goal: Improved Attention and Thought Clarity  Outcome: Ongoing, Progressing     Problem: Sleep Disturbance (Delirium)  Goal: Improved Sleep  Outcome: Ongoing, Progressing     Problem: Fall Injury Risk  Goal: Absence of Fall and Fall-Related Injury  Outcome: Ongoing, Progressing     Problem: Seizure, Active Management  Goal: Absence of Seizure/Seizure-Related Injury  Outcome: Ongoing, Progressing     "

## 2022-05-20 NOTE — PT/OT/SLP PROGRESS
Occupational Therapy      Patient Name:  Teresa Leos   MRN:  [id number]    Patient not seen today secondary to  Working with PT.      5/20/2022

## 2022-05-21 PROBLEM — R25.1 TREMORS OF NERVOUS SYSTEM: Status: ACTIVE | Noted: 2022-05-21

## 2022-05-21 LAB
ANION GAP SERPL CALC-SCNC: 14 MMOL/L (ref 8–16)
ANISOCYTOSIS BLD QL SMEAR: SLIGHT
BASOPHILS # BLD AUTO: 0.06 K/UL (ref 0–0.2)
BASOPHILS NFR BLD: 0.5 % (ref 0–1.9)
BNP SERPL-MCNC: 449 PG/ML (ref 0–99)
BUN SERPL-MCNC: 23 MG/DL (ref 8–23)
CALCIUM SERPL-MCNC: 9 MG/DL (ref 8.7–10.5)
CHLORIDE SERPL-SCNC: 89 MMOL/L (ref 95–110)
CO2 SERPL-SCNC: 35 MMOL/L (ref 23–29)
CREAT SERPL-MCNC: 1 MG/DL (ref 0.5–1.4)
DIFFERENTIAL METHOD: ABNORMAL
EOSINOPHIL # BLD AUTO: 0.1 K/UL (ref 0–0.5)
EOSINOPHIL NFR BLD: 0.7 % (ref 0–8)
ERYTHROCYTE [DISTWIDTH] IN BLOOD BY AUTOMATED COUNT: 13.2 % (ref 11.5–14.5)
EST. GFR  (AFRICAN AMERICAN): >60 ML/MIN/1.73 M^2
EST. GFR  (NON AFRICAN AMERICAN): 54.1 ML/MIN/1.73 M^2
GLUCOSE SERPL-MCNC: 76 MG/DL (ref 70–110)
HCT VFR BLD AUTO: 31.7 % (ref 37–48.5)
HGB BLD-MCNC: 10.7 G/DL (ref 12–16)
IMM GRANULOCYTES # BLD AUTO: 0.07 K/UL (ref 0–0.04)
IMM GRANULOCYTES NFR BLD AUTO: 0.6 % (ref 0–0.5)
LYMPHOCYTES # BLD AUTO: 2.2 K/UL (ref 1–4.8)
LYMPHOCYTES NFR BLD: 17.9 % (ref 18–48)
MAGNESIUM SERPL-MCNC: 1.9 MG/DL (ref 1.6–2.6)
MCH RBC QN AUTO: 32.5 PG (ref 27–31)
MCHC RBC AUTO-ENTMCNC: 33.8 G/DL (ref 32–36)
MCV RBC AUTO: 96 FL (ref 82–98)
MONOCYTES # BLD AUTO: 2.2 K/UL (ref 0.3–1)
MONOCYTES NFR BLD: 18.4 % (ref 4–15)
NEUTROPHILS # BLD AUTO: 7.5 K/UL (ref 1.8–7.7)
NEUTROPHILS NFR BLD: 61.9 % (ref 38–73)
NRBC BLD-RTO: 0 /100 WBC
OVALOCYTES BLD QL SMEAR: ABNORMAL
PLATELET # BLD AUTO: 455 K/UL (ref 150–450)
PLATELET BLD QL SMEAR: ABNORMAL
PMV BLD AUTO: 11 FL (ref 9.2–12.9)
POIKILOCYTOSIS BLD QL SMEAR: SLIGHT
POTASSIUM SERPL-SCNC: 3 MMOL/L (ref 3.5–5.1)
RBC # BLD AUTO: 3.29 M/UL (ref 4–5.4)
SODIUM SERPL-SCNC: 138 MMOL/L (ref 136–145)
VANCOMYCIN TROUGH SERPL-MCNC: 9.5 UG/ML (ref 10–22)
WBC # BLD AUTO: 12.1 K/UL (ref 3.9–12.7)

## 2022-05-21 PROCEDURE — 85025 COMPLETE CBC W/AUTO DIFF WBC: CPT | Performed by: STUDENT IN AN ORGANIZED HEALTH CARE EDUCATION/TRAINING PROGRAM

## 2022-05-21 PROCEDURE — 97535 SELF CARE MNGMENT TRAINING: CPT

## 2022-05-21 PROCEDURE — 94761 N-INVAS EAR/PLS OXIMETRY MLT: CPT

## 2022-05-21 PROCEDURE — 36406 VNPNXR<3YRS PHY/QHP OTHER VN: CPT

## 2022-05-21 PROCEDURE — 99233 PR SUBSEQUENT HOSPITAL CARE,LEVL III: ICD-10-PCS | Mod: ,,, | Performed by: INTERNAL MEDICINE

## 2022-05-21 PROCEDURE — 95720 EEG PHY/QHP EA INCR W/VEEG: CPT | Mod: ,,, | Performed by: PSYCHIATRY & NEUROLOGY

## 2022-05-21 PROCEDURE — 36415 COLL VENOUS BLD VENIPUNCTURE: CPT | Performed by: INTERNAL MEDICINE

## 2022-05-21 PROCEDURE — 25000003 PHARM REV CODE 250: Performed by: PHYSICIAN ASSISTANT

## 2022-05-21 PROCEDURE — 95700 EEG CONT REC W/VID EEG TECH: CPT

## 2022-05-21 PROCEDURE — 83735 ASSAY OF MAGNESIUM: CPT | Performed by: STUDENT IN AN ORGANIZED HEALTH CARE EDUCATION/TRAINING PROGRAM

## 2022-05-21 PROCEDURE — 95714 VEEG EA 12-26 HR UNMNTR: CPT

## 2022-05-21 PROCEDURE — 80048 BASIC METABOLIC PNL TOTAL CA: CPT | Performed by: STUDENT IN AN ORGANIZED HEALTH CARE EDUCATION/TRAINING PROGRAM

## 2022-05-21 PROCEDURE — 63600175 PHARM REV CODE 636 W HCPCS: Performed by: STUDENT IN AN ORGANIZED HEALTH CARE EDUCATION/TRAINING PROGRAM

## 2022-05-21 PROCEDURE — 25000003 PHARM REV CODE 250: Performed by: STUDENT IN AN ORGANIZED HEALTH CARE EDUCATION/TRAINING PROGRAM

## 2022-05-21 PROCEDURE — 99900035 HC TECH TIME PER 15 MIN (STAT)

## 2022-05-21 PROCEDURE — 25000003 PHARM REV CODE 250: Performed by: HOSPITALIST

## 2022-05-21 PROCEDURE — 25000003 PHARM REV CODE 250: Performed by: INTERNAL MEDICINE

## 2022-05-21 PROCEDURE — 83880 ASSAY OF NATRIURETIC PEPTIDE: CPT | Performed by: INTERNAL MEDICINE

## 2022-05-21 PROCEDURE — 95720 PR EEG, W/VIDEO, CONT RECORD, I&R, >12<26 HRS: ICD-10-PCS | Mod: ,,, | Performed by: PSYCHIATRY & NEUROLOGY

## 2022-05-21 PROCEDURE — 99233 SBSQ HOSP IP/OBS HIGH 50: CPT | Mod: ,,, | Performed by: INTERNAL MEDICINE

## 2022-05-21 PROCEDURE — 20600001 HC STEP DOWN PRIVATE ROOM

## 2022-05-21 PROCEDURE — 25000242 PHARM REV CODE 250 ALT 637 W/ HCPCS: Performed by: STUDENT IN AN ORGANIZED HEALTH CARE EDUCATION/TRAINING PROGRAM

## 2022-05-21 PROCEDURE — 27000221 HC OXYGEN, UP TO 24 HOURS

## 2022-05-21 PROCEDURE — 80202 ASSAY OF VANCOMYCIN: CPT | Performed by: INTERNAL MEDICINE

## 2022-05-21 PROCEDURE — 94640 AIRWAY INHALATION TREATMENT: CPT

## 2022-05-21 PROCEDURE — 92610 EVALUATE SWALLOWING FUNCTION: CPT

## 2022-05-21 PROCEDURE — 63600175 PHARM REV CODE 636 W HCPCS: Performed by: INTERNAL MEDICINE

## 2022-05-21 PROCEDURE — 25000003 PHARM REV CODE 250

## 2022-05-21 PROCEDURE — 94660 CPAP INITIATION&MGMT: CPT

## 2022-05-21 RX ORDER — POTASSIUM CHLORIDE 750 MG/1
30 CAPSULE, EXTENDED RELEASE ORAL
Status: COMPLETED | OUTPATIENT
Start: 2022-05-21 | End: 2022-05-23

## 2022-05-21 RX ADMIN — VALPROIC ACID 250 MG: 250 CAPSULE, LIQUID FILLED ORAL at 03:05

## 2022-05-21 RX ADMIN — APIXABAN 5 MG: 5 TABLET, FILM COATED ORAL at 09:05

## 2022-05-21 RX ADMIN — MELATONIN TAB 3 MG 6 MG: 3 TAB at 09:05

## 2022-05-21 RX ADMIN — FUROSEMIDE 80 MG: 10 INJECTION, SOLUTION INTRAMUSCULAR; INTRAVENOUS at 02:05

## 2022-05-21 RX ADMIN — PANTOPRAZOLE SODIUM 40 MG: 40 TABLET, DELAYED RELEASE ORAL at 09:05

## 2022-05-21 RX ADMIN — AMIODARONE HYDROCHLORIDE 200 MG: 200 TABLET ORAL at 09:05

## 2022-05-21 RX ADMIN — LACOSAMIDE 100 MG: 50 TABLET, FILM COATED ORAL at 09:05

## 2022-05-21 RX ADMIN — SENNOSIDES AND DOCUSATE SODIUM 2 TABLET: 50; 8.6 TABLET ORAL at 09:05

## 2022-05-21 RX ADMIN — LEVETIRACETAM 1000 MG: 500 TABLET, FILM COATED ORAL at 09:05

## 2022-05-21 RX ADMIN — VANCOMYCIN HYDROCHLORIDE 1250 MG: 1.25 INJECTION, POWDER, LYOPHILIZED, FOR SOLUTION INTRAVENOUS at 02:05

## 2022-05-21 RX ADMIN — POTASSIUM CHLORIDE 30 MEQ: 10 CAPSULE, COATED, EXTENDED RELEASE ORAL at 11:05

## 2022-05-21 RX ADMIN — LEVALBUTEROL HYDROCHLORIDE 1.25 MG: 1.25 SOLUTION, CONCENTRATE RESPIRATORY (INHALATION) at 09:05

## 2022-05-21 RX ADMIN — POTASSIUM CHLORIDE 30 MEQ: 10 CAPSULE, COATED, EXTENDED RELEASE ORAL at 05:05

## 2022-05-21 RX ADMIN — ATORVASTATIN CALCIUM 40 MG: 20 TABLET, FILM COATED ORAL at 09:05

## 2022-05-21 RX ADMIN — VALPROIC ACID 250 MG: 250 CAPSULE, LIQUID FILLED ORAL at 09:05

## 2022-05-21 RX ADMIN — DONEPEZIL HYDROCHLORIDE 5 MG: 5 TABLET ORAL at 09:05

## 2022-05-21 RX ADMIN — FUROSEMIDE 80 MG: 10 INJECTION, SOLUTION INTRAMUSCULAR; INTRAVENOUS at 09:05

## 2022-05-21 RX ADMIN — METOPROLOL TARTRATE 75 MG: 50 TABLET, FILM COATED ORAL at 09:05

## 2022-05-21 RX ADMIN — LEVALBUTEROL HYDROCHLORIDE 1.25 MG: 1.25 SOLUTION, CONCENTRATE RESPIRATORY (INHALATION) at 08:05

## 2022-05-21 RX ADMIN — PIPERACILLIN SODIUM AND TAZOBACTAM SODIUM 4.5 G: 4; .5 INJECTION, POWDER, LYOPHILIZED, FOR SOLUTION INTRAVENOUS at 03:05

## 2022-05-21 RX ADMIN — PIPERACILLIN SODIUM AND TAZOBACTAM SODIUM 4.5 G: 4; .5 INJECTION, POWDER, LYOPHILIZED, FOR SOLUTION INTRAVENOUS at 09:05

## 2022-05-21 RX ADMIN — SILODOSIN 8 MG: 8 CAPSULE ORAL at 09:05

## 2022-05-21 RX ADMIN — METOPROLOL TARTRATE 75 MG: 50 TABLET, FILM COATED ORAL at 01:05

## 2022-05-21 RX ADMIN — FUROSEMIDE 80 MG: 10 INJECTION, SOLUTION INTRAMUSCULAR; INTRAVENOUS at 05:05

## 2022-05-21 RX ADMIN — POTASSIUM CHLORIDE 30 MEQ: 10 CAPSULE, COATED, EXTENDED RELEASE ORAL at 07:05

## 2022-05-21 RX ADMIN — METOPROLOL TARTRATE 50 MG: 50 TABLET, FILM COATED ORAL at 09:05

## 2022-05-21 NOTE — CARE UPDATE
RAPID RESPONSE NURSE ROUND       Rounding completed with charge RN, Katt. No concerns verbalized at this time. Instructed to call [phone] for further concerns or assistance.

## 2022-05-21 NOTE — PROGRESS NOTES
Pharmacokinetic Assessment Follow Up: IV Vancomycin    Vancomycin serum concentration assessment(s):    The trough level was drawn incorrectly and cannot be used to guide therapy at this time. Dose was not given yesterday.    Vancomycin Regimen Plan:    Continue regimen of Vancomycin 1250 mg IV every 24 hours with next serum trough concentration measured at 1300 on Monday 5/23      Drug levels (last 3 results):  Recent Labs   Lab Result Units 05/19/22  1207 05/21/22  1351   Vancomycin-Trough ug/mL 12.8 9.5*       Pharmacy will continue to follow and monitor vancomycin.    Please contact pharmacy at extension [phone] for questions regarding this assessment.    Thank you for the consult,   Lyly Toribio       Patient brief summary:  Teresa Leos is a 77 y.o. female initiated on antimicrobial therapy with IV Vancomycin for treatment of lower respiratory infection    The patient's current regimen is 1250mg IV every 24 hours    Drug Allergies:   Review of patient's allergies indicates:   Allergen Reactions    Ciprofloxacin Other (See Comments)     Seizures       Actual Body Weight:   51.3 kg    Renal Function:   Estimated Creatinine Clearance: 35 mL/min (based on SCr of 1 mg/dL).,     Dialysis Method (if applicable):  N/A    CBC (last 72 hours):  Recent Labs   Lab Result Units 05/19/22  0403 05/20/22  0405 05/21/22  0402   WBC K/uL 15.47* 13.89* 12.10   Hemoglobin g/dL 10.4* 10.6* 10.7*   Hematocrit % 29.6* 30.5* 31.7*   Platelets K/uL 411 422 455*   Gran % % 66.4 61.2 61.9   Lymph % % 11.1* 14.4* 17.9*   Mono % % 20.9* 22.0* 18.4*   Eosinophil % % 0.8 1.0 0.7   Basophil % % 0.3 0.5 0.5   Differential Method  Automated Automated Automated       Metabolic Panel (last 72 hours):  Recent Labs   Lab Result Units 05/19/22  0403 05/20/22  0405 05/21/22  0402   Sodium mmol/L 138 132* 138   Potassium mmol/L 3.1* 2.9* 3.0*   Chloride mmol/L 91* 88* 89*   CO2 mmol/L 31* 30* 35*   Glucose mg/dL 83 74 76   BUN mg/dL 13 16 23    Creatinine mg/dL 0.9 0.9 1.0   Magnesium mg/dL 1.7 1.7 1.9       Vancomycin Administrations:  vancomycin given in the last 96 hours                   vancomycin 1.25 g in dextrose 5% 250 mL IVPB (ready to mix) (mg) 1,250 mg New Bag 05/21/22 1415     1,250 mg New Bag 05/19/22 1513    vancomycin in dextrose 5 % 1 gram/250 mL IVPB 1,000 mg (mg) 1,000 mg New Bag 05/18/22 1507                Microbiologic Results:  Microbiology Results (last 7 days)     ** No results found for the last 168 hours. **

## 2022-05-21 NOTE — NURSING
Patient noted awake and alert , mood pleasant. Resp even and non-labored, skin w/d to touch. Patient remains on o2 at 7 liters per NC. Patient noted to be weak but alert and able to talk with family. Patient denies pain on 0-10 scale. Patient requires maxium assistance with adls . Patient noted fed per staff but has decreased appetitte. Patient is able to assist with reposition . No distress noted. Siderail up x 2 , call light in reach, will continue to observe.

## 2022-05-21 NOTE — ASSESSMENT & PLAN NOTE
Likely due to acute metabolic encephalopathy  Tremors noted by nurse and daughter. Brief episode of somnolence after last tremor event.   EEG negative for seizures

## 2022-05-21 NOTE — PLAN OF CARE
Bedside swallow evaluation completed. Recommend a mechanical soft solid diet with thin liquids when pt is fully awake/alert and sitting upright. Pills crushed in puree. ST services will continue to follow.

## 2022-05-21 NOTE — NURSING
MD responds , informs this nurse to administer patient 80 mg of lasix related to patient has heart failure. Will follow MD order. MD informed this nurse that she will come see patient.

## 2022-05-21 NOTE — NURSING
MD responds and informs this nurse to give patient 80 mg of lasix, this nurse request that MD come to unit due to patient being more drowsy.

## 2022-05-21 NOTE — PT/OT/SLP EVAL
"Speech Language Pathology Evaluation  Bedside Swallow    Patient Name:  Teresa Leos   MRN:  [id number]  Admitting Diagnosis: Acute hypoxemic respiratory failure    Recommendations:                 General Recommendations:  Dysphagia therapy  Diet recommendations:  Mechanical soft, Thin   Aspiration Precautions: 1 bite/sip at a time, Assistance with meals, Feed only when awake/alert, Frequent oral care, HOB to 90 degrees, Meds crushed in puree, Small bites/sips and Standard aspiration precautions   General Precautions: Standard, fall, aspiration  Communication strategies:  provide increased time to answer and go to room if call light pushed    History:     Past Medical History:   Diagnosis Date    Hyperlipidemia     Hypertension     Osteoporosis        Past Surgical History:   Procedure Laterality Date    HIP REPLACEMENT ARTHROPLASTY Right     HYSTERECTOMY      LEG SURGERY      SKIN CANCER EXCISION      TONSILLECTOMY, ADENOIDECTOMY         History of Present Illness: "Ms. Leos is a 76 y/o female with PMH significant for dementia, hypertension and recently diagnosed atrial fibrillation presents to [facility] as a transfer from Hospital Medicine team for further management of recurrent focal seizures and AF with RVR. Patient initially presented to [facility] on April 29th with confusion and witnessed convulsions. During her two day admission, stroke was reportedly ruled out and no further seizures were noted. Patient was diagnosed with new onset AF, for which she was discharged on AC and BB. She represented to [facility] on 5/4 after family reported intermittent staring spells. She had witnessed convulsions during EMS transport to [facility] ED. She had a telestroke consult and was transferred to [facility] for further evaluation. She had a total of 2g Keppra and was started on 500 mg BID for maintenance. She was given BB and amiodarone for her HR. On arrival to [facility], patient was awake and oriented to self and place. This " "evening, patient developed AF RVR with HR into 180s with stable BP which did not respond to IV BB. Patient was also having fluctuating mentation associated with staring, deviated gaze, and becoming nonverbal, eventually also associated with left sided weakness and facial twitching.  Patient was given amiodarone per HM team and was loaded with 2g Keppra and 2 mg lorazepam. Patient did not return to her neurological baseline after medications, still requiring sternal rubbing to say her name. cEEG was placed and interpreted by Dr. Brown, noted to have recurrent focal right sided seizures. Given her AF RVR and recurrent seizures refractory to benzodiazepines and Keppra load, patient was transferred to [facility] for further management and diagnostics.     On arrival to [facility], patient was given small fluid bolus and loaded with 300 mg lacosamide. She is intermittently following simple commands and saying her name, protecting her airway. "    Prior Intubation HX: n/a    Modified Barium Swallow: n/a    Chest X-Rays: 5/20 "Single chest view is submitted.  When accounting for difference in position and technique the appearance of the cardiomediastinal silhouette is stable.  Aortic atherosclerotic change again noted.  Mild central pulmonary vascular prominence noted.  There is continued appearance of bilateral interstitial and alveolar infiltrate, right greater than left and greatest at the lung bases, however with improved aeration bilaterally most notable improvement is seen at the right upper lobe.  Bilateral pleural effusions are again noted.  There is no evidence for pneumothorax.  The osseous structures demonstrate chronic change."    Prior diet: MS/thin    Subjective     Spoke with RN prior to session. Pt awake/alert though lethargic w/ family at bedside. She was agreeable to a few PO trials with encouragement.     Pain/Comfort:  · Pain Rating 1: 0/10    Respiratory Status: High flow, flow 5 L/min, concentration  " %    Objective:     Oral Musculature Evaluation  · Oral Musculature: general weakness, WFL  · Dentition: upper dentures  · Secretion Management: adequate  · Mucosal Quality: good  · Mandibular Strength and Mobility: WFL  · Oral Labial Strength and Mobility: WFL  · Lingual Strength and Mobility: WFL, impaired strength  · Velar Elevation: WFL  · Buccal Strength and Mobility: WFL  · Volitional Cough: weak  · Volitional Swallow: timely  · Voice Prior to PO Intake: clear, diminished    Bedside Swallow Eval:   Consistencies Assessed:  · Thin liquids  2 ox via straw  · Puree pudding x5  · Soft solids crushed cracker in pudding x3 bites  · Solids cracker x 1 bite     Oral Phase:   · reduceed matication, no bottom dentures   · Prolonged mastication  · Slow oral transit time  · Spitting out of food/liquid w/ regular solids    Pharyngeal Phase:   · no overt clinical signs/symptoms of aspiration  · no overt clinical signs/symptoms of pharyngeal dysphagia    Compensatory Strategies  · cyclic ingestion     Treatment: Pt able to tolerate small amounts of soft solids and thin liquids with prolonged mastication process, though no overt s/sx of airway compromise. Educated family and pt on diet recs, aspiration precautions and strategies. They verbalized understanding and were in agreement. ST services will continue to follow.     Assessment:     Teresa Leos is a 77 y.o. female with an SLP diagnosis of Dysphagia.      Goals:   Multidisciplinary Problems     SLP Goals        Problem: SLP    Goal Priority Disciplines Outcome   SLP Goal     SLP    Description: Speech Language Pathology Goals  Goals expected to be met by 6/2 1. Pt will participate in ongoing swallow assessment to determine safest and least restrictive PO diet.                      Multidisciplinary Problems (Resolved)        Problem: SLP    Goal Priority Disciplines Outcome   SLP Goal   (Resolved)     SLP Met   Description: Speech Language Pathology Goals  Goals  expected to be met by 5/13    1. Pt will participate in ongoing swallow assessment  2. PT will participate in speech language eval                              Plan:     · Patient to be seen:  4 x/week   · Plan of Care expires:  06/20/22  · Plan of Care reviewed with:  patient, family   · SLP Follow-Up:  Yes       Discharge recommendations:  nursing facility, skilled   Barriers to Discharge:  Level of Skilled Assistance Needed      Time Tracking:     SLP Treatment Date:   05/21/22  Speech Start Time:  1002  Speech Stop Time:  1017     Speech Total Time (min):  15 min    Billable Minutes: Eval Swallow and Oral Function 7 and Self Care/Home Management Training 8    05/21/2022

## 2022-05-21 NOTE — NURSING
MD notified due to patient is noted to have tremors, patient daugther in law informed nurse that patient has not had them before. This is the second day patient is seen slightly with tremors. Will continue to monitor this patient.

## 2022-05-21 NOTE — PROGRESS NOTES
Hospital Medicine  Progress note    Team: [facility] [place] [facility] Keiko Marlow MD  Admit Date: 5/4/2022    Principal Problem:  Acute hypoxemic respiratory failure    Interval hx:  Patient alert. Later daughter complains of tremors    ROS   Respiratory: neg for cough neg for shortness of breath  Cardiovascular: neg for chest pain neg for palpitations  Gastrointestinal: neg for nausea neg for vomiting, neg for abdominal pain neg for diarrhea neg for constipation   Behavioral/Psych: neg for depression neg for anxiety    PEx  Temp:  [96 °F (35.6 °C)-98.3 °F (36.8 °C)]   Pulse:  [105-123]   Resp:  [14-26]   BP: (104-136)/(52-90)   SpO2:  [95 %-100 %]     Intake/Output Summary (Last 24 hours) at 5/21/2022 1339  Last data filed at 5/21/2022 0600  Gross per 24 hour   Intake 120 ml   Output 1600 ml   Net -1480 ml       General Appearance: no acute distress, WD, elderly, ill-appearing  Heart: regular rate and rhythm, no heave  Respiratory: Normal respiratory effort, symmetric excursion, bilateral vesicular breath sounds   Abdomen: Soft, non-tender; bowel sounds active  Skin: intact, no rash, no ulcers  Neurologic:  No focal numbness or weakness  Mental status: Alert, oriented x 4, affect appropriate     Recent Labs   Lab 05/19/22  0403 05/20/22  0405 05/21/22  0402   WBC 15.47* 13.89* 12.10   HGB 10.4* 10.6* 10.7*   HCT 29.6* 30.5* 31.7*    422 455*     Recent Labs   Lab 05/19/22  0403 05/20/22  0405 05/21/22  0402    132* 138   K 3.1* 2.9* 3.0*   CL 91* 88* 89*   CO2 31* 30* 35*   BUN 13 16 23   CREATININE 0.9 0.9 1.0   GLU 83 74 76   CALCIUM 8.6* 8.4* 9.0   MG 1.7 1.7 1.9     Recent Labs   Lab 05/17/22  1910   ALKPHOS 50*   ALT 13   AST 26   ALBUMIN 2.1*   PROT 5.6*   BILITOT 0.4        Scheduled Meds:   amiodarone  200 mg Oral Daily    apixaban  5 mg Oral BID    atorvastatin  40 mg Oral QHS    donepeziL  5 mg Oral Nightly    furosemide (LASIX) injection  80 mg Intravenous Q8H    lacosamide  100 mg Oral Q12H     levalbuterol  1.25 mg Nebulization Q12H    levETIRAcetam  1,000 mg Oral BID    melatonin  6 mg Oral Nightly    metoprolol tartrate  75 mg Oral QID    pantoprazole  40 mg Oral Daily    piperacillin-tazobactam (ZOSYN) IVPB  4.5 g Intravenous Q8H    potassium chloride  30 mEq Oral TID WM    senna-docusate 8.6-50 mg  2 tablet Oral Daily    silodosin  8 mg Oral Daily    valproic acid  250 mg Oral TID    vancomycin (VANCOCIN) IVPB  1,250 mg Intravenous Q24H     Continuous Infusions:    As Needed:  acetaminophen, aluminum-magnesium hydroxide-simethicone, bisacodyL, hydrALAZINE, melatonin, naloxone, ondansetron, prochlorperazine, sodium chloride 0.9%, Pharmacy to dose Vancomycin consult **AND** vancomycin - pharmacy to dose    Assessment and Plan  / Problems managed today    * Acute hypoxemic respiratory failure  76 y/o female with new onset seizures and AF presents with recurrent focal right sided seizures. Likely 2/2 prior stroke, +/- provoked by recent course of ciprofloxacin for UTI, then developed volume overload, and was diuresed.     Volume overload  Acute diastolic heart failure  Flash pulmonary edema  HFpEF (diastolic dysfunction grade III)  Pulmonary hypertension (PASP 51 mmHg)  Likely due to volume overload, not on O2 at home,. Potentially due to afib w/ RVR leading to volume overload. EF showed normal systolic function on 5/5. No description of diastolic function and CVP 8. She received multiple boluses for hypotensions. However on 5/17 she became significantly more hypoxic, hypertensive, requiring up to 16L HFNC. Repeat ECHO showed diastolic dysfunction, pulmonary hypertension and CVP 8 mmHg. CXR showed likely volume overload. She was started furosemide 80 mg IV TID and broad spectrum antibiotics. BP controlled. CXR 5/20 showed improved right lung consolidation. Will continue vancomycin and zosyn for total of 7 days. Progressively decreasing oxygen requirement.    Status epilepticus  Admitted to  neuroICU. Epilepsy saw patient. Keppra 1 g BID, vimpat 100 mg BID and depakote 250 mg TID. Vimpat was decreased from 200 mg to 100 mg over concerns of oversedation.     Acute metabolic encephalopathy  Infectious work upon arrival negative. However she completed 7 day antibiotic treatment for previously diagnosed UTI. MRI head 5/7 unremarkable for acute events. CT head 5/18 also unremarkable for acute events. Neurology was consulted for concerns of AED causing oversedation. Recommended to decrease vimpat to 100 mg BID. Patient also with acute illness and hyponatremia. She has baseline low neurologic reserve and any acute physiologic event. However, low threshhold to reconsult neurology for assistance if still with mental status change with stabilization or resolution of above acute events. Delirium precautions. Minimized nighttime interruptions.    Atrial fibrillation with RVR  HR into 170s-180 prior to transfer to [facility] admission given BB and completed amiodarone loading. Continue apixaban for anticoagulation. Initially placed on diltiazem but stopped due to hypotension requiring bolus fluids. Metoprolol decreased to 50 mg BID due to bradycardia (into 40s). Can transition to XL at discharge.  -Goal Hr <60    Tremors of nervous system  Likely due to acute metabolic encephalopathy  EEG    Hypokalemia  Replete  prn    Prolonged Q-T interval on ECG  May have been secondary to amiodarone gtt -> improved  - QTc 490 on AM ECG   - Trend daily  - Aggressively replete K/Mag  - Avoid additional QTc prolonging medications    Urinary retention  Requiring frequent straight caths      - UA on admission non-infectious   - C/w silodosin 8 mg qday  - Sampson in place for accurate I and Os while critically ill    Dementia  Mild per family reports, remains independent of ADLs.      - c/w home donepezil 5 mg  - Delirium precautions  - Melatonin 6 mg qhs  - Avoid seroquel/antipsychotics given prolonged QTc     Hyponatremia  Stopped thiazide.  Improved with fluid restriction and diuresis  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  Continue fluid restriction and diuresis    Other hyperlipidemia  Continue atorvastatin    Gastroesophageal reflux disease  Continue protonix in place of home PPI    Essential hypertension  Stopped home amlodipine and irbesartan-HCTZ in favor AV kwabena blockade. Patient with multiple hypotensive episodes with metoprolol and diltiazem requiring boluses. Hypotension improved on cessation of diltiazem. Has a few isolated episodes of elevated BPs.   Goal SBP <160 due to age and comorbidites  - Hydralazine PRN while BP is labile    Discharge Planning   MONA: 5/26/2022     Code Status: Full Code   Is the patient medically ready for discharge?: No    Reason for patient still in hospital (select all that apply): Patient unstable and Patient trending condition  Discharge Plan A: Skilled Nursing Facility   Discharge Delays: None known at this time    Diet:  regular diet  GI PPx: protonix  DVT PPx:  apixaban  Airways: room air  Wounds: none    Goals of Care:  Return to prior functional status       Time (minutes) spent in care of the patient (Greater than 1/2 spent in direct face-to-face contact and care coordination on unit)  35 min     Keiko Marlow MD

## 2022-05-21 NOTE — PLAN OF CARE
"  Problem: Infection  Goal: Absence of Infection Signs and Symptoms  Outcome: Ongoing, Progressing     Problem: Adult Inpatient Plan of Care  Goal: Plan of Care Review  Outcome: Ongoing, Progressing  Goal: Patient-Specific Goal (Individualized)  Description: Admit Date: 5/4/2022    Focal seizures    Past Medical History:  No date: Hyperlipidemia  No date: Hypertension  No date: Osteoporosis    Past Surgical History:  No date: HIP REPLACEMENT ARTHROPLASTY; Right  No date: HYSTERECTOMY  No date: LEG SURGERY  No date: SKIN CANCER EXCISION  No date: TONSILLECTOMY, ADENOIDECTOMY    Individualization:   1. Pt likes to be called "Tuyet"     Restraints: none        Outcome: Ongoing, Progressing  Goal: Absence of Hospital-Acquired Illness or Injury  Outcome: Ongoing, Progressing     Problem: Skin Injury Risk Increased  Goal: Skin Health and Integrity  Outcome: Ongoing, Progressing     Problem: Altered Behavior (Delirium)  Goal: Improved Behavioral Control  Outcome: Ongoing, Progressing     Problem: Fall Injury Risk  Goal: Absence of Fall and Fall-Related Injury  Outcome: Ongoing, Progressing     "

## 2022-05-21 NOTE — PROGRESS NOTES
Hospital Medicine  Progress note    Team: [facility] [place] [facility] MED Z Keiko Marlow MD  Admit Date: 5/4/2022    Principal Problem:  Acute hypoxemic respiratory failure    Interval hx:  Patient alert.    ROS   Respiratory: neg for cough neg for shortness of breath  Cardiovascular: neg for chest pain neg for palpitations  Gastrointestinal: neg for nausea neg for vomiting, neg for abdominal pain neg for diarrhea neg for constipation   Behavioral/Psych: neg for depression neg for anxiety    PEx  Temp:  [96 °F (35.6 °C)-98.6 °F (37 °C)]   Pulse:  []   Resp:  [18-21]   BP: (103-145)/(52-66)   SpO2:  [92 %-99 %]     Intake/Output Summary (Last 24 hours) at 5/20/2022 2020  Last data filed at 5/20/2022 1554  Gross per 24 hour   Intake 200 ml   Output 1650 ml   Net -1450 ml       General Appearance: no acute distress, WD, elderly, ill-appearing  Heart: regular rate and rhythm, no heave  Respiratory: Normal respiratory effort, symmetric excursion, bilateral vesicular breath sounds   Abdomen: Soft, non-tender; bowel sounds active  Skin: intact, no rash, no ulcers  Neurologic:  No focal numbness or weakness  Mental status: Alert, oriented x 4, affect appropriate     Recent Labs   Lab 05/18/22 0526 05/19/22 0403 05/20/22  0405   WBC 12.99* 15.47* 13.89*   HGB 11.0* 10.4* 10.6*   HCT 31.9* 29.6* 30.5*    411 422     Recent Labs   Lab 05/18/22 0526 05/19/22  0403 05/20/22  0405   * 138 132*   K 4.3 3.1* 2.9*   CL 95 91* 88*   CO2 24 31* 30*   BUN 13 13 16   CREATININE 0.8 0.9 0.9   GLU 77 83 74   CALCIUM 8.5* 8.6* 8.4*   MG 1.7 1.7 1.7     Recent Labs   Lab 05/17/22  1910   ALKPHOS 50*   ALT 13   AST 26   ALBUMIN 2.1*   PROT 5.6*   BILITOT 0.4        Scheduled Meds:   amiodarone  200 mg Oral Daily    apixaban  5 mg Oral BID    atorvastatin  40 mg Oral QHS    donepeziL  5 mg Oral Nightly    furosemide (LASIX) injection  80 mg Intravenous Q8H    lacosamide  100 mg Oral Q12H    levalbuterol  1.25 mg Nebulization Q12H     levETIRAcetam  1,000 mg Oral BID    melatonin  6 mg Oral Nightly    metoprolol tartrate  50 mg Oral QID    pantoprazole  40 mg Oral Daily    piperacillin-tazobactam (ZOSYN) IVPB  4.5 g Intravenous Q8H    potassium chloride  30 mEq Oral with breakfast    senna-docusate 8.6-50 mg  2 tablet Oral Daily    silodosin  8 mg Oral Daily    valproic acid  250 mg Oral TID    vancomycin (VANCOCIN) IVPB  1,250 mg Intravenous Q24H     Continuous Infusions:    As Needed:  acetaminophen, aluminum-magnesium hydroxide-simethicone, bisacodyL, hydrALAZINE, melatonin, naloxone, ondansetron, prochlorperazine, sodium chloride 0.9%, Pharmacy to dose Vancomycin consult **AND** vancomycin - pharmacy to dose    Assessment and Plan  / Problems managed today    * Acute hypoxemic respiratory failure  78 y/o female with new onset seizures and AF presents with recurrent focal right sided seizures. Likely 2/2 prior stroke, +/- provoked by recent course of ciprofloxacin for UTI, then developed volume overload, and was diuresed.     Volume overload  Acute diastolic heart failure  Flash pulmonary edema  HFpEF (diastolic dysfunction grade III)  Pulmonary hypertension (PASP 51 mmHg)  Likely due to volume overload, not on O2 at home,. Potentially due to afib w/ RVR leading to volume overload. EF showed normal systolic function on 5/5. No description of diastolic function and CVP 8. She received multiple boluses for hypotensions. However on 5/17 she became significantly more hypoxic, hypertensive, requiring up to 16L HFNC. Repeat ECHO showed diastolic dysfunction, pulmonary hypertension and CVP 8 mmHg. CXR showed likely volume overload. She was started furosemide 80 mg IV TID and broad spectrum antibiotics. BP controlled. She has vicki weaned to 7L NC. Progress has plateaued. CXR 5/20 showed improved right lung consolidation. Will continue vancomycin and zosyn.     Status epilepticus  Admitted to neuroICU. Epilepsy saw patient. Keppra 1 g BID,  vimpat 100 mg BID and depakote 250 mg TID. Vimpat was decreased from 200 mg to 100 mg over concerns of oversedation.     Acute metabolic encephalopathy  Infectious work upon arrival negative. However she completed 7 day antibiotic treatment for previously diagnosed UTI. MRI head 5/7 unremarkable for acute events. CT head 5/18 also unremarkable for acute events. Neurology was consulted for concerns of AED causing oversedation. Recommended to decrease vimpat to 100 mg BID. Patient also with acute illness and hyponatremia. She has baseline low neurologic reserve and any acute physiologic event. However, low threshhold to reconsult neurology for assistance if still with mental status change with stabilization or resolution of above acute events. Delirium precautions. Minimized nighttime interruptions.    Atrial fibrillation with RVR  HR into 170s-180 prior to transfer to [facility] admission given BB and completed amiodarone loading. Continue apixaban for anticoagulation. Initially placed on diltiazem but stopped due to hypotension requiring bolus fluids. Metoprolol decreased to 50 mg BID due to bradycardia (into 40s). Can transition to XL at discharge.  -Goal Hr <60    Hypokalemia  replet prn    Prolonged Q-T interval on ECG  May have been secondary to amiodarone gtt -> improved  - QTc 490 on AM ECG   - Trend daily  - Aggressively replete K/Mag  - Avoid additional QTc prolonging medications    Urinary retention  Requiring frequent straight caths      - UA on admission non-infectious   - C/w silodosin 8 mg qday  - Sampson in place for accurate I and Os while critically ill    Dementia  Mild per family reports, remains independent of ADLs.      - c/w home donepezil 5 mg  - Delirium precautions  - Melatonin 6 mg qhs  - Avoid seroquel/antipsychotics given prolonged QTc     Hyponatremia  Stopped thiazide. Improved with fluid restriction and diuresis  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  Continue fluid restriction and diuresis    Other  hyperlipidemia  Continue atorvastatin    Gastroesophageal reflux disease  Continue protonix in place of home PPI    Essential hypertension  Stopped home amlodipine and irbesartan-HCTZ in favor AV kwabena blockade. Patient with multiple hypotensive episodes with metoprolol and diltiazem requiring boluses. Hypotension improved on cessation of diltiazem. Has a few isolated episodes of elevated BPs.   Goal SBP <160 due to age and comorbidites  - Hydralazine PRN while BP is labile    Discharge Planning   MONA: 5/26/2022     Code Status: Full Code   Is the patient medically ready for discharge?: No    Reason for patient still in hospital (select all that apply): Patient unstable and Patient trending condition  Discharge Plan A: Skilled Nursing Facility   Discharge Delays: None known at this time    Diet:  regular diet  GI PPx: protonix  DVT PPx:  apixaban  Airways: room air  Wounds: none    Goals of Care:  Return to prior functional status       Time (minutes) spent in care of the patient (Greater than 1/2 spent in direct face-to-face contact and care coordination on unit)  35 min     Keiko Marlow MD

## 2022-05-22 LAB
ANION GAP SERPL CALC-SCNC: 14 MMOL/L (ref 8–16)
BASOPHILS # BLD AUTO: 0.08 K/UL (ref 0–0.2)
BASOPHILS NFR BLD: 0.9 % (ref 0–1.9)
BUN SERPL-MCNC: 27 MG/DL (ref 8–23)
CALCIUM SERPL-MCNC: 9.3 MG/DL (ref 8.7–10.5)
CHLORIDE SERPL-SCNC: 92 MMOL/L (ref 95–110)
CO2 SERPL-SCNC: 33 MMOL/L (ref 23–29)
CREAT SERPL-MCNC: 1.2 MG/DL (ref 0.5–1.4)
DIFFERENTIAL METHOD: ABNORMAL
EOSINOPHIL # BLD AUTO: 0.1 K/UL (ref 0–0.5)
EOSINOPHIL NFR BLD: 1.1 % (ref 0–8)
ERYTHROCYTE [DISTWIDTH] IN BLOOD BY AUTOMATED COUNT: 13.2 % (ref 11.5–14.5)
EST. GFR  (AFRICAN AMERICAN): 50 ML/MIN/1.73 M^2
EST. GFR  (NON AFRICAN AMERICAN): 43.4 ML/MIN/1.73 M^2
GLUCOSE SERPL-MCNC: 89 MG/DL (ref 70–110)
HCT VFR BLD AUTO: 37.4 % (ref 37–48.5)
HGB BLD-MCNC: 12.1 G/DL (ref 12–16)
IMM GRANULOCYTES # BLD AUTO: 0.07 K/UL (ref 0–0.04)
IMM GRANULOCYTES NFR BLD AUTO: 0.8 % (ref 0–0.5)
LYMPHOCYTES # BLD AUTO: 1.5 K/UL (ref 1–4.8)
LYMPHOCYTES NFR BLD: 16.9 % (ref 18–48)
MAGNESIUM SERPL-MCNC: 2.2 MG/DL (ref 1.6–2.6)
MCH RBC QN AUTO: 31.3 PG (ref 27–31)
MCHC RBC AUTO-ENTMCNC: 32.4 G/DL (ref 32–36)
MCV RBC AUTO: 97 FL (ref 82–98)
MONOCYTES # BLD AUTO: 1.5 K/UL (ref 0.3–1)
MONOCYTES NFR BLD: 16.7 % (ref 4–15)
NEUTROPHILS # BLD AUTO: 5.7 K/UL (ref 1.8–7.7)
NEUTROPHILS NFR BLD: 63.6 % (ref 38–73)
NRBC BLD-RTO: 0 /100 WBC
PLATELET # BLD AUTO: 443 K/UL (ref 150–450)
PMV BLD AUTO: 10.5 FL (ref 9.2–12.9)
POTASSIUM SERPL-SCNC: 4.2 MMOL/L (ref 3.5–5.1)
RBC # BLD AUTO: 3.86 M/UL (ref 4–5.4)
SODIUM SERPL-SCNC: 139 MMOL/L (ref 136–145)
WBC # BLD AUTO: 8.88 K/UL (ref 3.9–12.7)

## 2022-05-22 PROCEDURE — 25000003 PHARM REV CODE 250

## 2022-05-22 PROCEDURE — 99233 PR SUBSEQUENT HOSPITAL CARE,LEVL III: ICD-10-PCS | Mod: ,,, | Performed by: INTERNAL MEDICINE

## 2022-05-22 PROCEDURE — 36415 COLL VENOUS BLD VENIPUNCTURE: CPT | Performed by: STUDENT IN AN ORGANIZED HEALTH CARE EDUCATION/TRAINING PROGRAM

## 2022-05-22 PROCEDURE — 25000003 PHARM REV CODE 250: Performed by: STUDENT IN AN ORGANIZED HEALTH CARE EDUCATION/TRAINING PROGRAM

## 2022-05-22 PROCEDURE — 80048 BASIC METABOLIC PNL TOTAL CA: CPT | Performed by: STUDENT IN AN ORGANIZED HEALTH CARE EDUCATION/TRAINING PROGRAM

## 2022-05-22 PROCEDURE — 27000221 HC OXYGEN, UP TO 24 HOURS

## 2022-05-22 PROCEDURE — 25000003 PHARM REV CODE 250: Performed by: INTERNAL MEDICINE

## 2022-05-22 PROCEDURE — 36406 VNPNXR<3YRS PHY/QHP OTHER VN: CPT

## 2022-05-22 PROCEDURE — 63600175 PHARM REV CODE 636 W HCPCS: Performed by: INTERNAL MEDICINE

## 2022-05-22 PROCEDURE — 63600175 PHARM REV CODE 636 W HCPCS: Performed by: STUDENT IN AN ORGANIZED HEALTH CARE EDUCATION/TRAINING PROGRAM

## 2022-05-22 PROCEDURE — 25000242 PHARM REV CODE 250 ALT 637 W/ HCPCS: Performed by: STUDENT IN AN ORGANIZED HEALTH CARE EDUCATION/TRAINING PROGRAM

## 2022-05-22 PROCEDURE — 83735 ASSAY OF MAGNESIUM: CPT | Performed by: STUDENT IN AN ORGANIZED HEALTH CARE EDUCATION/TRAINING PROGRAM

## 2022-05-22 PROCEDURE — 99233 SBSQ HOSP IP/OBS HIGH 50: CPT | Mod: ,,, | Performed by: INTERNAL MEDICINE

## 2022-05-22 PROCEDURE — 20600001 HC STEP DOWN PRIVATE ROOM

## 2022-05-22 PROCEDURE — 94761 N-INVAS EAR/PLS OXIMETRY MLT: CPT

## 2022-05-22 PROCEDURE — 85025 COMPLETE CBC W/AUTO DIFF WBC: CPT | Performed by: STUDENT IN AN ORGANIZED HEALTH CARE EDUCATION/TRAINING PROGRAM

## 2022-05-22 PROCEDURE — 25000003 PHARM REV CODE 250: Performed by: PHYSICIAN ASSISTANT

## 2022-05-22 PROCEDURE — 99900035 HC TECH TIME PER 15 MIN (STAT)

## 2022-05-22 PROCEDURE — 94660 CPAP INITIATION&MGMT: CPT

## 2022-05-22 PROCEDURE — 94640 AIRWAY INHALATION TREATMENT: CPT

## 2022-05-22 PROCEDURE — 25000003 PHARM REV CODE 250: Performed by: HOSPITALIST

## 2022-05-22 RX ADMIN — LACOSAMIDE 100 MG: 50 TABLET, FILM COATED ORAL at 08:05

## 2022-05-22 RX ADMIN — PANTOPRAZOLE SODIUM 40 MG: 40 TABLET, DELAYED RELEASE ORAL at 08:05

## 2022-05-22 RX ADMIN — METOPROLOL TARTRATE 75 MG: 50 TABLET, FILM COATED ORAL at 08:05

## 2022-05-22 RX ADMIN — DONEPEZIL HYDROCHLORIDE 5 MG: 5 TABLET ORAL at 08:05

## 2022-05-22 RX ADMIN — LEVALBUTEROL HYDROCHLORIDE 1.25 MG: 1.25 SOLUTION, CONCENTRATE RESPIRATORY (INHALATION) at 09:05

## 2022-05-22 RX ADMIN — ATORVASTATIN CALCIUM 40 MG: 20 TABLET, FILM COATED ORAL at 09:05

## 2022-05-22 RX ADMIN — SENNOSIDES AND DOCUSATE SODIUM 2 TABLET: 50; 8.6 TABLET ORAL at 08:05

## 2022-05-22 RX ADMIN — VANCOMYCIN HYDROCHLORIDE 1250 MG: 1.25 INJECTION, POWDER, LYOPHILIZED, FOR SOLUTION INTRAVENOUS at 05:05

## 2022-05-22 RX ADMIN — LEVETIRACETAM 1000 MG: 500 TABLET, FILM COATED ORAL at 08:05

## 2022-05-22 RX ADMIN — METOPROLOL TARTRATE 75 MG: 50 TABLET, FILM COATED ORAL at 04:05

## 2022-05-22 RX ADMIN — FUROSEMIDE 80 MG: 10 INJECTION, SOLUTION INTRAMUSCULAR; INTRAVENOUS at 02:05

## 2022-05-22 RX ADMIN — FUROSEMIDE 80 MG: 10 INJECTION, SOLUTION INTRAMUSCULAR; INTRAVENOUS at 05:05

## 2022-05-22 RX ADMIN — SILODOSIN 8 MG: 8 CAPSULE ORAL at 08:05

## 2022-05-22 RX ADMIN — PIPERACILLIN SODIUM AND TAZOBACTAM SODIUM 4.5 G: 4; .5 INJECTION, POWDER, LYOPHILIZED, FOR SOLUTION INTRAVENOUS at 08:05

## 2022-05-22 RX ADMIN — PIPERACILLIN SODIUM AND TAZOBACTAM SODIUM 4.5 G: 4; .5 INJECTION, POWDER, LYOPHILIZED, FOR SOLUTION INTRAVENOUS at 12:05

## 2022-05-22 RX ADMIN — POTASSIUM CHLORIDE 30 MEQ: 10 CAPSULE, COATED, EXTENDED RELEASE ORAL at 08:05

## 2022-05-22 RX ADMIN — PIPERACILLIN SODIUM AND TAZOBACTAM SODIUM 4.5 G: 4; .5 INJECTION, POWDER, LYOPHILIZED, FOR SOLUTION INTRAVENOUS at 04:05

## 2022-05-22 RX ADMIN — POTASSIUM CHLORIDE 30 MEQ: 10 CAPSULE, COATED, EXTENDED RELEASE ORAL at 01:05

## 2022-05-22 RX ADMIN — VALPROIC ACID 250 MG: 250 CAPSULE, LIQUID FILLED ORAL at 08:05

## 2022-05-22 RX ADMIN — POTASSIUM CHLORIDE 30 MEQ: 10 CAPSULE, COATED, EXTENDED RELEASE ORAL at 04:05

## 2022-05-22 RX ADMIN — PIPERACILLIN SODIUM AND TAZOBACTAM SODIUM 4.5 G: 4; .5 INJECTION, POWDER, LYOPHILIZED, FOR SOLUTION INTRAVENOUS at 11:05

## 2022-05-22 RX ADMIN — AMIODARONE HYDROCHLORIDE 200 MG: 200 TABLET ORAL at 09:05

## 2022-05-22 RX ADMIN — APIXABAN 5 MG: 5 TABLET, FILM COATED ORAL at 08:05

## 2022-05-22 RX ADMIN — MELATONIN TAB 3 MG 6 MG: 3 TAB at 09:05

## 2022-05-22 RX ADMIN — METOPROLOL TARTRATE 75 MG: 50 TABLET, FILM COATED ORAL at 01:05

## 2022-05-22 RX ADMIN — VALPROIC ACID 250 MG: 250 CAPSULE, LIQUID FILLED ORAL at 02:05

## 2022-05-22 NOTE — NURSING
Patient noted in bed in supine position, hob in 30 degree angle. Mood pleasant. Patient noted with decreased appetitte , noted to eat 10 %. Patient noted to drink well. Remains drowsy, remains on eeg monitoring.Sampson remain in tact and flowing to gravity. Patient shows no s/s of pain or discomfort. No distress noted. Siderails up x 2, call light in reach, will continue to monitor.

## 2022-05-22 NOTE — PLAN OF CARE
"  Problem: Infection  Goal: Absence of Infection Signs and Symptoms  5/22/2022 1031 by Stephanie Schilling RN  Outcome: Ongoing, Progressing  5/22/2022 1029 by Stephanie Schilling RN  Outcome: Ongoing, Progressing     Problem: Adult Inpatient Plan of Care  Goal: Plan of Care Review  5/22/2022 1031 by Stephanie Schilling RN  Outcome: Ongoing, Progressing  5/22/2022 1029 by Stephanie Schilling RN  Outcome: Ongoing, Progressing  Goal: Patient-Specific Goal (Individualized)  Description: Admit Date: 5/4/2022    Focal seizures    Past Medical History:  No date: Hyperlipidemia  No date: Hypertension  No date: Osteoporosis    Past Surgical History:  No date: HIP REPLACEMENT ARTHROPLASTY; Right  No date: HYSTERECTOMY  No date: LEG SURGERY  No date: SKIN CANCER EXCISION  No date: TONSILLECTOMY, ADENOIDECTOMY    Individualization:   1. Pt likes to be called "Tuyet"     Restraints: none        5/22/2022 1031 by Stephanie Schilling RN  Outcome: Ongoing, Progressing  5/22/2022 1029 by Stephanie Schilling RN  Outcome: Ongoing, Progressing  Goal: Absence of Hospital-Acquired Illness or Injury  5/22/2022 1031 by Stephanie Schilling RN  Outcome: Ongoing, Progressing  5/22/2022 1029 by Stephanie Schilling RN  Outcome: Ongoing, Progressing  Goal: Optimal Comfort and Wellbeing  5/22/2022 1031 by Stephanie Schilling RN  Outcome: Ongoing, Progressing  5/22/2022 1029 by Stephanie Schilling RN  Outcome: Ongoing, Progressing  Goal: Readiness for Transition of Care  5/22/2022 1031 by Stephanie Schilling RN  Outcome: Ongoing, Progressing  5/22/2022 1029 by Stephanie Schilling RN  Outcome: Ongoing, Progressing     Problem: Skin Injury Risk Increased  Goal: Skin Health and Integrity  5/22/2022 1031 by Stephanie Schilling RN  Outcome: Ongoing, Progressing  5/22/2022 1029 by Stephanie Schilling RN  Outcome: Ongoing, Progressing     Problem: Impaired Wound Healing  Goal: Optimal Wound Healing  5/22/2022 1031 by Stephanie Schilling RN  Outcome: Ongoing, Progressing  5/22/2022 " 1029 by Stephanie Schilling RN  Outcome: Ongoing, Progressing     Problem: Adjustment to Illness (Delirium)  Goal: Optimal Coping  5/22/2022 1031 by Stephanie Schilling RN  Outcome: Ongoing, Progressing  5/22/2022 1029 by Stephanie Schilling RN  Outcome: Ongoing, Progressing     Problem: Altered Behavior (Delirium)  Goal: Improved Behavioral Control  5/22/2022 1031 by Stephanie Schilling RN  Outcome: Ongoing, Progressing  5/22/2022 1029 by Stephanie Schilling RN  Outcome: Ongoing, Progressing     Problem: Attention and Thought Clarity Impairment (Delirium)  Goal: Improved Attention and Thought Clarity  5/22/2022 1031 by Stephanie Schilling RN  Outcome: Ongoing, Progressing  5/22/2022 1029 by Stephanie Schilling RN  Outcome: Ongoing, Progressing     Problem: Sleep Disturbance (Delirium)  Goal: Improved Sleep  5/22/2022 1031 by Stephanie Schilling RN  Outcome: Ongoing, Progressing  5/22/2022 1029 by Stephanie Schilling RN  Outcome: Ongoing, Progressing     Problem: Fall Injury Risk  Goal: Absence of Fall and Fall-Related Injury  5/22/2022 1031 by Stephanie Schilling RN  Outcome: Ongoing, Progressing  5/22/2022 1029 by Stephanie Schilling RN  Outcome: Ongoing, Progressing     Problem: Restraint, Nonbehavioral (Nonviolent)  Goal: Absence of Harm or Injury  5/22/2022 1031 by Stephanie Schilling RN  Outcome: Ongoing, Progressing  5/22/2022 1029 by Stephanie Schilling RN  Outcome: Ongoing, Progressing     Problem: Seizure, Active Management  Goal: Absence of Seizure/Seizure-Related Injury  5/22/2022 1031 by Stephanie Schilling RN  Outcome: Ongoing, Progressing  5/22/2022 1029 by Stephanie Schilling RN  Outcome: Ongoing, Progressing

## 2022-05-22 NOTE — PLAN OF CARE
"  Problem: Infection  Goal: Absence of Infection Signs and Symptoms  Outcome: Ongoing, Progressing     Problem: Adult Inpatient Plan of Care  Goal: Plan of Care Review  Outcome: Ongoing, Progressing  Goal: Patient-Specific Goal (Individualized)  Description: Admit Date: 5/4/2022    Focal seizures    Past Medical History:  No date: Hyperlipidemia  No date: Hypertension  No date: Osteoporosis    Past Surgical History:  No date: HIP REPLACEMENT ARTHROPLASTY; Right  No date: HYSTERECTOMY  No date: LEG SURGERY  No date: SKIN CANCER EXCISION  No date: TONSILLECTOMY, ADENOIDECTOMY    Individualization:   1. Pt likes to be called "Tuyet"     Restraints: none        Outcome: Ongoing, Progressing  Goal: Absence of Hospital-Acquired Illness or Injury  Outcome: Ongoing, Progressing  Goal: Readiness for Transition of Care  Outcome: Ongoing, Progressing     Problem: Skin Injury Risk Increased  Goal: Skin Health and Integrity  Outcome: Ongoing, Progressing     Problem: Adjustment to Illness (Delirium)  Goal: Optimal Coping  Outcome: Ongoing, Progressing     "

## 2022-05-22 NOTE — PROGRESS NOTES
Hospital Medicine  Progress note    [place] [facility] Keiko Marlow MD  Admit Date: 5/4/2022    Principal Problem:  Acute hypoxemic respiratory failure    Interval hx:  Patient alert. Per nursing, no episodes of tremors afterwards. Oxygen at 4L when I saw her.     ROS   Respiratory: neg for cough neg for shortness of breath  Cardiovascular: neg for chest pain neg for palpitations  Gastrointestinal: neg for nausea neg for vomiting, neg for abdominal pain neg for diarrhea neg for constipation   Behavioral/Psych: neg for depression neg for anxiety    PEx  Temp:  [96.6 °F (35.9 °C)-98 °F (36.7 °C)]   Pulse:  []   Resp:  [16-21]   BP: ()/(51-77)   SpO2:  [97 %-100 %]     Intake/Output Summary (Last 24 hours) at 5/22/2022 1606  Last data filed at 5/22/2022 0452  Gross per 24 hour   Intake 220 ml   Output 1800 ml   Net -1580 ml       General Appearance: no acute distress, WD, elderly, ill-appearing  Heart: regular rate and rhythm, no heave  Respiratory: Normal respiratory effort, symmetric excursion, bilateral vesicular breath sounds   Abdomen: Soft, non-tender; bowel sounds active  Skin: intact, no rash, no ulcers  Neurologic:  No focal numbness or weakness  Mental status: Alert, oriented x 4, affect appropriate     Recent Labs   Lab 05/20/22  0405 05/21/22  0402 05/22/22  1215   WBC 13.89* 12.10 8.88   HGB 10.6* 10.7* 12.1   HCT 30.5* 31.7* 37.4    455* 443     Recent Labs   Lab 05/20/22  0405 05/21/22  0402 05/22/22  1215   * 138 139   K 2.9* 3.0* 4.2   CL 88* 89* 92*   CO2 30* 35* 33*   BUN 16 23 27*   CREATININE 0.9 1.0 1.2   GLU 74 76 89   CALCIUM 8.4* 9.0 9.3   MG 1.7 1.9 2.2     Recent Labs   Lab 05/17/22  1910   ALKPHOS 50*   ALT 13   AST 26   ALBUMIN 2.1*   PROT 5.6*   BILITOT 0.4        Scheduled Meds:   amiodarone  200 mg Oral Daily    apixaban  5 mg Oral BID    atorvastatin  40 mg Oral QHS    donepeziL  5 mg Oral Nightly    furosemide (LASIX) injection  80 mg Intravenous Q8H     lacosamide  100 mg Oral Q12H    levalbuterol  1.25 mg Nebulization Q12H    levETIRAcetam  1,000 mg Oral BID    melatonin  6 mg Oral Nightly    metoprolol tartrate  75 mg Oral QID    pantoprazole  40 mg Oral Daily    piperacillin-tazobactam (ZOSYN) IVPB  4.5 g Intravenous Q8H    potassium chloride  30 mEq Oral TID WM    senna-docusate 8.6-50 mg  2 tablet Oral Daily    silodosin  8 mg Oral Daily    valproic acid  250 mg Oral TID    vancomycin (VANCOCIN) IVPB  1,250 mg Intravenous Q24H     Continuous Infusions:    As Needed:  acetaminophen, aluminum-magnesium hydroxide-simethicone, bisacodyL, hydrALAZINE, melatonin, naloxone, ondansetron, prochlorperazine, sodium chloride 0.9%    Assessment and Plan  / Problems managed today    * Acute hypoxemic respiratory failure  78 y/o female with new onset seizures and AF presents with recurrent focal right sided seizures. Likely 2/2 prior stroke, +/- provoked by recent course of ciprofloxacin for UTI, then developed volume overload, and was diuresed.     Volume overload  Acute diastolic heart failure  Flash pulmonary edema  HFpEF (diastolic dysfunction grade III)  Pulmonary hypertension (PASP 51 mmHg)  Likely due to volume overload, not on O2 at home,. Potentially due to afib w/ RVR leading to volume overload. EF showed normal systolic function on 5/5. No description of diastolic function and CVP 8. She received multiple boluses for hypotensions. However on 5/17 she became significantly more hypoxic, hypertensive, requiring up to 16L HFNC. Repeat ECHO showed diastolic dysfunction, pulmonary hypertension and CVP 8 mmHg. CXR showed likely volume overload. She was started furosemide 80 mg IV TID and broad spectrum antibiotics. BP controlled. CXR 5/20 showed improved right lung consolidation. Will continue vancomycin and zosyn for total of 7 days. Progressively decreasing oxygen requirement. Difficult to determine volume status on exam. Labs showed some volume  contraction. Hold IV diuresis.     Status epilepticus  Admitted to neuroICU. Epilepsy saw patient. Keppra 1 g BID, vimpat 100 mg BID and depakote 250 mg TID. Vimpat was decreased from 200 mg to 100 mg over concerns of oversedation.     Acute metabolic encephalopathy  Infectious work upon arrival negative. However she completed 7 day antibiotic treatment for previously diagnosed UTI. MRI head 5/7 unremarkable for acute events. CT head 5/18 also unremarkable for acute events. Neurology was consulted for concerns of AED causing oversedation. Recommended to decrease vimpat to 100 mg BID. Patient also with acute illness and hyponatremia. She has baseline low neurologic reserve and any acute physiologic event. However, low threshhold to reconsult neurology for assistance if still with mental status change with stabilization or resolution of above acute events. Delirium precautions. Minimized nighttime interruptions.    Atrial fibrillation with RVR  HR into 170s-180 prior to transfer to [facility] admission given BB and completed amiodarone loading. Continue apixaban for anticoagulation. Initially placed on diltiazem but stopped due to hypotension requiring bolus fluids. Metoprolol decreased to 50 mg BID due to bradycardia (into 40s). Can transition to XL at discharge.  -Goal Hr <60    Tremors of nervous system  Likely due to acute metabolic encephalopathy  EEG    Hypokalemia  Replete  prn    Prolonged Q-T interval on ECG  May have been secondary to amiodarone gtt -> improved  - QTc 490 on AM ECG   - Trend daily  - Aggressively replete K/Mag  - Avoid additional QTc prolonging medications    Urinary retention  Requiring frequent straight caths      - UA on admission non-infectious   - C/w silodosin 8 mg qday  - Sampson in place for accurate I and Os while critically ill    Dementia  Mild per family reports, remains independent of ADLs.      - c/w home donepezil 5 mg  - Delirium precautions  - Melatonin 6 mg qhs  - Avoid  seroquel/antipsychotics given prolonged QTc     Hyponatremia  Stopped thiazide. Improved with fluid restriction and diuresis  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  Continue fluid restriction and diuresis    Other hyperlipidemia  Continue atorvastatin    Gastroesophageal reflux disease  Continue protonix in place of home PPI    Essential hypertension  Stopped home amlodipine and irbesartan-HCTZ in favor AV kwabena blockade. Patient with multiple hypotensive episodes with metoprolol and diltiazem requiring boluses. Hypotension improved on cessation of diltiazem. Has a few isolated episodes of elevated BPs.   Goal SBP <160 due to age and comorbidites  - Hydralazine PRN while BP is labile    Discharge Planning   MONA: 5/26/2022     Code Status: Full Code   Is the patient medically ready for discharge?: No    Reason for patient still in hospital (select all that apply): Patient unstable and Patient trending condition  Discharge Plan A: Skilled Nursing Facility   Discharge Delays: None known at this time    Diet:  regular diet  GI PPx: protonix  DVT PPx:  apixaban  Airways: room air  Wounds: none    Goals of Care:  Return to prior functional status       Time (minutes) spent in care of the patient (Greater than 1/2 spent in direct face-to-face contact and care coordination on unit)  35 min     Keiko Marlow MD

## 2022-05-22 NOTE — CARE UPDATE
"RAPID RESPONSE NURSE CHART REVIEW        Chart Reviewed: 05/21/2022, 9:52 PM    MRN: [id number]  Bed: 8085/8085 A    Dx: Acute hypoxemic respiratory failure    Teresa Leos has a past medical history of Hyperlipidemia, Hypertension, and Osteoporosis.    Last VS: /76   Pulse (!) 111   Temp 97.8 °F (36.6 °C) (Oral)   Resp 16   Ht 5' 1" (1.549 m)   Wt 51.3 kg (113 lb)   SpO2 99%   BMI 21.35 kg/m²     24H Vital Sign Range:  Temp:  [97.3 °F (36.3 °C)-98.3 °F (36.8 °C)]   Pulse:  [105-122]   Resp:  [14-26]   BP: ()/(51-90)   SpO2:  [98 %-100 %]     Level of Consciousness (AVPU): alert    Recent Labs     05/19/22  0403 05/20/22  0405 05/21/22  0402   WBC 15.47* 13.89* 12.10   HGB 10.4* 10.6* 10.7*   HCT 29.6* 30.5* 31.7*    422 455*       Recent Labs     05/19/22  0403 05/20/22  0405 05/21/22  0402    132* 138   K 3.1* 2.9* 3.0*   CL 91* 88* 89*   CO2 31* 30* 35*   CREATININE 0.9 0.9 1.0   GLU 83 74 76   MG 1.7 1.7 1.9        No results for input(s): PH, PCO2, PO2, HCO3, POCSATURATED, BE in the last 72 hours.     OXYGEN:  Flow (L/min): 6  Oxygen Concentration (%): 60  O2 Device (Oxygen Therapy): High Flow nasal Cannula    MEWS score: 4    bedside RNJaclyn contacted. No concerns verbalized at this time. Instructed to call [phone] for further concerns or assistance.    Simone Gallo RN      "

## 2022-05-23 LAB
ANION GAP SERPL CALC-SCNC: 10 MMOL/L (ref 8–16)
BASOPHILS # BLD AUTO: 0.11 K/UL (ref 0–0.2)
BASOPHILS NFR BLD: 1.1 % (ref 0–1.9)
BUN SERPL-MCNC: 32 MG/DL (ref 8–23)
CALCIUM SERPL-MCNC: 9.8 MG/DL (ref 8.7–10.5)
CHLORIDE SERPL-SCNC: 95 MMOL/L (ref 95–110)
CO2 SERPL-SCNC: 35 MMOL/L (ref 23–29)
CREAT SERPL-MCNC: 1.1 MG/DL (ref 0.5–1.4)
DIFFERENTIAL METHOD: ABNORMAL
EOSINOPHIL # BLD AUTO: 0.2 K/UL (ref 0–0.5)
EOSINOPHIL NFR BLD: 1.5 % (ref 0–8)
ERYTHROCYTE [DISTWIDTH] IN BLOOD BY AUTOMATED COUNT: 13 % (ref 11.5–14.5)
EST. GFR  (AFRICAN AMERICAN): 55.6 ML/MIN/1.73 M^2
EST. GFR  (NON AFRICAN AMERICAN): 48.2 ML/MIN/1.73 M^2
GLUCOSE SERPL-MCNC: 79 MG/DL (ref 70–110)
HCT VFR BLD AUTO: 39.3 % (ref 37–48.5)
HGB BLD-MCNC: 12.8 G/DL (ref 12–16)
IMM GRANULOCYTES # BLD AUTO: 0.08 K/UL (ref 0–0.04)
IMM GRANULOCYTES NFR BLD AUTO: 0.8 % (ref 0–0.5)
LYMPHOCYTES # BLD AUTO: 2.3 K/UL (ref 1–4.8)
LYMPHOCYTES NFR BLD: 23 % (ref 18–48)
MAGNESIUM SERPL-MCNC: 2.2 MG/DL (ref 1.6–2.6)
MCH RBC QN AUTO: 31.7 PG (ref 27–31)
MCHC RBC AUTO-ENTMCNC: 32.6 G/DL (ref 32–36)
MCV RBC AUTO: 97 FL (ref 82–98)
MONOCYTES # BLD AUTO: 1.5 K/UL (ref 0.3–1)
MONOCYTES NFR BLD: 14.5 % (ref 4–15)
NEUTROPHILS # BLD AUTO: 6 K/UL (ref 1.8–7.7)
NEUTROPHILS NFR BLD: 59.1 % (ref 38–73)
NRBC BLD-RTO: 0 /100 WBC
PHOSPHATE SERPL-MCNC: 3.1 MG/DL (ref 2.7–4.5)
PLATELET # BLD AUTO: 466 K/UL (ref 150–450)
PMV BLD AUTO: 10.4 FL (ref 9.2–12.9)
POTASSIUM SERPL-SCNC: 4.4 MMOL/L (ref 3.5–5.1)
RBC # BLD AUTO: 4.04 M/UL (ref 4–5.4)
SARS-COV-2 RDRP RESP QL NAA+PROBE: NEGATIVE
SODIUM SERPL-SCNC: 140 MMOL/L (ref 136–145)
WBC # BLD AUTO: 10.19 K/UL (ref 3.9–12.7)

## 2022-05-23 PROCEDURE — 83735 ASSAY OF MAGNESIUM: CPT | Performed by: STUDENT IN AN ORGANIZED HEALTH CARE EDUCATION/TRAINING PROGRAM

## 2022-05-23 PROCEDURE — 94660 CPAP INITIATION&MGMT: CPT

## 2022-05-23 PROCEDURE — 85025 COMPLETE CBC W/AUTO DIFF WBC: CPT | Performed by: STUDENT IN AN ORGANIZED HEALTH CARE EDUCATION/TRAINING PROGRAM

## 2022-05-23 PROCEDURE — 84100 ASSAY OF PHOSPHORUS: CPT | Performed by: INTERNAL MEDICINE

## 2022-05-23 PROCEDURE — 25000003 PHARM REV CODE 250: Performed by: HOSPITALIST

## 2022-05-23 PROCEDURE — 25000003 PHARM REV CODE 250: Performed by: INTERNAL MEDICINE

## 2022-05-23 PROCEDURE — 86580 TB INTRADERMAL TEST: CPT | Performed by: INTERNAL MEDICINE

## 2022-05-23 PROCEDURE — 92526 ORAL FUNCTION THERAPY: CPT

## 2022-05-23 PROCEDURE — 30200315 PPD INTRADERMAL TEST REV CODE 302: Performed by: INTERNAL MEDICINE

## 2022-05-23 PROCEDURE — 63600175 PHARM REV CODE 636 W HCPCS: Performed by: INTERNAL MEDICINE

## 2022-05-23 PROCEDURE — 27000221 HC OXYGEN, UP TO 24 HOURS

## 2022-05-23 PROCEDURE — 36415 COLL VENOUS BLD VENIPUNCTURE: CPT | Performed by: STUDENT IN AN ORGANIZED HEALTH CARE EDUCATION/TRAINING PROGRAM

## 2022-05-23 PROCEDURE — 99233 SBSQ HOSP IP/OBS HIGH 50: CPT | Mod: ,,, | Performed by: INTERNAL MEDICINE

## 2022-05-23 PROCEDURE — 94640 AIRWAY INHALATION TREATMENT: CPT

## 2022-05-23 PROCEDURE — 99233 PR SUBSEQUENT HOSPITAL CARE,LEVL III: ICD-10-PCS | Mod: ,,, | Performed by: INTERNAL MEDICINE

## 2022-05-23 PROCEDURE — 80048 BASIC METABOLIC PNL TOTAL CA: CPT | Performed by: STUDENT IN AN ORGANIZED HEALTH CARE EDUCATION/TRAINING PROGRAM

## 2022-05-23 PROCEDURE — 94761 N-INVAS EAR/PLS OXIMETRY MLT: CPT

## 2022-05-23 PROCEDURE — 99900035 HC TECH TIME PER 15 MIN (STAT)

## 2022-05-23 PROCEDURE — 25000003 PHARM REV CODE 250

## 2022-05-23 PROCEDURE — 25000003 PHARM REV CODE 250: Performed by: PHYSICIAN ASSISTANT

## 2022-05-23 PROCEDURE — 25000003 PHARM REV CODE 250: Performed by: STUDENT IN AN ORGANIZED HEALTH CARE EDUCATION/TRAINING PROGRAM

## 2022-05-23 PROCEDURE — 20600001 HC STEP DOWN PRIVATE ROOM

## 2022-05-23 PROCEDURE — 25000242 PHARM REV CODE 250 ALT 637 W/ HCPCS: Performed by: STUDENT IN AN ORGANIZED HEALTH CARE EDUCATION/TRAINING PROGRAM

## 2022-05-23 PROCEDURE — 97530 THERAPEUTIC ACTIVITIES: CPT

## 2022-05-23 PROCEDURE — 63600175 PHARM REV CODE 636 W HCPCS: Performed by: STUDENT IN AN ORGANIZED HEALTH CARE EDUCATION/TRAINING PROGRAM

## 2022-05-23 PROCEDURE — U0002 COVID-19 LAB TEST NON-CDC: HCPCS | Performed by: INTERNAL MEDICINE

## 2022-05-23 RX ORDER — METOPROLOL SUCCINATE 100 MG/1
300 TABLET, EXTENDED RELEASE ORAL DAILY
Status: DISCONTINUED | OUTPATIENT
Start: 2022-05-24 | End: 2022-05-25 | Stop reason: HOSPADM

## 2022-05-23 RX ORDER — FUROSEMIDE 80 MG/1
80 TABLET ORAL DAILY
Status: DISCONTINUED | OUTPATIENT
Start: 2022-05-24 | End: 2022-05-25 | Stop reason: HOSPADM

## 2022-05-23 RX ADMIN — PIPERACILLIN SODIUM AND TAZOBACTAM SODIUM 4.5 G: 4; .5 INJECTION, POWDER, LYOPHILIZED, FOR SOLUTION INTRAVENOUS at 10:05

## 2022-05-23 RX ADMIN — POTASSIUM CHLORIDE 30 MEQ: 10 CAPSULE, COATED, EXTENDED RELEASE ORAL at 09:05

## 2022-05-23 RX ADMIN — PIPERACILLIN SODIUM AND TAZOBACTAM SODIUM 4.5 G: 4; .5 INJECTION, POWDER, LYOPHILIZED, FOR SOLUTION INTRAVENOUS at 04:05

## 2022-05-23 RX ADMIN — PIPERACILLIN SODIUM AND TAZOBACTAM SODIUM 4.5 G: 4; .5 INJECTION, POWDER, LYOPHILIZED, FOR SOLUTION INTRAVENOUS at 09:05

## 2022-05-23 RX ADMIN — METOPROLOL TARTRATE 75 MG: 50 TABLET, FILM COATED ORAL at 01:05

## 2022-05-23 RX ADMIN — LEVALBUTEROL HYDROCHLORIDE 1.25 MG: 1.25 SOLUTION, CONCENTRATE RESPIRATORY (INHALATION) at 09:05

## 2022-05-23 RX ADMIN — METOPROLOL TARTRATE 75 MG: 50 TABLET, FILM COATED ORAL at 09:05

## 2022-05-23 RX ADMIN — TUBERCULIN PURIFIED PROTEIN DERIVATIVE 5 UNITS: 5 INJECTION, SOLUTION INTRADERMAL at 01:05

## 2022-05-23 RX ADMIN — DONEPEZIL HYDROCHLORIDE 5 MG: 5 TABLET ORAL at 10:05

## 2022-05-23 RX ADMIN — APIXABAN 5 MG: 5 TABLET, FILM COATED ORAL at 10:05

## 2022-05-23 RX ADMIN — AMIODARONE HYDROCHLORIDE 200 MG: 200 TABLET ORAL at 09:05

## 2022-05-23 RX ADMIN — METOPROLOL TARTRATE 75 MG: 50 TABLET, FILM COATED ORAL at 05:05

## 2022-05-23 RX ADMIN — VALPROIC ACID 250 MG: 250 CAPSULE, LIQUID FILLED ORAL at 04:05

## 2022-05-23 RX ADMIN — METOPROLOL TARTRATE 75 MG: 50 TABLET, FILM COATED ORAL at 10:05

## 2022-05-23 RX ADMIN — VANCOMYCIN HYDROCHLORIDE 1250 MG: 1.25 INJECTION, POWDER, LYOPHILIZED, FOR SOLUTION INTRAVENOUS at 01:05

## 2022-05-23 RX ADMIN — MELATONIN TAB 3 MG 6 MG: 3 TAB at 10:05

## 2022-05-23 RX ADMIN — PANTOPRAZOLE SODIUM 40 MG: 40 TABLET, DELAYED RELEASE ORAL at 09:05

## 2022-05-23 RX ADMIN — LEVETIRACETAM 1000 MG: 500 TABLET, FILM COATED ORAL at 10:05

## 2022-05-23 RX ADMIN — LEVETIRACETAM 1000 MG: 500 TABLET, FILM COATED ORAL at 09:05

## 2022-05-23 RX ADMIN — VALPROIC ACID 250 MG: 250 CAPSULE, LIQUID FILLED ORAL at 09:05

## 2022-05-23 RX ADMIN — VALPROIC ACID 250 MG: 250 CAPSULE, LIQUID FILLED ORAL at 10:05

## 2022-05-23 RX ADMIN — SENNOSIDES AND DOCUSATE SODIUM 2 TABLET: 50; 8.6 TABLET ORAL at 09:05

## 2022-05-23 RX ADMIN — LACOSAMIDE 100 MG: 50 TABLET, FILM COATED ORAL at 10:05

## 2022-05-23 RX ADMIN — SILODOSIN 8 MG: 8 CAPSULE ORAL at 09:05

## 2022-05-23 RX ADMIN — LACOSAMIDE 100 MG: 50 TABLET, FILM COATED ORAL at 09:05

## 2022-05-23 RX ADMIN — ATORVASTATIN CALCIUM 40 MG: 20 TABLET, FILM COATED ORAL at 10:05

## 2022-05-23 RX ADMIN — APIXABAN 5 MG: 5 TABLET, FILM COATED ORAL at 09:05

## 2022-05-23 NOTE — PROGRESS NOTES
Hospital Medicine  Progress note    [place] [facility] [place] [facility] [place] Keiko Marlow MD  Admit Date: 5/4/2022    Principal Problem:  Acute hypoxemic respiratory failure    Interval hx:  Patient alert. Son states patient with tremors on standing.      ROS   Respiratory: neg for cough neg for shortness of breath  Cardiovascular: neg for chest pain neg for palpitations  Gastrointestinal: neg for nausea neg for vomiting, neg for abdominal pain neg for diarrhea neg for constipation   Behavioral/Psych: neg for depression neg for anxiety    PEx  Temp:  [97.6 °F (36.4 °C)-98.6 °F (37 °C)]   Pulse:  []   Resp:  [14-25]   BP: ()/(54-84)   SpO2:  [95 %-100 %]     Intake/Output Summary (Last 24 hours) at 5/23/2022 1522  Last data filed at 5/23/2022 1128  Gross per 24 hour   Intake 930 ml   Output 1500 ml   Net -570 ml       General Appearance: no acute distress, WD, elderly, ill-appearing  Heart: regular rate and rhythm, no heave  Respiratory: Normal respiratory effort, symmetric excursion, bilateral vesicular breath sounds   Abdomen: Soft, non-tender; bowel sounds active  Skin: intact, no rash, no ulcers  Neurologic:  No focal numbness or weakness  Mental status: Alert, oriented x 4, affect appropriate     Recent Labs   Lab 05/21/22  0402 05/22/22  1215 05/23/22  0907   WBC 12.10 8.88 10.19   HGB 10.7* 12.1 12.8   HCT 31.7* 37.4 39.3   * 443 466*     Recent Labs   Lab 05/21/22  0402 05/22/22  1215 05/23/22  0907    139 140   K 3.0* 4.2 4.4   CL 89* 92* 95   CO2 35* 33* 35*   BUN 23 27* 32*   CREATININE 1.0 1.2 1.1   GLU 76 89 79   CALCIUM 9.0 9.3 9.8   MG 1.9 2.2 2.2   PHOS  --   --  3.1     Recent Labs   Lab 05/17/22  1910   ALKPHOS 50*   ALT 13   AST 26   ALBUMIN 2.1*   PROT 5.6*   BILITOT 0.4        Scheduled Meds:   amiodarone  200 mg Oral Daily    apixaban  5 mg Oral BID    atorvastatin  40 mg Oral QHS    donepeziL  5 mg Oral Nightly    lacosamide  100 mg Oral Q12H    levalbuterol  1.25 mg Nebulization  Q12H    levETIRAcetam  1,000 mg Oral BID    melatonin  6 mg Oral Nightly    metoprolol tartrate  75 mg Oral QID    pantoprazole  40 mg Oral Daily    piperacillin-tazobactam (ZOSYN) IVPB  4.5 g Intravenous Q8H    senna-docusate 8.6-50 mg  2 tablet Oral Daily    silodosin  8 mg Oral Daily    valproic acid  250 mg Oral TID    vancomycin (VANCOCIN) IVPB  1,250 mg Intravenous Q24H     Continuous Infusions:    As Needed:  acetaminophen, aluminum-magnesium hydroxide-simethicone, bisacodyL, hydrALAZINE, melatonin, naloxone, ondansetron, prochlorperazine, sodium chloride 0.9%    Assessment and Plan  / Problems managed today    * Acute hypoxemic respiratory failure  78 y/o female with new onset seizures and AF presents with recurrent focal right sided seizures. Likely 2/2 prior stroke, +/- provoked by recent course of ciprofloxacin for UTI, then developed volume overload, and was diuresed.     Volume overload  Acute diastolic heart failure  Flash pulmonary edema  HFpEF (diastolic dysfunction grade III)  Pulmonary hypertension (PASP 51 mmHg)  Likely due to volume overload, not on O2 at home,. Potentially due to afib w/ RVR leading to volume overload. EF showed normal systolic function on 5/5. No description of diastolic function and CVP 8. She received multiple boluses for hypotensions. However on 5/17 she became significantly more hypoxic, hypertensive, requiring up to 16L HFNC. Repeat ECHO showed diastolic dysfunction, pulmonary hypertension and CVP 8 mmHg. CXR showed likely volume overload. She was started furosemide 80 mg IV TID and broad spectrum antibiotics. BP controlled. CXR 5/20 showed improved right lung consolidation. Will continue vancomycin and zosyn for total of 7 days. Progressively decreasing oxygen requirement. Difficult to determine volume status on exam. Labs showed some volume contraction. IV diuresis held a few days. Furosemide 80 mg daily tomorrow.    Status epilepticus  Admitted to neuroICU.  Epilepsy saw patient. Keppra 1 g BID, vimpat 100 mg BID and depakote 250 mg TID. Vimpat was decreased from 200 mg to 100 mg over concerns of oversedation.     Acute metabolic encephalopathy  Infectious work upon arrival negative. However she completed 7 day antibiotic treatment for previously diagnosed UTI. MRI head 5/7 unremarkable for acute events. CT head 5/18 also unremarkable for acute events. Neurology was consulted for concerns of AED causing oversedation. Recommended to decrease vimpat to 100 mg BID. Patient also with acute illness and hyponatremia. She has baseline low neurologic reserve and any acute physiologic event. However, low threshhold to reconsult neurology for assistance if still with mental status change with stabilization or resolution of above acute events. Delirium precautions. Minimized nighttime interruptions.    Atrial fibrillation with RVR  HR into 170s-180 prior to transfer to [facility] admission given BB and completed amiodarone loading. Continue apixaban for anticoagulation. Initially placed on diltiazem but stopped due to hypotension requiring bolus fluids. Metoprolol decreased to 50 mg BID due to bradycardia (into 40s).  Metoprolol increased to  mg daily due to uncontrolled HR.   -Goal Hr <60    Tremors of nervous system  Likely due to acute metabolic encephalopathy  Tremors noted by nurse and daughter. Brief episode of somnolence after last tremor event.   EEG negative for seizures    Hypokalemia  Replete  prn    Prolonged Q-T interval on ECG  May have been secondary to amiodarone gtt -> improved  - QTc 490 on AM ECG   - Trend daily  - Aggressively replete K/Mag  - Avoid additional QTc prolonging medications    Urinary retention  Requiring frequent straight caths      - UA on admission non-infectious   - C/w silodosin 8 mg qday  - Sampson in place for accurate I and Os while critically ill    Dementia  Mild per family reports, remains independent of ADLs.      - c/w home donepezil 5  mg  - Delirium precautions  - Melatonin 6 mg qhs  - Avoid seroquel/antipsychotics given prolonged QTc     Hyponatremia  Stopped thiazide. Improved with fluid restriction and diuresis  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  Continue fluid restriction and diuresis    Other hyperlipidemia  Continue atorvastatin    Gastroesophageal reflux disease  Continue protonix in place of home PPI    Essential hypertension  Stopped home amlodipine and irbesartan-HCTZ in favor AV kwabena blockade. Patient with multiple hypotensive episodes with metoprolol and diltiazem requiring boluses. Hypotension improved on cessation of diltiazem. Has a few isolated episodes of elevated BPs.   Goal SBP <160 due to age and comorbidites  - Hydralazine PRN while BP is labile    Discharge Planning   MONA: 5/26/2022     Code Status: Full Code   Is the patient medically ready for discharge?: No    Reason for patient still in hospital (select all that apply): Patient trending condition and Pending disposition  Discharge Plan A: Skilled Nursing Facility   Discharge Delays: None known at this time    Diet:  regular diet  GI PPx: protonix  DVT PPx:  apixaban  Airways: room air  Wounds: none    Goals of Care:  Return to prior functional status       Time (minutes) spent in care of the patient (Greater than 1/2 spent in direct face-to-face contact and care coordination on unit)  35 min     Keiko Marlow MD

## 2022-05-23 NOTE — PLAN OF CARE
05/23/22 1256   Post-Acute Status   Post-Acute Authorization Placement   Post-Acute Placement Status Pending medical clearance/testing   [facility]- spoke with Licha ([phone]) in admissions who reports patient is accepted, received [employer] authorization for SNF.    Per MD, patient is expected to be ready for discharge 5/24 vs 5/25. SW updated Licha regarding MONA. Will need covid test within 5 days of admit (ordered).     Ginna Rodriguez LMSW  [facility]  Ext. [phone]

## 2022-05-23 NOTE — PLAN OF CARE
"  Problem: Infection  Goal: Absence of Infection Signs and Symptoms  Outcome: Ongoing, Progressing     Problem: Adult Inpatient Plan of Care  Goal: Plan of Care Review  Outcome: Ongoing, Progressing  Goal: Patient-Specific Goal (Individualized)  Description: Admit Date: 5/4/2022    Focal seizures    Past Medical History:  No date: Hyperlipidemia  No date: Hypertension  No date: Osteoporosis    Past Surgical History:  No date: HIP REPLACEMENT ARTHROPLASTY; Right  No date: HYSTERECTOMY  No date: LEG SURGERY  No date: SKIN CANCER EXCISION  No date: TONSILLECTOMY, ADENOIDECTOMY    Individualization:   1. Pt likes to be called "Tuyet"     Restraints: none        Outcome: Ongoing, Progressing  Goal: Absence of Hospital-Acquired Illness or Injury  Outcome: Ongoing, Progressing  Goal: Optimal Comfort and Wellbeing  Outcome: Ongoing, Progressing  Goal: Readiness for Transition of Care  Outcome: Ongoing, Progressing     Problem: Skin Injury Risk Increased  Goal: Skin Health and Integrity  Outcome: Ongoing, Progressing     Problem: Impaired Wound Healing  Goal: Optimal Wound Healing  Outcome: Ongoing, Progressing     Problem: Adjustment to Illness (Delirium)  Goal: Optimal Coping  Outcome: Ongoing, Progressing     Problem: Altered Behavior (Delirium)  Goal: Improved Behavioral Control  Outcome: Ongoing, Progressing     Problem: Attention and Thought Clarity Impairment (Delirium)  Goal: Improved Attention and Thought Clarity  Outcome: Ongoing, Progressing     Problem: Sleep Disturbance (Delirium)  Goal: Improved Sleep  Outcome: Ongoing, Progressing     Problem: Fall Injury Risk  Goal: Absence of Fall and Fall-Related Injury  Outcome: Ongoing, Progressing     Problem: Restraint, Nonbehavioral (Nonviolent)  Goal: Absence of Harm or Injury  Outcome: Ongoing, Progressing     Problem: Seizure, Active Management  Goal: Absence of Seizure/Seizure-Related Injury  Outcome: Ongoing, Progressing   Patient AAOx1. VSS. A Fib and tachy on the " tele. Wean down 4L to 3L NC.  O2 Stat 100%. Sampson cath intact. PICC intact. POC and safety checks reviewed with patient. NO acute signs of distress. WCTM.

## 2022-05-23 NOTE — PROCEDURES
EEG Extended Monitoring greater than one hour    Date/Time: 5/4/2022 6:49 PM  Performed by: Terell Canchola MD  Authorized by: Keiko Marlow MD       EXTENDED  ELECTROENCEPHALOGRAM  REPORT    DATE OF SERVICE: 5/21/22-5/22/22  EEG NUMBER: FH -1,[id number]  REQUESTED BY:  Ele  LOCATION OF SERVICE:  [facility] [place]    METHODOLOGY   Electroencephalographic (EEG) recording is with electrodes placed according to the International 10-20 placement system.  Thirty two (32) channels of digital signal (sampling rate of 512/sec) including T1 and T2 was simultaneously recorded from the scalp and may include  EKG, EMG, and/or eye monitors.  Recording band pass was 0.1 to 512 hz.  Digital video recording of the patient is simultaneously recorded with the EEG.  The patient is instructed report clinical symptoms which may occur during the recording session.  EEG and video recording is stored and archived in digital format.  Activation procedures which include photic stimulation, hyperventilation and instructing patients to perform simple task are done in selected patients.   The EEG is displayed on a monitor screen and can be reviewed using different montages.  Computer assisted analysis is employed to detect spike and electrographic seizure activity.   The entire record is submitted for computer analysis.  The entire recording is visually reviewed and the times identified by computer analysis as being spikes or seizures are reviewed again.  Compresses spectral analysis (CSA) is also performed on the activity recorded from each individual channel.  This is displayed as a power display of frequencies from 0 to 30 Hz over time.   The CSA is reviewed looking for asymmetries in power between homologous areas of the scalp and then compared with the original EEG recording.     [employer] software was also utilized in the review of this study.  This software suite analyzes the EEG recording in multiple domains.  Coherence and rhythmicity is computed to  identify EEG sections which may contain organized seizures.  Each channel undergoes analysis to detect presence of spike and sharp waves which have special and morphological characteristic of epileptic activity.  The routine EEG recording is converted from spacial into frequency domain.  This is then displayed comparing homologous areas to identify areas of significant asymmetry.  Algorithm to identify non-cortically generated artifact is used to separate eye movement, EMG and other artifact from the EEG.      RECORDING TIMES  Start on 5/21/22 at  14:39:42   Stop on 5/22/22  at 07:00:16  Start on 5/22/22 at 07:01:39  Stop on 5/22/22 at 17:01:05  A total of  26  hrs of EEG recording was obtained.    EEG FINDINGS  The record shows a poor organization at rest, consisting of no discernible  posterior dominant rhythm with poor reactivity. There is mild bilateral beta activity. There is continuous, bilateral, independent delta and theta range background slowing. There are intermittent generalized periodic epileptiform discharges without clear evolution waxing and wanting throughout the study with intermittent lateralization over the right hemisphere. At times discharges take on triphasic morphology.    Drowsiness is characterized by attenuation of the background, vertex waves, and bilateral theta slowing. Stage II sleep is characterized by slowing, vertex waves, and poorly formed sleep spindles.     Provocative maneuvers including hyperventilation and photic stimulation were not performed.     EKG recording shows a regular rhythm.    There is no push button or clinical event.    IMPRESSION:  Abnormal study due to moderate to severe  diffuse background slowing consistent with diffuse cerebral dysfunction and encephalopathy which may be on the basis of toxic, metabolic, or primary neuronal disorder. Interictal periodic epileptiform discharges are consistent with a high degree of cortical irritability and epileptogenic  potential. Triphasic waves may be seen in a variety of encephalopathies, including those due to renal, hepatic, anoxic, metabolic, or other toxic sources.    Terell Canchola MD

## 2022-05-23 NOTE — PT/OT/SLP PROGRESS
"Speech Language Pathology Treatment/Discharge Summary    Patient Name:  Teresa Loes   MRN:  [id number]  Admitting Diagnosis: Acute hypoxemic respiratory failure    Recommendations:                 General Recommendations:  Dysphagia therapy  Diet recommendations:  Mechanical soft, Thin   Aspiration Precautions: 1 bite/sip at a time, Assistance with meals, Feed only when awake/alert, Frequent oral care, HOB to 90 degrees, Meds crushed in puree, Small bites/sips and Standard aspiration precautions   General Precautions: Standard, fall, aspiration  Communication strategies:  provide increased time to answer and go to room if call light pushed    Subjective     Pt found resting in bed with family present at bedside upon SLP entry into room. Pt agreeable to participate in all aspects of session.     Patient goals: "Oh thank you so much!" in response to family bringing breakfast     Pain/Comfort:  · Pain Rating 1: 0/10    Respiratory Status: Room air    Objective:     Has the patient been evaluated by SLP for swallowing?   Yes  Keep patient NPO? No   Current Respiratory Status:        Pt seen for ongoing dysphagia therapy. Family at bedside reported pt typically consumes a regular diet with thin liquids at baseline but has recently lost mandibular dentures. Pt awake, alert, and pleasantly confused throughout session. She consumed small bites of soft solids including [employer] sausage and scrambled eggs with homemade potato hash. Pt exhibiting periods of inattention and required intermittent MIN verbal cueing to continue adequate mastication of soft solids. Sips of thin liquids via cup edge tolerated without difficulty. SLP spoke with family and pt regarding recommendations for continuation of mechanical soft diet given missing dentition and decreased attention during PO, signs of aspiration, and SLP POC. Pt verbalized understanding but would continue to benefit from ongoing education. Pt's family verbalized " understanding and had no additional questions or concerns upon SLP exit.    Assessment:     Teresa Leos is a 77 y.o. female who presents with mild oral dysphagia. Pt continues to tolerate mechanical soft diet without difficulty. No additional skilled speech services required at this time.     Goals:   Multidisciplinary Problems     SLP Goals     Not on file          Multidisciplinary Problems (Resolved)        Problem: SLP    Goal Priority Disciplines Outcome   SLP Goal   (Resolved)     SLP Met   Description: Speech Language Pathology Goals  Goals expected to be met by 5/13    1. Pt will participate in ongoing swallow assessment  2. PT will participate in speech language eval                     Problem: SLP    Goal Priority Disciplines Outcome   SLP Goal   (Resolved)     SLP Met   Description: Speech Language Pathology Goals  Goals expected to be met by 6/2    1. Pt will participate in ongoing swallow assessment to determine safest and least restrictive PO diet.                            Plan:     · Patient to be seen:  4 x/week   · Plan of Care expires:  06/20/22  · Plan of Care reviewed with:  patient, family   · SLP Follow-Up:  No       Discharge recommendations:  nursing facility, skilled   Barriers to Discharge:  None    Time Tracking:     SLP Treatment Date:   05/23/22  Speech Start Time:  1002  Speech Stop Time:  1012     Speech Total Time (min):  10 min    Billable Minutes: Treatment Swallowing Dysfunction 10       05/23/2022

## 2022-05-23 NOTE — CARE UPDATE
"RAPID RESPONSE NURSE AI ALERT       AI alert received.    Chart Reviewed: 05/23/2022, 11:04 AM    MRN: [id number]  Bed: 8085/8085 A    Dx: Acute hypoxemic respiratory failure    Teresa Leos has a past medical history of Hyperlipidemia, Hypertension, and Osteoporosis.    Last VS: /64 (BP Location: Right arm, Patient Position: Lying)   Pulse 110   Temp 97.7 °F (36.5 °C) (Oral)   Resp 14   Ht 5' 1" (1.549 m)   Wt 51.3 kg (113 lb)   SpO2 100%   BMI 21.35 kg/m²     24H Vital Sign Range:  Temp:  [96.8 °F (36 °C)-98.6 °F (37 °C)]   Pulse:  []   Resp:  [14-25]   BP: ()/(54-84)   SpO2:  [95 %-100 %]     Level of Consciousness (AVPU): alert    Recent Labs     05/21/22  0402 05/22/22  1215 05/23/22  0907   WBC 12.10 8.88 10.19   HGB 10.7* 12.1 12.8   HCT 31.7* 37.4 39.3   * 443 466*       Recent Labs     05/21/22  0402 05/22/22  1215 05/23/22  0907    139 140   K 3.0* 4.2 4.4   CL 89* 92* 95   CO2 35* 33* 35*   CREATININE 1.0 1.2 1.1   GLU 76 89 79   PHOS  --   --  3.1   MG 1.9 2.2 2.2        No results for input(s): PH, PCO2, PO2, HCO3, POCSATURATED, BE in the last 72 hours.     OXYGEN:  Flow (L/min): 3 (decreased to 3L via weaning protical)  Oxygen Concentration (%): 40  O2 Device (Oxygen Therapy): nasal cannula    MEWS score: 3    Bedside RNMor contacted. No concerns verbalized at this time. Instructed to call [phone]  for further concerns or assistance.    Jonna Ford RN      "

## 2022-05-23 NOTE — PT/OT/SLP PROGRESS
Occupational Therapy   Treatment    Name: Teresa Leos  MRN: [id number]  Admitting Diagnosis:  Acute hypoxemic respiratory failure       Recommendations:     Discharge Recommendations: nursing facility, skilled  Discharge Equipment Recommendations:  walker, rolling, wheelchair, shower chair  Barriers to discharge:       Assessment:     Teresa Leos is a 77 y.o. female with a medical diagnosis of Acute hypoxemic respiratory failure. Pt stood up at EOB then stood at which time family noticed that she had a BM in her brief so returned to supine to be cleaned. Performance deficits affecting function are weakness, impaired endurance, impaired self care skills, impaired functional mobilty, gait instability, impaired balance, decreased coordination, decreased safety awareness.     Rehab Prognosis:  Good; patient would benefit from acute skilled OT services to address these deficits and reach maximum level of function.       Plan:     Patient to be seen 3 x/week to address the above listed problems via self-care/home management, therapeutic exercises, therapeutic activities, neuromuscular re-education  · Plan of Care Expires: 06/09/22  · Plan of Care Reviewed with: patient    Subjective     Pain/Comfort:  · Pain Rating 1: 0/10    Objective:     Communicated with: rn prior to session.  Patient found supine with paula catheter, oxygen, peripheral IV, telemetry upon OT entry to room.    General Precautions: Standard, fall   Orthopedic Precautions:N/A   Braces:    Respiratory Status: Nasal cannula, flow 2 L/min     Occupational Performance:     Bed Mobility:    · Patient completed Rolling/Turning to Left with  maximal assistance  · Patient completed Scooting/Bridging with total assistance  · Patient completed Supine to Sit with maximal assistance and 2 persons  · Patient completed Sit to Supine with maximal assistance and 2 persons     Functional Mobility/Transfers:  · Patient completed Sit <> Stand Transfer with moderate  assistance  with  no assistive device and rolling walker  (2 trials).  · Functional Mobility: Pt took a few sidesteps to the HOB with Mod A / no AD and uncoordinated LEs.    [facility] 6 Click ADL: 12    Treatment & Education:  EOB balance = Fair+  Discussed OT POC.    Patient left supine with all lines intact and call button in reachEducation:      GOALS:   Multidisciplinary Problems     Occupational Therapy Goals        Problem: Occupational Therapy    Goal Priority Disciplines Outcome Interventions   Occupational Therapy Goal     OT, PT/OT Ongoing, Progressing    Description: Goals set on 5/11/2022 with expiration date 5/25/2022:  Patient will increase functional independence with ADLs by performing:    Supine <> Sit with Stand-by Assistance.  Grooming while standing at sink with Stand-by Assistance.  UB Dressing with Stand-by Assistance.  LB Dressing with Stand-by Assistance.  Step transfer with Stand-by Assistance with DME as needed.  Pt will demonstrate understanding of education provided regarding energy conservation and task modification through teach-back method.                    Multidisciplinary Problems (Resolved)        Problem: Occupational Therapy    Goal Priority Disciplines Outcome Interventions   Occupational Therapy Goal   (Resolved)     OT, PT/OT Met                    Time Tracking:     OT Date of Treatment: 05/23/22  OT Start Time: 1013  OT Stop Time: 1032  OT Total Time (min): 19 min    Billable Minutes:Therapeutic Activity 19    OT/HAYDEE: OT     HAYDEE Visit Number: 1    5/23/2022

## 2022-05-24 LAB
ANION GAP SERPL CALC-SCNC: 11 MMOL/L (ref 8–16)
BASOPHILS # BLD AUTO: 0.14 K/UL (ref 0–0.2)
BASOPHILS NFR BLD: 1.3 % (ref 0–1.9)
BUN SERPL-MCNC: 32 MG/DL (ref 8–23)
CALCIUM SERPL-MCNC: 9 MG/DL (ref 8.7–10.5)
CHLORIDE SERPL-SCNC: 99 MMOL/L (ref 95–110)
CO2 SERPL-SCNC: 28 MMOL/L (ref 23–29)
CREAT SERPL-MCNC: 1 MG/DL (ref 0.5–1.4)
DIFFERENTIAL METHOD: ABNORMAL
EOSINOPHIL # BLD AUTO: 0.1 K/UL (ref 0–0.5)
EOSINOPHIL NFR BLD: 1.2 % (ref 0–8)
ERYTHROCYTE [DISTWIDTH] IN BLOOD BY AUTOMATED COUNT: 13.2 % (ref 11.5–14.5)
EST. GFR  (AFRICAN AMERICAN): >60 ML/MIN/1.73 M^2
EST. GFR  (NON AFRICAN AMERICAN): 54.1 ML/MIN/1.73 M^2
GLUCOSE SERPL-MCNC: 73 MG/DL (ref 70–110)
HCT VFR BLD AUTO: 35.3 % (ref 37–48.5)
HGB BLD-MCNC: 11.1 G/DL (ref 12–16)
IMM GRANULOCYTES # BLD AUTO: 0.11 K/UL (ref 0–0.04)
IMM GRANULOCYTES NFR BLD AUTO: 1.1 % (ref 0–0.5)
LYMPHOCYTES # BLD AUTO: 1.6 K/UL (ref 1–4.8)
LYMPHOCYTES NFR BLD: 15.6 % (ref 18–48)
MAGNESIUM SERPL-MCNC: 2.1 MG/DL (ref 1.6–2.6)
MCH RBC QN AUTO: 31.5 PG (ref 27–31)
MCHC RBC AUTO-ENTMCNC: 31.4 G/DL (ref 32–36)
MCV RBC AUTO: 100 FL (ref 82–98)
MONOCYTES # BLD AUTO: 2 K/UL (ref 0.3–1)
MONOCYTES NFR BLD: 19.4 % (ref 4–15)
NEUTROPHILS # BLD AUTO: 6.4 K/UL (ref 1.8–7.7)
NEUTROPHILS NFR BLD: 61.4 % (ref 38–73)
NRBC BLD-RTO: 0 /100 WBC
PHOSPHATE SERPL-MCNC: 3.2 MG/DL (ref 2.7–4.5)
PLATELET # BLD AUTO: 322 K/UL (ref 150–450)
PMV BLD AUTO: 11.9 FL (ref 9.2–12.9)
POTASSIUM SERPL-SCNC: 5.5 MMOL/L (ref 3.5–5.1)
RBC # BLD AUTO: 3.52 M/UL (ref 4–5.4)
SODIUM SERPL-SCNC: 138 MMOL/L (ref 136–145)
WBC # BLD AUTO: 10.41 K/UL (ref 3.9–12.7)

## 2022-05-24 PROCEDURE — 94761 N-INVAS EAR/PLS OXIMETRY MLT: CPT

## 2022-05-24 PROCEDURE — 97112 NEUROMUSCULAR REEDUCATION: CPT

## 2022-05-24 PROCEDURE — 99233 SBSQ HOSP IP/OBS HIGH 50: CPT | Mod: ,,, | Performed by: INTERNAL MEDICINE

## 2022-05-24 PROCEDURE — 83735 ASSAY OF MAGNESIUM: CPT | Performed by: STUDENT IN AN ORGANIZED HEALTH CARE EDUCATION/TRAINING PROGRAM

## 2022-05-24 PROCEDURE — 25000242 PHARM REV CODE 250 ALT 637 W/ HCPCS: Performed by: STUDENT IN AN ORGANIZED HEALTH CARE EDUCATION/TRAINING PROGRAM

## 2022-05-24 PROCEDURE — 25000003 PHARM REV CODE 250: Performed by: HOSPITALIST

## 2022-05-24 PROCEDURE — 25000003 PHARM REV CODE 250: Performed by: INTERNAL MEDICINE

## 2022-05-24 PROCEDURE — 25000003 PHARM REV CODE 250

## 2022-05-24 PROCEDURE — 97530 THERAPEUTIC ACTIVITIES: CPT

## 2022-05-24 PROCEDURE — 80048 BASIC METABOLIC PNL TOTAL CA: CPT | Performed by: STUDENT IN AN ORGANIZED HEALTH CARE EDUCATION/TRAINING PROGRAM

## 2022-05-24 PROCEDURE — 99233 PR SUBSEQUENT HOSPITAL CARE,LEVL III: ICD-10-PCS | Mod: ,,, | Performed by: INTERNAL MEDICINE

## 2022-05-24 PROCEDURE — 63600175 PHARM REV CODE 636 W HCPCS: Performed by: INTERNAL MEDICINE

## 2022-05-24 PROCEDURE — 94660 CPAP INITIATION&MGMT: CPT

## 2022-05-24 PROCEDURE — 84100 ASSAY OF PHOSPHORUS: CPT | Performed by: INTERNAL MEDICINE

## 2022-05-24 PROCEDURE — 85025 COMPLETE CBC W/AUTO DIFF WBC: CPT | Performed by: INTERNAL MEDICINE

## 2022-05-24 PROCEDURE — 20600001 HC STEP DOWN PRIVATE ROOM

## 2022-05-24 PROCEDURE — 25000003 PHARM REV CODE 250: Performed by: STUDENT IN AN ORGANIZED HEALTH CARE EDUCATION/TRAINING PROGRAM

## 2022-05-24 PROCEDURE — 94640 AIRWAY INHALATION TREATMENT: CPT

## 2022-05-24 PROCEDURE — 25000003 PHARM REV CODE 250: Performed by: PHYSICIAN ASSISTANT

## 2022-05-24 PROCEDURE — 27000221 HC OXYGEN, UP TO 24 HOURS

## 2022-05-24 PROCEDURE — 97535 SELF CARE MNGMENT TRAINING: CPT

## 2022-05-24 PROCEDURE — 99900035 HC TECH TIME PER 15 MIN (STAT)

## 2022-05-24 PROCEDURE — 36415 COLL VENOUS BLD VENIPUNCTURE: CPT | Performed by: STUDENT IN AN ORGANIZED HEALTH CARE EDUCATION/TRAINING PROGRAM

## 2022-05-24 RX ORDER — ATORVASTATIN CALCIUM 40 MG/1
40 TABLET, FILM COATED ORAL NIGHTLY
Start: 2022-05-24

## 2022-05-24 RX ORDER — LEVETIRACETAM 1000 MG/1
1000 TABLET ORAL 2 TIMES DAILY
Qty: 60 TABLET | Refills: 11 | Status: ON HOLD
Start: 2022-05-25 | End: 2022-10-12 | Stop reason: HOSPADM

## 2022-05-24 RX ORDER — TAMSULOSIN HYDROCHLORIDE 0.4 MG/1
0.4 CAPSULE ORAL DAILY
Qty: 30 CAPSULE | Refills: 11
Start: 2022-05-24 | End: 2022-10-07

## 2022-05-24 RX ORDER — LACOSAMIDE 100 MG/1
100 TABLET ORAL EVERY 12 HOURS
Qty: 60 TABLET | Refills: 11
Start: 2022-05-25 | End: 2022-05-25 | Stop reason: SDUPTHER

## 2022-05-24 RX ORDER — AMOXICILLIN 250 MG
2 CAPSULE ORAL DAILY
Qty: 60 TABLET
Start: 2022-05-25 | End: 2022-10-07

## 2022-05-24 RX ORDER — METOPROLOL SUCCINATE 100 MG/1
300 TABLET, EXTENDED RELEASE ORAL DAILY
Qty: 90 TABLET | Refills: 11
Start: 2022-05-25 | End: 2022-10-07 | Stop reason: DRUGHIGH

## 2022-05-24 RX ORDER — FUROSEMIDE 80 MG/1
80 TABLET ORAL DAILY
Qty: 30 TABLET | Refills: 11
Start: 2022-05-25 | End: 2022-10-07 | Stop reason: DRUGHIGH

## 2022-05-24 RX ORDER — DIVALPROEX SODIUM 500 MG/1
1000 TABLET, FILM COATED, EXTENDED RELEASE ORAL NIGHTLY
Qty: 60 TABLET | Refills: 11 | Status: ON HOLD | OUTPATIENT
Start: 2022-05-24 | End: 2022-10-12 | Stop reason: HOSPADM

## 2022-05-24 RX ORDER — AMIODARONE HYDROCHLORIDE 200 MG/1
200 TABLET ORAL DAILY
Qty: 30 TABLET | Refills: 11
Start: 2022-05-25 | End: 2023-05-25

## 2022-05-24 RX ADMIN — MELATONIN TAB 3 MG 6 MG: 3 TAB at 08:05

## 2022-05-24 RX ADMIN — ATORVASTATIN CALCIUM 40 MG: 20 TABLET, FILM COATED ORAL at 08:05

## 2022-05-24 RX ADMIN — VALPROIC ACID 250 MG: 250 CAPSULE, LIQUID FILLED ORAL at 02:05

## 2022-05-24 RX ADMIN — LEVALBUTEROL HYDROCHLORIDE 1.25 MG: 1.25 SOLUTION, CONCENTRATE RESPIRATORY (INHALATION) at 07:05

## 2022-05-24 RX ADMIN — LEVETIRACETAM 1000 MG: 500 TABLET, FILM COATED ORAL at 08:05

## 2022-05-24 RX ADMIN — APIXABAN 5 MG: 5 TABLET, FILM COATED ORAL at 08:05

## 2022-05-24 RX ADMIN — LACOSAMIDE 100 MG: 50 TABLET, FILM COATED ORAL at 08:05

## 2022-05-24 RX ADMIN — AMIODARONE HYDROCHLORIDE 200 MG: 200 TABLET ORAL at 08:05

## 2022-05-24 RX ADMIN — DONEPEZIL HYDROCHLORIDE 5 MG: 5 TABLET ORAL at 08:05

## 2022-05-24 RX ADMIN — FUROSEMIDE 80 MG: 80 TABLET ORAL at 08:05

## 2022-05-24 RX ADMIN — SILODOSIN 8 MG: 8 CAPSULE ORAL at 08:05

## 2022-05-24 RX ADMIN — VALPROIC ACID 250 MG: 250 CAPSULE, LIQUID FILLED ORAL at 08:05

## 2022-05-24 RX ADMIN — PIPERACILLIN SODIUM AND TAZOBACTAM SODIUM 4.5 G: 4; .5 INJECTION, POWDER, LYOPHILIZED, FOR SOLUTION INTRAVENOUS at 07:05

## 2022-05-24 RX ADMIN — PANTOPRAZOLE SODIUM 40 MG: 40 TABLET, DELAYED RELEASE ORAL at 08:05

## 2022-05-24 RX ADMIN — SENNOSIDES AND DOCUSATE SODIUM 2 TABLET: 50; 8.6 TABLET ORAL at 08:05

## 2022-05-24 RX ADMIN — METOPROLOL SUCCINATE 300 MG: 100 TABLET, EXTENDED RELEASE ORAL at 08:05

## 2022-05-24 NOTE — PT/OT/SLP PROGRESS
Occupational Therapy  Co- Treatment    Name: Teresa Leos  MRN: [id number]  Admitting Diagnosis:  Acute hypoxemic respiratory failure        Co-evaluation/treatment performed due to patient's multiple deficits requiring two skilled therapists to appropriately and safely assess patient's strength and endurance while facilitating functional tasks in addition to accommodating for patient's activity tolerance.       Recommendations:     Discharge Recommendations: nursing facility, skilled  Discharge Equipment Recommendations:  shower chair, walker, rolling, wheelchair  Barriers to discharge:  Inaccessible home environment, increased skilled assistance required    Assessment:     Teresa Leos is a 77 y.o. female with a medical diagnosis of Acute hypoxemic respiratory failure.  Patient tolerated therapy session fairly, requiring multiple cues and redirection throughout session due to confusion and Aox2.  Patient able to complete bed mobility with max A x 2 and sat EOB with max A for balance, demonstrating posterior lean despite cues.  Therapists introduced multiple reaching exercises to challenge pt's balance and neuro-muscular retraining, pt able to minimally engage due to cognitive status.  Patient deemed unsafe to transfer from sit to stand and returned to supine.     She presents with performance deficits affecting function are weakness, impaired endurance, impaired self care skills, impaired functional mobilty, gait instability, impaired balance, impaired cognition, decreased coordination, decreased upper extremity function, decreased lower extremity function, decreased safety awareness.     Rehab Prognosis:  Good; patient would benefit from acute skilled OT services to address these deficits and reach maximum level of function.       Plan:     Patient to be seen 3 x/week to address the above listed problems via self-care/home management, therapeutic activities, therapeutic exercises, neuromuscular  "re-education  · Plan of Care Expires: 06/09/22  · Plan of Care Reviewed with: patient    Subjective     "Yeah, I can"     Pain/Comfort:  · Pain Rating 1: 0/10  · Pain Rating Post-Intervention 1: 0/10    Objective:     Communicated with: SAQIB Olguin prior to session.  Patient found supine with paula catheter, peripheral IV, telemetry, pulse ox (continuous) upon OT entry to room.    General Precautions: Standard, fall   Orthopedic Precautions:N/A   Braces: N/A  Respiratory Status: Nasal cannula, flow 4 L/min     Occupational Performance:     Bed Mobility:    · Patient completed Rolling/Turning to Left with  maximal assistance and 2 persons  · Patient completed Rolling/Turning to Right with maximal assistance and 2 persons  · Patient completed Scooting/Bridging  · To EOB with maximal assistance and 2 persons  · To HOB while supine, total assistance w 2 persons  · Patient completed Supine to Sit with maximal assistance and 2 persons  · Patient completed Sit to Supine with maximal assistance and 2 persons     Functional Mobility/Transfers:  · Pt completed sustained sit with feet on the floor ~20min with max A for dynamic and static balance, pt demonstrating posterior lean despite prompts  · Pt unable to complete BUE reaching activity while PT provided max A trunk support and due to cognitive status. Multiple cues provided to engage core and recruit abdominal muscles.   · Pt completed 2/5 BLE TherEx with PT while OT provided max A trunk support and provided multiple cues to correct posterior lean  · Deferred sit to stand transfer due to safety awareness    Activities of Daily Living:  · Upper Body Dressing: minimum assistance doffing soiled hospital gown and donning fresh hospital gown      [facility] 6 Click ADL: 12    Treatment & Education:     Patient and family aware of patient's deficits and therapy progression.       Patient left supine with all lines intact, call button in reach and RN notifiedEducation:      GOALS: "   Multidisciplinary Problems     Occupational Therapy Goals        Problem: Occupational Therapy    Goal Priority Disciplines Outcome Interventions   Occupational Therapy Goal     OT, PT/OT Ongoing, Progressing    Description: Goals set on 5/11/2022 with expiration date 5/25/2022:  Patient will increase functional independence with ADLs by performing:    Supine <> Sit with Stand-by Assistance.  Grooming while standing at sink with Stand-by Assistance.  UB Dressing with Stand-by Assistance.  LB Dressing with Stand-by Assistance.  Step transfer with Stand-by Assistance with DME as needed.  Pt will demonstrate understanding of education provided regarding energy conservation and task modification through teach-back method.                    Multidisciplinary Problems (Resolved)        Problem: Occupational Therapy    Goal Priority Disciplines Outcome Interventions   Occupational Therapy Goal   (Resolved)     OT, PT/OT Met                    Time Tracking:     OT Date of Treatment: 05/24/22  OT Start Time: 0943  OT Stop Time: 1014  OT Total Time (min): 31 min    Billable Minutes:Self Care/Home Management 9  Therapeutic Activity 22    OT/HAYDEE: OT     HAYDEE Visit Number: 1    5/24/2022

## 2022-05-24 NOTE — PLAN OF CARE
[facility] - Telemetry Stepdown  Discharge Reassessment    Primary Care Provider: Joseph Mcpherson NP    Expected Discharge Date: 5/26/2022    Reassessment (most recent)     Discharge Reassessment - 05/24/22 1340        Discharge Reassessment    Assessment Type Discharge Planning Reassessment     Discharge Plan discussed with: Adult children     Name(s) and Number(s) Adán Denny, [phone]     Communicated MONA with patient/caregiver Yes     Discharge Plan A Skilled Nursing Facility     Discharge Plan B Home;Home with family     DME Needed Upon Discharge  other (see comments)   TBD    Discharge Barriers Identified None     Why the patient remains in the hospital Requires continued medical care        Post-Acute Status    Post-Acute Authorization Placement     Post-Acute Placement Status Pending medical clearance/testing     Discharge Delays None known at this time               KOURTNEY spoke to Licha cedeno/ Ashley Pulido [phone] in reference to pt.  Updated clinicals sent.  Will need a COVID sample within five days of discharge.  Last COVID on file 5/23/2022.    KOURTNEY spoke to pt's son, Adán [phone], to provide an update on discharge plan.  KOURTNEY advised pt's son Ashley Pulido received insurance auth and awaiting medical clearance.  Pt's son request contact from medical team to provide update on medical care.  KOURTNEY notified medical team of above information.     Tracey Billings LMSW  PRN-  [facility]  Ext. [phone]

## 2022-05-24 NOTE — PLAN OF CARE
Patient tolerated therapy session fairly, requiring multiple cues and redirection throughout session due to confusion and Aox2.  Patient able to complete bed mobility with max A x 2 and sat EOB with max A for balance, demonstrating posterior lean despite cues.  Therapists introduced multiple reaching exercises to challenge pt's balance and neuro-muscular retraining, pt able to minimally engage due to cognitive status.  Patient deemed unsafe to transfer from sit to stand and returned to supine.     Problem: Occupational Therapy  Goal: Occupational Therapy Goal  Description: Goals set on 5/11/2022 with expiration date 5/25/2022:  Patient will increase functional independence with ADLs by performing:    Supine <> Sit with Stand-by Assistance.  Grooming while standing at sink with Stand-by Assistance.  UB Dressing with Stand-by Assistance.  LB Dressing with Stand-by Assistance.  Step transfer with Stand-by Assistance with DME as needed.  Pt will demonstrate understanding of education provided regarding energy conservation and task modification through teach-back method.         Outcome: Ongoing, Progressing

## 2022-05-24 NOTE — PT/OT/SLP PROGRESS
Physical Therapy Co Treatment    Patient Name:  Teresa Leos   MRN:  [id number]  *Co treatment of 2 skilled therapists indicated in order to maximize function and safety of Pt.  Recommendations:     Discharge Recommendations:  nursing facility, skilled   Discharge Equipment Recommendations: shower chair, walker, rolling, wheelchair   Barriers to discharge: increased burden of care    Assessment:     Teresa Leos is a 77 y.o. female admitted with a medical diagnosis of Acute hypoxemic respiratory failure.  She presents with the following impairments/functional limitations:  weakness, impaired endurance, impaired self care skills, impaired functional mobilty, gait instability, impaired balance, impaired cognition, decreased coordination, decreased upper extremity function, decreased lower extremity function, decreased safety awareness. Teresa Leos would benefit from acute PT intervention to address listed functional deficits, provide patient/caregiver education, reduce fall risk, and maximize (I) and safety with functional mobility.    Pt presents with significant functional mobility deficits and is functioning below baseline. Pt limited mainly by decreased attention and ability to follow commands. Required max A x 2 - total A x 2 for all mobility. Pt sat EOB x ~20 minutes requiring max A x 1 throughout most of session but briefly progressed to CGA with feet flat on floor.  Pt will continue to benefit from acute PT services in order to maximize safety and (I) with functional mobility.    After hospital discharge, pt would benefit from SNF to continue addressing therapy impairments.    Rehab Prognosis: Fair; patient would benefit from acute skilled PT services to address these deficits and reach maximum level of function.      Plan:     During this hospitalization, patient to be seen 3 x/week to address the identified rehab impairments via gait training, therapeutic activities, therapeutic exercises,  neuromuscular re-education and progress toward the following goals:    · Plan of Care Expires:  06/10/22    This plan of care has been discussed with the patient/caregiver, who was included in its development and is in agreement with the identified goals and treatment plan.     Subjective     Communicated with RN prior to session.  Patient agreeable to participate.     Chief Complaint: none  Patient/Family Comments/goals: none noted  Pt pain prior to session: 0/10  Pt pain following session: 0/10    Objective:     Patient found HOB elevated with paula catheter, peripheral IV, telemetry, pulse ox (continuous)  upon PT entry to room.    General Precautions: Standard, fall   Orthopedic Precautions:N/A   Braces: N/A     Functional Mobility:    Bed Mobility:  · Supine to Sit: Maximum Assistance x 2  · Sit to Supine: Maximum Assistance x 2  · Scooting anteriorly to EOB to plant feet on floor: Maximum Assistance  · Scooting/Bridging in supine to HOB: Total Assistance x 2    Transfers:   · Deferred 2/2 decreased safety awareness              Balance:  · Static Sit: Max Assist, briefly progressing to CGA at EOB x ~20 minutes  · PT providing pt with tactile cues to trunk and upper chest to increase core musculature recruitment in order to improve upright posture      Therapeutic Activities/Exercises     PT assisted with repositioning and donning fresh gown at end of session 2/2 complexity of lines and assistance level required.    Patient educated on the importance of early mobility to prevent functional decline during hospital stay    Patient was instructed to utilize staff assistance for mobility/transfers.  Patient was educated on PT POC and all questions answered within PT scope of practice.    Patient able to verbalize understanding; will follow-up with pt during current admit for additional questions/concerns within scope of practice.     AM-PAC 6 CLICK MOBILITY  Total Score:8     Patient left HOB elevated with all  lines intact, call button in reach and RN notified.        History/Goals:     PAST MEDICAL HISTORY:  Past Medical History:   Diagnosis Date    Hyperlipidemia     Hypertension     Osteoporosis        Past Surgical History:   Procedure Laterality Date    HIP REPLACEMENT ARTHROPLASTY Right     HYSTERECTOMY      LEG SURGERY      SKIN CANCER EXCISION      TONSILLECTOMY, ADENOIDECTOMY         GOALS:   Multidisciplinary Problems     Physical Therapy Goals        Problem: Physical Therapy    Goal Priority Disciplines Outcome Goal Variances Interventions   Physical Therapy Goal     PT, PT/OT Ongoing, Progressing     Description: Goals to be met by: 22    Patient will increase functional independence with mobility by performin. Supine to sit with supervision  2. Sit to supine with supervision  3. Sit to stand transfer with independence  4. Gait  x 80 feet with supervision using LRAD as needed  5. Ascend/descend 5 stair with left handrails supervision using LRAD as needed  6. Lower extremity exercise program x10 reps per handout, with independence                     Time Tracking:     PT Received On: 22  PT Start Time: 943     PT Stop Time: 1015  PT Total Time (min): 32 min     Billable Minutes: Therapeutic Activity 15 and Neuromuscular Re-education 15      Colette Farias, PT  2022

## 2022-05-24 NOTE — CARE UPDATE
RAPID RESPONSE NURSE ROUND       Rounding completed with charge RN, Jade. No concerns verbalized at this time. Instructed to call [phone] for further concerns or assistance.

## 2022-05-24 NOTE — PLAN OF CARE
Problem: Adult Inpatient Plan of Care  Goal: Plan of Care Review  Outcome: Ongoing, Progressing  Goal: Patient-Specific Goal (Individualized)     Outcome: Ongoing, Progressing  Goal: Absence of Hospital-Acquired Illness or Injury  Outcome: Ongoing, Progressing  Goal: Optimal Comfort and Wellbeing  Outcome: Ongoing, Progressing  POC reviewed with pt. Disoriented to situation and time. 2L NC. VSS. No acute changes. Sampson care complete. Safety checks complete. Bed in lowest position. Wheels locked. Call light in reach. Will continue to monitor.

## 2022-05-24 NOTE — PLAN OF CARE
"  Problem: Infection  Goal: Absence of Infection Signs and Symptoms  5/24/2022 0140 by Annita Bahena RN  Outcome: Ongoing, Progressing  5/24/2022 0140 by Annita Bahena RN  Outcome: Ongoing, Progressing     Problem: Adult Inpatient Plan of Care  Goal: Plan of Care Review  5/24/2022 0140 by Annita Bahena RN  Outcome: Ongoing, Progressing  5/24/2022 0140 by Annita Bahena RN  Outcome: Ongoing, Progressing  Goal: Patient-Specific Goal (Individualized)  Description: Admit Date: 5/4/2022    Focal seizures    Past Medical History:  No date: Hyperlipidemia  No date: Hypertension  No date: Osteoporosis    Past Surgical History:  No date: HIP REPLACEMENT ARTHROPLASTY; Right  No date: HYSTERECTOMY  No date: LEG SURGERY  No date: SKIN CANCER EXCISION  No date: TONSILLECTOMY, ADENOIDECTOMY    Individualization:   1. Pt likes to be called "Tuyet"     Restraints: none        5/24/2022 0140 by Annita Bahena RN  Outcome: Ongoing, Progressing  5/24/2022 0140 by Annita Bahena RN  Outcome: Ongoing, Progressing  Goal: Absence of Hospital-Acquired Illness or Injury  5/24/2022 0140 by Annita Bahena RN  Outcome: Ongoing, Progressing  5/24/2022 0140 by Annita Bahena RN  Outcome: Ongoing, Progressing  Goal: Optimal Comfort and Wellbeing  5/24/2022 0140 by Annita Bahena RN  Outcome: Ongoing, Progressing  5/24/2022 0140 by Annita Bahena RN  Outcome: Ongoing, Progressing  Goal: Readiness for Transition of Care  5/24/2022 0140 by Annita Bahena RN  Outcome: Ongoing, Progressing  5/24/2022 0140 by Annita Bahena RN  Outcome: Ongoing, Progressing     Problem: Skin Injury Risk Increased  Goal: Skin Health and Integrity  5/24/2022 0140 by Annita Bahena RN  Outcome: Ongoing, Progressing  5/24/2022 0140 by Annita Bahena RN  Outcome: Ongoing, Progressing     Problem: Impaired Wound Healing  Goal: Optimal Wound Healing  5/24/2022 0140 by Annita Bahena RN  Outcome: Ongoing, Progressing  5/24/2022 0140 by Annita Bahena RN  Outcome: Ongoing, " Progressing     Problem: Adjustment to Illness (Delirium)  Goal: Optimal Coping  5/24/2022 0140 by Annita Bahena RN  Outcome: Ongoing, Progressing  5/24/2022 0140 by Annita Bahena RN  Outcome: Ongoing, Progressing     Problem: Altered Behavior (Delirium)  Goal: Improved Behavioral Control  5/24/2022 0140 by Annita Bahena RN  Outcome: Ongoing, Progressing  5/24/2022 0140 by Annita Bahena RN  Outcome: Ongoing, Progressing     Problem: Attention and Thought Clarity Impairment (Delirium)  Goal: Improved Attention and Thought Clarity  5/24/2022 0140 by Annita Bahena RN  Outcome: Ongoing, Progressing  5/24/2022 0140 by Annita Bahena RN  Outcome: Ongoing, Progressing     Problem: Sleep Disturbance (Delirium)  Goal: Improved Sleep  5/24/2022 0140 by Annita Bahena RN  Outcome: Ongoing, Progressing  5/24/2022 0140 by Annita Bahena RN  Outcome: Ongoing, Progressing     Problem: Fall Injury Risk  Goal: Absence of Fall and Fall-Related Injury  5/24/2022 0140 by Annita Bahena RN  Outcome: Ongoing, Progressing  5/24/2022 0140 by Annita Bahena RN  Outcome: Ongoing, Progressing     Problem: Restraint, Nonbehavioral (Nonviolent)  Goal: Absence of Harm or Injury  5/24/2022 0140 by Annita Bahena RN  Outcome: Ongoing, Progressing  5/24/2022 0140 by Annita Bahena RN  Outcome: Ongoing, Progressing     Problem: Seizure, Active Management  Goal: Absence of Seizure/Seizure-Related Injury  5/24/2022 0140 by Annita Bahena RN  Outcome: Ongoing, Progressing  5/24/2022 0140 by Annita Bahena RN  Outcome: Ongoing, Progressing

## 2022-05-24 NOTE — PROGRESS NOTES
Hospital Medicine  Progress note    [place]: [facility] Keiko Marlow MD  Admit Date: 5/4/2022    Principal Problem:  Acute hypoxemic respiratory failure    Interval hx:  Patient alert. She is on 2L of oxygen.      ROS   Respiratory: neg for cough neg for shortness of breath  Cardiovascular: neg for chest pain neg for palpitations  Gastrointestinal: neg for nausea neg for vomiting, neg for abdominal pain neg for diarrhea neg for constipation   Behavioral/Psych: neg for depression neg for anxiety    PEx  Temp:  [97.6 °F (36.4 °C)-98.7 °F (37.1 °C)]   Pulse:  []   Resp:  [12-21]   BP: ()/(66-75)   SpO2:  [97 %-100 %]     Intake/Output Summary (Last 24 hours) at 5/24/2022 1442  Last data filed at 5/24/2022 0600  Gross per 24 hour   Intake 550 ml   Output 500 ml   Net 50 ml       General Appearance: no acute distress, WD, elderly, ill-appearing  Heart: regular rate and rhythm, no heave  Respiratory: Normal respiratory effort, symmetric excursion, bilateral vesicular breath sounds   Abdomen: Soft, non-tender; bowel sounds active  Skin: intact, no rash, no ulcers  Neurologic:  No focal numbness or weakness  Mental status: Alert, oriented x 4, affect appropriate     Recent Labs   Lab 05/22/22  1215 05/23/22  0907 05/24/22  0728   WBC 8.88 10.19 10.41   HGB 12.1 12.8 11.1*   HCT 37.4 39.3 35.3*    466* 322     Recent Labs   Lab 05/22/22  1215 05/23/22  0907 05/24/22  0431    140 138   K 4.2 4.4 5.5*   CL 92* 95 99   CO2 33* 35* 28   BUN 27* 32* 32*   CREATININE 1.2 1.1 1.0   GLU 89 79 73   CALCIUM 9.3 9.8 9.0   MG 2.2 2.2 2.1   PHOS  --  3.1 3.2     Recent Labs   Lab 05/17/22  1910   ALKPHOS 50*   ALT 13   AST 26   ALBUMIN 2.1*   PROT 5.6*   BILITOT 0.4        Scheduled Meds:   amiodarone  200 mg Oral Daily    apixaban  5 mg Oral BID    atorvastatin  40 mg Oral QHS    donepeziL  5 mg Oral Nightly    furosemide  80 mg Oral Daily    lacosamide  100 mg Oral Q12H    levalbuterol  1.25 mg  Nebulization Q12H    levETIRAcetam  1,000 mg Oral BID    melatonin  6 mg Oral Nightly    metoprolol succinate  300 mg Oral Daily    pantoprazole  40 mg Oral Daily    senna-docusate 8.6-50 mg  2 tablet Oral Daily    silodosin  8 mg Oral Daily    valproic acid  250 mg Oral TID     Continuous Infusions:    As Needed:  acetaminophen, aluminum-magnesium hydroxide-simethicone, bisacodyL, hydrALAZINE, melatonin, naloxone, ondansetron, prochlorperazine, sodium chloride 0.9%    Assessment and Plan  / Problems managed today    * Acute hypoxemic respiratory failure  78 y/o female with new onset seizures and AF presents with recurrent focal right sided seizures. Likely 2/2 prior stroke, +/- provoked by recent course of ciprofloxacin for UTI, then developed volume overload, and was diuresed.     Volume overload  Acute diastolic heart failure  Flash pulmonary edema  HFpEF (diastolic dysfunction grade III)  Pulmonary hypertension (PASP 51 mmHg)  Likely due to volume overload, not on O2 at home,. Potentially due to afib w/ RVR leading to volume overload. EF showed normal systolic function on 5/5. No description of diastolic function and CVP 8. She received multiple boluses for hypotensions. However on 5/17 she became significantly more hypoxic, hypertensive, requiring up to 16L HFNC. Repeat ECHO showed diastolic dysfunction, pulmonary hypertension and CVP 8 mmHg. CXR showed likely volume overload. She was started furosemide 80 mg IV TID and broad spectrum antibiotics. BP controlled. CXR 5/20 showed improved right lung consolidation. Will continue vancomycin and zosyn for total of 7 days. Progressively decreasing oxygen requirement. Difficult to determine volume status on exam. Labs showed some volume contraction. IV diuresis held a few days. Furosemide 80 mg daily tomorrow. Goal UOP -1L    Status epilepticus  Admitted to neuroICU. Epilepsy saw patient. Keppra 1 g BID, vimpat 100 mg BID and depakote 250 mg TID. Vimpat was  decreased from 200 mg to 100 mg over concerns of oversedation.     Acute metabolic encephalopathy  Infectious work upon arrival negative. However she completed 7 day antibiotic treatment for previously diagnosed UTI. MRI head 5/7 unremarkable for acute events. CT head 5/18 also unremarkable for acute events. Neurology was consulted for concerns of AED causing oversedation. Recommended to decrease vimpat to 100 mg BID. Patient also with acute illness and hyponatremia. She has baseline low neurologic reserve and any acute physiologic event. However, low threshhold to reconsult neurology for assistance if still with mental status change with stabilization or resolution of above acute events. Delirium precautions. Minimized nighttime interruptions.    Atrial fibrillation with RVR  HR into 170s-180 prior to transfer to [facility] admission given BB and completed amiodarone loading. Continue apixaban for anticoagulation. Initially placed on diltiazem but stopped due to hypotension requiring bolus fluids. Metoprolol decreased to 50 mg BID due to bradycardia (into 40s).  Metoprolol increased to  mg daily due to uncontrolled HR.   -Goal Hr <60    Tremors of nervous system  Likely due to acute metabolic encephalopathy  Tremors noted by nurse and daughter. Brief episode of somnolence after last tremor event.   EEG negative for seizures    Hypokalemia  Replete  prn    Prolonged Q-T interval on ECG  May have been secondary to amiodarone gtt -> improved  - QTc 490 on AM ECG   - Trend daily  - Aggressively replete K/Mag  - Avoid additional QTc prolonging medications    Urinary retention  Requiring frequent straight caths      - UA on admission non-infectious   - C/w silodosin 8 mg qday  - Sampson in place for accurate I and Os while critically ill    Dementia  Mild per family reports, remains independent of ADLs.      - c/w home donepezil 5 mg  - Delirium precautions  - Melatonin 6 mg qhs  - Avoid seroquel/antipsychotics given  prolonged QTc     Hyponatremia  Stopped thiazide. Improved with fluid restriction and diuresis  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  Continue fluid restriction and diuresis    Other hyperlipidemia  Continue atorvastatin    Gastroesophageal reflux disease  Continue protonix in place of home PPI    Essential hypertension  Stopped home amlodipine and irbesartan-HCTZ in favor AV kwabena blockade. Patient with multiple hypotensive episodes with metoprolol and diltiazem requiring boluses. Hypotension improved on cessation of diltiazem. Has a few isolated episodes of elevated BPs.   Goal SBP <160 due to age and comorbidites  - Hydralazine PRN while BP is labile    Discharge Planning   MONA: 5/26/2022     Code Status: Full Code   Is the patient medically ready for discharge?: No    Reason for patient still in hospital (select all that apply): Patient trending condition and Pending disposition  Discharge Plan A: Skilled Nursing Facility   Discharge Delays: None known at this time    Diet:  regular diet  GI PPx: protonix  DVT PPx:  apixaban  Airways: room air  Wounds: none    Goals of Care:  Return to prior functional status       Time (minutes) spent in care of the patient (Greater than 1/2 spent in direct face-to-face contact and care coordination on unit)  35 min     Keiko Marlow MD

## 2022-05-25 VITALS
SYSTOLIC BLOOD PRESSURE: 112 MMHG | RESPIRATION RATE: 13 BRPM | TEMPERATURE: 98 F | DIASTOLIC BLOOD PRESSURE: 62 MMHG | HEART RATE: 88 BPM | OXYGEN SATURATION: 100 % | HEIGHT: 61 IN | WEIGHT: 112.81 LBS | BODY MASS INDEX: 21.3 KG/M2

## 2022-05-25 LAB
ANION GAP SERPL CALC-SCNC: 13 MMOL/L (ref 8–16)
ANISOCYTOSIS BLD QL SMEAR: SLIGHT
BASOPHILS # BLD AUTO: 0.13 K/UL (ref 0–0.2)
BASOPHILS NFR BLD: 1 % (ref 0–1.9)
BUN SERPL-MCNC: 30 MG/DL (ref 8–23)
CALCIUM SERPL-MCNC: 9.5 MG/DL (ref 8.7–10.5)
CHLORIDE SERPL-SCNC: 97 MMOL/L (ref 95–110)
CO2 SERPL-SCNC: 31 MMOL/L (ref 23–29)
CREAT SERPL-MCNC: 0.9 MG/DL (ref 0.5–1.4)
DIFFERENTIAL METHOD: ABNORMAL
EOSINOPHIL # BLD AUTO: 0 K/UL (ref 0–0.5)
EOSINOPHIL NFR BLD: 0.2 % (ref 0–8)
ERYTHROCYTE [DISTWIDTH] IN BLOOD BY AUTOMATED COUNT: 13.2 % (ref 11.5–14.5)
EST. GFR  (AFRICAN AMERICAN): >60 ML/MIN/1.73 M^2
EST. GFR  (NON AFRICAN AMERICAN): >60 ML/MIN/1.73 M^2
GLUCOSE SERPL-MCNC: 75 MG/DL (ref 70–110)
HCT VFR BLD AUTO: 35.7 % (ref 37–48.5)
HGB BLD-MCNC: 11.2 G/DL (ref 12–16)
HYPOCHROMIA BLD QL SMEAR: ABNORMAL
IMM GRANULOCYTES # BLD AUTO: 0.15 K/UL (ref 0–0.04)
IMM GRANULOCYTES NFR BLD AUTO: 1.2 % (ref 0–0.5)
LYMPHOCYTES # BLD AUTO: 1.5 K/UL (ref 1–4.8)
LYMPHOCYTES NFR BLD: 12 % (ref 18–48)
MAGNESIUM SERPL-MCNC: 2 MG/DL (ref 1.6–2.6)
MCH RBC QN AUTO: 31.9 PG (ref 27–31)
MCHC RBC AUTO-ENTMCNC: 31.4 G/DL (ref 32–36)
MCV RBC AUTO: 102 FL (ref 82–98)
MONOCYTES # BLD AUTO: 2.2 K/UL (ref 0.3–1)
MONOCYTES NFR BLD: 17.4 % (ref 4–15)
NEUTROPHILS # BLD AUTO: 8.6 K/UL (ref 1.8–7.7)
NEUTROPHILS NFR BLD: 68.2 % (ref 38–73)
NRBC BLD-RTO: 0 /100 WBC
OVALOCYTES BLD QL SMEAR: ABNORMAL
PHOSPHATE SERPL-MCNC: 3.6 MG/DL (ref 2.7–4.5)
PLATELET # BLD AUTO: 338 K/UL (ref 150–450)
PMV BLD AUTO: 11.3 FL (ref 9.2–12.9)
POIKILOCYTOSIS BLD QL SMEAR: SLIGHT
POLYCHROMASIA BLD QL SMEAR: ABNORMAL
POTASSIUM SERPL-SCNC: 4 MMOL/L (ref 3.5–5.1)
RBC # BLD AUTO: 3.51 M/UL (ref 4–5.4)
SODIUM SERPL-SCNC: 141 MMOL/L (ref 136–145)
WBC # BLD AUTO: 12.59 K/UL (ref 3.9–12.7)

## 2022-05-25 PROCEDURE — 85025 COMPLETE CBC W/AUTO DIFF WBC: CPT | Performed by: INTERNAL MEDICINE

## 2022-05-25 PROCEDURE — 25000003 PHARM REV CODE 250: Performed by: PHYSICIAN ASSISTANT

## 2022-05-25 PROCEDURE — 25000003 PHARM REV CODE 250: Performed by: STUDENT IN AN ORGANIZED HEALTH CARE EDUCATION/TRAINING PROGRAM

## 2022-05-25 PROCEDURE — 94660 CPAP INITIATION&MGMT: CPT

## 2022-05-25 PROCEDURE — 1111F PR DISCHARGE MEDS RECONCILED W/ CURRENT OUTPATIENT MED LIST: ICD-10-PCS | Mod: CPTII,,, | Performed by: INTERNAL MEDICINE

## 2022-05-25 PROCEDURE — 27000221 HC OXYGEN, UP TO 24 HOURS

## 2022-05-25 PROCEDURE — 99900035 HC TECH TIME PER 15 MIN (STAT)

## 2022-05-25 PROCEDURE — 99239 PR HOSPITAL DISCHARGE DAY,>30 MIN: ICD-10-PCS | Mod: ,,, | Performed by: INTERNAL MEDICINE

## 2022-05-25 PROCEDURE — 25000003 PHARM REV CODE 250: Performed by: INTERNAL MEDICINE

## 2022-05-25 PROCEDURE — 94640 AIRWAY INHALATION TREATMENT: CPT

## 2022-05-25 PROCEDURE — 84100 ASSAY OF PHOSPHORUS: CPT | Performed by: INTERNAL MEDICINE

## 2022-05-25 PROCEDURE — 94799 UNLISTED PULMONARY SVC/PX: CPT

## 2022-05-25 PROCEDURE — 99239 HOSP IP/OBS DSCHRG MGMT >30: CPT | Mod: ,,, | Performed by: INTERNAL MEDICINE

## 2022-05-25 PROCEDURE — 94761 N-INVAS EAR/PLS OXIMETRY MLT: CPT

## 2022-05-25 PROCEDURE — 83735 ASSAY OF MAGNESIUM: CPT | Performed by: STUDENT IN AN ORGANIZED HEALTH CARE EDUCATION/TRAINING PROGRAM

## 2022-05-25 PROCEDURE — 80048 BASIC METABOLIC PNL TOTAL CA: CPT | Performed by: STUDENT IN AN ORGANIZED HEALTH CARE EDUCATION/TRAINING PROGRAM

## 2022-05-25 PROCEDURE — 1111F DSCHRG MED/CURRENT MED MERGE: CPT | Mod: CPTII,,, | Performed by: INTERNAL MEDICINE

## 2022-05-25 PROCEDURE — 25000242 PHARM REV CODE 250 ALT 637 W/ HCPCS: Performed by: STUDENT IN AN ORGANIZED HEALTH CARE EDUCATION/TRAINING PROGRAM

## 2022-05-25 PROCEDURE — 25000003 PHARM REV CODE 250

## 2022-05-25 RX ORDER — LACOSAMIDE 100 MG/1
100 TABLET ORAL EVERY 12 HOURS
Qty: 60 TABLET | Refills: 11 | Status: ON HOLD | OUTPATIENT
Start: 2022-05-25 | End: 2022-10-12 | Stop reason: HOSPADM

## 2022-05-25 RX ADMIN — AMIODARONE HYDROCHLORIDE 200 MG: 200 TABLET ORAL at 08:05

## 2022-05-25 RX ADMIN — SILODOSIN 8 MG: 8 CAPSULE ORAL at 08:05

## 2022-05-25 RX ADMIN — VALPROIC ACID 250 MG: 250 CAPSULE, LIQUID FILLED ORAL at 08:05

## 2022-05-25 RX ADMIN — PANTOPRAZOLE SODIUM 40 MG: 40 TABLET, DELAYED RELEASE ORAL at 08:05

## 2022-05-25 RX ADMIN — APIXABAN 5 MG: 5 TABLET, FILM COATED ORAL at 08:05

## 2022-05-25 RX ADMIN — LEVETIRACETAM 1000 MG: 500 TABLET, FILM COATED ORAL at 08:05

## 2022-05-25 RX ADMIN — LEVALBUTEROL HYDROCHLORIDE 1.25 MG: 1.25 SOLUTION, CONCENTRATE RESPIRATORY (INHALATION) at 08:05

## 2022-05-25 RX ADMIN — SENNOSIDES AND DOCUSATE SODIUM 2 TABLET: 50; 8.6 TABLET ORAL at 08:05

## 2022-05-25 RX ADMIN — FUROSEMIDE 80 MG: 80 TABLET ORAL at 08:05

## 2022-05-25 RX ADMIN — METOPROLOL SUCCINATE 300 MG: 100 TABLET, EXTENDED RELEASE ORAL at 08:05

## 2022-05-25 RX ADMIN — LACOSAMIDE 100 MG: 50 TABLET, FILM COATED ORAL at 08:05

## 2022-05-25 NOTE — NURSING
AVS given to and reviewed with pt. Report called and given to Kelsey at [facility]. All belongings with pt.

## 2022-05-25 NOTE — CARE UPDATE
RAPID RESPONSE NURSE ROUND       Rounding completed with charge RN, Efe. No concerns verbalized at this time. Instructed to call [phone] for further concerns or assistance.

## 2022-05-25 NOTE — AI DETERIORATION ALERT
"RAPID RESPONSE NURSE AI ALERT       AI alert received.    Chart Reviewed: 05/25/2022, 3:48 AM    MRN: [id number]  Bed: 8085/8085 A    Dx: Acute hypoxemic respiratory failure    Teresa Leos has a past medical history of Hyperlipidemia, Hypertension, and Osteoporosis.    Last VS: /71 (BP Location: Right arm, Patient Position: Lying)   Pulse 108   Temp 97.7 °F (36.5 °C) (Axillary)   Resp 17   Ht 5' 1" (1.549 m)   Wt 51.2 kg (112 lb 12.6 oz)   SpO2 100%   BMI 21.31 kg/m²     24H Vital Sign Range:  Temp:  [97.4 °F (36.3 °C)-98.7 °F (37.1 °C)]   Pulse:  []   Resp:  [17-21]   BP: ()/(62-71)   SpO2:  [97 %-100 %]     Level of Consciousness (AVPU): alert    Recent Labs     05/22/22  1215 05/23/22  0907 05/24/22  0728   WBC 8.88 10.19 10.41   HGB 12.1 12.8 11.1*   HCT 37.4 39.3 35.3*    466* 322       Recent Labs     05/22/22  1215 05/23/22  0907 05/24/22  0431    140 138   K 4.2 4.4 5.5*   CL 92* 95 99   CO2 33* 35* 28   CREATININE 1.2 1.1 1.0   GLU 89 79 73   PHOS  --  3.1 3.2   MG 2.2 2.2 2.1        No results for input(s): PH, PCO2, PO2, HCO3, POCSATURATED, BE in the last 72 hours.     OXYGEN:  Flow (L/min): 2  Oxygen Concentration (%): 40  O2 Device (Oxygen Therapy): BiPAP    MEWS score: 2    Bedside RNAnnita contacted. No concerns verbalized at this time. Instructed to call [phone] for further concerns or assistance.    Patricia Osman RN         "

## 2022-05-25 NOTE — RESPIRATORY THERAPY
RAPID RESPONSE RESPIRATORY CHART CHECK       Chart check completed,   instructed to call [phone] for further concerns or assistance.

## 2022-05-25 NOTE — PLAN OF CARE
9:00 AM  Spoke with Dr. Marlow who would like to send escript for Vimpat to the SNF's pharmacy directly.  Spoke with Margie at [facility] regarding pharmacy, states they use [facility] ([address], [place] [address]). Updated MD. Margie states will need to confirm the script is received prior to being ready for report and transportation set up.    11:37 AM  Spoke with reception at [facility]; after a brief hold, reception confirmed with the admissions coordinator, Licha, that they are ready for nurse to call report to Kelsey at [phone] and for SW to set up transportation. Updated nurse.    SW arranged stretcher transport via [facility]. Requested  time is 12:20PM. Requested  time does not guarantee arrival time.    [facility] - Telemetry Stepdown  Discharge Final Note    Primary Care Provider: Joseph Mcpherson NP    Expected Discharge Date: 5/25/2022    Final Discharge Note (most recent)     Final Note - 05/25/22 1136        Final Note    Assessment Type Final Discharge Note     Anticipated Discharge Disposition Skilled Nursing Facility        Post-Acute Status    Post-Acute Authorization Placement     Post-Acute Placement Status Set-up Complete/Auth obtained   [facility]    Discharge Delays None known at this time               Ginna Rodriguez LMSW  [facility]  Ext. [phone]

## 2022-05-25 NOTE — PLAN OF CARE
[facility]     Department of Hospital Medicine     [address] [place] [address]     [phone] after hours  [phone] fax       NURSING HOME ORDERS                                     05/24/2022    Admit to Nursing Home:    Skilled Bed      Diagnoses:  Active Hospital Problems    Diagnosis  POA    *Acute hypoxemic respiratory failure [J96.01]  No     Priority: 1 - High    Status epilepticus [G40.901]  Yes     Priority: 2     Acute metabolic encephalopathy [G93.41]  Yes     Priority: 3     Atrial fibrillation with RVR [I48.91]  Yes     Priority: 4     Seizure-like activity [R56.9]  Yes    Tremors of nervous system [R25.1]  Yes    Hypokalemia [E87.6]  Yes    Prolonged Q-T interval on ECG [R94.31]  Yes    Dementia [F03.90]  Yes    Urinary retention [R33.9]  Yes    Hyponatremia [E87.1]  Yes    Gastroesophageal reflux disease [K21.9]  Yes    Other hyperlipidemia [E78.49]  Yes    Essential hypertension [I10]  Yes      Resolved Hospital Problems    Diagnosis Date Resolved POA    Hypotension [I95.9] 05/17/2022 Yes     Priority: 3     Acute cystitis without hematuria [N30.00] 05/06/2022 Yes    Elevated lactic acid level [R79.89] 05/06/2022 Yes       Allergies:  Review of patient's allergies indicates:   Allergen Reactions    Ciprofloxacin Other (See Comments)     Seizures       Vitals:   Every shift (Skilled Nursing patients)  Daily weights    Diet:low sodium high protein diet  Ensure plus with meal TID  Fluid restriction 1500 mL    Acitivities:      - Up in a chair each morning as tolerated   - May use walker, cane, or self-propelled wheelchair    Nursing Precautions:    - Aspiration precautions:             -  Upright 90 degrees before during and after meals   - Decubitus precautions:        -  for positioning   - Pressure reducing foam mattress   - Turn patient every two hours. Use wedge pillows to anchor patient   - Fall precautions   -  Seizure precaution    Remove paula for Voiding trial 5/26      CONSULTS:   PT to evaluate and treat     OT to evaluate and treat     ST to evaluate and treat     Nutrition to evaluate and recommend diet    LABS:  Valproic acid in one week  Renal function panel twice week        Medications: Discontinue all previous medication orders, if any. See new list below.  Current Discharge Medication List      START taking these medications    Details   amiodarone (PACERONE) 200 MG Tab Take 1 tablet (200 mg total) by mouth once daily.  Qty: 30 tablet, Refills: 11      divalproex ER (DEPAKOTE) 500 MG Tb24 Take 2 tablets (1,000 mg total) by mouth every evening.  Qty: 60 tablet, Refills: 11      furosemide (LASIX) 80 MG tablet Take 1 tablet (80 mg total) by mouth once daily.  Qty: 30 tablet, Refills: 11      lacosamide (VIMPAT) 100 mg Tab Take 1 tablet (100 mg total) by mouth every 12 (twelve) hours.  Qty: 60 tablet, Refills: 11      levETIRAcetam (KEPPRA) 1000 MG tablet Take 1 tablet (1,000 mg total) by mouth 2 (two) times daily.  Qty: 60 tablet, Refills: 11      senna-docusate 8.6-50 mg (PERICOLACE) 8.6-50 mg per tablet Take 2 tablets by mouth once daily.  Qty: 60 tablet      tamsulosin (FLOMAX) 0.4 mg Cap Take 1 capsule (0.4 mg total) by mouth once daily.  Qty: 30 capsule, Refills: 11         CONTINUE these medications which have CHANGED    Details   atorvastatin (LIPITOR) 40 MG tablet Take 1 tablet (40 mg total) by mouth every evening.      metoprolol succinate (TOPROL-XL) 100 MG 24 hr tablet Take 3 tablets (300 mg total) by mouth once daily.  Qty: 90 tablet, Refills: 11  Hold for SBP <90.          CONTINUE these medications which have NOT CHANGED    Details   amLODIPine (NORVASC) 5 MG tablet Take 5 mg by mouth once daily.      aspirin (ECOTRIN) 81 MG EC tablet Take 81 mg by mouth once daily at 6am.      donepeziL (ARICEPT) 5 MG tablet Take 5 mg by mouth nightly.      ELIQUIS 5 mg Tab Take 5 mg by mouth 2 (two) times daily.       omeprazole (PRILOSEC) 40 MG capsule Take 40 mg by mouth once daily.         STOP taking these medications       irbesartan-hydrochlorothiazide (AVALIDE) 300-12.5 mg per tablet        simvastatin (ZOCOR) 40 MG tablet        vitamin E 400 UNIT capsule              _________________________________  Keiko Marlow MD  05/24/2022

## 2022-05-31 NOTE — DISCHARGE SUMMARY
Discharge Summary  Hospital Medicine    Attending Provider on Discharge: Keiko Marlow MD  [facility] Medicine Team: [facility] MED Z  Date of Admission:  5/4/2022     Date of Discharge:  5/25/2022  Code status: Full Code    Active Hospital Problems    Diagnosis  POA    *Acute hypoxemic respiratory failure [J96.01]  No     Priority: 1 - High    Status epilepticus [G40.901]  Yes     Priority: 2     Acute metabolic encephalopathy [G93.41]  Yes     Priority: 3     Atrial fibrillation with RVR [I48.91]  Yes     Priority: 4     Seizure-like activity [R56.9]  Yes    Tremors of nervous system [R25.1]  Yes    Hypokalemia [E87.6]  Yes    Prolonged Q-T interval on ECG [R94.31]  Yes    Dementia [F03.90]  Yes    Urinary retention [R33.9]  Yes    Hyponatremia [E87.1]  Yes    Gastroesophageal reflux disease [K21.9]  Yes    Other hyperlipidemia [E78.49]  Yes    Essential hypertension [I10]  Yes      Resolved Hospital Problems    Diagnosis Date Resolved POA    Hypotension [I95.9] 05/17/2022 Yes     Priority: 3     Acute cystitis without hematuria [N30.00] 05/06/2022 Yes    Elevated lactic acid level [R79.89] 05/06/2022 Yes     HPI  76 y/o WF hx of recently diagnosed Atrial Fibrillation, Alzheimers Dementia, Hypertension, Hyperlipidemia who is transferred for concern of seizure and acute encephalopathy.     History provided by EMR review and by pts Son-Daughter-in-Law at bedside.     For the last 2 weeks patient has  noted to have progressive confusion worse 1 week ago since Wednesday.  This past Friday - April 29th, daughter-in-law noted patient was outside and throwing rocks away in the trash can, when she went outside to see the patient patient had fallen down and was convulsing, reported seizure activity for 30-40 minutes.  Patient was confused afterward, EMS called and patient admitted to [facility] from 4/29 - Mon 5/1.  Family states @ [place], they states patient had just passed out/syncope, stroke was  ruled out with CNS imaging reported CT & MRI, records not available for review.  Atrial fibrillation was noted as new diagnosis and patient was discharged on multiple new medications - including Toprol XL 25mg BID & Apixaban 5mg BID.      Today patient was at dining table eating, when she suddenly stopped responding, reported staring spells and then developed convulsions.   En Route via EMS to [facility] reported pt with additional seizures.      Tele stroke Neurology eval completed @ [facility], pt transferred to [facility] for further w/u due to concern of seizures as inciting issue.     Bedside interview - patient is awake, alert, oriented to self, location, denies any acute complaints, no chest pain, dyspnea, palpitations, no LE swelling, no visual complaints, no traumatic injury     ED [facility] & [facility] -  Keppra 2gm iv x1,  Amiodarone - unclear amt.  Metoprolol 5mg iv x 1,  LR bolus 500cc    Hospital Course  * Acute hypoxemic respiratory failure  78 y/o female with new onset seizures and AF presents with recurrent focal right sided seizures. Likely 2/2 prior stroke, +/- provoked by recent course of ciprofloxacin for UTI, then developed volume overload, and was diuresed.     Volume overload  Acute diastolic heart failure  Flash pulmonary edema  HFpEF (diastolic dysfunction grade III)  Pulmonary hypertension (PASP 51 mmHg)  Likely due to volume overload, not on O2 at home,. Potentially due to afib w/ RVR leading to volume overload. EF showed normal systolic function on 5/5. No description of diastolic function and CVP 8. She received multiple boluses for hypotensions. However on 5/17 she became significantly more hypoxic, hypertensive, requiring up to 16L HFNC. Repeat ECHO showed diastolic dysfunction, pulmonary hypertension and CVP 8 mmHg. CXR showed likely volume overload. She was started furosemide 80 mg IV TID and broad spectrum antibiotics. BP controlled. CXR 5/20 showed improved right lung consolidation.  Will continue vancomycin and zosyn for total of 7 days. Progressively decreasing oxygen requirement. Difficult to determine volume status on exam. Labs showed some volume contraction. IV diuresis held a few days. Furosemide 80 mg daily tomorrow. Goal UOP -1L. She was discharged on furosemide 80 mg daily.    Status epilepticus  Admitted to neuroICU. Epilepsy saw patient. Keppra 1 g BID, vimpat 100 mg BID and depakote 250 mg TID. Vimpat was decreased from 200 mg to 100 mg over concerns of oversedation.     Acute metabolic encephalopathy  Infectious work upon arrival negative. However she completed 7 day antibiotic treatment for previously diagnosed UTI. MRI head 5/7 unremarkable for acute events. CT head 5/18 also unremarkable for acute events. Neurology was consulted for concerns of AED causing oversedation. Recommended to decrease vimpat to 100 mg BID. Patient also with acute illness and hyponatremia. She has baseline low neurologic reserve and any acute physiologic event. However, low threshhold to reconsult neurology for assistance if still with mental status change with stabilization or resolution of above acute events. Delirium precautions. Minimized nighttime interruptions.    Atrial fibrillation with RVR  HR into 170s-180 prior to transfer to [facility] admission given BB and completed amiodarone loading. Continue apixaban for anticoagulation. Initially placed on diltiazem but stopped due to hypotension requiring bolus fluids. Metoprolol decreased to 50 mg BID due to bradycardia (into 40s).  Metoprolol increased to  mg daily due to uncontrolled HR. Discharged on metoprolol  mg daily and amiodarone 200 mg daily and apixaban.  -Goal Hr <60    Tremors of nervous system  Likely due to acute metabolic encephalopathy  Tremors noted by nurse and daughter. Brief episode of somnolence after last tremor event.   EEG negative for seizures    Hypokalemia  Replete  prn    Prolonged Q-T interval on ECG  May have been  secondary to amiodarone gtt -> improved  - QTc 490 on AM ECG   - Trend daily  - Aggressively replete K/Mag  - Avoid additional QTc prolonging medications    Urinary retention  Requiring frequent straight caths      - UA on admission non-infectious   - C/w silodosin 8 mg qday  - Sampson in place for accurate I and Os while critically ill    Dementia  Mild per family reports, remains independent of ADLs.      - c/w home donepezil 5 mg  - Delirium precautions  - Melatonin 6 mg qhs  - Avoid seroquel/antipsychotics given prolonged QTc     Hyponatremia  Stopped thiazide. Improved with fluid restriction and diuresis  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  Continue fluid restriction and diuresis  Resolved once heart failure improved    Other hyperlipidemia  Continue atorvastatin    Gastroesophageal reflux disease  Continue protonix in place of home PPI    Essential hypertension  Stopped home amlodipine and irbesartan-HCTZ in favor AV kwabena blockade. Patient with multiple hypotensive episodes with metoprolol and diltiazem requiring boluses. Hypotension improved on cessation of diltiazem. Has a few isolated episodes of elevated BPs.   Goal SBP <160 due to age and comorbidites  - Hydralazine PRN while BP is labile      Procedures: none    Consultants: neurology, epilepsy, vascular neurology    Discharge Medication List as of 5/25/2022  2:43 PM      START taking these medications    Details   amiodarone (PACERONE) 200 MG Tab Take 1 tablet (200 mg total) by mouth once daily., Starting Wed 5/25/2022, Until Thu 5/25/2023, No Print      divalproex ER (DEPAKOTE) 500 MG Tb24 Take 2 tablets (1,000 mg total) by mouth every evening., Starting Tue 5/24/2022, Until Wed 5/24/2023, Normal      furosemide (LASIX) 80 MG tablet Take 1 tablet (80 mg total) by mouth once daily., Starting Wed 5/25/2022, Until Thu 5/25/2023, No Print      levETIRAcetam (KEPPRA) 1000 MG tablet Take 1 tablet (1,000 mg total) by mouth 2 (two) times daily., Starting Wed  5/25/2022, Until Thu 5/25/2023, No Print      senna-docusate 8.6-50 mg (PERICOLACE) 8.6-50 mg per tablet Take 2 tablets by mouth once daily., Starting Wed 5/25/2022, No Print      tamsulosin (FLOMAX) 0.4 mg Cap Take 1 capsule (0.4 mg total) by mouth once daily., Starting Tue 5/24/2022, Until Wed 5/24/2023, No Print         CONTINUE these medications which have CHANGED    Details   atorvastatin (LIPITOR) 40 MG tablet Take 1 tablet (40 mg total) by mouth every evening., Starting Tue 5/24/2022, No Print      lacosamide (VIMPAT) 100 mg Tab Take 1 tablet (100 mg total) by mouth every 12 (twelve) hours., Starting Wed 5/25/2022, Until Thu 5/25/2023, Normal      metoprolol succinate (TOPROL-XL) 100 MG 24 hr tablet Take 3 tablets (300 mg total) by mouth once daily., Starting Wed 5/25/2022, Until Thu 5/25/2023, No Print         CONTINUE these medications which have NOT CHANGED    Details   amLODIPine (NORVASC) 5 MG tablet Take 5 mg by mouth once daily., Starting Fri 4/15/2022, Historical Med      aspirin (ECOTRIN) 81 MG EC tablet Take 81 mg by mouth once daily at 6am., Historical Med      donepeziL (ARICEPT) 5 MG tablet Take 5 mg by mouth nightly., Starting Mon 5/2/2022, Historical Med      ELIQUIS 5 mg Tab Take 5 mg by mouth 2 (two) times daily., Starting Mon 5/2/2022, Historical Med      omeprazole (PRILOSEC) 40 MG capsule Take 40 mg by mouth once daily., Starting Fri 4/15/2022, Historical Med         STOP taking these medications       irbesartan-hydrochlorothiazide (AVALIDE) 300-12.5 mg per tablet Comments:   Reason for Stopping:         simvastatin (ZOCOR) 40 MG tablet Comments:   Reason for Stopping:         vitamin E 400 UNIT capsule Comments:   Reason for Stopping:               Discharge Diet:2 gram sodium diet     Activity: activity as tolerated    Discharge Condition: Good    Disposition: Admitted as an Inpatient    Tests pending at the time of discharge: none      Time spent  on the discharge of the patient  including review of hospital course with the patient. reviewing discharge medications and arranging follow-up care 35 min    Discharge examination Patient was seen and examined on the date of discharge and determined to be suitable for discharge.    Discharge plan     No future appointments.    Keiko Marlow MD

## 2022-05-31 NOTE — PHYSICIAN QUERY
PT Name: Teresa Leos  MR #: [id number]     DOCUMENTATION CLARIFICATION      CDS/:  Burak Son RN, CDS                   Contact Information:  [email or web address]      This form is a permanent document in the medical record.     Query Date: May 31, 2022    Dear Provider,  By submitting this query, we are merely seeking further clarification of documentation.  Please utilize your independent clinical judgment when addressing the question(s) below.     The Medical Record contains the following:    Supporting Clinical Findings Location in Medical Record   On 5/17 she became significantly more hypoxic, requiring as much as 10-16 L HFNC to maintain her oxygen saturation. Was HTN to 180s/90s, was given 20mg IV hydralazine with improvement and 80mg IV lasix, she was weaned back to 7L HFNC. Continued on IV lasix 80mg IV TID, CT head done due to intermittent somnolence and confusion, no acute abnormality shown. ABG showed no hypercarbia. Started on vanc/zosyn for possible PNA.    Start vanc/zosyn for possible PNA due to persistent hypoxia to treat for possible HAP, plan on 7 day course if necessary (5/17-23)   HM PN 5/18   However on 5/17 she became significantly more hypoxic, hypertensive, requiring up to 16L HFNC. Repeat ECHO showed diastolic dysfunction, pulmonary hypertension and CVP 8 mmHg. CXR showed likely volume overload. She was started furosemide 80 mg IV TID and broad spectrum antibiotics. BP controlled. CXR 5/20 showed improved right lung consolidation. Will continue vancomycin and zosyn for total of 7 days   DCS 5/25    There is continued appearance of bilateral interstitial and alveolar infiltrate, right greater than left and greatest at the lung bases, however with improved aeration bilaterally most notable improvement is seen at the right upper lobe.  Bilateral pleural effusions are again noted.   CXR 5/17   WBC 12.41    WBC 12.99    WBC 15.47    piperacillin-tazobactam 5/17 -  5/24    Vacomycin 5/17- 5/23   Labs 5/17    Labs 5/18    Labs 5/19    MAR         Please clarify if the Pneumonia diagnosis has been:    [ x ] Ruled In     [  ] Ruled Out     [  ] Other/Clarification of findings (please specify): _______________                [  ] Clinically undetermined     Please document in your progress notes daily for the duration of treatment, until resolved, and include in your discharge summary.    Form No. [id number]

## 2022-06-06 NOTE — PROCEDURES
DATE: 5/9/22     EEG NUMBER: [id number]     REFERRING PHYSICIAN:  Dr. Gonzalez      This EEG was performed to assess for subclinical seizures      ELECTROENCEPHALOGRAM REPORT     METHODOLOGY:  Electroencephalographic (EEG) recording is with electrodes placed according to the International 10-20 placement system.  Thirty two (32) channels of digital signal are simultaneously recorded from the scalp and may include EKG, EMG, and/or eye monitors.   Recording band pass was 0.1 to 512 hz.  Digital video recording of the patient is simultaneously recorded with the EEG.  The nursing staff report clinical symptoms and may press an event button when the patient has symptoms of clinical interest to the treating physicians.  EEG and video recording is stored and archived in digital format.  The entire recording is visually reviewed, and the times identified by computer analysis as being spikes or seizures are reviewed again.  Activation procedures which include photic stimulation, hyperventilation and instructing patients to perform simple task are done in selected patients.   Compresses spectral analysis (CSA) is also performed on the activity recorded from each individual channel.  This is displayed as a power display of frequencies from 0 to 30 Hz over time.   The CSA analysis is done and displayed continuously.  This is reviewed for asymmetries in power between homologous areas of the scalp and for presence of changes in power which can be seen when seizures occur.  Sections of suspected abnormalities on the CSA is then compared with the original EEG recording.                [employer] software was also utilized in the review of this study.  This software suite analyzes the EEG recording in multiple domains.  Coherence and rhythmicity is computed to identify EEG sections which may contain organized seizures.  Each channel undergoes analysis to detect presence of spike and sharp waves which have special and morphological  characteristic of epileptic activity.  The routine EEG recording is converted from spacial into frequency domain.  This is then displayed comparing homologous areas to identify areas of significant asymmetry.  Algorithm to identify non-cortically generated artifact is used to separate eye movement, EMG and other artifact from the EEG.     Recording times  Start on May 9, 2022 at hours 7 minute 1 seconds 31  End on May 10, 2022 at hours 7 minute 0 seconds 3  Start on May 10, 2022 at hours 7 minute 1 seconds 30  End on May 10, 2022 at hours 11 minute 46 seconds 51    The total time of EEG recording for the study was 28 hours and 40 minutes     EEG FINDINGS:  The recording was obtained with a number of standard bipolar and referential montages during wakefulness, drowsiness and sleep.  In the alert state, the posterior background rhythm was a symmetric, well-modulated 7 Hz activity, which reacted symmetrically to eye opening.  Activation procedures were not performed.  The background is punctuated by right frontal polar maximum as well as right lateralized epileptiform discharges which were pseudo periodic at a frequency of 1-3 hertz.  In addition prolonged runs of sharply contoured rhythmical 4-5 hertz activity was noted in the right hemisphere with evolution in morphology and rhythmicity suggestive of an electrographic seizure.  By hour 23 minute 5 the right hemisphere demonstrated 2-3 hertz waxing waning lateralized periodic epileptiform discharges.     The EKG channel revealed an irregular rhythm     IMPRESSION:  This is an abnormal EEG during wakefulness, drowsiness and sleep. Diffuse slowing was noted.  Asymmetric slowing of the right hemisphere was noted.  Right frontal polar maximum epileptiform discharges were noted which were pseudo periodic.  Numerous subclinical electrographic seizures were recorded emanating from this region.     CLINICAL CORRELATION:  The patient is a 77 year female who presented with  witnessed seizures and is currently maintained on Keppra Vimpat and Depakote.  This is an abnormal EEG during wakefulness, drowsiness and sleep.  The overall degree of slowing disorganization suggestive mild encephalopathy nonspecific to the cause.  The background asymmetry suggestive of structural abnormality in the right hemisphere.  The presence of right frontal polar maximum epileptiform discharges confers an increased risk for focal seizures from a deeper epileptogenic focus within the right hemisphere.  Beginning hour 8 minutes 7 continuous evolving electrographic seizures were visualized emanating from the right hemisphere suggestive of focal status epilepticus.  By hour 23 minute 5 on to May 10, 2022 right lateralized epileptiform discharges were noted suggestive of cortical irritation in this hemisphere.

## 2022-06-09 ENCOUNTER — LAB VISIT (OUTPATIENT)
Dept: LAB | Facility: HOSPITAL | Age: 77
End: 2022-06-09
Attending: HOSPITALIST
Payer: MEDICARE

## 2022-06-09 DIAGNOSIS — K92.1 BLOOD IN STOOL: Primary | ICD-10-CM

## 2022-06-09 LAB — OB PNL STL: POSITIVE

## 2022-06-09 PROCEDURE — 82272 OCCULT BLD FECES 1-3 TESTS: CPT

## 2022-08-02 NOTE — ASSESSMENT & PLAN NOTE
BP soft on admission, holding home antihypertensives.     - Hold home amlodipine 5 mg qday, irbesartan-HCTZ 300-12.5 mg qday  - Goal normotension    Patient endorses that he took Zyprexa 10 mg HS with Paxil 10 mg daily in the past. He is willing to continue as such and endorses that he feels stable on this meds combination. Discussed risks; benefits; s-e of both Zyprexa/Paxil Patient endorses that he took Zyprexa 10 mg HS with Paxil 10 mg daily in the past. He is willing to continue as such and endorses that he feels stable on this meds combination. Discussed risks; benefits; s-e of both Zyprexa/Paxil

## 2022-08-05 ENCOUNTER — HOSPITAL ENCOUNTER (EMERGENCY)
Facility: HOSPITAL | Age: 77
Discharge: ANOTHER HOSPITAL | End: 2022-08-06
Attending: SURGERY
Payer: MEDICARE

## 2022-08-05 DIAGNOSIS — U07.1 COVID-19 VIRUS INFECTION: ICD-10-CM

## 2022-08-05 DIAGNOSIS — R41.82 ALTERED MENTAL STATUS, UNSPECIFIED ALTERED MENTAL STATUS TYPE: Primary | ICD-10-CM

## 2022-08-05 DIAGNOSIS — U07.1 COVID-19 VIRUS DETECTED: ICD-10-CM

## 2022-08-05 LAB
ALBUMIN SERPL BCP-MCNC: 2.9 G/DL (ref 3.5–5.2)
ALP SERPL-CCNC: 58 U/L (ref 55–135)
ALT SERPL W/O P-5'-P-CCNC: 13 U/L (ref 10–44)
ANION GAP SERPL CALC-SCNC: 7 MMOL/L (ref 8–16)
AST SERPL-CCNC: 30 U/L (ref 10–40)
BASOPHILS # BLD AUTO: 0.06 K/UL (ref 0–0.2)
BASOPHILS NFR BLD: 0.7 % (ref 0–1.9)
BILIRUB SERPL-MCNC: 0.3 MG/DL (ref 0.1–1)
BILIRUB UR QL STRIP: NEGATIVE
BNP SERPL-MCNC: 513 PG/ML (ref 0–99)
BUN SERPL-MCNC: 16 MG/DL (ref 8–23)
CALCIUM SERPL-MCNC: 9.1 MG/DL (ref 8.7–10.5)
CHLORIDE SERPL-SCNC: 96 MMOL/L (ref 95–110)
CK MB SERPL-MCNC: 0.4 NG/ML (ref 0.1–6.5)
CK MB SERPL-RTO: 1 % (ref 0–5)
CK SERPL-CCNC: 42 U/L (ref 20–180)
CK SERPL-CCNC: 42 U/L (ref 20–180)
CLARITY UR: CLEAR
CO2 SERPL-SCNC: 28 MMOL/L (ref 23–29)
COLOR UR: YELLOW
CREAT SERPL-MCNC: 0.9 MG/DL (ref 0.5–1.4)
DIFFERENTIAL METHOD: ABNORMAL
EOSINOPHIL # BLD AUTO: 0.1 K/UL (ref 0–0.5)
EOSINOPHIL NFR BLD: 0.8 % (ref 0–8)
ERYTHROCYTE [DISTWIDTH] IN BLOOD BY AUTOMATED COUNT: 15.2 % (ref 11.5–14.5)
EST. GFR  (NO RACE VARIABLE): >60 ML/MIN/1.73 M^2
GLUCOSE SERPL-MCNC: 86 MG/DL (ref 70–110)
GLUCOSE UR QL STRIP: NEGATIVE
GROUP A STREP, MOLECULAR: NEGATIVE
HCT VFR BLD AUTO: 36.8 % (ref 37–48.5)
HGB BLD-MCNC: 12.1 G/DL (ref 12–16)
HGB UR QL STRIP: NEGATIVE
IMM GRANULOCYTES # BLD AUTO: 0.03 K/UL (ref 0–0.04)
IMM GRANULOCYTES NFR BLD AUTO: 0.4 % (ref 0–0.5)
INFLUENZA A, MOLECULAR: NEGATIVE
INFLUENZA B, MOLECULAR: NEGATIVE
KETONES UR QL STRIP: ABNORMAL
LACTATE SERPL-SCNC: 0.6 MMOL/L (ref 0.5–2.2)
LEUKOCYTE ESTERASE UR QL STRIP: NEGATIVE
LYMPHOCYTES # BLD AUTO: 2.4 K/UL (ref 1–4.8)
LYMPHOCYTES NFR BLD: 28.4 % (ref 18–48)
MCH RBC QN AUTO: 31.4 PG (ref 27–31)
MCHC RBC AUTO-ENTMCNC: 32.9 G/DL (ref 32–36)
MCV RBC AUTO: 96 FL (ref 82–98)
MONOCYTES # BLD AUTO: 1.5 K/UL (ref 0.3–1)
MONOCYTES NFR BLD: 17.4 % (ref 4–15)
NEUTROPHILS # BLD AUTO: 4.4 K/UL (ref 1.8–7.7)
NEUTROPHILS NFR BLD: 52.3 % (ref 38–73)
NITRITE UR QL STRIP: NEGATIVE
NRBC BLD-RTO: 0 /100 WBC
PH UR STRIP: 8 [PH] (ref 5–8)
PLATELET # BLD AUTO: 223 K/UL (ref 150–450)
PMV BLD AUTO: 10.3 FL (ref 9.2–12.9)
POTASSIUM SERPL-SCNC: 4.4 MMOL/L (ref 3.5–5.1)
PROCALCITONIN SERPL IA-MCNC: 0.04 NG/ML
PROT SERPL-MCNC: 6.7 G/DL (ref 6–8.4)
PROT UR QL STRIP: NEGATIVE
RBC # BLD AUTO: 3.85 M/UL (ref 4–5.4)
SARS-COV-2 RDRP RESP QL NAA+PROBE: POSITIVE
SODIUM SERPL-SCNC: 131 MMOL/L (ref 136–145)
SP GR UR STRIP: 1.02 (ref 1–1.03)
SPECIMEN SOURCE: NORMAL
TROPONIN I SERPL DL<=0.01 NG/ML-MCNC: <0.006 NG/ML (ref 0–0.03)
URN SPEC COLLECT METH UR: ABNORMAL
UROBILINOGEN UR STRIP-ACNC: 1 EU/DL
WBC # BLD AUTO: 8.46 K/UL (ref 3.9–12.7)

## 2022-08-05 PROCEDURE — 96365 THER/PROPH/DIAG IV INF INIT: CPT

## 2022-08-05 PROCEDURE — 99285 EMERGENCY DEPT VISIT HI MDM: CPT | Mod: 25

## 2022-08-05 PROCEDURE — 82553 CREATINE MB FRACTION: CPT | Performed by: SURGERY

## 2022-08-05 PROCEDURE — 63600175 PHARM REV CODE 636 W HCPCS: Performed by: SURGERY

## 2022-08-05 PROCEDURE — 84484 ASSAY OF TROPONIN QUANT: CPT | Performed by: SURGERY

## 2022-08-05 PROCEDURE — 83605 ASSAY OF LACTIC ACID: CPT | Performed by: SURGERY

## 2022-08-05 PROCEDURE — 87040 BLOOD CULTURE FOR BACTERIA: CPT | Performed by: SURGERY

## 2022-08-05 PROCEDURE — 96361 HYDRATE IV INFUSION ADD-ON: CPT | Mod: 59

## 2022-08-05 PROCEDURE — 80053 COMPREHEN METABOLIC PANEL: CPT | Performed by: SURGERY

## 2022-08-05 PROCEDURE — 96372 THER/PROPH/DIAG INJ SC/IM: CPT | Mod: 59 | Performed by: SURGERY

## 2022-08-05 PROCEDURE — 93010 ELECTROCARDIOGRAM REPORT: CPT | Mod: ,,, | Performed by: INTERNAL MEDICINE

## 2022-08-05 PROCEDURE — U0002 COVID-19 LAB TEST NON-CDC: HCPCS | Performed by: SURGERY

## 2022-08-05 PROCEDURE — 81003 URINALYSIS AUTO W/O SCOPE: CPT | Performed by: SURGERY

## 2022-08-05 PROCEDURE — 84145 PROCALCITONIN (PCT): CPT | Performed by: SURGERY

## 2022-08-05 PROCEDURE — 87502 INFLUENZA DNA AMP PROBE: CPT | Performed by: SURGERY

## 2022-08-05 PROCEDURE — 93010 EKG 12-LEAD: ICD-10-PCS | Mod: ,,, | Performed by: INTERNAL MEDICINE

## 2022-08-05 PROCEDURE — 25000003 PHARM REV CODE 250: Performed by: SURGERY

## 2022-08-05 PROCEDURE — 83880 ASSAY OF NATRIURETIC PEPTIDE: CPT | Performed by: SURGERY

## 2022-08-05 PROCEDURE — 85025 COMPLETE CBC W/AUTO DIFF WBC: CPT | Performed by: SURGERY

## 2022-08-05 PROCEDURE — 96375 TX/PRO/DX INJ NEW DRUG ADDON: CPT | Mod: 59

## 2022-08-05 PROCEDURE — 87651 STREP A DNA AMP PROBE: CPT | Performed by: SURGERY

## 2022-08-05 PROCEDURE — 93005 ELECTROCARDIOGRAM TRACING: CPT

## 2022-08-05 RX ORDER — IBUPROFEN 800 MG/1
800 TABLET ORAL
Status: DISCONTINUED | OUTPATIENT
Start: 2022-08-05 | End: 2022-08-05 | Stop reason: ALTCHOICE

## 2022-08-05 RX ORDER — LEVETIRACETAM 5 MG/ML
1000 INJECTION INTRAVASCULAR
Status: COMPLETED | OUTPATIENT
Start: 2022-08-05 | End: 2022-08-05

## 2022-08-05 RX ORDER — LEVETIRACETAM 5 MG/ML
100 INJECTION INTRAVASCULAR
Status: DISCONTINUED | OUTPATIENT
Start: 2022-08-05 | End: 2022-08-05

## 2022-08-05 RX ORDER — ACETAMINOPHEN 650 MG/1
650 SUPPOSITORY RECTAL
Status: COMPLETED | OUTPATIENT
Start: 2022-08-05 | End: 2022-08-05

## 2022-08-05 RX ORDER — ENOXAPARIN SODIUM 100 MG/ML
1 INJECTION SUBCUTANEOUS
Status: COMPLETED | OUTPATIENT
Start: 2022-08-05 | End: 2022-08-05

## 2022-08-05 RX ORDER — DEXAMETHASONE SODIUM PHOSPHATE 4 MG/ML
6 INJECTION, SOLUTION INTRA-ARTICULAR; INTRALESIONAL; INTRAMUSCULAR; INTRAVENOUS; SOFT TISSUE
Status: COMPLETED | OUTPATIENT
Start: 2022-08-05 | End: 2022-08-05

## 2022-08-05 RX ORDER — ASPIRIN 325 MG
325 TABLET ORAL
Status: DISCONTINUED | OUTPATIENT
Start: 2022-08-05 | End: 2022-08-06 | Stop reason: HOSPADM

## 2022-08-05 RX ORDER — ACETAMINOPHEN 500 MG
1000 TABLET ORAL
Status: DISCONTINUED | OUTPATIENT
Start: 2022-08-05 | End: 2022-08-05 | Stop reason: ALTCHOICE

## 2022-08-05 RX ORDER — FUROSEMIDE 10 MG/ML
40 INJECTION INTRAMUSCULAR; INTRAVENOUS
Status: COMPLETED | OUTPATIENT
Start: 2022-08-05 | End: 2022-08-05

## 2022-08-05 RX ADMIN — ACETAMINOPHEN 650 MG: 650 SUPPOSITORY RECTAL at 05:08

## 2022-08-05 RX ADMIN — ENOXAPARIN SODIUM 50 MG: 60 INJECTION SUBCUTANEOUS at 08:08

## 2022-08-05 RX ADMIN — FUROSEMIDE 40 MG: 10 INJECTION, SOLUTION INTRAMUSCULAR; INTRAVENOUS at 08:08

## 2022-08-05 RX ADMIN — LEVETIRACETAM 1000 MG: 5 INJECTION INTRAVENOUS at 09:08

## 2022-08-05 RX ADMIN — DEXAMETHASONE SODIUM PHOSPHATE 6 MG: 4 INJECTION, SOLUTION INTRA-ARTICULAR; INTRALESIONAL; INTRAMUSCULAR; INTRAVENOUS; SOFT TISSUE at 08:08

## 2022-08-05 RX ADMIN — SODIUM CHLORIDE 1000 ML: 0.9 INJECTION, SOLUTION INTRAVENOUS at 05:08
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
## 2022-08-05 NOTE — ED TRIAGE NOTES
77 y.o. female presents to ER ED 02/ED 02A   Chief Complaint   Patient presents with    Altered Mental Status   Pt brought to ER by EMS with AMS onset this morning. No acute distress noted.

## 2022-08-06 ENCOUNTER — HOSPITAL ENCOUNTER (INPATIENT)
Facility: HOSPITAL | Age: 77
LOS: 3 days | Discharge: HOME HEALTH | DRG: 177 | End: 2022-08-09
Attending: HOSPITALIST | Admitting: HOSPITALIST
Payer: MEDICARE

## 2022-08-06 VITALS
HEART RATE: 81 BPM | SYSTOLIC BLOOD PRESSURE: 96 MMHG | RESPIRATION RATE: 20 BRPM | BODY MASS INDEX: 21.16 KG/M2 | OXYGEN SATURATION: 97 % | TEMPERATURE: 98 F | DIASTOLIC BLOOD PRESSURE: 59 MMHG | WEIGHT: 112 LBS

## 2022-08-06 DIAGNOSIS — R29.90 STROKE-LIKE SYMPTOMS: ICD-10-CM

## 2022-08-06 DIAGNOSIS — U07.1 ENCEPHALOPATHY DUE TO COVID-19 VIRUS: ICD-10-CM

## 2022-08-06 DIAGNOSIS — I72.9 ANEURYSM: Primary | ICD-10-CM

## 2022-08-06 DIAGNOSIS — R29.90 STROKE-LIKE SYMPTOM: ICD-10-CM

## 2022-08-06 DIAGNOSIS — R56.9 SEIZURES: ICD-10-CM

## 2022-08-06 DIAGNOSIS — F03.90 DEMENTIA WITHOUT BEHAVIORAL DISTURBANCE, UNSPECIFIED DEMENTIA TYPE: ICD-10-CM

## 2022-08-06 DIAGNOSIS — R07.9 CHEST PAIN: ICD-10-CM

## 2022-08-06 DIAGNOSIS — G93.49 ENCEPHALOPATHY DUE TO COVID-19 VIRUS: ICD-10-CM

## 2022-08-06 PROBLEM — J96.01 ACUTE HYPOXEMIC RESPIRATORY FAILURE DUE TO COVID-19: Status: ACTIVE | Noted: 2022-08-06

## 2022-08-06 PROBLEM — I48.91 A-FIB: Status: ACTIVE | Noted: 2022-08-06

## 2022-08-06 LAB
ANION GAP SERPL CALC-SCNC: 12 MMOL/L (ref 8–16)
AORTIC ROOT ANNULUS: 2.57 CM
APTT BLDCRRT: 38.2 SEC (ref 21–32)
ASCENDING AORTA: 2.62 CM
AV INDEX (PROSTH): 0.43
AV MEAN GRADIENT: 3 MMHG
AV PEAK GRADIENT: 5 MMHG
AV VALVE AREA: 1.32 CM2
AV VELOCITY RATIO: 0.58
BASOPHILS # BLD AUTO: 0.05 K/UL (ref 0–0.2)
BASOPHILS NFR BLD: 0.6 % (ref 0–1.9)
BSA FOR ECHO PROCEDURE: 1.47 M2
BUN SERPL-MCNC: 15 MG/DL (ref 8–23)
CALCIUM SERPL-MCNC: 8.2 MG/DL (ref 8.7–10.5)
CHLORIDE SERPL-SCNC: 98 MMOL/L (ref 95–110)
CHOLEST SERPL-MCNC: 180 MG/DL (ref 120–199)
CHOLEST/HDLC SERPL: 2.5 {RATIO} (ref 2–5)
CK MB SERPL-MCNC: 0.5 NG/ML (ref 0.1–6.5)
CK MB SERPL-RTO: 1.1 % (ref 0–5)
CK SERPL-CCNC: 44 U/L (ref 20–180)
CK SERPL-CCNC: 44 U/L (ref 20–180)
CO2 SERPL-SCNC: 23 MMOL/L (ref 23–29)
CREAT SERPL-MCNC: 0.7 MG/DL (ref 0.5–1.4)
CV ECHO LV RWT: 0.31 CM
D DIMER PPP IA.FEU-MCNC: 0.45 MG/L FEU
DIFFERENTIAL METHOD: ABNORMAL
DOP CALC AO PEAK VEL: 1.1 M/S
DOP CALC AO VTI: 21.25 CM
DOP CALC LVOT AREA: 3.1 CM2
DOP CALC LVOT DIAMETER: 1.98 CM
DOP CALC LVOT PEAK VEL: 0.64 M/S
DOP CALC LVOT STROKE VOLUME: 27.97 CM3
DOP CALCLVOT PEAK VEL VTI: 9.09 CM
ECHO LV POSTERIOR WALL: 0.61 CM (ref 0.6–1.1)
EJECTION FRACTION: 65 %
EOSINOPHIL # BLD AUTO: 0 K/UL (ref 0–0.5)
EOSINOPHIL NFR BLD: 0 % (ref 0–8)
ERYTHROCYTE [DISTWIDTH] IN BLOOD BY AUTOMATED COUNT: 15.3 % (ref 11.5–14.5)
ERYTHROCYTE [SEDIMENTATION RATE] IN BLOOD BY WESTERGREN METHOD: 42 MM/HR (ref 0–20)
EST. GFR  (NO RACE VARIABLE): >60 ML/MIN/1.73 M^2
ESTIMATED AVG GLUCOSE: 91 MG/DL (ref 68–131)
FERRITIN SERPL-MCNC: 283 NG/ML (ref 20–300)
FRACTIONAL SHORTENING: 67 % (ref 28–44)
GLUCOSE SERPL-MCNC: 89 MG/DL (ref 70–110)
HBA1C MFR BLD: 4.8 % (ref 4–5.6)
HCT VFR BLD AUTO: 41.8 % (ref 37–48.5)
HDLC SERPL-MCNC: 72 MG/DL (ref 40–75)
HDLC SERPL: 40 % (ref 20–50)
HGB BLD-MCNC: 13.7 G/DL (ref 12–16)
IMM GRANULOCYTES # BLD AUTO: 0.02 K/UL (ref 0–0.04)
IMM GRANULOCYTES NFR BLD AUTO: 0.3 % (ref 0–0.5)
INR PPP: 1.2 (ref 0.8–1.2)
INTERVENTRICULAR SEPTUM: 0.58 CM (ref 0.6–1.1)
LA MAJOR: 5.58 CM
LA MINOR: 5.42 CM
LA WIDTH: 4.4 CM
LDH SERPL L TO P-CCNC: 248 U/L (ref 110–260)
LDLC SERPL CALC-MCNC: 98.8 MG/DL (ref 63–159)
LEFT ATRIUM SIZE: 3.5 CM
LEFT ATRIUM VOLUME INDEX MOD: 46.8 ML/M2
LEFT ATRIUM VOLUME INDEX: 49 ML/M2
LEFT ATRIUM VOLUME MOD: 68.81 CM3
LEFT ATRIUM VOLUME: 71.98 CM3
LEFT INTERNAL DIMENSION IN SYSTOLE: 1.3 CM (ref 2.1–4)
LEFT VENTRICLE DIASTOLIC VOLUME INDEX: 40.59 ML/M2
LEFT VENTRICLE DIASTOLIC VOLUME: 59.67 ML
LEFT VENTRICLE MASS INDEX: 41 G/M2
LEFT VENTRICLE SYSTOLIC VOLUME INDEX: 16.9 ML/M2
LEFT VENTRICLE SYSTOLIC VOLUME: 24.79 ML
LEFT VENTRICULAR INTERNAL DIMENSION IN DIASTOLE: 3.9 CM (ref 3.5–6)
LEFT VENTRICULAR MASS: 60.96 G
LYMPHOCYTES # BLD AUTO: 2.5 K/UL (ref 1–4.8)
LYMPHOCYTES NFR BLD: 32.3 % (ref 18–48)
MAGNESIUM SERPL-MCNC: 1.8 MG/DL (ref 1.6–2.6)
MCH RBC QN AUTO: 32.1 PG (ref 27–31)
MCHC RBC AUTO-ENTMCNC: 32.8 G/DL (ref 32–36)
MCV RBC AUTO: 98 FL (ref 82–98)
MONOCYTES # BLD AUTO: 0.8 K/UL (ref 0.3–1)
MONOCYTES NFR BLD: 10.1 % (ref 4–15)
NEUTROPHILS # BLD AUTO: 4.4 K/UL (ref 1.8–7.7)
NEUTROPHILS NFR BLD: 56.7 % (ref 38–73)
NONHDLC SERPL-MCNC: 108 MG/DL
NRBC BLD-RTO: 0 /100 WBC
PISA MRMAX VEL: 0.02 M/S
PISA TR MAX VEL: 2.12 M/S
PLATELET # BLD AUTO: 170 K/UL (ref 150–450)
PMV BLD AUTO: 11.2 FL (ref 9.2–12.9)
POCT GLUCOSE: 112 MG/DL (ref 70–110)
POTASSIUM SERPL-SCNC: 4.5 MMOL/L (ref 3.5–5.1)
PROTHROMBIN TIME: 12.2 SEC (ref 9–12.5)
PULM VEIN S/D RATIO: 1.02
PV PEAK D VEL: 0.6 M/S
PV PEAK S VEL: 0.61 M/S
PV PEAK VELOCITY: 1 CM/S
RA MAJOR: 5.4 CM
RA PRESSURE: 3 MMHG
RA WIDTH: 4.5 CM
RBC # BLD AUTO: 4.27 M/UL (ref 4–5.4)
RIGHT VENTRICULAR END-DIASTOLIC DIMENSION: 2.3 CM
RIGHT VENTRICULAR LENGTH IN DIASTOLE (APICAL 4-CHAMBER VIEW): 4.2 CM
RV TISSUE DOPPLER FREE WALL SYSTOLIC VELOCITY 1 (APICAL 4 CHAMBER VIEW): 7.29 CM/S
SODIUM SERPL-SCNC: 133 MMOL/L (ref 136–145)
STJ: 2.06 CM
TR MAX PG: 18 MMHG
TRICUSPID ANNULAR PLANE SYSTOLIC EXCURSION: 1.3 CM
TRIGL SERPL-MCNC: 46 MG/DL (ref 30–150)
TROPONIN I SERPL DL<=0.01 NG/ML-MCNC: <0.006 NG/ML (ref 0–0.03)
TSH SERPL DL<=0.005 MIU/L-ACNC: 2.25 UIU/ML (ref 0.4–4)
TV REST PULMONARY ARTERY PRESSURE: 21 MMHG
WBC # BLD AUTO: 7.81 K/UL (ref 3.9–12.7)

## 2022-08-06 PROCEDURE — 80048 BASIC METABOLIC PNL TOTAL CA: CPT | Performed by: NURSE PRACTITIONER

## 2022-08-06 PROCEDURE — C9254 INJECTION, LACOSAMIDE: HCPCS | Performed by: NURSE PRACTITIONER

## 2022-08-06 PROCEDURE — 63600175 PHARM REV CODE 636 W HCPCS: Performed by: NURSE PRACTITIONER

## 2022-08-06 PROCEDURE — 25000003 PHARM REV CODE 250: Performed by: HOSPITALIST

## 2022-08-06 PROCEDURE — 84484 ASSAY OF TROPONIN QUANT: CPT | Performed by: NURSE PRACTITIONER

## 2022-08-06 PROCEDURE — 25000003 PHARM REV CODE 250: Performed by: NURSE PRACTITIONER

## 2022-08-06 PROCEDURE — 97530 THERAPEUTIC ACTIVITIES: CPT

## 2022-08-06 PROCEDURE — 11000001 HC ACUTE MED/SURG PRIVATE ROOM

## 2022-08-06 PROCEDURE — 94640 AIRWAY INHALATION TREATMENT: CPT

## 2022-08-06 PROCEDURE — 82728 ASSAY OF FERRITIN: CPT | Performed by: NURSE PRACTITIONER

## 2022-08-06 PROCEDURE — 63600175 PHARM REV CODE 636 W HCPCS: Performed by: HOSPITALIST

## 2022-08-06 PROCEDURE — 99900035 HC TECH TIME PER 15 MIN (STAT)

## 2022-08-06 PROCEDURE — 85652 RBC SED RATE AUTOMATED: CPT | Performed by: NURSE PRACTITIONER

## 2022-08-06 PROCEDURE — 80061 LIPID PANEL: CPT | Performed by: NURSE PRACTITIONER

## 2022-08-06 PROCEDURE — 51798 US URINE CAPACITY MEASURE: CPT

## 2022-08-06 PROCEDURE — 27000207 HC ISOLATION

## 2022-08-06 PROCEDURE — 25500020 PHARM REV CODE 255: Performed by: HOSPITALIST

## 2022-08-06 PROCEDURE — 84443 ASSAY THYROID STIM HORMONE: CPT | Performed by: NURSE PRACTITIONER

## 2022-08-06 PROCEDURE — 85610 PROTHROMBIN TIME: CPT | Performed by: NURSE PRACTITIONER

## 2022-08-06 PROCEDURE — 83735 ASSAY OF MAGNESIUM: CPT | Performed by: NURSE PRACTITIONER

## 2022-08-06 PROCEDURE — 82553 CREATINE MB FRACTION: CPT | Performed by: NURSE PRACTITIONER

## 2022-08-06 PROCEDURE — 83036 HEMOGLOBIN GLYCOSYLATED A1C: CPT | Performed by: NURSE PRACTITIONER

## 2022-08-06 PROCEDURE — 83615 LACTATE (LD) (LDH) ENZYME: CPT | Performed by: NURSE PRACTITIONER

## 2022-08-06 PROCEDURE — 97166 OT EVAL MOD COMPLEX 45 MIN: CPT

## 2022-08-06 PROCEDURE — 27000221 HC OXYGEN, UP TO 24 HOURS

## 2022-08-06 PROCEDURE — 92610 EVALUATE SWALLOWING FUNCTION: CPT

## 2022-08-06 PROCEDURE — 85730 THROMBOPLASTIN TIME PARTIAL: CPT | Performed by: NURSE PRACTITIONER

## 2022-08-06 PROCEDURE — 85379 FIBRIN DEGRADATION QUANT: CPT | Performed by: NURSE PRACTITIONER

## 2022-08-06 PROCEDURE — 85025 COMPLETE CBC W/AUTO DIFF WBC: CPT | Performed by: NURSE PRACTITIONER

## 2022-08-06 PROCEDURE — 25000242 PHARM REV CODE 250 ALT 637 W/ HCPCS: Performed by: HOSPITALIST

## 2022-08-06 PROCEDURE — 94761 N-INVAS EAR/PLS OXIMETRY MLT: CPT

## 2022-08-06 RX ORDER — SODIUM CHLORIDE 0.9 % (FLUSH) 0.9 %
10 SYRINGE (ML) INJECTION
Status: DISCONTINUED | OUTPATIENT
Start: 2022-08-06 | End: 2022-08-09 | Stop reason: HOSPADM

## 2022-08-06 RX ORDER — ATORVASTATIN CALCIUM 40 MG/1
40 TABLET, FILM COATED ORAL DAILY
Status: DISCONTINUED | OUTPATIENT
Start: 2022-08-06 | End: 2022-08-09 | Stop reason: HOSPADM

## 2022-08-06 RX ORDER — LEVETIRACETAM 10 MG/ML
1000 INJECTION INTRAVASCULAR EVERY 12 HOURS
Status: DISCONTINUED | OUTPATIENT
Start: 2022-08-06 | End: 2022-08-07

## 2022-08-06 RX ORDER — NAPROXEN SODIUM 220 MG/1
81 TABLET, FILM COATED ORAL DAILY
Status: CANCELLED | OUTPATIENT
Start: 2022-08-06

## 2022-08-06 RX ORDER — ALBUTEROL SULFATE 90 UG/1
2 AEROSOL, METERED RESPIRATORY (INHALATION) EVERY 6 HOURS
Status: DISCONTINUED | OUTPATIENT
Start: 2022-08-06 | End: 2022-08-06

## 2022-08-06 RX ORDER — ATORVASTATIN CALCIUM 40 MG/1
40 TABLET, FILM COATED ORAL DAILY
Status: DISCONTINUED | OUTPATIENT
Start: 2022-08-06 | End: 2022-08-06

## 2022-08-06 RX ORDER — NAPROXEN SODIUM 220 MG/1
81 TABLET, FILM COATED ORAL DAILY
Status: DISCONTINUED | OUTPATIENT
Start: 2022-08-06 | End: 2022-08-09 | Stop reason: HOSPADM

## 2022-08-06 RX ORDER — ATORVASTATIN CALCIUM 40 MG/1
40 TABLET, FILM COATED ORAL DAILY
Status: CANCELLED | OUTPATIENT
Start: 2022-08-06

## 2022-08-06 RX ORDER — IBUPROFEN 200 MG
16 TABLET ORAL
Status: DISCONTINUED | OUTPATIENT
Start: 2022-08-06 | End: 2022-08-09 | Stop reason: HOSPADM

## 2022-08-06 RX ORDER — ASCORBIC ACID 500 MG
500 TABLET ORAL 2 TIMES DAILY
Status: DISCONTINUED | OUTPATIENT
Start: 2022-08-06 | End: 2022-08-06

## 2022-08-06 RX ORDER — ONDANSETRON 2 MG/ML
4 INJECTION INTRAMUSCULAR; INTRAVENOUS EVERY 8 HOURS PRN
Status: DISCONTINUED | OUTPATIENT
Start: 2022-08-06 | End: 2022-08-09 | Stop reason: HOSPADM

## 2022-08-06 RX ORDER — SIMVASTATIN 40 MG/1
TABLET, FILM COATED ORAL
Status: ON HOLD | COMMUNITY
Start: 2022-07-11 | End: 2022-08-09 | Stop reason: HOSPADM

## 2022-08-06 RX ORDER — SODIUM CHLORIDE 0.9 % (FLUSH) 0.9 %
3 SYRINGE (ML) INJECTION EVERY 12 HOURS PRN
Status: DISCONTINUED | OUTPATIENT
Start: 2022-08-06 | End: 2022-08-09 | Stop reason: HOSPADM

## 2022-08-06 RX ORDER — DEXAMETHASONE SODIUM PHOSPHATE 4 MG/ML
6 INJECTION, SOLUTION INTRA-ARTICULAR; INTRALESIONAL; INTRAMUSCULAR; INTRAVENOUS; SOFT TISSUE DAILY
Status: DISCONTINUED | OUTPATIENT
Start: 2022-08-06 | End: 2022-08-07

## 2022-08-06 RX ORDER — ACETAMINOPHEN 325 MG/1
650 TABLET ORAL EVERY 4 HOURS PRN
Status: DISCONTINUED | OUTPATIENT
Start: 2022-08-06 | End: 2022-08-09 | Stop reason: HOSPADM

## 2022-08-06 RX ORDER — MUPIROCIN 20 MG/G
OINTMENT TOPICAL 2 TIMES DAILY
Status: DISCONTINUED | OUTPATIENT
Start: 2022-08-06 | End: 2022-08-09 | Stop reason: HOSPADM

## 2022-08-06 RX ORDER — ASPIRIN 81 MG/1
81 TABLET ORAL DAILY
Status: DISCONTINUED | OUTPATIENT
Start: 2022-08-06 | End: 2022-08-06

## 2022-08-06 RX ORDER — CLOPIDOGREL BISULFATE 75 MG/1
75 TABLET ORAL DAILY
Status: DISCONTINUED | OUTPATIENT
Start: 2022-08-06 | End: 2022-08-06

## 2022-08-06 RX ORDER — GLUCAGON 1 MG
1 KIT INJECTION
Status: DISCONTINUED | OUTPATIENT
Start: 2022-08-06 | End: 2022-08-09 | Stop reason: HOSPADM

## 2022-08-06 RX ORDER — AZELASTINE 1 MG/ML
2 SPRAY, METERED NASAL 2 TIMES DAILY
COMMUNITY
Start: 2022-06-30

## 2022-08-06 RX ORDER — LABETALOL HYDROCHLORIDE 5 MG/ML
10 INJECTION, SOLUTION INTRAVENOUS
Status: DISCONTINUED | OUTPATIENT
Start: 2022-08-06 | End: 2022-08-09 | Stop reason: HOSPADM

## 2022-08-06 RX ORDER — TALC
6 POWDER (GRAM) TOPICAL NIGHTLY PRN
Status: DISCONTINUED | OUTPATIENT
Start: 2022-08-06 | End: 2022-08-09 | Stop reason: HOSPADM

## 2022-08-06 RX ORDER — IBUPROFEN 200 MG
24 TABLET ORAL
Status: DISCONTINUED | OUTPATIENT
Start: 2022-08-06 | End: 2022-08-06 | Stop reason: SDUPTHER

## 2022-08-06 RX ORDER — BENZONATATE 100 MG/1
100 CAPSULE ORAL 3 TIMES DAILY PRN
Status: DISCONTINUED | OUTPATIENT
Start: 2022-08-06 | End: 2022-08-09 | Stop reason: HOSPADM

## 2022-08-06 RX ORDER — DEXAMETHASONE SODIUM PHOSPHATE 4 MG/ML
6 INJECTION, SOLUTION INTRA-ARTICULAR; INTRALESIONAL; INTRAMUSCULAR; INTRAVENOUS; SOFT TISSUE DAILY
Status: DISCONTINUED | OUTPATIENT
Start: 2022-08-07 | End: 2022-08-06

## 2022-08-06 RX ORDER — CLOPIDOGREL BISULFATE 75 MG/1
75 TABLET ORAL DAILY
Status: CANCELLED | OUTPATIENT
Start: 2022-08-06

## 2022-08-06 RX ORDER — NALOXONE HCL 0.4 MG/ML
0.02 VIAL (ML) INJECTION
Status: DISCONTINUED | OUTPATIENT
Start: 2022-08-06 | End: 2022-08-09 | Stop reason: HOSPADM

## 2022-08-06 RX ORDER — ENOXAPARIN SODIUM 100 MG/ML
1 INJECTION SUBCUTANEOUS 2 TIMES DAILY
Status: DISCONTINUED | OUTPATIENT
Start: 2022-08-06 | End: 2022-08-09 | Stop reason: HOSPADM

## 2022-08-06 RX ORDER — GLUCAGON 1 MG
1 KIT INJECTION
Status: DISCONTINUED | OUTPATIENT
Start: 2022-08-06 | End: 2022-08-06 | Stop reason: SDUPTHER

## 2022-08-06 RX ORDER — AMIODARONE HYDROCHLORIDE 200 MG/1
200 TABLET ORAL DAILY
Status: DISCONTINUED | OUTPATIENT
Start: 2022-08-06 | End: 2022-08-09 | Stop reason: HOSPADM

## 2022-08-06 RX ORDER — ALBUTEROL SULFATE 90 UG/1
2 AEROSOL, METERED RESPIRATORY (INHALATION) EVERY 6 HOURS
Status: DISCONTINUED | OUTPATIENT
Start: 2022-08-06 | End: 2022-08-09 | Stop reason: HOSPADM

## 2022-08-06 RX ORDER — IBUPROFEN 200 MG
16 TABLET ORAL
Status: DISCONTINUED | OUTPATIENT
Start: 2022-08-06 | End: 2022-08-06 | Stop reason: SDUPTHER

## 2022-08-06 RX ORDER — IBUPROFEN 200 MG
24 TABLET ORAL
Status: DISCONTINUED | OUTPATIENT
Start: 2022-08-06 | End: 2022-08-09 | Stop reason: HOSPADM

## 2022-08-06 RX ADMIN — LEVETIRACETAM 1000 MG: 10 INJECTION INTRAVENOUS at 08:08

## 2022-08-06 RX ADMIN — SODIUM CHLORIDE 500 ML: 9 INJECTION, SOLUTION INTRAVENOUS at 10:08

## 2022-08-06 RX ADMIN — ALBUTEROL SULFATE 2 PUFF: 90 AEROSOL, METERED RESPIRATORY (INHALATION) at 08:08

## 2022-08-06 RX ADMIN — DEXTROSE 250 MG: 50 INJECTION, SOLUTION INTRAVENOUS at 05:08

## 2022-08-06 RX ADMIN — ATORVASTATIN CALCIUM 40 MG: 40 TABLET, FILM COATED ORAL at 12:08

## 2022-08-06 RX ADMIN — DEXTROSE 250 MG: 50 INJECTION, SOLUTION INTRAVENOUS at 11:08

## 2022-08-06 RX ADMIN — CLOPIDOGREL 75 MG: 75 TABLET, FILM COATED ORAL at 12:08

## 2022-08-06 RX ADMIN — IOHEXOL 100 ML: 350 INJECTION, SOLUTION INTRAVENOUS at 10:08

## 2022-08-06 RX ADMIN — ALBUTEROL SULFATE 2 PUFF: 90 AEROSOL, METERED RESPIRATORY (INHALATION) at 07:08

## 2022-08-06 RX ADMIN — SODIUM CHLORIDE 100 MG: 9 INJECTION, SOLUTION INTRAVENOUS at 09:08

## 2022-08-06 RX ADMIN — DEXAMETHASONE SODIUM PHOSPHATE 6 MG: 4 INJECTION, SOLUTION INTRA-ARTICULAR; INTRALESIONAL; INTRAMUSCULAR; INTRAVENOUS; SOFT TISSUE at 08:08

## 2022-08-06 RX ADMIN — ASPIRIN 81 MG: 81 TABLET, CHEWABLE ORAL at 12:08

## 2022-08-06 RX ADMIN — AMIODARONE HYDROCHLORIDE 200 MG: 200 TABLET ORAL at 12:08

## 2022-08-06 RX ADMIN — SODIUM CHLORIDE 100 MG: 9 INJECTION, SOLUTION INTRAVENOUS at 10:08

## 2022-08-06 RX ADMIN — ENOXAPARIN SODIUM 50 MG: 100 INJECTION SUBCUTANEOUS at 08:08

## 2022-08-06 RX ADMIN — ALBUTEROL SULFATE 2 PUFF: 90 AEROSOL, METERED RESPIRATORY (INHALATION) at 12:08

## 2022-08-06 RX ADMIN — MUPIROCIN: 20 OINTMENT TOPICAL at 08:08

## 2022-08-06 RX ADMIN — LEVETIRACETAM 1000 MG: 10 INJECTION INTRAVENOUS at 09:08

## 2022-08-06 RX ADMIN — SODIUM CHLORIDE 500 ML: 9 INJECTION, SOLUTION INTRAVENOUS at 08:08

## 2022-08-06 RX ADMIN — REMDESIVIR 200 MG: 100 INJECTION, POWDER, LYOPHILIZED, FOR SOLUTION INTRAVENOUS at 04:08

## 2022-08-06 NOTE — NURSING
Care plan reviewed. Cardiac monitoring continues. Seen by ST and diet placed per recommendations. MRI of the brain and CTA of head and neck done today. Seen by neurology. Pt had bear hugger on from night shift but requested for it to be turned off in the middle of the day, pt got hot, temp obtained orally and wnl. Seen by ST. Safety maintained, bed at lowest position, wheels locked, call light within reach. Bed alarm on.

## 2022-08-06 NOTE — PROGRESS NOTES
08/06/22 0254   Admission   Initial VN Admission Questions Complete   Communication Issues? Patient Hearing   Shift   Virtual Nurse - Patient Verbalized Approval Of Camera Use   Safety/Activity   Patient Rounds bed in low position;call light in patient/parent reach;visualized patient   Safety Promotion/Fall Prevention assistive device/personal item within reach;side rails raised x 3   Positioning   Body Position supine   Head of Bed (HOB) Positioning HOB elevated

## 2022-08-06 NOTE — HPI
Per transfer :  Ms. Leos is a 76yo lady with a past medical history of left leg osteomyelitis due to enterococcus with hardware removal, GERD, H.pylori, Afib RVR on Eliquis, dementia, hyponatremia, prolonged QT, pulmonary HTN, DCHF, HLD, HTN and osteoporosis.  She also has a questionable diagnosis of seizures (isn't completely clear).  She has also had admissions for metabolic encephalopathy.  Her last TTE was done on 5/18/22 and showed and EF of 65%, Grade III diastolic dysfunction, PAP 51 and normal RV function.      She is now brought to the ED by her family due altered mental staus and slurred speech since this morning, along with cough and fever.  On exam she has no focal weakness, rather just lethargy, confusion and slurred speech.     In the ED her VS's were /76   Pulse (!) 113   Temp 101.3F ->99.3 °F (37.4 °C) (Oral)   Resp 20   Wt 50.8 kg (112 lb)   SpO2 95-97% RA   BMI 21.16 kg/m² .  Labs showed normal CV]BC, Na 131, Cr 0.9, normal LFTs.  , CPK 42, trop <0.006, Procal 0.04, COVID POSITIVE, FLU NEGATIVE.  UA normal.       CXR showed there is patchy opacification of the pulmonary parenchyma similar to prior exam concerning for pneumonia versus edema. NC CT head showed no acute abnormality.     In the ED she was treated with Tylenol suppository 650mg 1737, and NS 1L iv 1726.  I have asked the ED to give her Decadron 6mg iv, ASA 300mg NJ, and Lovenox 50mg SQ x 1 (hasn't taken her Eliquis due to swallowing issues).  For now, she failed her nursing bedside swallow study.  This seems more consistent with septic encephalopathy, though lingering issue of CVA is the reason for transfer.    On arrival to [place], the patient is alert and stable on room air. Hypothermia noted. She has slurred speech and with some short term memory loss with a history of dementia. Very pleasant. Denies any complaints.
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
## 2022-08-06 NOTE — ASSESSMENT & PLAN NOTE
Slurred speech  -MRI brain pending  -CT head and neck pending  -echo  -ST/PT/OT consulted  -permissive HTN  -hold ASA, statin, and plavix for failed swallow

## 2022-08-06 NOTE — PLAN OF CARE
The proper method of use, as well as anticipated side effects, of this metered-dose inhaler are discussed and demonstrated to the patient. Patient on oxygen with documented flow.  Will attempt to wean per O2 order protocol. Will continue to monitor.

## 2022-08-06 NOTE — ASSESSMENT & PLAN NOTE
-switch from eliquis to therapeutic lovenox  -hold amiodarone and metoprolol   -add lopressor IV PRN for rate control if needed

## 2022-08-06 NOTE — PROVIDER TRANSFER
Outside Transfer Acceptance Note / [facility]erral [facility]    Referring facility: [facility]  Referring provider: CASPER ALLEN  Accepting facility: [facility]  Accepting provider: PANCHITO COTO  Admitting provider: MINDY VILLALOBOS  Reason for transfer: Higher Level of Care   Transfer diagnosis: Patient with slurred speech since this morning, out of any stroke window, COVID positive has high risk for stroke at this time  Transfer specialty requested: Neurology  Transfer specialty notified: yes  Transfer level: NUMBER 1-5: 2  Bed type requested: MED-TELE  Isolation status: COVID POSITIVE active isolation  Admission class or status: IP- Inpatient      Narrative      Ms. Leos is a 76yo lady with a past medical history of left leg osteomyelitis due to enterococcus with hardware removal, GERD, H.pylori, Afib RVR on Eliquis, dementia, hyponatremia, prolonged QT, pulmonary HTN, DCHF, HLD, HTN and osteoporosis.  She also has a questionable diagnosis of seizures (isn't completely clear).  She has also had admissions for metabolic encephalopathy.  Her last TTE was done on 5/18/22 and showed and EF of 65%, Grade III diastolic dysfunction, PAP 51 and normal RV function.     She is now brought to the ED by her family due altered mental staus and slurred speech since this morning, along with cough and fever.  On exam she has no focal weakness, rather just lethargy, confusion and slurred speech.    In the ED her VS's were /76   Pulse (!) 113   Temp 101.3F ->99.3 °F (37.4 °C) (Oral)   Resp 20   Wt 50.8 kg (112 lb)   SpO2 95-97% RA   BMI 21.16 kg/m² .  Labs showed normal CV]BC, Na 131, Cr 0.9, normal LFTs.  , CPK 42, trop <0.006, Procal 0.04, COVID POSITIVE, FLU NEGATIVE.  UA normal.      CXR showed there is patchy opacification of the pulmonary parenchyma similar to prior exam concerning for pneumonia versus edema. NC CT head showed no acute abnormality.    In the ED she was treated with Tylenol  suppository 650mg 1737, and NS 1L iv 1726.  I have asked the ED to give her Decadron 6mg iv, ASA 300mg CT, and Lovenox 50mg SQ x 1 (hasn't taken her Eliquis due to swallowing issues).  For now, she failed her nursing bedside swallow study.  This seems more consistent with septic encephalopathy, though lingering issue of CVA is the reason for transfer.    Objective     Vitals:     Temp:  [99.3 °F (37.4 °C)-101.3 °F (38.5 °C)] 99.3 °F (37.4 °C)  Pulse:  [113] 113  Resp:  [20] 20  SpO2:  [95 %-97 %] 97 %  BP: (119-133)/(76-87) 119/76     Recent Labs: All pertinent labs within the past 24 hours have been reviewed.  Recent Results (from the past 24 hour(s))   COVID-19 Rapid Screening    Collection Time: 08/05/22  4:57 PM   Result Value Ref Range    SARS-CoV-2 RNA, Amplification, Qual Positive (A) Negative   Influenza A & B by Molecular    Collection Time: 08/05/22  4:57 PM    Specimen: Nasal Swab; Nasopharyngeal Swab   Result Value Ref Range    Influenza A, Molecular Negative Negative    Influenza B, Molecular Negative Negative    Flu A & B Source Nasal swab    Group A Strep, Molecular    Collection Time: 08/05/22  4:57 PM    Specimen: Throat   Result Value Ref Range    Group A Strep, Molecular Negative Negative   Procalcitonin    Collection Time: 08/05/22  5:05 PM   Result Value Ref Range    Procalcitonin 0.04 <0.25 ng/mL   CBC Auto Differential    Collection Time: 08/05/22  5:05 PM   Result Value Ref Range    WBC 8.46 3.90 - 12.70 K/uL    RBC 3.85 (L) 4.00 - 5.40 M/uL    Hemoglobin 12.1 12.0 - 16.0 g/dL    Hematocrit 36.8 (L) 37.0 - 48.5 %    MCV 96 82 - 98 fL    MCH 31.4 (H) 27.0 - 31.0 pg    MCHC 32.9 32.0 - 36.0 g/dL    RDW 15.2 (H) 11.5 - 14.5 %    Platelets 223 150 - 450 K/uL    MPV 10.3 9.2 - 12.9 fL    Immature Granulocytes 0.4 0.0 - 0.5 %    Gran # (ANC) 4.4 1.8 - 7.7 K/uL    Immature Grans (Abs) 0.03 0.00 - 0.04 K/uL    Lymph # 2.4 1.0 - 4.8 K/uL    Mono # 1.5 (H) 0.3 - 1.0 K/uL    Eos # 0.1 0.0 - 0.5 K/uL     Baso # 0.06 0.00 - 0.20 K/uL    nRBC 0 0 /100 WBC    Gran % 52.3 38.0 - 73.0 %    Lymph % 28.4 18.0 - 48.0 %    Mono % 17.4 (H) 4.0 - 15.0 %    Eosinophil % 0.8 0.0 - 8.0 %    Basophil % 0.7 0.0 - 1.9 %    Differential Method Automated    BNP    Collection Time: 08/05/22  5:05 PM   Result Value Ref Range     (H) 0 - 99 pg/mL   Comprehensive Metabolic Panel    Collection Time: 08/05/22  5:06 PM   Result Value Ref Range    Sodium 131 (L) 136 - 145 mmol/L    Potassium 4.4 3.5 - 5.1 mmol/L    Chloride 96 95 - 110 mmol/L    CO2 28 23 - 29 mmol/L    Glucose 86 70 - 110 mg/dL    BUN 16 8 - 23 mg/dL    Creatinine 0.9 0.5 - 1.4 mg/dL    Calcium 9.1 8.7 - 10.5 mg/dL    Total Protein 6.7 6.0 - 8.4 g/dL    Albumin 2.9 (L) 3.5 - 5.2 g/dL    Total Bilirubin 0.3 0.1 - 1.0 mg/dL    Alkaline Phosphatase 58 55 - 135 U/L    AST 30 10 - 40 U/L    ALT 13 10 - 44 U/L    Anion Gap 7 (L) 8 - 16 mmol/L    eGFR >60 >60 mL/min/1.73 m^2   CK    Collection Time: 08/05/22  5:06 PM   Result Value Ref Range    CPK 42 20 - 180 U/L   Troponin I    Collection Time: 08/05/22  5:06 PM   Result Value Ref Range    Troponin I <0.006 0.000 - 0.026 ng/mL   CK-MB    Collection Time: 08/05/22  5:06 PM   Result Value Ref Range    CPK 42 20 - 180 U/L    CPK MB 0.4 0.1 - 6.5 ng/mL    MB % 1.0 0.0 - 5.0 %   Lactic Acid, Plasma    Collection Time: 08/05/22  5:06 PM   Result Value Ref Range    Lactate (Lactic Acid) 0.6 0.5 - 2.2 mmol/L   Urinalysis, Reflex to Urine Culture Urine, Clean Catch    Collection Time: 08/05/22  5:21 PM    Specimen: Urine   Result Value Ref Range    Specimen UA Urine, Catheterized     Color, UA Yellow Yellow, Straw, Jocelyn    Appearance, UA Clear Clear    pH, UA 8.0 5.0 - 8.0    Specific Gravity, UA 1.020 1.005 - 1.030    Protein, UA Negative Negative    Glucose, UA Negative Negative    Ketones, UA 1+ (A) Negative    Bilirubin (UA) Negative Negative    Occult Blood UA Negative Negative    Nitrite, UA Negative Negative     Urobilinogen, UA 1.0 <2.0 EU/dL    Leukocytes, UA Negative Negative       Allergies:   Review of patient's allergies indicates:   Allergen Reactions    Ciprofloxacin Other (See Comments)     Seizures      NPO: No      Anticoagulation:   Anticoagulants     None           Instructions      [facility]  Admit to Hospital Medicine  Upon patient arrival to floor, please contact Hospital Medicine on call.

## 2022-08-06 NOTE — ASSESSMENT & PLAN NOTE
Presented to ED with fevers, lethargy, cough, and slurred speech, now with hypothermia  -start remdesevir x 3 day course  -therapeutic lovenox for Afib  -hold steroids, no hypoxia  -isolation protocol  -warming measures
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
## 2022-08-06 NOTE — PLAN OF CARE
Problem: Adult Inpatient Plan of Care  Goal: Plan of Care Review  Outcome: Ongoing, Progressing  Goal: Patient-Specific Goal (Individualized)  Outcome: Ongoing, Progressing  Goal: Absence of Hospital-Acquired Illness or Injury  Outcome: Ongoing, Progressing  Goal: Optimal Comfort and Wellbeing  Outcome: Ongoing, Progressing  Goal: Readiness for Transition of Care  Outcome: Ongoing, Progressing     Pt failed swallow eval overnight. No gag reflex on assessment. Pt with weak cough. Pt hypothermic so warming blanket applied. Pt with some confusion overnight but pleasant. When asleep mouth breathes so oxygen applied. Other wise Vss. All medications thus far given IV.
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
## 2022-08-06 NOTE — PT/OT/SLP EVAL
Speech Language Pathology Evaluation  Bedside Swallow    Patient Name:  Teresa Leos   MRN:  [id number]  Admitting Diagnosis: Encephalopathy due to COVID-19 virus    Recommendations:                 General Recommendations:  Follow up with meal x1  Diet recommendations:  Mechanical soft, Thin   Aspiration Precautions: 1 bite/sip at a time, Alternating bites/sips, Avoid talking while eating, Feed only when awake/alert, Frequent oral care, HOB to 90 degrees, Meds whole 1 at a time, Monitor for s/s of aspiration, Remain upright 30 minutes post meal, Small bites/sips, Standard aspiration precautions and Wear oxygen during intake   General Precautions: Standard, respiratory  Communication strategies:  none    History:   HPI: Per transfer :  Ms. Leos is a 76yo lady with a past medical history of left leg osteomyelitis due to enterococcus with hardware removal, GERD, H.pylori, Afib RVR on Eliquis, dementia, hyponatremia, prolonged QT, pulmonary HTN, DCHF, HLD, HTN and osteoporosis.  She also has a questionable diagnosis of seizures (isn't completely clear).  She has also had admissions for metabolic encephalopathy.  Her last TTE was done on 5/18/22 and showed and EF of 65%, Grade III diastolic dysfunction, PAP 51 and normal RV function.      She is now brought to the ED by her family due altered mental staus and slurred speech since this morning, along with cough and fever.  On exam she has no focal weakness, rather just lethargy, confusion and slurred speech.     In the ED her VS's were /76   Pulse (!) 113   Temp 101.3F ->99.3 °F (37.4 °C) (Oral)   Resp 20   Wt 50.8 kg (112 lb)   SpO2 95-97% RA   BMI 21.16 kg/m² .  Labs showed normal CV]BC, Na 131, Cr 0.9, normal LFTs.  , CPK 42, trop <0.006, Procal 0.04, COVID POSITIVE, FLU NEGATIVE.  UA normal.       CXR showed there is patchy opacification of the pulmonary parenchyma similar to prior exam concerning for pneumonia versus edema. NC CT head  "showed no acute abnormality.     In the ED she was treated with Tylenol suppository 650mg 1737, and NS 1L iv 1726.  I have asked the ED to give her Decadron 6mg iv, ASA 300mg NC, and Lovenox 50mg SQ x 1 (hasn't taken her Eliquis due to swallowing issues).  For now, she failed her nursing bedside swallow study.  This seems more consistent with septic encephalopathy, though lingering issue of CVA is the reason for transfer.     On arrival to [place], the patient is alert and stable on room air. Hypothermia noted. She has slurred speech and with some short term memory loss with a history of dementia. Very pleasant. Denies any complaints.      Past Medical History:   Diagnosis Date    Hyperlipidemia     Hypertension     Osteoporosis        Past Surgical History:   Procedure Laterality Date    HIP REPLACEMENT ARTHROPLASTY Right     HYSTERECTOMY      LEG SURGERY      SKIN CANCER EXCISION      TONSILLECTOMY, ADENOIDECTOMY         Social History: Patient lives with family.    Prior Intubation HX:  None this admit.    Modified Barium Swallow: none per EMR.    Chest X-Rays: Impression:     There is patchy opacification of the pulmonary parenchyma similar to prior exam concerning for pneumonia versus edema.     Prior diet: Softer foods per pt.    Subjective     Pt seen at the bedside for clinical swallow assessment. SLP did check w/ RN, Melinda, prior to visit who reported good tolerance of water this AM. Pt asleep upon ST arrival but awakened to loud verbal stimuli. She was alert and engaged in exam.   Patient goals: "I need to learn to chew with my top teeth!"     Pain/Comfort:  · Pain Rating 1: 0/10    Respiratory Status: Nasal cannula     Objective:     Oral Musculature Evaluation  · Oral Musculature: general weakness (mild)  · Dentition: upper dentures (absent lower dentition)  · Secretion Management: adequate  · Mucosal Quality:  (slightly dry)  · Mandibular Strength and Mobility:  (mildly reduced)  · Oral Labial " Strength and Mobility: functional retraction, functional pursing, functional seal, functional coordination  · Lingual Strength and Mobility:  (fair)  · Velar Elevation: WFL  · Buccal Strength and Mobility: decreased tone  · Volitional Swallow:  (timely)  · Voice Prior to PO Intake:  (clear/dry)    Bedside Swallow Eval: PPE donned prior to room entry. HOB elevated by clinician. Most trials fed by clinician though pt able to feed self for meals. Call light left within reach.   Consistencies Assessed:  · Thin liquids : straw sips water x3oz  · Puree : tsp bites pudding x3  · Soft solids : tsp bites diced pears x5  · Solids : deferred per pt     Oral Phase:   · Prolonged mastication  · Impaired rotational chew   · No oral residue or anterior spillage  · Adequate munching pattern for soft solids though prolonged oral phase    Pharyngeal Phase:   · no overt clinical signs/symptoms of aspiration  · no overt clinical signs/symptoms of pharyngeal dysphagia   · Timely swallow initiation, adequate hyolaryngeal lift/excursion per SLP palpation, no overt coughing/choking or change in vocal quality     Compensatory Strategies  · None    Treatment: Pt will benefit from SLP tx 3x/week while inpatient to ensure swallow safety, provide dysphagia mgmt, and modify diet as needed.     Assessment:     Teresa Leos is a 77 y.o. female with an admit diagnosis of Encephalopathy due to COVID-19 virus. Pt presents with functional tolerance of all textures trialed this date. At baseline, pt avoids hard solids due to missing lower dentures. REC: Mechanical Soft/Thin liquid diet given standard aspiration precautions. Wear oxygen during intake. Meds whole 1-2 at a time. SLP to follow.     Goals:   Multidisciplinary Problems     SLP Goals        Problem: SLP    Goal Priority Disciplines Outcome   SLP Goal     SLP Ongoing, Progressing   Description: STGs:  1. Pt will participate in clinical swallow assessment to determine safest diet level. -  Met   2. Pt will safely tolerate a Mechanical Soft/Thin liquid diet without any overt s/sx of aspiration.                    Plan:     · Patient to be seen:  3 x/week   · Plan of Care expires:  09/04/22  · Plan of Care reviewed with:  patient (MD and RN)   · SLP Follow-Up:  Yes       Discharge recommendations:  home   Barriers to Discharge:  None    Time Tracking:     SLP Treatment Date:   08/06/22  Speech Start Time:  1156  Speech Stop Time:  1208     Speech Total Time (min):  12 min    Billable Minutes: Eval Swallow and Oral Function 12 mins    08/06/2022

## 2022-08-06 NOTE — PLAN OF CARE
Problem: SLP  Goal: SLP Goal  Description: STGs:  1. Pt will participate in clinical swallow assessment to determine safest diet level. - Met   2. Pt will safely tolerate a Mechanical Soft/Thin liquid diet without any overt s/sx of aspiration.   Outcome: Ongoing, Progressing   Bedside swallow study completed. REC: Mechanical Soft/Thin liquid diet given standard aspiration precautions. SLP to follow x1 to ensure diet tolerance.

## 2022-08-07 PROBLEM — I72.9 ANEURYSM: Status: ACTIVE | Noted: 2022-08-07

## 2022-08-07 PROBLEM — R53.1 WEAKNESS: Status: ACTIVE | Noted: 2022-08-07

## 2022-08-07 LAB
ANION GAP SERPL CALC-SCNC: 9 MMOL/L (ref 8–16)
BASOPHILS # BLD AUTO: 0.02 K/UL (ref 0–0.2)
BASOPHILS NFR BLD: 0.1 % (ref 0–1.9)
BUN SERPL-MCNC: 21 MG/DL (ref 8–23)
CALCIUM SERPL-MCNC: 8 MG/DL (ref 8.7–10.5)
CHLORIDE SERPL-SCNC: 100 MMOL/L (ref 95–110)
CO2 SERPL-SCNC: 26 MMOL/L (ref 23–29)
CREAT SERPL-MCNC: 0.8 MG/DL (ref 0.5–1.4)
DIFFERENTIAL METHOD: ABNORMAL
EOSINOPHIL # BLD AUTO: 0 K/UL (ref 0–0.5)
EOSINOPHIL NFR BLD: 0 % (ref 0–8)
ERYTHROCYTE [DISTWIDTH] IN BLOOD BY AUTOMATED COUNT: 15.2 % (ref 11.5–14.5)
EST. GFR  (NO RACE VARIABLE): >60 ML/MIN/1.73 M^2
GLUCOSE SERPL-MCNC: 113 MG/DL (ref 70–110)
HCT VFR BLD AUTO: 32.2 % (ref 37–48.5)
HGB BLD-MCNC: 10.9 G/DL (ref 12–16)
IMM GRANULOCYTES # BLD AUTO: 0.04 K/UL (ref 0–0.04)
IMM GRANULOCYTES NFR BLD AUTO: 0.3 % (ref 0–0.5)
LYMPHOCYTES # BLD AUTO: 2.8 K/UL (ref 1–4.8)
LYMPHOCYTES NFR BLD: 19.9 % (ref 18–48)
MAGNESIUM SERPL-MCNC: 1.8 MG/DL (ref 1.6–2.6)
MCH RBC QN AUTO: 32.3 PG (ref 27–31)
MCHC RBC AUTO-ENTMCNC: 33.9 G/DL (ref 32–36)
MCV RBC AUTO: 96 FL (ref 82–98)
MONOCYTES # BLD AUTO: 1.7 K/UL (ref 0.3–1)
MONOCYTES NFR BLD: 12.1 % (ref 4–15)
NEUTROPHILS # BLD AUTO: 9.6 K/UL (ref 1.8–7.7)
NEUTROPHILS NFR BLD: 67.6 % (ref 38–73)
NRBC BLD-RTO: 0 /100 WBC
PHOSPHATE SERPL-MCNC: 3.2 MG/DL (ref 2.7–4.5)
PLATELET # BLD AUTO: 199 K/UL (ref 150–450)
PMV BLD AUTO: 10.5 FL (ref 9.2–12.9)
POTASSIUM SERPL-SCNC: 4.1 MMOL/L (ref 3.5–5.1)
RBC # BLD AUTO: 3.37 M/UL (ref 4–5.4)
SODIUM SERPL-SCNC: 135 MMOL/L (ref 136–145)
WBC # BLD AUTO: 14.14 K/UL (ref 3.9–12.7)

## 2022-08-07 PROCEDURE — 63600175 PHARM REV CODE 636 W HCPCS: Performed by: HOSPITALIST

## 2022-08-07 PROCEDURE — 27000207 HC ISOLATION

## 2022-08-07 PROCEDURE — 80048 BASIC METABOLIC PNL TOTAL CA: CPT | Performed by: NURSE PRACTITIONER

## 2022-08-07 PROCEDURE — 94640 AIRWAY INHALATION TREATMENT: CPT

## 2022-08-07 PROCEDURE — 84100 ASSAY OF PHOSPHORUS: CPT | Performed by: NURSE PRACTITIONER

## 2022-08-07 PROCEDURE — 27000221 HC OXYGEN, UP TO 24 HOURS

## 2022-08-07 PROCEDURE — 94761 N-INVAS EAR/PLS OXIMETRY MLT: CPT

## 2022-08-07 PROCEDURE — 92526 ORAL FUNCTION THERAPY: CPT

## 2022-08-07 PROCEDURE — 11000001 HC ACUTE MED/SURG PRIVATE ROOM

## 2022-08-07 PROCEDURE — 85025 COMPLETE CBC W/AUTO DIFF WBC: CPT | Performed by: NURSE PRACTITIONER

## 2022-08-07 PROCEDURE — 83735 ASSAY OF MAGNESIUM: CPT | Performed by: NURSE PRACTITIONER

## 2022-08-07 PROCEDURE — 63600175 PHARM REV CODE 636 W HCPCS: Mod: TB | Performed by: NURSE PRACTITIONER

## 2022-08-07 PROCEDURE — 25000003 PHARM REV CODE 250: Performed by: NURSE PRACTITIONER

## 2022-08-07 PROCEDURE — 25000003 PHARM REV CODE 250: Performed by: HOSPITALIST

## 2022-08-07 PROCEDURE — 99900035 HC TECH TIME PER 15 MIN (STAT)

## 2022-08-07 RX ORDER — LACOSAMIDE 50 MG/1
100 TABLET ORAL EVERY 12 HOURS
Status: DISCONTINUED | OUTPATIENT
Start: 2022-08-07 | End: 2022-08-09 | Stop reason: HOSPADM

## 2022-08-07 RX ORDER — DIVALPROEX SODIUM 250 MG/1
1000 TABLET, FILM COATED, EXTENDED RELEASE ORAL NIGHTLY
Status: DISCONTINUED | OUTPATIENT
Start: 2022-08-07 | End: 2022-08-09 | Stop reason: HOSPADM

## 2022-08-07 RX ORDER — LEVETIRACETAM 500 MG/1
1000 TABLET ORAL 2 TIMES DAILY
Status: DISCONTINUED | OUTPATIENT
Start: 2022-08-07 | End: 2022-08-09 | Stop reason: HOSPADM

## 2022-08-07 RX ORDER — FUROSEMIDE 10 MG/ML
40 INJECTION INTRAMUSCULAR; INTRAVENOUS ONCE
Status: COMPLETED | OUTPATIENT
Start: 2022-08-07 | End: 2022-08-07

## 2022-08-07 RX ORDER — FUROSEMIDE 40 MG/1
80 TABLET ORAL DAILY
Status: DISCONTINUED | OUTPATIENT
Start: 2022-08-08 | End: 2022-08-09 | Stop reason: HOSPADM

## 2022-08-07 RX ORDER — METOPROLOL SUCCINATE 50 MG/1
100 TABLET, EXTENDED RELEASE ORAL DAILY
Status: DISCONTINUED | OUTPATIENT
Start: 2022-08-07 | End: 2022-08-09 | Stop reason: HOSPADM

## 2022-08-07 RX ADMIN — MUPIROCIN: 20 OINTMENT TOPICAL at 08:08

## 2022-08-07 RX ADMIN — DIVALPROEX SODIUM 1000 MG: 250 TABLET, EXTENDED RELEASE ORAL at 10:08

## 2022-08-07 RX ADMIN — DEXAMETHASONE SODIUM PHOSPHATE 6 MG: 4 INJECTION, SOLUTION INTRA-ARTICULAR; INTRALESIONAL; INTRAMUSCULAR; INTRAVENOUS; SOFT TISSUE at 05:08

## 2022-08-07 RX ADMIN — LEVETIRACETAM 1000 MG: 500 TABLET, FILM COATED ORAL at 10:08

## 2022-08-07 RX ADMIN — ALBUTEROL SULFATE 2 PUFF: 90 AEROSOL, METERED RESPIRATORY (INHALATION) at 12:08

## 2022-08-07 RX ADMIN — ALBUTEROL SULFATE 2 PUFF: 90 AEROSOL, METERED RESPIRATORY (INHALATION) at 07:08

## 2022-08-07 RX ADMIN — ENOXAPARIN SODIUM 50 MG: 100 INJECTION SUBCUTANEOUS at 10:08

## 2022-08-07 RX ADMIN — DEXAMETHASONE 6 MG: 4 TABLET ORAL at 08:08

## 2022-08-07 RX ADMIN — ASPIRIN 81 MG: 81 TABLET, CHEWABLE ORAL at 08:08

## 2022-08-07 RX ADMIN — MUPIROCIN: 20 OINTMENT TOPICAL at 10:08

## 2022-08-07 RX ADMIN — DEXTROSE 250 MG: 50 INJECTION, SOLUTION INTRAVENOUS at 12:08

## 2022-08-07 RX ADMIN — AMIODARONE HYDROCHLORIDE 200 MG: 200 TABLET ORAL at 08:08

## 2022-08-07 RX ADMIN — LEVETIRACETAM 1000 MG: 500 TABLET, FILM COATED ORAL at 08:08

## 2022-08-07 RX ADMIN — METOPROLOL SUCCINATE 100 MG: 50 TABLET, EXTENDED RELEASE ORAL at 09:08

## 2022-08-07 RX ADMIN — ALBUTEROL SULFATE 2 PUFF: 90 AEROSOL, METERED RESPIRATORY (INHALATION) at 08:08

## 2022-08-07 RX ADMIN — REMDESIVIR 100 MG: 100 INJECTION, POWDER, LYOPHILIZED, FOR SOLUTION INTRAVENOUS at 08:08

## 2022-08-07 RX ADMIN — ATORVASTATIN CALCIUM 40 MG: 40 TABLET, FILM COATED ORAL at 08:08

## 2022-08-07 RX ADMIN — ENOXAPARIN SODIUM 50 MG: 100 INJECTION SUBCUTANEOUS at 08:08

## 2022-08-07 RX ADMIN — DEXTROSE 250 MG: 50 INJECTION, SOLUTION INTRAVENOUS at 05:08

## 2022-08-07 RX ADMIN — LACOSAMIDE 100 MG: 50 TABLET, FILM COATED ORAL at 10:08

## 2022-08-07 RX ADMIN — LACOSAMIDE 100 MG: 50 TABLET, FILM COATED ORAL at 08:08

## 2022-08-07 RX ADMIN — FUROSEMIDE 40 MG: 10 INJECTION, SOLUTION INTRAMUSCULAR; INTRAVENOUS at 08:08

## 2022-08-07 NOTE — ASSESSMENT & PLAN NOTE
Presented to ED with fevers, lethargy, cough, and slurred speech, now with hypothermia  -start remdesevir x up to 5 day course  -therapeutic lovenox for Afib  -given hypoxia, initiated dexamethasone for up to 10d  -isolation protocol  -warming measures

## 2022-08-07 NOTE — PROGRESS NOTES
[facility] Progress Note    Teresa Leos  1945  [id number]      HPI:  Per transfer :  Ms. Leos is a 76yo lady with a past medical history of left leg osteomyelitis due to enterococcus with hardware removal, GERD, H.pylori, Afib RVR on Eliquis, dementia, hyponatremia, prolonged QT, pulmonary HTN, DCHF, HLD, HTN and osteoporosis.  She also has a questionable diagnosis of seizures (isn't completely clear).  She has also had admissions for metabolic encephalopathy.  Her last TTE was done on 5/18/22 and showed and EF of 65%, Grade III diastolic dysfunction, PAP 51 and normal RV function.   She is now brought to the ED by her family due altered mental staus and slurred speech since this morning, along with cough and fever.  On exam she has no focal weakness, rather just lethargy, confusion and slurred speech.  In the ED her VS's were /76   Pulse (!) 113   Temp 101.3F ->99.3 °F (37.4 °C) (Oral)   Resp 20   Wt 50.8 kg (112 lb)   SpO2 95-97% RA   BMI 21.16 kg/m² .  Labs showed normal CV]BC, Na 131, Cr 0.9, normal LFTs.  , CPK 42, trop <0.006, Procal 0.04, COVID POSITIVE, FLU NEGATIVE.  UA normal.    CXR showed there is patchy opacification of the pulmonary parenchyma similar to prior exam concerning for pneumonia versus edema. NC CT head showed no acute abnormality.  In the ED she was treated with Tylenol suppository 650mg 1737, and NS 1L iv 1726.  I have asked the ED to give her Decadron 6mg iv, ASA 300mg [place], and Lovenox 50mg SQ x 1 (hasn't taken her Eliquis due to swallowing issues).  For now, she failed her nursing bedside swallow study.  This seems more consistent with septic encephalopathy, though lingering issue of CVA is the reason for transfer.  On arrival to [place], the patient is alert and stable on room air. Hypothermia noted. She has slurred speech and with some short term memory loss with a history of dementia. Very pleasant. Denies any complaints.     Subjective:   Today,  patient found awake and resting in bed. She denies any complaints at this time. She is fully aware of the events going on around her and appears to be well oriented at this time. Denies acute issues or FND at this time.    Objective:  Vitals:    08/07/22 0822   BP:    Pulse: (!) 118   Resp:    Temp:      Scheduled Meds:   albuterol  2 puff Inhalation Q6H    amiodarone  200 mg Oral Daily    aspirin  81 mg Oral Daily    atorvastatin  40 mg Oral Daily    dexAMETHasone  6 mg Oral Daily    divalproex ER  1,000 mg Oral QHS    enoxaparin  1 mg/kg Subcutaneous BID    [START ON 8/8/2022] furosemide  80 mg Oral Daily    lacosamide  100 mg Oral Q12H    levETIRAcetam  1,000 mg Oral BID    metoprolol succinate  100 mg Oral Daily    mupirocin   Nasal BID    remdesivir infusion  100 mg Intravenous Daily       Neurologic Physical Examination:   Orientation  Alert, awake, oriented to self, place, time, and situation.  Memory  Recent and remote memory intact.  Language  No dysarthria, No aphasia.   Cranial Nerves  PERRL, VF intact, EOMI, V1-V3 intact, symmetric facial expression, hearing grossly intact, SCM & TPZ 5/5, tongue midline, symmetric palate elevation.  Motor  Normal Bulk  Normal Tone  5/5 strength in 4 extremities  Sensory  Normal to light touch throughout  Cerebellar/Gait  Gait deferred    Laboratory Findings:   Recent Results (from the past 24 hour(s))   Basic Metabolic Panel (BMP)    Collection Time: 08/07/22  2:57 AM   Result Value Ref Range    Sodium 135 (L) 136 - 145 mmol/L    Potassium 4.1 3.5 - 5.1 mmol/L    Chloride 100 95 - 110 mmol/L    CO2 26 23 - 29 mmol/L    Glucose 113 (H) 70 - 110 mg/dL    BUN 21 8 - 23 mg/dL    Creatinine 0.8 0.5 - 1.4 mg/dL    Calcium 8.0 (L) 8.7 - 10.5 mg/dL    Anion Gap 9 8 - 16 mmol/L    eGFR >60 >60 mL/min/1.73 m^2   Magnesium    Collection Time: 08/07/22  2:57 AM   Result Value Ref Range    Magnesium 1.8 1.6 - 2.6 mg/dL   CBC with Automated Differential    Collection  Time: 08/07/22  2:58 AM   Result Value Ref Range    WBC 14.14 (H) 3.90 - 12.70 K/uL    RBC 3.37 (L) 4.00 - 5.40 M/uL    Hemoglobin 10.9 (L) 12.0 - 16.0 g/dL    Hematocrit 32.2 (L) 37.0 - 48.5 %    MCV 96 82 - 98 fL    MCH 32.3 (H) 27.0 - 31.0 pg    MCHC 33.9 32.0 - 36.0 g/dL    RDW 15.2 (H) 11.5 - 14.5 %    Platelets 199 150 - 450 K/uL    MPV 10.5 9.2 - 12.9 fL    Immature Granulocytes 0.3 0.0 - 0.5 %    Gran # (ANC) 9.6 (H) 1.8 - 7.7 K/uL    Immature Grans (Abs) 0.04 0.00 - 0.04 K/uL    Lymph # 2.8 1.0 - 4.8 K/uL    Mono # 1.7 (H) 0.3 - 1.0 K/uL    Eos # 0.0 0.0 - 0.5 K/uL    Baso # 0.02 0.00 - 0.20 K/uL    nRBC 0 0 /100 WBC    Gran % 67.6 38.0 - 73.0 %    Lymph % 19.9 18.0 - 48.0 %    Mono % 12.1 4.0 - 15.0 %    Eosinophil % 0.0 0.0 - 8.0 %    Basophil % 0.1 0.0 - 1.9 %    Differential Method Automated    Phosphorus    Collection Time: 08/07/22  2:58 AM   Result Value Ref Range    Phosphorus 3.2 2.7 - 4.5 mg/dL       Neuroimaging:   CT Head Without Contrast  Result Date: 8/5/2022  EXAMINATION: CT HEAD WITHOUT CONTRAST CLINICAL HISTORY: Dizziness, persistent/recurrent, cardiac or vascular cause suspected;Headache, chronic, new features or increased frequency; TECHNIQUE: Low dose axial CT images obtained throughout the head without intravenous contrast. Sagittal and coronal reconstructions were performed. COMPARISON: 05/18/2022 FINDINGS: Intracranial compartment: Ventricles and sulci are normal in size for age without evidence of hydrocephalus. No extra-axial blood or fluid collections. The brain parenchyma appears normal. No parenchymal mass, hemorrhage, edema or major vascular distribution infarct. Skull/extracranial contents (limited evaluation): No fracture. Mastoid air cells and paranasal sinuses are essentially clear.   No acute abnormality.    MRI BRAIN WITHOUT CONTRAST  No acute infarct.  Stable appearance of the brain with presumed chronic small vessel ischemic changes.     Echo Saline Bubble? Yes  Result  Date: 8/6/2022  · There is no evidence of intracardiac shunting. · The left ventricle is normal in size with normal systolic function. · The estimated ejection fraction is 65%. · A diastolic pattern consistent with atrial fibrillation observed. · Normal right ventricular size with normal right ventricular systolic function. · Severe left atrial enlargement. · Moderate right atrial enlargement. · The estimated PA systolic pressure is 21 mmHg. · Normal central venous pressure (3 mmHg).      CTA Head and Neck (xpd)  No acute intracranial process with white matter changes again identified. Approximately 60-65% stenosis at the right carotid bifurcation by NASCET criteria.  No significant stenosis on the left.  Vertebral arteries are patent without advanced stenosis. Severe stenosis of the left PCA with focal calcification.  No other major branch stenosis/occlusion at the [id number]-[id number]. 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 Nonspecific 5 mm lung apical nodule.  Consider 12 month follow-up in a high risk patient.     Assessment/Plan:   Teresa Leos is a 77 y.o. w/ Hx of left leg osteomyelitis due to enterococcus with hardware removal, GERD, H.pylori, Afib RVR on Eliquis, dementia, hyponatremia, prolonged QT, pulmonary HTN, DCHF, HLD, HTN, osteoporosis, and seizures. The patient presented to OSH yesterday with family after they found her with slurred speech and acting unlike herself. She was transferred to [facility] for further workup. Patient currently undergoing stroke/TIA workup. Also found to be covid positive on admit. Imaging without signs suggesting acute infarct or acute cerebrovascular disease (5mm R PComm Aneurysm and Chronically stenotic L PCA though). MRI Brain pending for completion of workup, however. Given history of seizures potential for focal seizure PTA to OSH now resolved vs acute encephalopathy related to COVID infection most likely etiologies to speech changes and AMS at this time.       - Given initial imaging findings, low suspicion for acute stroke  - MRI Brain without findings of acute stroke  - Continue ASA 81mg and Lipitor 40mg  - Other etiologies include seizure (given history) and/or encephalopathy 2/2 COVID infection  - Continue home AEDs: Depakote ER 1g qhs, Keppra 1g BID, Vimpat 100mg BID  - Getting 3 day course Remdesivir for COVID -- Day 2/3  - Regarding R PComm Aneurysm, appears stable on CTA -- will need monitoring vs treatment with angiogram +/- coiling or clipping when stable  - Neurosurgery consult or close outpatient follow-up for aneurysm    Will sign off. Call for any further questions, recurrence of a previously resolved problem, or any neurologic detrimental changes.    Solo Bhatia MD  [employer] [facility] PGY-IV  [facility] Consult Service

## 2022-08-07 NOTE — PLAN OF CARE
Problem: Occupational Therapy  Goal: Occupational Therapy Goal  Description: Goals to be met by: 9/6/2022    Patient will increase functional independence with ADLs by performing:    UE Dressing with Stand-by Assistance.  Toileting from bedside commode with Minimal Assistance for hygiene and clothing management.   Supine to sit with Modified [place].  Step transfer with Stand-by Assistance  Toilet transfer to bedside commode with Stand-by Assistance.  Increased functional strength to WFL for ADLS.  Upper extremity exercise program x 10 reps per handout, with supervision.    Outcome: Ongoing, Progressing     Problem: Occupational Therapy  Goal: Occupational Therapy Goal  Description: Goals to be met by: 9/6/2022    Patient will increase functional independence with ADLs by performing:    UE Dressing with Stand-by Assistance.  Toileting from bedside commode with Minimal Assistance for hygiene and clothing management.   Supine to sit with Modified [place].  Step transfer with Stand-by Assistance  Toilet transfer to bedside commode with Stand-by Assistance.  Increased functional strength to WFL for ADLS.  Upper extremity exercise program x 10 reps per handout, with supervision.    Outcome: Ongoing, Progressing   OT initial eval completed and tx initiated. Pt was orthostatic and symptomatic after ambulating~10' Min A with Rw. Nurse notified. DC recommendations pending.

## 2022-08-07 NOTE — PT/OT/SLP PROGRESS
"Speech Language Pathology Treatment and Discharge Note    Patient Name:  Teresa Leos   MRN:  [id number]  Admitting Diagnosis: Encephalopathy due to COVID-19 virus    Recommendations:                 General Recommendations:  Follow-up not indicated  Diet recommendations:  Mechanical soft, Liquid Diet Level: Thin   Aspiration Precautions: 1 bite/sip at a time, Alternating bites/sips, Avoid talking while eating, Frequent oral care, HOB to 90 degrees, Meds whole 1 at a time, Remain upright 30 minutes post meal, Small bites/sips and Standard aspiration precautions   General Precautions: Standard, droplet, fall, airborne, contact, seizure  Communication strategies:  none    Subjective     SLP confirmed with RN prior to entry. Pt found in bed awake, alert and in good spirits. Pt agreeable to participate in skilled ST session. Pt reported she has been tolerating her meal trays without difficulty and denied coughing/choking and/or dcr'd respiratory support.     Patient goals: "I just need my bottom teeth! Hopefully I get them soon!"      Pain/Comfort:  · Pain Rating 1: 0/10    Respiratory Status: Nasal cannula, flow 3 L/min -- per RN attempting to wean pt off NC    Objective:     Has the patient been evaluated by SLP for swallowing?   Yes  Keep patient NPO? No     - Cognition/Communciation: Awake, alert, good affect, able to sustain attention and demonstrated timely processing and initiation throughout session. Oriented to name, , current place, current month/year and reasoning for current admission. Able to follow all commands and fluently express all wants/needs during session. Able to recall events leading to current admission, recalled how she recently lost her bottom dentures and how this impacts her with PO intake (reported she requires softer solids until dentures are replaced), and other pmhx without difficulty. Also demonstrated good turn-taking and topic maintenance throughout session.     - Swallowing: Pt " seen this AM to ensure safety with rec'd diet/swallow precautions. Pt tolerated all PO trials given of thin liquids via straw sips (~4oz) and soft solids (multiple bites of diced peaches) -- pt demonstrated good oral acceptance, good labial seal, adequate bolus prep, fairly timely mastication, timely AP transit with good oral clearance. Demonstrated a timely swallow with adequate laryngeal elevation/exursion, per hand palpation and demonstrated no overt s/s of aspiration seen at bedside - no coughing/choking, change in vocal quality and/or dcr'd respiratory support.      Assessment:     Teresa Leos is a 77 y.o. female admitted with Encephalopathy due to COVID-19 virus.  She presents with functional oral and pharyngeal phases of the swallow - pt tolerated PO trials of thin liquids and soft solids with fairly timely bolus prep, mastication and AP transits with good oral clearance and no overt s/s of aspiration seen at bedside.  Pt also demonstrated intact speech-language and cognitive skills, per subjective observation.   SLP will sign off at this time, as skilled ST services are no longer warranted. Please reconsult SLP should pt experience any change in status.       Goals:   Multidisciplinary Problems     SLP Goals     Not on file          Multidisciplinary Problems (Resolved)        Problem: SLP    Goal Priority Disciplines Outcome   SLP Goal   (Resolved)     SLP Met   Description: STGs:  1. Pt will participate in clinical swallow assessment to determine safest diet level. - Met   2. Pt will safely tolerate a Mechanical Soft/Thin liquid diet without any overt s/sx of aspiration. - MET                   Plan:     · Plan of Care reviewed with:  patient   · SLP Follow-Up:  No       Discharge recommendations:  other (see comments) (TBD pending PT/OT recs - no further skilled ST services warranted at this time)   Barriers to Discharge:  None    Time Tracking:     SLP Treatment Date:   08/07/22  Speech Start Time:   0855  Speech Stop Time:  0905     Speech Total Time (min):  10 min    Billable Minutes: Treatment Swallowing Dysfunction 10    08/07/2022

## 2022-08-07 NOTE — ASSESSMENT & PLAN NOTE
-held amlodipine and metoprolol   -permissive HTN complete; resume metoprolol  -failed swallow on initial eval, now has passed

## 2022-08-07 NOTE — PLAN OF CARE
Problem: Adult Inpatient Plan of Care  Goal: Plan of Care Review  Outcome: Ongoing, Progressing  Goal: Patient-Specific Goal (Individualized)  Outcome: Ongoing, Progressing  Goal: Absence of Hospital-Acquired Illness or Injury  Outcome: Ongoing, Progressing  Goal: Optimal Comfort and Wellbeing  Outcome: Ongoing, Progressing  Goal: Readiness for Transition of Care  Outcome: Ongoing, Progressing   Pt in no distress overnight. Pt hypotensive upon assessment. Two 500ml bolus given. Normotensive at this time. Pt paula removed overnight. No urine output at this time. Pt bladder scan reveal 217 ml urine. Due to void in 2 hours or bladder scan again in 2 hours. Pt remains free of falls or injury throughout shift. Call light within reach. Bed in low position. Fall precautions maintained. Will continue to monitor.

## 2022-08-07 NOTE — ASSESSMENT & PLAN NOTE
-switch from eliquis to therapeutic lovenox  -resume amiodarone and metoprolol   -add lopressor IV PRN for rate control if needed

## 2022-08-07 NOTE — ASSESSMENT & PLAN NOTE
Slurred speech  -MRI brain w/o stroke  -CT head and neck with small aneurysm and severe stenosis of PCA, thought chronic  -echo  -ST/PT/OT consulted  -permissive HTN  -resume ASA, statin

## 2022-08-07 NOTE — PT/OT/SLP PROGRESS
Physical Therapy Evaluation Attempt      Patient Name:  Teresa Leos   MRN:  [id number]    Patient not seen today secondary to Nurse/ NINA hold, Other (Comment) (Nsg hold 2/2 elevated/uncontrolled HR). Will follow-up as able/appropriate.    8/7/2022

## 2022-08-07 NOTE — SUBJECTIVE & OBJECTIVE
Interval History: denies any acute complaints. Feels her breathing is doing fine. Still on supplemental O2 today. MRI yesterday is negative for acute stroke. Discussed with son over the phone.    Review of Systems   Constitutional:  Negative for fever.   Respiratory:  Negative for cough and shortness of breath.    Cardiovascular:  Negative for chest pain.   Neurological:  Negative for weakness.   Objective:     Vital Signs (Most Recent):  Temp: 97.6 °F (36.4 °C) (08/07/22 1143)  Pulse: 77 (08/07/22 1245)  Resp: 20 (08/07/22 1245)  BP: (!) 96/55 (08/07/22 1143)  SpO2: (!) 92 % (08/07/22 1245)   Vital Signs (24h Range):  Temp:  [96.6 °F (35.9 °C)-98.1 °F (36.7 °C)] 97.6 °F (36.4 °C)  Pulse:  [] 77  Resp:  [16-21] 20  SpO2:  [88 %-100 %] 92 %  BP: ()/(48-74) 96/55     Weight: 50.3 kg (110 lb 14.3 oz)  Body mass index is 20.95 kg/m².    Intake/Output Summary (Last 24 hours) at 8/7/2022 1322  Last data filed at 8/6/2022 2355  Gross per 24 hour   Intake 480 ml   Output 150 ml   Net 330 ml      Physical Exam  Vitals and nursing note reviewed.   Constitutional:       General: She is not in acute distress.     Appearance: Normal appearance. She is not toxic-appearing.   HENT:      Head: Normocephalic and atraumatic.      Nose: Nose normal.      Mouth/Throat:      Mouth: Mucous membranes are moist.   Eyes:      Pupils: Pupils are equal, round, and reactive to light.   Cardiovascular:      Rate and Rhythm: Normal rate. Rhythm irregular.      Pulses: Normal pulses.      Heart sounds: Normal heart sounds.   Pulmonary:      Effort: Pulmonary effort is normal.      Breath sounds: Normal breath sounds.   Abdominal:      General: Bowel sounds are normal.      Palpations: Abdomen is soft.   Musculoskeletal:         General: No swelling. Normal range of motion.      Cervical back: Normal range of motion and neck supple.   Skin:     General: Skin is warm and dry.   Neurological:      Mental Status: She is alert. She is  disoriented.      Motor: Weakness present.   Psychiatric:         Behavior: Behavior normal.       Significant Labs: All pertinent labs within the past 24 hours have been reviewed.    Significant Imaging: I have reviewed all pertinent imaging results/findings within the past 24 hours.

## 2022-08-07 NOTE — PROGRESS NOTES
[facility] Medicine  Progress Note    Patient Name: Teresa Leos  MRN: [id number]  Patient Class: IP- Inpatient   Admission Date: 8/6/2022  Length of Stay: 1 days  Attending Physician: Bronson Umanzor, *  Primary Care Provider: Joseph Mcpherson NP        Subjective:     Principal Problem:Encephalopathy due to COVID-19 virus        HPI:  Per transfer :  Ms. Leos is a 78yo lady with a past medical history of left leg osteomyelitis due to enterococcus with hardware removal, GERD, H.pylori, Afib RVR on Eliquis, dementia, hyponatremia, prolonged QT, pulmonary HTN, DCHF, HLD, HTN and osteoporosis.  She also has a questionable diagnosis of seizures (isn't completely clear).  She has also had admissions for metabolic encephalopathy.  Her last TTE was done on 5/18/22 and showed and EF of 65%, Grade III diastolic dysfunction, PAP 51 and normal RV function.      She is now brought to the ED by her family due altered mental staus and slurred speech since this morning, along with cough and fever.  On exam she has no focal weakness, rather just lethargy, confusion and slurred speech.     In the ED her VS's were /76   Pulse (!) 113   Temp 101.3F ->99.3 °F (37.4 °C) (Oral)   Resp 20   Wt 50.8 kg (112 lb)   SpO2 95-97% RA   BMI 21.16 kg/m² .  Labs showed normal CV]BC, Na 131, Cr 0.9, normal LFTs.  , CPK 42, trop <0.006, Procal 0.04, COVID POSITIVE, FLU NEGATIVE.  UA normal.       CXR showed there is patchy opacification of the pulmonary parenchyma similar to prior exam concerning for pneumonia versus edema. NC CT head showed no acute abnormality.     In the ED she was treated with Tylenol suppository 650mg 1737, and NS 1L iv 1726.  I have asked the ED to give her Decadron 6mg iv, ASA 300mg OR, and Lovenox 50mg SQ x 1 (hasn't taken her Eliquis due to swallowing issues).  For now, she failed her nursing bedside swallow study.  This seems more consistent with septic encephalopathy,  though lingering issue of CVA is the reason for transfer.    On arrival to [facility], the patient is alert and stable on room air. Hypothermia noted. She has slurred speech and with some short term memory loss with a history of dementia. Very pleasant. Denies any complaints.       Overview/Hospital Course:  No notes on file    Interval History: denies any acute complaints. Feels her breathing is doing fine. Still on supplemental O2 today. MRI yesterday is negative for acute stroke. Discussed with son over the phone.    Review of Systems   Constitutional:  Negative for fever.   Respiratory:  Negative for cough and shortness of breath.    Cardiovascular:  Negative for chest pain.   Neurological:  Negative for weakness.   Objective:     Vital Signs (Most Recent):  Temp: 97.6 °F (36.4 °C) (08/07/22 1143)  Pulse: 77 (08/07/22 1245)  Resp: 20 (08/07/22 1245)  BP: (!) 96/55 (08/07/22 1143)  SpO2: (!) 92 % (08/07/22 1245)   Vital Signs (24h Range):  Temp:  [96.6 °F (35.9 °C)-98.1 °F (36.7 °C)] 97.6 °F (36.4 °C)  Pulse:  [] 77  Resp:  [16-21] 20  SpO2:  [88 %-100 %] 92 %  BP: ()/(48-74) 96/55     Weight: 50.3 kg (110 lb 14.3 oz)  Body mass index is 20.95 kg/m².    Intake/Output Summary (Last 24 hours) at 8/7/2022 1322  Last data filed at 8/6/2022 2355  Gross per 24 hour   Intake 480 ml   Output 150 ml   Net 330 ml      Physical Exam  Vitals and nursing note reviewed.   Constitutional:       General: She is not in acute distress.     Appearance: Normal appearance. She is not toxic-appearing.   HENT:      Head: Normocephalic and atraumatic.      Nose: Nose normal.      Mouth/Throat:      Mouth: Mucous membranes are moist.   Eyes:      Pupils: Pupils are equal, round, and reactive to light.   Cardiovascular:      Rate and Rhythm: Normal rate. Rhythm irregular.      Pulses: Normal pulses.      Heart sounds: Normal heart sounds.   Pulmonary:      Effort: Pulmonary effort is normal.      Breath sounds: Normal breath  sounds.   Abdominal:      General: Bowel sounds are normal.      Palpations: Abdomen is soft.   Musculoskeletal:         General: No swelling. Normal range of motion.      Cervical back: Normal range of motion and neck supple.   Skin:     General: Skin is warm and dry.   Neurological:      Mental Status: She is alert. She is disoriented.      Motor: Weakness present.   Psychiatric:         Behavior: Behavior normal.       Significant Labs: All pertinent labs within the past 24 hours have been reviewed.    Significant Imaging: I have reviewed all pertinent imaging results/findings within the past 24 hours.      Assessment/Plan:      * Encephalopathy due to COVID-19 virus  Presented to ED with fevers, lethargy, cough, and slurred speech, now with hypothermia  -start remdesevir x up to 5 day course  -therapeutic lovenox for Afib  -given hypoxia, initiated dexamethasone for up to 10d  -isolation protocol  -warming measures       Aneurysm  - right PCOM aneurysm, stable  - NSGY f/u as outpatient      Weakness  - PT/OT      Atrial fibrillation with rapid ventricular response  -switch from eliquis to therapeutic lovenox  -resume amiodarone and metoprolol   -add lopressor IV PRN for rate control if needed        Stroke-like symptom  Slurred speech  -MRI brain w/o stroke  -CT head and neck with small aneurysm and severe stenosis of PCA, thought chronic  -echo  -ST/PT/OT consulted  -permissive HTN  -resume ASA, statin      Seizures  Cont home regimen  Depakote, vipmat, and keppra IV->PO today      Dementia  -resume aricept      Essential hypertension  -held amlodipine and metoprolol   -permissive HTN complete; resume metoprolol  -failed swallow on initial eval, now has passed        VTE Risk Mitigation (From admission, onward)         Ordered     enoxaparin injection 50 mg  2 times daily         08/06/22 0151     IP VTE LOW RISK PATIENT  Once         08/06/22 0153     Place sequential compression device  Until discontinued          08/06/22 0153     Place sequential compression device  Until discontinued         08/06/22 0151                Discharge Planning   MONA:      Code Status: Full Code   Is the patient medically ready for discharge?:     Reason for patient still in hospital (select all that apply): Patient trending condition, Treatment and PT / OT recommendations                     Bronson Umanzor MD  Department of [facility] Medicine   [facility]

## 2022-08-07 NOTE — PLAN OF CARE
Recommendation:   1. Add low Na restrictions to diet.   2. Encourage intake at meals as tolerated.   3. Monitor weight/labs.   4. RD to follow to monitor po intake    Goals:   Pt will tolerate diet with at least 50% intake at meals by RD follow up  Nutrition Goal Status: new

## 2022-08-07 NOTE — NURSING
Care plan reviewed. Pt has been afib on the monitor. HR this am shifting from 120's-140's. Dr. Umanzor notified, order for metoprolol added to medications, HR came down to 90's-110's and sometimes at 120 but not sustained, asked PT to hold working with pt today due to increased HR today. Safety maintained, bed at lowest position, wheels locked, call light within reach, bed alarm on.

## 2022-08-07 NOTE — CONSULTS
"  [facility] - Telemetry  Adult Nutrition  Consult Note    SUMMARY     Recommendations    Recommendation:   1. Add low Na restrictions to diet.   2. Encourage intake at meals as tolerated.   3. Monitor weight/labs.   4. RD to follow to monitor po intake    Goals:  Pt will tolerate diet with at least 50% intake at meals by RD follow up  Nutrition Goal Status: new  Communication of RD Recs: other (comment) (POC)    Assessment and Plan  No nutrition dx at this time     Malnutrition Assessment  Weight Loss (Malnutrition):  (2% x 3 months)       Reason for Assessment  Reason For Assessment: consult (stroke pathway)  Diagnosis:  (encephalopathy due to COVID)  Relevant Medical History: HTN, HLD, osteoporosis, hysterectomy  General Information Comments: Pt on dysphagia Mech Soft diet. Pt with % intake at meals today. Eduardo 14-skin intact. NOted 3lb weight loss x 3 months. Unable to assess NFPE 2/2 pt on COVID isolation  Nutrition Discharge Planning: d/c diet to be determined    Nutrition Risk Screen  Nutrition Risk Screen: dysphagia or difficulty swallowing    Nutrition/Diet History  Food Preferences: unable to assess  Spiritual, Cultural Beliefs, Jehovah's witness Practices, Values that Affect Care: no  Factors Affecting Nutritional Intake: difficulty/impaired swallowing    Anthropometrics  Temp: 97.7 °F (36.5 °C)  Height: 5' 1" (154.9 cm)  Height (inches): 61 in  Weight Method: Bed Scale  Weight: 50.3 kg (110 lb 14.3 oz)  Weight (lb): 110.89 lb  Ideal Body Weight (IBW), Female: 105 lb  % Ideal Body Weight, Female (lb): 105.61 %  BMI (Calculated): 21  BMI Grade: 18.5-24.9 - normal  Usual Body Weight (UBW), k.7 kg ()  % Usual Body Weight: 97.5  % Weight Change From Usual Weight: -2.71 %     Lab/Procedures/Meds  Pertinent Labs Reviewed: reviewed  Pertinent Labs Comments: Na 133L, Ca 8.2L  Pertinent Medications Reviewed: reviewed  Pertinent Medications Comments: remdesivir, amiodarone, aspirin, " enoxaparin    Estimated/Assessed Needs  Weight Used For Calorie Calculations: 50.3 kg (110 lb 14.3 oz)  Energy Calorie Requirements (kcal): 1509 (30 kcal/kg)  Energy Need Method: Kcal/kg  Protein Requirements: 50g (1.0g/kg)  Weight Used For Protein Calculations: 50.3 kg (110 lb 14.3 oz)  Estimated Fluid Requirement Method: RDA Method  RDA Method (mL): 1509     Nutrition Prescription Ordered  Current Diet Order: dysphagia [facility] Soft    Evaluation of Received Nutrient/Fluid Intake  I/O: 480/0  Energy Calories Required: meeting needs  Protein Required: meeting needs  Fluid Required: meeting needs  Comments: LBM 8/4  % Intake of Estimated Energy Needs: 75 - 100 %  % Meal Intake: 75 - 100 %    Nutrition Risk  Level of Risk/Frequency of Follow-up:  (1xweekly)     Monitor and Evaluation  Food and Nutrient Intake: food and beverage intake  Food and Nutrient Adminstration: diet order  Physical Activity and Function: nutrition-related ADLs and IADLs  Anthropometric Measurements: weight  Biochemical Data, Medical Tests and Procedures: electrolyte and renal panel  Nutrition-Focused Physical Findings: overall appearance     Nutrition Follow-Up  RD Follow-up?: Yes
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
## 2022-08-08 ENCOUNTER — PATIENT OUTREACH (OUTPATIENT)
Dept: ADMINISTRATIVE | Facility: OTHER | Age: 77
End: 2022-08-08
Payer: MEDICARE

## 2022-08-08 ENCOUNTER — CLINICAL SUPPORT (OUTPATIENT)
Dept: SMOKING CESSATION | Facility: CLINIC | Age: 77
End: 2022-08-08

## 2022-08-08 DIAGNOSIS — F17.210 CIGARETTE SMOKER: Primary | ICD-10-CM

## 2022-08-08 LAB
ANION GAP SERPL CALC-SCNC: 6 MMOL/L (ref 8–16)
BASOPHILS # BLD AUTO: 0.01 K/UL (ref 0–0.2)
BASOPHILS NFR BLD: 0.1 % (ref 0–1.9)
BUN SERPL-MCNC: 23 MG/DL (ref 8–23)
CALCIUM SERPL-MCNC: 8.4 MG/DL (ref 8.7–10.5)
CHLORIDE SERPL-SCNC: 98 MMOL/L (ref 95–110)
CO2 SERPL-SCNC: 31 MMOL/L (ref 23–29)
CREAT SERPL-MCNC: 0.7 MG/DL (ref 0.5–1.4)
DIFFERENTIAL METHOD: ABNORMAL
EOSINOPHIL # BLD AUTO: 0 K/UL (ref 0–0.5)
EOSINOPHIL NFR BLD: 0.1 % (ref 0–8)
ERYTHROCYTE [DISTWIDTH] IN BLOOD BY AUTOMATED COUNT: 14.9 % (ref 11.5–14.5)
EST. GFR  (NO RACE VARIABLE): >60 ML/MIN/1.73 M^2
GLUCOSE SERPL-MCNC: 111 MG/DL (ref 70–110)
HCT VFR BLD AUTO: 33.9 % (ref 37–48.5)
HGB BLD-MCNC: 11.4 G/DL (ref 12–16)
IMM GRANULOCYTES # BLD AUTO: 0.05 K/UL (ref 0–0.04)
IMM GRANULOCYTES NFR BLD AUTO: 0.4 % (ref 0–0.5)
LYMPHOCYTES # BLD AUTO: 2.5 K/UL (ref 1–4.8)
LYMPHOCYTES NFR BLD: 18.7 % (ref 18–48)
MAGNESIUM SERPL-MCNC: 1.8 MG/DL (ref 1.6–2.6)
MCH RBC QN AUTO: 31.8 PG (ref 27–31)
MCHC RBC AUTO-ENTMCNC: 33.6 G/DL (ref 32–36)
MCV RBC AUTO: 95 FL (ref 82–98)
MONOCYTES # BLD AUTO: 0.8 K/UL (ref 0.3–1)
MONOCYTES NFR BLD: 6.1 % (ref 4–15)
NEUTROPHILS # BLD AUTO: 9.9 K/UL (ref 1.8–7.7)
NEUTROPHILS NFR BLD: 74.6 % (ref 38–73)
NRBC BLD-RTO: 0 /100 WBC
PLATELET # BLD AUTO: 175 K/UL (ref 150–450)
PMV BLD AUTO: 11.6 FL (ref 9.2–12.9)
POTASSIUM SERPL-SCNC: 4.3 MMOL/L (ref 3.5–5.1)
RBC # BLD AUTO: 3.58 M/UL (ref 4–5.4)
SODIUM SERPL-SCNC: 135 MMOL/L (ref 136–145)
WBC # BLD AUTO: 13.22 K/UL (ref 3.9–12.7)

## 2022-08-08 PROCEDURE — 63600175 PHARM REV CODE 636 W HCPCS: Performed by: HOSPITALIST

## 2022-08-08 PROCEDURE — 27000207 HC ISOLATION

## 2022-08-08 PROCEDURE — 25000003 PHARM REV CODE 250: Performed by: NURSE PRACTITIONER

## 2022-08-08 PROCEDURE — 83735 ASSAY OF MAGNESIUM: CPT | Performed by: NURSE PRACTITIONER

## 2022-08-08 PROCEDURE — 99406 BEHAV CHNG SMOKING 3-10 MIN: CPT | Mod: S$GLB,,,

## 2022-08-08 PROCEDURE — 25000003 PHARM REV CODE 250: Performed by: HOSPITALIST

## 2022-08-08 PROCEDURE — 36415 COLL VENOUS BLD VENIPUNCTURE: CPT | Performed by: NURSE PRACTITIONER

## 2022-08-08 PROCEDURE — 94640 AIRWAY INHALATION TREATMENT: CPT

## 2022-08-08 PROCEDURE — 11000001 HC ACUTE MED/SURG PRIVATE ROOM

## 2022-08-08 PROCEDURE — 85025 COMPLETE CBC W/AUTO DIFF WBC: CPT | Performed by: NURSE PRACTITIONER

## 2022-08-08 PROCEDURE — 80048 BASIC METABOLIC PNL TOTAL CA: CPT | Performed by: NURSE PRACTITIONER

## 2022-08-08 PROCEDURE — 63600175 PHARM REV CODE 636 W HCPCS: Performed by: NURSE PRACTITIONER

## 2022-08-08 PROCEDURE — 27100098 HC SPACER

## 2022-08-08 PROCEDURE — 94761 N-INVAS EAR/PLS OXIMETRY MLT: CPT

## 2022-08-08 PROCEDURE — 97116 GAIT TRAINING THERAPY: CPT

## 2022-08-08 PROCEDURE — 99900035 HC TECH TIME PER 15 MIN (STAT)

## 2022-08-08 PROCEDURE — 27000221 HC OXYGEN, UP TO 24 HOURS

## 2022-08-08 PROCEDURE — 97161 PT EVAL LOW COMPLEX 20 MIN: CPT

## 2022-08-08 PROCEDURE — 97530 THERAPEUTIC ACTIVITIES: CPT

## 2022-08-08 PROCEDURE — 99406 PT REFUSED TOBACCO CESSATION: ICD-10-PCS | Mod: S$GLB,,,

## 2022-08-08 RX ADMIN — AMIODARONE HYDROCHLORIDE 200 MG: 200 TABLET ORAL at 09:08

## 2022-08-08 RX ADMIN — DIVALPROEX SODIUM 1000 MG: 250 TABLET, EXTENDED RELEASE ORAL at 09:08

## 2022-08-08 RX ADMIN — ENOXAPARIN SODIUM 50 MG: 100 INJECTION SUBCUTANEOUS at 09:08

## 2022-08-08 RX ADMIN — ALBUTEROL SULFATE 2 PUFF: 90 AEROSOL, METERED RESPIRATORY (INHALATION) at 12:08

## 2022-08-08 RX ADMIN — REMDESIVIR 100 MG: 100 INJECTION, POWDER, LYOPHILIZED, FOR SOLUTION INTRAVENOUS at 09:08

## 2022-08-08 RX ADMIN — MUPIROCIN: 20 OINTMENT TOPICAL at 09:08

## 2022-08-08 RX ADMIN — LEVETIRACETAM 1000 MG: 500 TABLET, FILM COATED ORAL at 09:08

## 2022-08-08 RX ADMIN — DEXAMETHASONE 6 MG: 4 TABLET ORAL at 09:08

## 2022-08-08 RX ADMIN — LACOSAMIDE 100 MG: 50 TABLET, FILM COATED ORAL at 09:08

## 2022-08-08 RX ADMIN — FUROSEMIDE 80 MG: 40 TABLET ORAL at 09:08

## 2022-08-08 RX ADMIN — ASPIRIN 81 MG: 81 TABLET, CHEWABLE ORAL at 09:08

## 2022-08-08 RX ADMIN — ATORVASTATIN CALCIUM 40 MG: 40 TABLET, FILM COATED ORAL at 09:08

## 2022-08-08 RX ADMIN — METOPROLOL SUCCINATE 100 MG: 50 TABLET, EXTENDED RELEASE ORAL at 09:08

## 2022-08-08 RX ADMIN — ALBUTEROL SULFATE 2 PUFF: 90 AEROSOL, METERED RESPIRATORY (INHALATION) at 07:08

## 2022-08-08 RX ADMIN — ALBUTEROL SULFATE 2 PUFF: 90 AEROSOL, METERED RESPIRATORY (INHALATION) at 08:08

## 2022-08-08 NOTE — PROGRESS NOTES
IP Liaison - Initial Visit Note    Patient: Teresa Leos  MRN:  [id number]  Date of Service:  8/8/2022  Completed by:  WILLIS Marr    Reason for Visit   Patient presents with    IP Liaison Initial Visit       RSW contacted pt via room phone in order to complete SDOH questionnaire and liaison assessment.  Pt has identified no social barriers to care. Pt son and daughter-in-law are pt support system, pt lives with son and pt son and DIL provide pt transportation. Per pt, pt is not in need of resources at this time.    The following were addressed during this visit:  - Review SDOH Questions   - Complete patient assessment   - Complete initial visit with patient        Patient Summary     IP Liaison Patient Assessment    General  Level of Caregiver support: Member independent and does not need caregiver assistance  Have you had to make a decision between paying for any of the following in the last 2 months?: None  Transportation means: Family  Employment status: Retired and not working  Assessments  Was the PHQ Depression Screening completed this visit?: No  Was the DYLAN-7 Screening completed this visit?: No       WILLIS Marr

## 2022-08-08 NOTE — ASSESSMENT & PLAN NOTE
Slurred speech  -MRI brain w/o stroke  -CT head and neck with small aneurysm and severe stenosis of PCA, thought chronic  -echo  -ST/PT/OT consulted  -permissive HTN  -continue ASA, statin

## 2022-08-08 NOTE — ASSESSMENT & PLAN NOTE
-held amlodipine and metoprolol   -permissive HTN complete; resumed metoprolol  -failed swallow on initial eval, now has passed

## 2022-08-08 NOTE — PLAN OF CARE
Problem: Occupational Therapy  Goal: Occupational Therapy Goal  Description: Goals to be met by: 9/6/2022    Patient will increase functional independence with ADLs by performing:    UE Dressing with Stand-by Assistance.  Toileting from bedside commode with Minimal Assistance for hygiene and clothing management.   Supine to sit with Modified [place].  Step transfer with Stand-by Assistance  Toilet transfer to bedside commode with Stand-by Assistance.  Increased functional strength to WFL for ADLS.  Upper extremity exercise program x 10 reps per handout, with supervision.    Outcome: Ongoing, Progressing

## 2022-08-08 NOTE — ASSESSMENT & PLAN NOTE
Presented to ED with fevers, lethargy, cough, and slurred speech, now with hypothermia  -start remdesevir for up to 5 day course  -therapeutic lovenox for Afib  -given hypoxia, initiated dexamethasone for up to 10d  -isolation protocol  -warming measures   -wean O2 as tolerated

## 2022-08-08 NOTE — PLAN OF CARE
POC reviewed with patient, Aox4 with respirations even and unlabored. Breath sounds coarse and diminished in anterior and lateral fields on 1L O2 NC. Infrequent, nonproductive cough noted. Purewick in place and draining clear yellow urine. Bed in lowest position, SR up x 3, call bell in reach and bed alarm activated with instructions given to call for assistance prior to getting OOB. Weightshifting assistance provided every 2 hours. Medications administered per MAR.    Problem: Adult Inpatient Plan of Care  Goal: Plan of Care Review  Outcome: Ongoing, Progressing     Problem: Fall Injury Risk  Goal: Absence of Fall and Fall-Related Injury  Outcome: Ongoing, Progressing     Problem: Adult Inpatient Plan of Care  Goal: Plan of Care Review  Outcome: Ongoing, Progressing     Problem: Fall Injury Risk  Goal: Absence of Fall and Fall-Related Injury  Outcome: Ongoing, Progressing
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
## 2022-08-08 NOTE — PT/OT/SLP PROGRESS
Occupational Therapy   Treatment    Name: Teresa Leos  MRN: [id number]  Admitting Diagnosis:  Encephalopathy due to COVID-19 virus     Diagnoses of Stroke-like symptoms, Chest pain, Stroke-like symptom, Encephalopathy due to COVID-19 virus, Dementia without behavioral disturbance, unspecified dementia type, and Seizures were pertinent to this visit.    Recommendations:     Discharge Recommendations: home health OT  Discharge Equipment Recommendations:  bedside commode  Barriers to discharge:   (orthostatic hypotension when ambulating)    Assessment:     Teresa Leos is a 77 y.o. female with a medical diagnosis of Encephalopathy due to COVID-19 virus.  She presents with Performance deficits affecting function are weakness, impaired endurance, impaired self care skills, impaired functional mobility, gait instability, impaired balance, impaired cognition, decreased coordination, decreased upper extremity function, decreased lower extremity function, decreased safety awareness.     Pt. transferred OOB to BS chair with Min A-CGA with RW. Once seated in chair BP 96/65mmHg, Map 89, SpO2 96% on RA,  bpm. Pt denied dizziness. Pt returned to bed, Min A-CGA with RW, sit>supine CGA. HOB elevated. /74 mmHg, Map 89, SpO2 96%, HR fluctuating 110-114 bpm. Pt was not in distress and eating dinner meal at the end of tx session. Nurse notified.     Rehab Prognosis:  Good and Fair; patient would benefit from acute skilled OT services to address these deficits and reach maximum level of function.       Plan:     Patient to be seen 5 x/week to address the above listed problems via self-care/home management, therapeutic activities, therapeutic exercises  · Plan of Care Expires: 09/06/22  · Plan of Care Reviewed with: patient    Subjective     Pain/Comfort:  · Pain Rating 1: 0/10  · Pain Rating Post-Intervention 1: 0/10    Objective:     Communicated with: nurse prior to session.  Patient found HOB elevated with bed alarm,  telemetry, peripheral IV upon OT entry to room.    General Precautions: Standard, fall, airborne, contact, droplet, seizure   Orthopedic Precautions:N/A   Braces: N/A  Respiratory Status: Room air     Occupational Performance:     Bed Mobility:    · Patient completed Scooting/Bridging with contact guard assistance to scoot anteriorly seated EOB, min a to HOB in supine.  · Patient completed Supine to Sit with supervision, increase time and cues for increase lateral WS tech  · Patient completed Sit to Supine with contact guard assistance for LE    Functional Mobility/Transfers:  · Patient completed Sit <> Stand Transfer with contact guard assistance and minimum assistance  with  rolling walker ,. vc , tc for hand placement.  · Patient completed Bed <> Chair Transfer using Step Transfer technique with contact guard assistance and minimum assistance with rolling walker, max sequencing cues.      Activities of Daily Living:  · Feeding:  Indep  after OT open packages, containers with HOB elevated  · Toileting: declined need to use toilet and pt has PW .      [facility] 6 Click ADL: 18    Treatment & Education:  Purpose of OT and POC  See above  Vitals assessed once pt sitting UIC and BTB. BP low and HR elevated sitting UIC, pt denied dizziness and chest pain. Pt was not left in chair for safety reasons. BP wfl once pt BTB with HOB elevated. SpO2 remained stable.    Patient left HOB elevated with all lines intact, call button in reach, bed alarm on and nurse notifiedEducation:      GOALS:   Multidisciplinary Problems     Occupational Therapy Goals        Problem: Occupational Therapy    Goal Priority Disciplines Outcome Interventions   Occupational Therapy Goal     OT, PT/OT Ongoing, Progressing    Description: Goals to be met by: 9/6/2022    Patient will increase functional independence with ADLs by performing:    UE Dressing with Stand-by Assistance.  Toileting from bedside commode with Minimal Assistance for hygiene and  clothing management.   Supine to sit with Modified [place].  Step transfer with Stand-by Assistance  Toilet transfer to bedside commode with Stand-by Assistance.  Increased functional strength to WFL for ADLS.  Upper extremity exercise program x 10 reps per handout, with supervision.                     Time Tracking:     OT Date of Treatment: 08/08/22  OT Start Time: 1716  OT Stop Time: 1739  OT Total Time (min): 23 min    Billable Minutes:Therapeutic Activity 23  Total Time 23    OT/HAYDEE: OT          8/8/2022

## 2022-08-08 NOTE — PLAN OF CARE
Problem: Adult Inpatient Plan of Care  Goal: Plan of Care Review  Outcome: Ongoing, Progressing     Problem: Adjustment to Illness (Stroke, Ischemic/Transient Ischemic Attack)  Goal: Optimal Coping  Outcome: Ongoing, Progressing     Problem: Cerebral Tissue Perfusion (Stroke, Ischemic/Transient Ischemic Attack)  Goal: Optimal Cerebral Tissue Perfusion  Outcome: Ongoing, Progressing     Problem: Communication Impairment (Stroke, Ischemic/Transient Ischemic Attack)  Goal: Improved Communication Skills  Outcome: Ongoing, Progressing     Problem: Functional Ability Impaired (Stroke, Ischemic/Transient Ischemic Attack)  Goal: Optimal Functional Ability  Outcome: Ongoing, Progressing     Problem: Functional Ability Impaired (Stroke, Ischemic/Transient Ischemic Attack)  Goal: Optimal Functional Ability  Outcome: Ongoing, Progressing     Problem: Respiratory Compromise (Stroke, Ischemic/Transient Ischemic Attack)  Goal: Effective Oxygenation and Ventilation  Outcome: Ongoing, Progressing     Problem: Sensorimotor Impairment (Stroke, Ischemic/Transient Ischemic Attack)  Goal: Improved Sensorimotor Function  Outcome: Ongoing, Progressing     Problem: Swallowing Impairment (Stroke, Ischemic/Transient Ischemic Attack)  Goal: Optimal Eating and Swallowing without Aspiration  Outcome: Ongoing, Progressing     Problem: Urinary Elimination Impaired (Stroke, Ischemic/Transient Ischemic Attack)  Goal: Effective Urinary Elimination  Outcome: Ongoing, Progressing     Problem: Fall Injury Risk  Goal: Absence of Fall and Fall-Related Injury  Outcome: Ongoing, Progressing     Problem: Infection  Goal: Absence of Infection Signs and Symptoms  Outcome: Ongoing, Progressing     Problem: Skin Injury Risk Increased  Goal: Skin Health and Integrity  Outcome: Ongoing, Progressing
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
## 2022-08-08 NOTE — PLAN OF CARE
SW sent HH referral to [facility] Phone: [phone]  via [employer]. KOURTNEY will follow.    PHILLIP Adam  [phone]     08/08/22 1355   Post-Acute Status   Post-Acute Authorization Home Health   Home Health Status Referrals Sent   Coverage [facility]   Discharge Plan   Discharge Plan A [facility]

## 2022-08-08 NOTE — SUBJECTIVE & OBJECTIVE
Interval History: doing well today; no cough or shortness of breath. Has never had chest pain or GI symptoms. Awaiting further PT/OT assessment; appears to be back towards baseline and can likely disposition after O2 weaned.    Review of Systems   Constitutional:  Negative for fever.   Respiratory:  Negative for cough and shortness of breath.    Cardiovascular:  Negative for chest pain.   Neurological:  Negative for weakness.   Objective:     Vital Signs (Most Recent):  Temp: 96.2 °F (35.7 °C) (08/08/22 0800)  Pulse: 99 (08/08/22 0804)  Resp: 18 (08/08/22 0804)  BP: 133/89 (08/08/22 0800)  SpO2: (!) 91 % (08/08/22 0804)   Vital Signs (24h Range):  Temp:  [96.1 °F (35.6 °C)-97.6 °F (36.4 °C)] 96.2 °F (35.7 °C)  Pulse:  [] 99  Resp:  [16-20] 18  SpO2:  [91 %-99 %] 91 %  BP: ()/(55-89) 133/89     Weight: 51.9 kg (114 lb 6.7 oz)  Body mass index is 21.62 kg/m².    Intake/Output Summary (Last 24 hours) at 8/8/2022 1032  Last data filed at 8/7/2022 1825  Gross per 24 hour   Intake 480 ml   Output 400 ml   Net 80 ml        Physical Exam  Vitals and nursing note reviewed.   Constitutional:       General: She is not in acute distress.     Appearance: Normal appearance. She is not toxic-appearing.   HENT:      Head: Normocephalic and atraumatic.      Nose: Nose normal.      Mouth/Throat:      Mouth: Mucous membranes are moist.   Eyes:      Pupils: Pupils are equal, round, and reactive to light.   Cardiovascular:      Rate and Rhythm: Normal rate. Rhythm irregular.      Pulses: Normal pulses.      Heart sounds: Normal heart sounds.   Pulmonary:      Effort: Pulmonary effort is normal.      Breath sounds: Normal breath sounds.   Abdominal:      General: Bowel sounds are normal.      Palpations: Abdomen is soft.   Musculoskeletal:         General: No swelling. Normal range of motion.      Cervical back: Normal range of motion and neck supple.   Skin:     General: Skin is warm and dry.   Neurological:      Mental  Status: She is alert. She is disoriented.      Motor: Weakness present.   Psychiatric:         Behavior: Behavior normal.       Significant Labs: All pertinent labs within the past 24 hours have been reviewed.    Significant Imaging: I have reviewed all pertinent imaging results/findings within the past 24 hours.

## 2022-08-08 NOTE — ASSESSMENT & PLAN NOTE
-switch from eliquis to therapeutic lovenox  -resumed amiodarone and metoprolol   -add lopressor IV PRN for rate control if needed

## 2022-08-08 NOTE — PLAN OF CARE
SW met with pt via phone due to pt's covid positive status. Pt stated that she lives at home with Son Adán [phone] or Daughter Ammy [phone] at home. Pt has a wc and rollator at home. SW will request f/u appts.  Pt stated that she recently was a SNF in [place] for a month for therapy. Pt plans to have her children help transport her home at time of d/c. White board updated with CM name and contact information.  Discharge brochure provided.  Pt encouraged to call with any questions or concerns.  Cm will continue to follow pt through transitions of care and assist with any discharge needs.    PHILLIP Adam  [phone]       08/08/22 0944   Discharge Assessment   Assessment Type Discharge Planning Assessment   Confirmed/corrected address, phone number and insurance Yes   Confirmed Demographics Correct on Facesheet   Source of Information patient   When was your last doctors appointment?   (Pt was not sure)   Communicated MONA with patient/caregiver Yes   Reason For Admission Encephalopathy due to COVID-19 virus   Lives With child(ranjit), adult   Facility Arrived From: home   Do you expect to return to your current living situation? Yes   Do you have help at home or someone to help you manage your care at home? Yes   Who are your caregiver(s) and their phone number(s)? Son Adán [phone] or Daughter Ammy [phone]   Prior to hospitilization cognitive status: Alert/Oriented   Current cognitive status: Alert/Oriented   Walking or Climbing Stairs Difficulty ambulation difficulty, requires equipment   Dressing/Bathing Difficulty none   Home Accessibility wheelchair accessible   Home Layout Able to live on 1st floor   Equipment Currently Used at Home rollator;wheelchair   Readmission within 30 days? No   Patient currently being followed by outpatient case management? No   Do you currently have service(s) that help you manage your care at home? No   Do you take prescription medications? Yes   Do  you have prescription coverage? Yes   Coverage [employer]   Do you have any problems affording any of your prescribed medications? No   Is the patient taking medications as prescribed? yes   Who is going to help you get home at discharge? Raúl Mccain [phone] or Daughter Ammy [phone]   How do you get to doctors appointments? family or friend will provide   Are you on dialysis? No   Do you take coumadin? No   Discharge Plan A Home with family   DME Needed Upon Discharge  none   Discharge Plan discussed with: Patient   Discharge Barriers Identified None   Relationship/Environment   Name(s) of Who Lives With Patient Raúl Mccain [phone] or Jud Gimenez [phone]

## 2022-08-08 NOTE — PROGRESS NOTES
[facility] Medicine  Progress Note    Patient Name: Teresa Leos  MRN: [id number]  Patient Class: IP- Inpatient   Admission Date: 8/6/2022  Length of Stay: 2 days  Attending Physician: Bronson Umanzor, *  Primary Care Provider: Joseph Mcpherson NP        Subjective:     Principal Problem:Encephalopathy due to COVID-19 virus        HPI:  Per transfer :  Ms. Leos is a 76yo lady with a past medical history of left leg osteomyelitis due to enterococcus with hardware removal, GERD, H.pylori, Afib RVR on Eliquis, dementia, hyponatremia, prolonged QT, pulmonary HTN, DCHF, HLD, HTN and osteoporosis.  She also has a questionable diagnosis of seizures (isn't completely clear).  She has also had admissions for metabolic encephalopathy.  Her last TTE was done on 5/18/22 and showed and EF of 65%, Grade III diastolic dysfunction, PAP 51 and normal RV function.      She is now brought to the ED by her family due altered mental staus and slurred speech since this morning, along with cough and fever.  On exam she has no focal weakness, rather just lethargy, confusion and slurred speech.     In the ED her VS's were /76   Pulse (!) 113   Temp 101.3F ->99.3 °F (37.4 °C) (Oral)   Resp 20   Wt 50.8 kg (112 lb)   SpO2 95-97% RA   BMI 21.16 kg/m² .  Labs showed normal CV]BC, Na 131, Cr 0.9, normal LFTs.  , CPK 42, trop <0.006, Procal 0.04, COVID POSITIVE, FLU NEGATIVE.  UA normal.       CXR showed there is patchy opacification of the pulmonary parenchyma similar to prior exam concerning for pneumonia versus edema. NC CT head showed no acute abnormality.     In the ED she was treated with Tylenol suppository 650mg 1737, and NS 1L iv 1726.  I have asked the ED to give her Decadron 6mg iv, ASA 300mg GA, and Lovenox 50mg SQ x 1 (hasn't taken her Eliquis due to swallowing issues).  For now, she failed her nursing bedside swallow study.  This seems more consistent with septic encephalopathy,  though lingering issue of CVA is the reason for transfer.    On arrival to [facility], the patient is alert and stable on room air. Hypothermia noted. She has slurred speech and with some short term memory loss with a history of dementia. Very pleasant. Denies any complaints.       Overview/Hospital Course:  No notes on file    Interval History: doing well today; no cough or shortness of breath. Has never had chest pain or GI symptoms. Awaiting further PT/OT assessment; appears to be back towards baseline and can likely disposition after O2 weaned.    Review of Systems   Constitutional:  Negative for fever.   Respiratory:  Negative for cough and shortness of breath.    Cardiovascular:  Negative for chest pain.   Neurological:  Negative for weakness.   Objective:     Vital Signs (Most Recent):  Temp: 96.2 °F (35.7 °C) (08/08/22 0800)  Pulse: 99 (08/08/22 0804)  Resp: 18 (08/08/22 0804)  BP: 133/89 (08/08/22 0800)  SpO2: (!) 91 % (08/08/22 0804)   Vital Signs (24h Range):  Temp:  [96.1 °F (35.6 °C)-97.6 °F (36.4 °C)] 96.2 °F (35.7 °C)  Pulse:  [] 99  Resp:  [16-20] 18  SpO2:  [91 %-99 %] 91 %  BP: ()/(55-89) 133/89     Weight: 51.9 kg (114 lb 6.7 oz)  Body mass index is 21.62 kg/m².    Intake/Output Summary (Last 24 hours) at 8/8/2022 1032  Last data filed at 8/7/2022 1825  Gross per 24 hour   Intake 480 ml   Output 400 ml   Net 80 ml        Physical Exam  Vitals and nursing note reviewed.   Constitutional:       General: She is not in acute distress.     Appearance: Normal appearance. She is not toxic-appearing.   HENT:      Head: Normocephalic and atraumatic.      Nose: Nose normal.      Mouth/Throat:      Mouth: Mucous membranes are moist.   Eyes:      Pupils: Pupils are equal, round, and reactive to light.   Cardiovascular:      Rate and Rhythm: Normal rate. Rhythm irregular.      Pulses: Normal pulses.      Heart sounds: Normal heart sounds.   Pulmonary:      Effort: Pulmonary effort is normal.       Breath sounds: Normal breath sounds.   Abdominal:      General: Bowel sounds are normal.      Palpations: Abdomen is soft.   Musculoskeletal:         General: No swelling. Normal range of motion.      Cervical back: Normal range of motion and neck supple.   Skin:     General: Skin is warm and dry.   Neurological:      Mental Status: She is alert. She is disoriented.      Motor: Weakness present.   Psychiatric:         Behavior: Behavior normal.       Significant Labs: All pertinent labs within the past 24 hours have been reviewed.    Significant Imaging: I have reviewed all pertinent imaging results/findings within the past 24 hours.      Assessment/Plan:      * Encephalopathy due to COVID-19 virus  Presented to ED with fevers, lethargy, cough, and slurred speech, now with hypothermia  -start remdesevir for up to 5 day course  -therapeutic lovenox for Afib  -given hypoxia, initiated dexamethasone for up to 10d  -isolation protocol  -warming measures   -wean O2 as tolerated      Aneurysm  - right PCOM aneurysm, stable  - NSGY f/u as outpatient      Weakness  - PT/OT      Atrial fibrillation with rapid ventricular response  -switch from eliquis to therapeutic lovenox  -resumed amiodarone and metoprolol   -add lopressor IV PRN for rate control if needed        Stroke-like symptom  Slurred speech  -MRI brain w/o stroke  -CT head and neck with small aneurysm and severe stenosis of PCA, thought chronic  -echo  -ST/PT/OT consulted  -permissive HTN  -continue ASA, statin      Seizures  Cont home regimen  - continue depakote, vipmat, and keppra PO      Dementia  -resume aricept      Essential hypertension  -held amlodipine and metoprolol   -permissive HTN complete; resumed metoprolol  -failed swallow on initial eval, now has passed        VTE Risk Mitigation (From admission, onward)         Ordered     enoxaparin injection 50 mg  2 times daily         08/06/22 0151     IP VTE LOW RISK PATIENT  Once         08/06/22 0153      Place sequential compression device  Until discontinued         08/06/22 0153     Place sequential compression device  Until discontinued         08/06/22 0151                Discharge Planning   MONA: 8/9/2022     Code Status: Full Code   Is the patient medically ready for discharge?:     Reason for patient still in hospital (select all that apply): Patient trending condition and Treatment  Discharge Plan A: Home with family                  Bronson Umanzor MD  Department of [facility] Medicine   [facility]

## 2022-08-08 NOTE — PLAN OF CARE
Problem: Physical Therapy  Goal: Physical Therapy Goal  Description: Goals to be met by: 22     Patient will increase functional independence with mobility by performin. Supine to sit with Modified [place]  2. Sit to stand transfer with Stand-by Assistance  3. Bed to chair transfer with Stand-by Assistance using AD or HHA  4. Gait  x 80 feet with Contact Guard Assistance using AD or HHA  5. Lower extremity exercise program x10 reps per handout, with supervision    Outcome: Ongoing, Progressing     PT Eval completed, note to follow. Pt ambulated ~12 ft x 2 trials with CGA-Miranda and 1 HHA and seated rest break in between. Pt reporting mild dizziness initially upon sitting EOB that resolved. Family reporting they are able to provide 24/7 assist and prefer pt to d/c home. Recommending HH PT/OT and 24/7 assist.

## 2022-08-09 ENCOUNTER — PATIENT OUTREACH (OUTPATIENT)
Dept: ADMINISTRATIVE | Facility: OTHER | Age: 77
End: 2022-08-09
Payer: MEDICARE

## 2022-08-09 VITALS
RESPIRATION RATE: 18 BRPM | SYSTOLIC BLOOD PRESSURE: 138 MMHG | WEIGHT: 113.75 LBS | BODY MASS INDEX: 21.48 KG/M2 | OXYGEN SATURATION: 96 % | HEIGHT: 61 IN | TEMPERATURE: 98 F | HEART RATE: 71 BPM | DIASTOLIC BLOOD PRESSURE: 82 MMHG

## 2022-08-09 PROCEDURE — 25000003 PHARM REV CODE 250: Performed by: HOSPITALIST

## 2022-08-09 PROCEDURE — 27100098 HC SPACER

## 2022-08-09 PROCEDURE — 63600175 PHARM REV CODE 636 W HCPCS: Performed by: NURSE PRACTITIONER

## 2022-08-09 PROCEDURE — 94761 N-INVAS EAR/PLS OXIMETRY MLT: CPT

## 2022-08-09 PROCEDURE — 63600175 PHARM REV CODE 636 W HCPCS: Performed by: HOSPITALIST

## 2022-08-09 PROCEDURE — 94640 AIRWAY INHALATION TREATMENT: CPT

## 2022-08-09 PROCEDURE — 99900035 HC TECH TIME PER 15 MIN (STAT)

## 2022-08-09 RX ORDER — BENZONATATE 100 MG/1
100 CAPSULE ORAL 3 TIMES DAILY PRN
Qty: 30 CAPSULE | Refills: 0 | Status: SHIPPED | OUTPATIENT
Start: 2022-08-09 | End: 2022-08-19

## 2022-08-09 RX ADMIN — ALBUTEROL SULFATE 2 PUFF: 90 AEROSOL, METERED RESPIRATORY (INHALATION) at 12:08

## 2022-08-09 RX ADMIN — AMIODARONE HYDROCHLORIDE 200 MG: 200 TABLET ORAL at 08:08

## 2022-08-09 RX ADMIN — DEXAMETHASONE 6 MG: 4 TABLET ORAL at 08:08

## 2022-08-09 RX ADMIN — LEVETIRACETAM 1000 MG: 500 TABLET, FILM COATED ORAL at 08:08

## 2022-08-09 RX ADMIN — ASPIRIN 81 MG: 81 TABLET, CHEWABLE ORAL at 08:08

## 2022-08-09 RX ADMIN — ATORVASTATIN CALCIUM 40 MG: 40 TABLET, FILM COATED ORAL at 08:08

## 2022-08-09 RX ADMIN — FUROSEMIDE 80 MG: 40 TABLET ORAL at 08:08

## 2022-08-09 RX ADMIN — METOPROLOL SUCCINATE 100 MG: 50 TABLET, EXTENDED RELEASE ORAL at 08:08

## 2022-08-09 RX ADMIN — ENOXAPARIN SODIUM 50 MG: 100 INJECTION SUBCUTANEOUS at 08:08

## 2022-08-09 RX ADMIN — ALBUTEROL SULFATE 2 PUFF: 90 AEROSOL, METERED RESPIRATORY (INHALATION) at 07:08

## 2022-08-09 RX ADMIN — LACOSAMIDE 100 MG: 50 TABLET, FILM COATED ORAL at 08:08

## 2022-08-09 RX ADMIN — MUPIROCIN: 20 OINTMENT TOPICAL at 08:08

## 2022-08-09 NOTE — PLAN OF CARE
Introduced as VN and will be reviewing discharge instructions.  Educated patient on reason for admission, home medication list, and discharge instructions including when to return to ED and the following doctor appointments.  Education per flowsheet.  Opportunity given for questions and questions answered. Nurse  notified of   completion of discharge education. Patient waiting for wheelchair

## 2022-08-09 NOTE — HOSPITAL COURSE
"Ms. Leos presented with fevers, cough, lethargy, and slurred speech initially concerning for stroke; she was found to be COVID positive on admission.  Admitted for stroke workup:  MRI brain without acute stroke, although CTA did reveal right P comm aneurysm which appears stable.  Symptoms resolved rapidly.  Additionally, she was hypoxic on admission requiring initiation of remdesivir and dexamethasone for her COVID-19.  Respiratory status rapidly improved and she was able to be weaned off supplemental oxygen.  Evaluated by PT/OT/SLP and will arrange for home health at time of discharge.  She will continue aspirin and Lipitor.  Would recommend outpatient follow-up with Neurology and with Neurosurgery for monitoring of aneurysm.    /82   Pulse 71   Temp 97.5 °F (36.4 °C) (Oral)   Resp 18   Ht 5' 1" (1.549 m)   Wt 51.6 kg (113 lb 12.1 oz)   SpO2 96%   BMI 21.49 kg/m²     Physical Exam  Vitals and nursing note reviewed.   Constitutional:       General: She is not in acute distress.     Appearance: Normal appearance. She is not toxic-appearing.   HENT:      Head: Normocephalic and atraumatic.      Nose: Nose normal.      Mouth/Throat:      Mouth: Mucous membranes are moist.   Eyes:      Pupils: Pupils are equal, round, and reactive to light.   Cardiovascular:      Rate and Rhythm: Normal rate. Rhythm irregular.      Pulses: Normal pulses.      Heart sounds: Normal heart sounds.   Pulmonary:      Effort: Pulmonary effort is normal.      Breath sounds: Normal breath sounds.   Abdominal:      General: Bowel sounds are normal.      Palpations: Abdomen is soft.   Musculoskeletal:         General: No swelling. Normal range of motion.      Cervical back: Normal range of motion and neck supple.   Skin:     General: Skin is warm and dry.   Neurological:      Mental Status: She is alert. She is disoriented.      Motor: Weakness present.   Psychiatric:         Behavior: Behavior normal.   "

## 2022-08-09 NOTE — PROGRESS NOTES
[facility]                    Pharmacy       Discharge Medication Education    Patient ACCEPTED medication education. Pharmacy has provided education on the name, indication, and possible side effects of the medication(s) prescribed, using teach-back method.     The following medications have also been discussed, during this admission.        Medication List        START taking these medications      benzonatate 100 MG capsule  Commonly known as: TESSALON  Take 1 capsule (100 mg total) by mouth 3 (three) times daily as needed for Cough.            CHANGE how you take these medications      metoprolol succinate 100 MG 24 hr tablet  Commonly known as: TOPROL-XL  Take 3 tablets (300 mg total) by mouth once daily.  What changed: how much to take            CONTINUE taking these medications      amiodarone 200 MG Tab  Commonly known as: PACERONE  Take 1 tablet (200 mg total) by mouth once daily.     aspirin 81 MG EC tablet  Commonly known as: ECOTRIN     atorvastatin 40 MG tablet  Commonly known as: LIPITOR  Take 1 tablet (40 mg total) by mouth every evening.     azelastine 137 mcg (0.1 %) nasal spray  Commonly known as: ASTELIN     divalproex 500 MG Tb24  Take 2 tablets (1,000 mg total) by mouth every evening.     donepeziL 5 MG tablet  Commonly known as: ARICEPT     ELIQUIS 5 mg Tab  Generic drug: apixaban     furosemide 80 MG tablet  Commonly known as: LASIX  Take 1 tablet (80 mg total) by mouth once daily.     lacosamide 100 mg Tab  Commonly known as: VIMPAT  Take 1 tablet (100 mg total) by mouth every 12 (twelve) hours.     levETIRAcetam 1000 MG tablet  Commonly known as: KEPPRA  Take 1 tablet (1,000 mg total) by mouth 2 (two) times daily.     senna-docusate 8.6-50 mg 8.6-50 mg per tablet  Commonly known as: PERICOLACE  Take 2 tablets by mouth once daily.     tamsulosin 0.4 mg Cap  Commonly known as: FLOMAX  Take 1 capsule (0.4 mg total) by mouth once daily.            STOP taking these  medications      amLODIPine 5 MG tablet  Commonly known as: NORVASC     irbesartan-hydrochlorothiazide 300-12.5 mg per tablet  Commonly known as: AVALIDE     omeprazole 40 MG capsule  Commonly known as: PRILOSEC     simvastatin 40 MG tablet  Commonly known as: ZOCOR               Where to Get Your Medications        These medications were sent to [facility]  [address], [place] [address]      Hours: Mon-Fri, 8a-5:30p Phone: [phone]   benzonatate 100 MG capsule          Thank you  Sampson Brown, PharmD  [phone]

## 2022-08-09 NOTE — PLAN OF CARE
[place] - Telemetry      HOME HEALTH ORDERS  FACE TO FACE ENCOUNTER    Patient Name: Teresa Leos  YOB: 1945    PCP: Joseph Mcpherson NP   PCP Address: [address] [place] [address] [address] [facility] [address] [place] *  PCP Phone Number: [phone]  PCP Fax: [phone]    Encounter Date: 8/5/22    Admit to Home Health    Diagnoses:  Active Hospital Problems    Diagnosis  POA    *Encephalopathy due to COVID-19 virus [U07.1, G93.49]  Yes    Weakness [R53.1]  Yes    Aneurysm [I72.9]  Yes    Stroke-like symptom [R29.90]  Yes    Atrial fibrillation with rapid ventricular response [I48.91]  Yes    Seizures [R56.9]  Yes    Dementia [F03.90]  Yes    Essential hypertension [I10]  Yes      Resolved Hospital Problems   No resolved problems to display.       Follow Up Appointments:  No future appointments.    Allergies:  Review of patient's allergies indicates:   Allergen Reactions    Ciprofloxacin Other (See Comments)     Seizures       Medications: Review discharge medications with patient and family and provide education.    Current Facility-Administered Medications   Medication Dose Route Frequency Provider Last Rate Last Admin    acetaminophen tablet 650 mg  650 mg Oral Q4H PRN Kathie Noble NP        albuterol inhaler 2 puff  2 puff Inhalation Q6H Yue Duarte MD   2 puff at 08/09/22 0711    amiodarone tablet 200 mg  200 mg Oral Daily Bronson Umanzor MD   200 mg at 08/08/22 0933    aspirin chewable tablet 81 mg  81 mg Oral Daily Bronson Umanzor MD   81 mg at 08/08/22 0932    atorvastatin tablet 40 mg  40 mg Oral Daily Bronson Umanzor MD   40 mg at 08/08/22 0935    benzonatate capsule 100 mg  100 mg Oral TID PRN Kathie Noble NP        dexAMETHasone tablet 6 mg  6 mg Oral Daily Bronson Umanzor MD   6 mg at 08/08/22 0935    dextrose 10% bolus 125 mL  12.5 g Intravenous PRN Kathie Noble NP        dextrose 10% bolus 250 mL  25 g  Intravenous PRN Kathie Noble NP        divalproex ER 24 hr tablet 1,000 mg  1,000 mg Oral QHS Bronson Umanzor MD   1,000 mg at 08/08/22 2140    enoxaparin injection 50 mg  1 mg/kg Subcutaneous BID Kathie Noble NP   50 mg at 08/08/22 2140    furosemide tablet 80 mg  80 mg Oral Daily Bronson Umanzor MD   80 mg at 08/08/22 0932    glucagon (human recombinant) injection 1 mg  1 mg Intramuscular PRN Kathie Noble NP        glucose chewable tablet 16 g  16 g Oral PRN Kathie Noble NP        glucose chewable tablet 24 g  24 g Oral PRN Kathie Noble NP        labetaloL injection 10 mg  10 mg Intravenous Q15 Min PRN Kathie Noble NP        lacosamide tablet 100 mg  100 mg Oral Q12H Bronson Umanzor MD   100 mg at 08/08/22 2140    levETIRAcetam tablet 1,000 mg  1,000 mg Oral BID Bronson Umanzor MD   1,000 mg at 08/08/22 2140    melatonin tablet 6 mg  6 mg Oral Nightly PRN Kathie Noble NP        metoprolol succinate (TOPROL-XL) 24 hr tablet 100 mg  100 mg Oral Daily Bronson Umanzor MD   100 mg at 08/08/22 0936    mupirocin 2 % ointment   Nasal BID Yue Duarte MD   Given at 08/08/22 2145    naloxone 0.4 mg/mL injection 0.02 mg  0.02 mg Intravenous PRN Kathie Noble NP        ondansetron injection 4 mg  4 mg Intravenous Q8H PRN Kathie Noble NP        sodium chloride 0.9% flush 10 mL  10 mL Intravenous PRN Kathie Noble NP        sodium chloride 0.9% flush 10 mL  10 mL Intravenous PRN Kathie Noble NP        sodium chloride 0.9% flush 3 mL  3 mL Intravenous Q12H PRN Kathie Noble NP         Current Discharge Medication List      START taking these medications    Details   benzonatate (TESSALON) 100 MG capsule Take 1 capsule (100 mg total) by mouth 3 (three) times daily as needed for Cough.  Qty: 30 capsule, Refills: 0         CONTINUE these medications which have  NOT CHANGED    Details   amiodarone (PACERONE) 200 MG Tab Take 1 tablet (200 mg total) by mouth once daily.  Qty: 30 tablet, Refills: 11      aspirin (ECOTRIN) 81 MG EC tablet Take 81 mg by mouth once daily at 6am.      atorvastatin (LIPITOR) 40 MG tablet Take 1 tablet (40 mg total) by mouth every evening.      divalproex ER (DEPAKOTE) 500 MG Tb24 Take 2 tablets (1,000 mg total) by mouth every evening.  Qty: 60 tablet, Refills: 11      donepeziL (ARICEPT) 5 MG tablet Take 5 mg by mouth nightly.      ELIQUIS 5 mg Tab Take 5 mg by mouth 2 (two) times daily.      furosemide (LASIX) 80 MG tablet Take 1 tablet (80 mg total) by mouth once daily.  Qty: 30 tablet, Refills: 11      lacosamide (VIMPAT) 100 mg Tab Take 1 tablet (100 mg total) by mouth every 12 (twelve) hours.  Qty: 60 tablet, Refills: 11      levETIRAcetam (KEPPRA) 1000 MG tablet Take 1 tablet (1,000 mg total) by mouth 2 (two) times daily.  Qty: 60 tablet, Refills: 11      metoprolol succinate (TOPROL-XL) 100 MG 24 hr tablet Take 3 tablets (300 mg total) by mouth once daily.  Qty: 90 tablet, Refills: 11      senna-docusate 8.6-50 mg (PERICOLACE) 8.6-50 mg per tablet Take 2 tablets by mouth once daily.  Qty: 60 tablet      azelastine (ASTELIN) 137 mcg (0.1 %) nasal spray 2 sprays 2 (two) times daily.      tamsulosin (FLOMAX) 0.4 mg Cap Take 1 capsule (0.4 mg total) by mouth once daily.  Qty: 30 capsule, Refills: 11         STOP taking these medications       amLODIPine (NORVASC) 5 MG tablet Comments:   Reason for Stopping:         omeprazole (PRILOSEC) 40 MG capsule Comments:   Reason for Stopping:         irbesartan-hydrochlorothiazide (AVALIDE) 300-12.5 mg per tablet Comments:   Reason for Stopping:         simvastatin (ZOCOR) 40 MG tablet Comments:   Reason for Stopping:                 I have seen and examined this patient within the last 30 days. My clinical findings that support the need for the home health skilled services and home bound status are the  following:no   Weakness/numbness causing balance and gait disturbance due to Weakness/Debility making it taxing to leave home.  Requiring assistive device to leave home due to unsteady gait caused by  Weakness/Debility.     Diet:   cardiac diet    Labs:  n/a    Referrals/ Consults  Physical Therapy to evaluate and treat. Evaluate for home safety and equipment needs; Establish/upgrade home exercise program. Perform / instruct on therapeutic exercises, gait training, transfer training, and Range of Motion.  Occupational Therapy to evaluate and treat. Evaluate home environment for safety and equipment needs. Perform/Instruct on transfers, ADL training, ROM, and therapeutic exercises.   to evaluate for community resources/long-range planning.  Aide to provide assistance with personal care, ADLs, and vital signs.    Activities:   activity as tolerated    Nursing:   Agency to admit patient within 24 hours of hospital discharge unless specified on physician order or at patient request    SN to complete comprehensive assessment including routine vital signs. Instruct on disease process and s/s of complications to report to MD. Review/verify medication list sent home with the patient at time of discharge  and instruct patient/caregiver as needed. Frequency may be adjusted depending on start of care date.     Skilled nurse to perform up to 3 visits PRN for symptoms related to diagnosis    Notify MD if SBP > 160 or < 90; DBP > 90 or < 50; HR > 120 or < 50; Temp > 101; O2 < 88%; Other:       Ok to schedule additional visits based on staff availability and patient request on consecutive days within the home health episode.    When multiple disciplines ordered:    Start of Care occurs on Sunday - Wednesday schedule remaining discipline evaluations as ordered on separate consecutive days following the start of care.    Thursday SOC -schedule subsequent evaluations Friday and Monday the following week.     Friday -  Saturday SOC - schedule subsequent discipline evaluations on consecutive days starting Monday of the following week.    For all post-discharge communication and subsequent orders please contact patient's primary care physician. If unable to reach primary care physician or do not receive response within 30 minutes, please contact PCP for clinical staff order clarification    Miscellaneous   Routine Skin for Bedridden Patients: Instruct patient/caregiver to apply moisture barrier cream to all skin folds and wet areas in perineal area daily and after baths and all bowel movements.    Home Health Aide:  Nursing Three times weekly, Physical Therapy Three times weekly, Occupational Therapy Three times weekly, Respiratory Therapy Three times weekly and Home Health Aide Three times weekly    Wound Care Orders  no    I certify that this patient is confined to her home and needs intermittent skilled nursing care, physical therapy and occupational therapy.

## 2022-08-09 NOTE — PT/OT/SLP PROGRESS
Occupational Therapy      Patient Name:  Teresa Leos   MRN:  [id number]    Patient not seen today secondary to  (pt discharged home prior to OT visit). Will follow-up no.    8/9/2022

## 2022-08-09 NOTE — PLAN OF CARE
Patient on room air with documented SpO2 and in no apparent distress. Will continue to monitor.  Pt complained about not been able to cough up secretions down her throat. Will contact doctor for acapella (flutter valve)

## 2022-08-09 NOTE — PROGRESS NOTES
IP Liaison - Final Visit Note    Patient: Teresa Leos  MRN:  [id number]  Date of Service:  8/9/2022  Completed by:  WILLIS Marr    Reason for Visit   Patient presents with    IP Liaison Chart Review     Patient discharged from hospital before SAMUELW was able to complete follow-up visit.        Patient Summary     Discharge Date: 8/8/2022  Discharge telephone number/address: [phone] / [phone] [facility] [id number]  Follow up provider: Joseph Mcpherson NP  Follow up appointments: 8/15/2022 @ 1:15pm  Home Health agency & telephone number: [facility] ordered &  name: n/a  Assigned OPCM RN/SW: n/a  Report sent to follow up team (PCP/OPCM) via in basket message: n/a  Community Resources arranged including agency name & contact info: n/a      WILLIS Marr

## 2022-08-09 NOTE — DISCHARGE SUMMARY
[facility] Medicine  Discharge Summary      Patient Name: Teresa Leos  MRN: [id number]  Patient Class: IP- Inpatient  Admission Date: 8/6/2022  Hospital Length of Stay: 3 days  Discharge Date and Time: No discharge date for patient encounter.  Attending Physician: Bronson Umanzor, *   Discharging Provider: Bronson Umanzor MD  Primary Care Provider: Joseph Mcpherson NP      HPI:   Per transfer :  Ms. Leos is a 76yo lady with a past medical history of left leg osteomyelitis due to enterococcus with hardware removal, GERD, H.pylori, Afib RVR on Eliquis, dementia, hyponatremia, prolonged QT, pulmonary HTN, DCHF, HLD, HTN and osteoporosis.  She also has a questionable diagnosis of seizures (isn't completely clear).  She has also had admissions for metabolic encephalopathy.  Her last TTE was done on 5/18/22 and showed and EF of 65%, Grade III diastolic dysfunction, PAP 51 and normal RV function.      She is now brought to the ED by her family due altered mental staus and slurred speech since this morning, along with cough and fever.  On exam she has no focal weakness, rather just lethargy, confusion and slurred speech.     In the ED her VS's were /76   Pulse (!) 113   Temp 101.3F ->99.3 °F (37.4 °C) (Oral)   Resp 20   Wt 50.8 kg (112 lb)   SpO2 95-97% RA   BMI 21.16 kg/m² .  Labs showed normal CV]BC, Na 131, Cr 0.9, normal LFTs.  , CPK 42, trop <0.006, Procal 0.04, COVID POSITIVE, FLU NEGATIVE.  UA normal.       CXR showed there is patchy opacification of the pulmonary parenchyma similar to prior exam concerning for pneumonia versus edema. NC CT head showed no acute abnormality.     In the ED she was treated with Tylenol suppository 650mg 1737, and NS 1L iv 1726.  I have asked the ED to give her Decadron 6mg iv, ASA 300mg MD, and Lovenox 50mg SQ x 1 (hasn't taken her Eliquis due to swallowing issues).  For now, she failed her nursing bedside swallow study.  This  "seems more consistent with septic encephalopathy, though lingering issue of CVA is the reason for transfer.    On arrival to [place], the patient is alert and stable on room air. Hypothermia noted. She has slurred speech and with some short term memory loss with a history of dementia. Very pleasant. Denies any complaints.       * No surgery found *      Hospital Course:   Ms. Leos presented with fevers, cough, lethargy, and slurred speech initially concerning for stroke; she was found to be COVID positive on admission.  Admitted for stroke workup:  MRI brain without acute stroke, although CTA did reveal right P comm aneurysm which appears stable.  Symptoms resolved rapidly.  Additionally, she was hypoxic on admission requiring initiation of remdesivir and dexamethasone for her COVID-19.  Respiratory status rapidly improved and she was able to be weaned off supplemental oxygen.  Evaluated by PT/OT/SLP and will arrange for home health at time of discharge.  She will continue aspirin and Lipitor.  Would recommend outpatient follow-up with Neurology and with Neurosurgery for monitoring of aneurysm.    /82   Pulse 71   Temp 97.5 °F (36.4 °C) (Oral)   Resp 18   Ht 5' 1" (1.549 m)   Wt 51.6 kg (113 lb 12.1 oz)   SpO2 96%   BMI 21.49 kg/m²     Physical Exam  Vitals and nursing note reviewed.   Constitutional:       General: She is not in acute distress.     Appearance: Normal appearance. She is not toxic-appearing.   HENT:      Head: Normocephalic and atraumatic.      Nose: Nose normal.      Mouth/Throat:      Mouth: Mucous membranes are moist.   Eyes:      Pupils: Pupils are equal, round, and reactive to light.   Cardiovascular:      Rate and Rhythm: Normal rate. Rhythm irregular.      Pulses: Normal pulses.      Heart sounds: Normal heart sounds.   Pulmonary:      Effort: Pulmonary effort is normal.      Breath sounds: Normal breath sounds.   Abdominal:      General: Bowel sounds are normal.      " Palpations: Abdomen is soft.   Musculoskeletal:         General: No swelling. Normal range of motion.      Cervical back: Normal range of motion and neck supple.   Skin:     General: Skin is warm and dry.   Neurological:      Mental Status: She is alert. She is disoriented.      Motor: Weakness present.   Psychiatric:         Behavior: Behavior normal.        Goals of Care Treatment Preferences:  Code Status: Full Code      Consults:   Consults (From admission, onward)        Status Ordering Provider     Inpatient consult to Neurology  Once        Provider:  (Not yet assigned)    Completed TAHIRA EARLY     Inpatient consult to Registered Dietitian/Nutritionist  Once        Provider:  (Not yet assigned)    Completed TAHIRA EARLY     IP consult to case management/social work  Once        Provider:  (Not yet assigned)    Acknowledged TAHIRA EARLY          No new Assessment & Plan notes have been filed under this hospital service since the last note was generated.  Service: Hospital Medicine    Final Active Diagnoses:    Diagnosis Date Noted POA    PRINCIPAL PROBLEM:  Encephalopathy due to COVID-19 virus [U07.1, G93.49] 08/06/2022 Yes    Weakness [R53.1] 08/07/2022 Yes    Aneurysm [I72.9] 08/07/2022 Yes    Stroke-like symptom [R29.90] 08/06/2022 Yes    Atrial fibrillation with rapid ventricular response [I48.91] 08/06/2022 Yes    Seizures [R56.9]  Yes    Dementia [F03.90] 05/06/2022 Yes    Essential hypertension [I10] 05/16/2013 Yes      Problems Resolved During this Admission:       Discharged Condition: good    Disposition: Home or Self Care    Follow Up:   Follow-up Information     Joseph Mcpherson NP Follow up on 8/15/2022.    Specialty: Family Medicine  Why: SCHED PCP w/NP Joseph Mcpherson on 8/15 at 1:15  Contact information:  [address] [place] [address]  3RD FLOOR  [facility]  [place] [id number] [phone]                       Patient Instructions:      Ambulatory  referral/consult to Vascular Neurology   Standing Status:    Referral Priority: Routine Referral Type: Consultation   Referral Reason: Specialty Services Required   Requested Specialty: Vascular Neurology   Number of Visits Requested: 1     Ambulatory referral/consult to Neurosurgery   Standing Status: Future   Referral Priority: Routine Referral Type: Consultation   Referral Reason: Specialty Services Required   Requested Specialty: Neurosurgery   Number of Visits Requested: 1     Diet Cardiac   Standing Status:    Order Comments: See Stroke Patient Education Guide Booklet for details.     Call 911 for any of the following:   Standing Status:    Order Comments: Call 911  right away if any of the following warning signs come on suddenly, even if the symptoms only last for a few minutes. With stroke, timing is very important.   - Warning Signs of Stroke:  - Weakness: You may feel a sudden weakness, tingling or loss of feeling on one side of your face or body.  - Vision Problems: You may have sudden double vision or trouble seeing in one or both eyes.  - Speech Problems: You may have sudden trouble talking, slured speech, or problems understanding others.  - Headache: You may have sudden, severe headache.  - Movement Problems: You may experience dizziness, a feeling of spinning, a loss of balance, a feeling of falling or blackouts.       Significant Diagnostic Studies:  See above    Pending Diagnostic Studies:     None         Medications:  Reconciled Home Medications:      Medication List      START taking these medications    benzonatate 100 MG capsule  Commonly known as: TESSALON  Take 1 capsule (100 mg total) by mouth 3 (three) times daily as needed for Cough.        CHANGE how you take these medications    metoprolol succinate 100 MG 24 hr tablet  Commonly known as: TOPROL-XL  Take 3 tablets (300 mg total) by mouth once daily.  What changed: how much to take        CONTINUE taking these medications    amiodarone  200 MG Tab  Commonly known as: PACERONE  Take 1 tablet (200 mg total) by mouth once daily.     aspirin 81 MG EC tablet  Commonly known as: ECOTRIN  Take 81 mg by mouth once daily at 6am.     atorvastatin 40 MG tablet  Commonly known as: LIPITOR  Take 1 tablet (40 mg total) by mouth every evening.     azelastine 137 mcg (0.1 %) nasal spray  Commonly known as: ASTELIN  2 sprays 2 (two) times daily.     divalproex 500 MG Tb24  Take 2 tablets (1,000 mg total) by mouth every evening.     donepeziL 5 MG tablet  Commonly known as: ARICEPT  Take 5 mg by mouth nightly.     ELIQUIS 5 mg Tab  Generic drug: apixaban  Take 5 mg by mouth 2 (two) times daily.     furosemide 80 MG tablet  Commonly known as: LASIX  Take 1 tablet (80 mg total) by mouth once daily.     lacosamide 100 mg Tab  Commonly known as: VIMPAT  Take 1 tablet (100 mg total) by mouth every 12 (twelve) hours.     levETIRAcetam 1000 MG tablet  Commonly known as: KEPPRA  Take 1 tablet (1,000 mg total) by mouth 2 (two) times daily.     senna-docusate 8.6-50 mg 8.6-50 mg per tablet  Commonly known as: PERICOLACE  Take 2 tablets by mouth once daily.     tamsulosin 0.4 mg Cap  Commonly known as: FLOMAX  Take 1 capsule (0.4 mg total) by mouth once daily.        STOP taking these medications    amLODIPine 5 MG tablet  Commonly known as: NORVASC     irbesartan-hydrochlorothiazide 300-12.5 mg per tablet  Commonly known as: AVALIDE     omeprazole 40 MG capsule  Commonly known as: PRILOSEC     simvastatin 40 MG tablet  Commonly known as: ZOCOR            Indwelling Lines/Drains at time of discharge:   Lines/Drains/Airways     Drain  Duration           Female External Urinary Catheter 08/07/22 0901 2 days                Time spent on the discharge of patient: 34 minutes         Bronson Umanzor MD  Department of Hospital Medicine  [facility]

## 2022-08-09 NOTE — PLAN OF CARE
SW spoke with pt via phone due to pt's covid positive status to complete final assessment. Pt stated that her daughter in law Ammy [phone] will help transport pt home. SW called Ammy she stated that she would be up at the hospital around 10:30 to help transport pt home. Pt has f/u appts listed on avs. PT will receive [facility] Phone: [phone]  services starting tomorrow. Rounds completed on pt.  All questions addressed.  Bedside nurse to discuss d/c medications.  Discussed importance to attend all f/u appts and take medications as prescribed.  Verbalized understanding.    PHILLIP Adam  [phone]       08/09/22 0840   Final Note   Assessment Type Final Discharge Note   Anticipated Discharge Disposition Home-Health   What phone number can be called within the next 1-3 days to see how you are doing after discharge? [phone]   Hospital Resources/Appts/Education Provided Appointments scheduled and added to AVS   Post-Acute Status   Post-Acute Authorization Home Health   Home Health Status Set-up Complete/Auth obtained   Coverage [employer]   Discharge Delays None known at this time

## 2022-08-09 NOTE — PLAN OF CARE
Problem: Adult Inpatient Plan of Care  Goal: Plan of Care Review  Outcome: Ongoing, Progressing     Problem: Cognitive Impairment (Stroke, Ischemic/Transient Ischemic Attack)  Goal: Optimal Cognitive Function  Outcome: Ongoing, Progressing     Problem: Functional Ability Impaired (Stroke, Ischemic/Transient Ischemic Attack)  Goal: Optimal Functional Ability  Outcome: Ongoing, Progressing     Problem: Respiratory Compromise (Stroke, Ischemic/Transient Ischemic Attack)  Goal: Effective Oxygenation and Ventilation  Outcome: Ongoing, Progressing     Problem: Fall Injury Risk  Goal: Absence of Fall and Fall-Related Injury  Outcome: Ongoing, Progressing     Problem: Infection  Goal: Absence of Infection Signs and Symptoms  Outcome: Ongoing, Progressing

## 2022-08-10 ENCOUNTER — PATIENT OUTREACH (OUTPATIENT)
Dept: ADMINISTRATIVE | Facility: CLINIC | Age: 77
End: 2022-08-10
Payer: MEDICARE

## 2022-08-10 NOTE — PROGRESS NOTES
C3 nurse spoke with Teresa Leos's daughter-in-law and caregiver, Margie for a TCC post hospital discharge follow up call. The patient has a scheduled HOSFU appointment with Joseph Mcpherson NP on 8/15/22 @ 1767.      `

## 2022-08-11 LAB
BACTERIA BLD CULT: NORMAL
BACTERIA BLD CULT: NORMAL

## 2022-08-12 ENCOUNTER — PATIENT MESSAGE (OUTPATIENT)
Dept: NEUROSURGERY | Facility: CLINIC | Age: 77
End: 2022-08-12
Payer: MEDICARE

## 2022-08-12 PROCEDURE — G0180 MD CERTIFICATION HHA PATIENT: HCPCS | Mod: ,,, | Performed by: HOSPITALIST

## 2022-08-12 PROCEDURE — G0180 PR HOME HEALTH MD CERTIFICATION: ICD-10-PCS | Mod: ,,, | Performed by: HOSPITALIST

## 2022-08-12 NOTE — PHYSICIAN QUERY
PT Name: Teresa Leos  MR #: [id number]    DOCUMENTATION CLARIFICATION     CDS/: Neyda Webb  RN CCDS            Contact information:[email or web address]  This form is a permanent document in the medical record.     Query Date: August 12, 2022    By submitting this query, we are merely seeking further clarification of documentation. Please utilize your independent clinical judgment when addressing the question(s) below.    The Medical Record contains the following:   Indicators   Supporting Clinical Findings Location in Medical Record   x AMS, Confusion,  LOC, etc.  She is now brought to the ED by her family due altered mental staus and slurred speech since this morning, along with cough and fever.  On exam she has no focal weakness, rather just lethargy, confusion and slurred speech.    Encephalopathy due to COVID-19 virus  Presented to ED with fevers, lethargy, cough, and slurred speech, now with hypothermia  -start remdesevir x 3 day course  -therapeutic lovenox for Afib  -hold steroids, no hypoxia  -isolation protocol  -warming measures   H&P              H&P   x Acute/Chronic Illness COVID-19, A-Fib, HTN, Epilepsy Discharge summary   x Radiology Findings No acute intracranial process with white matter changes again identified.  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 CTA head 8/6    Electrolyte Imbalance     x Medication Supplemental oxygen  Remdesivir  Decadron    Nursing flow sheets  MAR    Treatment              Other       The noted clinical guidelines are only system guidelines and do not replace the providers clinical judgment.    The National [place] of Neurologic Disorders and Stroke (NINDS) of the NIH describes encephalopathy as any diffuse disease of the brain that alters brain function or structure.    Please further specify the type of encephalopathy.  Thank you.  [  x ] Metabolic Encephalopathy - Due to electrolyte imbalance, metabolic derangements, or infectious  processes, includes Septic Encephalopathy, Uremic Encephalopathy   [   ] Encephalopathy, unspecified      [   ] Other neurological condition- Includes Post-ictal altered mental status (please specify condition): __________   [   ]  Clinically Undetermined           Please document in your progress notes daily for the duration of treatment until resolved, and include in your discharge summary.    References:  VLAD Jo RN, CCDS. (2018, June 9). Notes from the Instructor: Encephalopathy tips. Retrieved October 22, 2020, from [email or web address]/[email or web address]-[email or web address]-encephalopathy-tips    ICD-9-CM Coding Clinic First Quarter 2013, Effective with discharges: October 21, 2013 [facility] § Seizure with encephalopathy due to postictal state (2013).    ICD-10-CM/[facility] Integrated Codebook (Version V.20.8.10.0) [Computer software]. (2020). Retrieved October 21, 2020.    National [place] of Neurological Disorders and Stroke. (2019, March 27). Retrieved October 22, 2020, from https://www.ninds.nih.gov/Disorders/All-Disorders/Dgnuehkljlnikj-Qppjodbogrk-Xmkq    Form No. 99545

## 2022-08-17 ENCOUNTER — TELEPHONE (OUTPATIENT)
Dept: NEUROLOGY | Facility: CLINIC | Age: 77
End: 2022-08-17
Payer: MEDICARE

## 2022-08-17 NOTE — TELEPHONE ENCOUNTER
----- Message from Arielle Hernandez sent at 8/17/2022  3:31 PM CDT -----  Patient has a referral placed by Dr. Umanzor and waiting to be scheduled, can you please assist in scheduling patient diagnostic is Aneurysm [I72.9]    Thank you

## 2022-08-17 NOTE — TELEPHONE ENCOUNTER
Spoke with pt's daughter, Thomas. Pt is currently established with Neurology outside of [facility]. We could have seen her here for aneurysm eval/surveillance but daughter declined at this time.

## 2022-08-26 ENCOUNTER — DOCUMENT SCAN (OUTPATIENT)
Dept: HOME HEALTH SERVICES | Facility: HOSPITAL | Age: 77
End: 2022-08-26
Payer: MEDICARE

## 2022-08-26 ENCOUNTER — EXTERNAL HOME HEALTH (OUTPATIENT)
Dept: HOME HEALTH SERVICES | Facility: HOSPITAL | Age: 77
End: 2022-08-26
Payer: MEDICARE

## 2022-10-07 PROBLEM — J18.9 RLL PNEUMONIA: Status: ACTIVE | Noted: 2022-10-07

## 2022-10-07 PROBLEM — Z91.89 AT HIGH RISK FOR ASPIRATION: Status: ACTIVE | Noted: 2022-10-07

## 2022-10-08 PROBLEM — E63.9 INADEQUATE DIETARY ENERGY INTAKE: Status: ACTIVE | Noted: 2022-10-08

## 2022-10-09 PROBLEM — J18.9 RLL PNEUMONIA: Status: RESOLVED | Noted: 2022-10-07 | Resolved: 2022-10-09

## 2022-10-11 PROBLEM — G92.9 ENCEPHALOPATHY, TOXIC: Status: ACTIVE | Noted: 2022-10-11

## 2025-01-23 NOTE — NURSING
Message sent to MD due to patient BP noted 110/61 wanted to clarify if 80 mg matthias lasix should be given to this patient due to this nurse did not want to drop this patients blood pressure.   2 seconds or less